# Patient Record
Sex: MALE | Race: WHITE | NOT HISPANIC OR LATINO | Employment: OTHER | URBAN - METROPOLITAN AREA
[De-identification: names, ages, dates, MRNs, and addresses within clinical notes are randomized per-mention and may not be internally consistent; named-entity substitution may affect disease eponyms.]

---

## 2017-01-03 ENCOUNTER — OFFICE VISIT (OUTPATIENT)
Dept: CARDIOLOGY | Facility: CLINIC | Age: 72
End: 2017-01-03
Attending: INTERNAL MEDICINE
Payer: COMMERCIAL

## 2017-01-03 VITALS
SYSTOLIC BLOOD PRESSURE: 136 MMHG | BODY MASS INDEX: 24.22 KG/M2 | DIASTOLIC BLOOD PRESSURE: 76 MMHG | WEIGHT: 173 LBS | HEIGHT: 71 IN | HEART RATE: 52 BPM

## 2017-01-03 DIAGNOSIS — I48.0 PAROXYSMAL A-FIB (H): ICD-10-CM

## 2017-01-03 PROCEDURE — 99213 OFFICE O/P EST LOW 20 MIN: CPT | Mod: 25 | Performed by: NURSE PRACTITIONER

## 2017-01-03 PROCEDURE — 93000 ELECTROCARDIOGRAM COMPLETE: CPT | Performed by: NURSE PRACTITIONER

## 2017-01-03 NOTE — Clinical Note
1/3/2017    Sabas Staley MD  [facility]   [address]  [facility] [address]    RE: Rey Benjaminalex       Dear Colleague,    I had the pleasure of seeing Rey Brown in the [facility].  HPI: I saw Mr. Brown for followup of paroxysmal atrial fibrillation.  He is a 70-year-old white male with symptomatic paroxysmal atrial fibrillation.  He has been on flecainide 100 mg p.o. b.i.d. without side effects or recurrence of atrial fibrillation.  His heart rates have been in the 50's on the flecainide but he is asymptomatic.  He remains  He is very physically active without any limitations.      EKG today showed sinus bradycardia at 49 beats per minute.  The QRS duration was normal.      ASSESSMENT AND PLAN:  Mr. Brown is doing well symptomatically.  He is not having any problems with the flecainide therapy.  He will continue the current medication and return for followup in 1 year.          Encounter Diagnosis   Name Primary?     Paroxysmal a-fib (H)        CURRENT MEDICATIONS:  Current Outpatient Prescriptions   Medication Sig Dispense Refill     flecainide (TAMBOCOR) 100 MG tablet Take 1 tablet (100 mg) by mouth 2 times daily 180 tablet 3     fluticasone (FLONASE) 50 MCG/ACT nasal spray        simvastatin (ZOCOR) 20 MG tablet TAKE ONE TABLET BY MOUTH AT BEDTIME 90 tablet 3     sildenafil (VIAGRA) 100 MG tablet Take 0.5-1 tablets ( mg) by mouth daily as needed for erectile dysfunction 6 tablet 0     aspirin 81 MG tablet Take  by mouth daily.         ALLERGIES   No Known Allergies    PAST MEDICAL HISTORY:  Past Medical History   Diagnosis Date     Paroxysmal a-fib (H)      Hypercholesterolemia      Lumbar disc disease        PAST SURGICAL HISTORY:  Past Surgical History   Procedure Laterality Date     Tonsillectomy       Open reduction internal fixation hand       Rhinoplasty         FAMILY HISTORY:  Family History   Problem Relation  "Age of Onset     Dementia Father      Osteoarthritis Father      CEREBROVASCULAR DISEASE Father      Myocardial Infarction Mother      C.A.D. Mother      Coronary Artery Disease Mother        SOCIAL HISTORY:  Social History     Social History     Marital Status:      Spouse Name: Erin     Number of Children: N/A     Years of Education: N/A     Social History Main Topics     Smoking status: Never Smoker      Smokeless tobacco: Never Used     Alcohol Use: 0.0 oz/week     .1 Cans of beer per week      Comment: social only     Drug Use: None     Sexual Activity: Not Asked     Other Topics Concern     Special Diet No     Exercise Yes     2-3x/week     Social History Narrative       Review of Systems:  Skin:  Negative       Eyes:  Negative      ENT:  Negative      Respiratory:  Negative       Cardiovascular:  Negative      Gastroenterology: Negative      Genitourinary:  Negative      Musculoskeletal:  Negative      Neurologic:  Positive for headaches    Psychiatric:  Negative      Heme/Lymph/Imm:  Negative      Endocrine:  Negative        Physical Exam:  Vitals: /76 mmHg  Pulse 52  Ht 1.791 m (5' 10.5\")  Wt 78.472 kg (173 lb)  BMI 24.46 kg/m2    Constitutional:  cooperative, alert and oriented, well developed, well nourished, in no acute distress        Skin:  warm and dry to the touch        Head:  normocephalic        Eyes:  pupils equal and round        ENT:  no pallor or cyanosis        Neck:  carotid pulses are full and equal bilaterally, JVP normal, no carotid bruit, no thyromegaly        Chest:  normal breath sounds, clear to auscultation, normal A-P diameter, normal symmetry, normal respiratory excursion, no use of accessory muscles          Cardiac: regular rhythm, normal S1/S2, no S3 or S4, apical impulse not displaced, no murmurs, gallops or rubs                  Abdomen:           Vascular:                                          Extremities and Back:  no edema              Neurological:  " affect appropriate, oriented to time, person and place            Thank you for allowing me to participate in the care of your patient.      Sincerely,     Kaylan Oleary, NP, APRN [facility]

## 2017-01-03 NOTE — PROGRESS NOTES
HPI: I saw Mr. Brown for followup of paroxysmal atrial fibrillation.  He is a 70-year-old white male with symptomatic paroxysmal atrial fibrillation.  He has been on flecainide 100 mg p.o. b.i.d. without side effects or recurrence of atrial fibrillation.  His heart rates have been in the 50's on the flecainide but he is asymptomatic.  He remains  He is very physically active without any limitations.      EKG today showed sinus bradycardia at 49 beats per minute.  The QRS duration was normal.      ASSESSMENT AND PLAN:  Mr. Brown is doing well symptomatically.  He is not having any problems with the flecainide therapy.  He will continue the current medication and return for followup in 1 year.          Encounter Diagnosis   Name Primary?     Paroxysmal a-fib (H)        CURRENT MEDICATIONS:  Current Outpatient Prescriptions   Medication Sig Dispense Refill     flecainide (TAMBOCOR) 100 MG tablet Take 1 tablet (100 mg) by mouth 2 times daily 180 tablet 3     fluticasone (FLONASE) 50 MCG/ACT nasal spray        simvastatin (ZOCOR) 20 MG tablet TAKE ONE TABLET BY MOUTH AT BEDTIME 90 tablet 3     sildenafil (VIAGRA) 100 MG tablet Take 0.5-1 tablets ( mg) by mouth daily as needed for erectile dysfunction 6 tablet 0     aspirin 81 MG tablet Take  by mouth daily.         ALLERGIES   No Known Allergies    PAST MEDICAL HISTORY:  Past Medical History   Diagnosis Date     Paroxysmal a-fib (H)      Hypercholesterolemia      Lumbar disc disease        PAST SURGICAL HISTORY:  Past Surgical History   Procedure Laterality Date     Tonsillectomy       Open reduction internal fixation hand       Rhinoplasty         FAMILY HISTORY:  Family History   Problem Relation Age of Onset     Dementia Father      Osteoarthritis Father      CEREBROVASCULAR DISEASE Father      Myocardial Infarction Mother      C.A.D. Mother      Coronary Artery Disease Mother        SOCIAL HISTORY:  Social History     Social History     Marital  "Status:      Spouse Name: Erin     Number of Children: N/A     Years of Education: N/A     Social History Main Topics     Smoking status: Never Smoker      Smokeless tobacco: Never Used     Alcohol Use: 0.0 oz/week     .1 Cans of beer per week      Comment: social only     Drug Use: None     Sexual Activity: Not Asked     Other Topics Concern     Special Diet No     Exercise Yes     2-3x/week     Social History Narrative       Review of Systems:  Skin:  Negative       Eyes:  Negative      ENT:  Negative      Respiratory:  Negative       Cardiovascular:  Negative      Gastroenterology: Negative      Genitourinary:  Negative      Musculoskeletal:  Negative      Neurologic:  Positive for headaches    Psychiatric:  Negative      Heme/Lymph/Imm:  Negative      Endocrine:  Negative        Physical Exam:  Vitals: /76 mmHg  Pulse 52  Ht 1.791 m (5' 10.5\")  Wt 78.472 kg (173 lb)  BMI 24.46 kg/m2    Constitutional:  cooperative, alert and oriented, well developed, well nourished, in no acute distress        Skin:  warm and dry to the touch        Head:  normocephalic        Eyes:  pupils equal and round        ENT:  no pallor or cyanosis        Neck:  carotid pulses are full and equal bilaterally, JVP normal, no carotid bruit, no thyromegaly        Chest:  normal breath sounds, clear to auscultation, normal A-P diameter, normal symmetry, normal respiratory excursion, no use of accessory muscles          Cardiac: regular rhythm, normal S1/S2, no S3 or S4, apical impulse not displaced, no murmurs, gallops or rubs                  Abdomen:           Vascular:                                          Extremities and Back:  no edema              Neurological:  affect appropriate, oriented to time, person and place              CC  Sharmin Scott MD   [facility]  [address]  [address]  [place] [id number]                "

## 2017-01-03 NOTE — Clinical Note
1/3/2017    Sharmin Scott MD   [facility]  [address]  [address]  [place] [address]    RE: Rey Perkinsbarbara       Dear Colleague,    [facility] the [facility].  HPI: I saw Mr. Brown for followup of paroxysmal atrial fibrillation.  He is a 70-year-old white male with symptomatic paroxysmal atrial fibrillation.  He has been on flecainide 100 mg p.o. b.i.d. without side effects or recurrence of atrial fibrillation.  His heart rates have been in the 50's on the flecainide but he is asymptomatic.  He remains  He is very physically active without any limitations.      EKG today showed sinus bradycardia at 49 beats per minute.  The QRS duration was normal.      ASSESSMENT AND PLAN:  Mr. Brown is doing well symptomatically.  He is not having any problems with the flecainide therapy.  He will continue the current medication and return for followup in 1 year.          Encounter Diagnosis   Name Primary?     Paroxysmal a-fib (H)        CURRENT MEDICATIONS:  Current Outpatient Prescriptions   Medication Sig Dispense Refill     flecainide (TAMBOCOR) 100 MG tablet Take 1 tablet (100 mg) by mouth 2 times daily 180 tablet 3     fluticasone (FLONASE) 50 MCG/ACT nasal spray        simvastatin (ZOCOR) 20 MG tablet TAKE ONE TABLET BY MOUTH AT BEDTIME 90 tablet 3     sildenafil (VIAGRA) 100 MG tablet Take 0.5-1 tablets ( mg) by mouth daily as needed for erectile dysfunction 6 tablet 0     aspirin 81 MG tablet Take  by mouth daily.         ALLERGIES   No Known Allergies    PAST MEDICAL HISTORY:  Past Medical History   Diagnosis Date     Paroxysmal a-fib (H)      Hypercholesterolemia      Lumbar disc disease        PAST SURGICAL HISTORY:  Past Surgical History   Procedure Laterality Date     Tonsillectomy       Open reduction internal fixation hand       Rhinoplasty         FAMILY HISTORY:  Family History   Problem Relation Age of Onset      "Dementia Father      Osteoarthritis Father      CEREBROVASCULAR DISEASE Father      Myocardial Infarction Mother      C.A.D. Mother      Coronary Artery Disease Mother        SOCIAL HISTORY:  Social History     Social History     Marital Status:      Spouse Name: Erin     Number of Children: N/A     Years of Education: N/A     Social History Main Topics     Smoking status: Never Smoker      Smokeless tobacco: Never Used     Alcohol Use: 0.0 oz/week     .1 Cans of beer per week      Comment: social only     Drug Use: None     Sexual Activity: Not Asked     Other Topics Concern     Special Diet No     Exercise Yes     2-3x/week     Social History Narrative       Review of Systems:  Skin:  Negative       Eyes:  Negative      ENT:  Negative      Respiratory:  Negative       Cardiovascular:  Negative      Gastroenterology: Negative      Genitourinary:  Negative      Musculoskeletal:  Negative      Neurologic:  Positive for headaches    Psychiatric:  Negative      Heme/Lymph/Imm:  Negative      Endocrine:  Negative        Physical Exam:  Vitals: /76 mmHg  Pulse 52  Ht 1.791 m (5' 10.5\")  Wt 78.472 kg (173 lb)  BMI 24.46 kg/m2    Constitutional:  cooperative, alert and oriented, well developed, well nourished, in no acute distress        Skin:  warm and dry to the touch        Head:  normocephalic        Eyes:  pupils equal and round        ENT:  no pallor or cyanosis        Neck:  carotid pulses are full and equal bilaterally, JVP normal, no carotid bruit, no thyromegaly        Chest:  normal breath sounds, clear to auscultation, normal A-P diameter, normal symmetry, normal respiratory excursion, no use of accessory muscles          Cardiac: regular rhythm, normal S1/S2, no S3 or S4, apical impulse not displaced, no murmurs, gallops or rubs                  Abdomen:           Vascular:                                          Extremities and Back:  no edema              Neurological:  affect appropriate, " oriented to time, person and place              CC  Sharmin Scott MD   [facility]  [address]  [address]  [place] [address]                  Thank you for allowing me to participate in the care of your patient.      Sincerely,     Kaylan Oleary, NP, APRN CNP     [facility] [place]    cc:   Sharmin Scott MD   [facility]  [address]  [address]  [place] [address]    Sabas Staley MD  [facility] [address]  [facility] [address]

## 2017-01-12 ENCOUNTER — MYC MEDICAL ADVICE (OUTPATIENT)
Dept: FAMILY MEDICINE | Facility: CLINIC | Age: 72
End: 2017-01-12

## 2017-01-12 RX ORDER — SILDENAFIL 100 MG/1
50-100 TABLET, FILM COATED ORAL DAILY PRN
Qty: 90 TABLET | Refills: 1 | COMMUNITY
Start: 2017-01-12 | End: 2017-01-12

## 2017-01-12 RX ORDER — SILDENAFIL 100 MG/1
50-100 TABLET, FILM COATED ORAL DAILY PRN
Qty: 16 TABLET | Refills: 3 | COMMUNITY
Start: 2017-01-12 | End: 2018-11-27 | Stop reason: SINTOL

## 2017-01-13 NOTE — TELEPHONE ENCOUNTER
Per patients request a rx for Viagra was mailed to him for Lorena fill.  Per Dr Luís FISHER for Viagra 100 mg.  Mailed out rx to patient.

## 2017-01-23 ENCOUNTER — OFFICE VISIT (OUTPATIENT)
Dept: FAMILY MEDICINE | Facility: CLINIC | Age: 72
End: 2017-01-23

## 2017-01-23 VITALS
HEART RATE: 45 BPM | SYSTOLIC BLOOD PRESSURE: 138 MMHG | OXYGEN SATURATION: 99 % | WEIGHT: 172 LBS | DIASTOLIC BLOOD PRESSURE: 70 MMHG | BODY MASS INDEX: 24.32 KG/M2

## 2017-01-23 DIAGNOSIS — M25.611 SHOULDER STIFFNESS, RIGHT: Primary | ICD-10-CM

## 2017-01-23 PROCEDURE — 99213 OFFICE O/P EST LOW 20 MIN: CPT | Performed by: FAMILY MEDICINE
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
## 2017-03-02 ENCOUNTER — OFFICE VISIT (OUTPATIENT)
Dept: FAMILY MEDICINE | Facility: CLINIC | Age: 72
End: 2017-03-02

## 2017-03-02 VITALS
SYSTOLIC BLOOD PRESSURE: 138 MMHG | WEIGHT: 169.2 LBS | BODY MASS INDEX: 24.22 KG/M2 | DIASTOLIC BLOOD PRESSURE: 68 MMHG | HEIGHT: 70 IN | OXYGEN SATURATION: 92 % | RESPIRATION RATE: 16 BRPM | HEART RATE: 53 BPM

## 2017-03-02 DIAGNOSIS — B07.0 PLANTAR WART: Primary | ICD-10-CM

## 2017-03-02 DIAGNOSIS — E78.00 HYPERCHOLESTEROLEMIA: ICD-10-CM

## 2017-03-02 PROCEDURE — 17110 DESTRUCTION B9 LES UP TO 14: CPT | Performed by: FAMILY MEDICINE

## 2017-03-02 PROCEDURE — 99207 ZZC NO CHARGE LOS: CPT | Performed by: FAMILY MEDICINE

## 2017-03-02 RX ORDER — SIMVASTATIN 20 MG
TABLET ORAL
Qty: 90 TABLET | Refills: 3 | Status: SHIPPED | OUTPATIENT
Start: 2017-03-02 | End: 2017-07-09

## 2017-03-02 NOTE — MR AVS SNAPSHOT
After Visit Summary   3/2/2017    Rey Brown    MRN: [id number]           Patient Information     Date Of Birth          1945        Visit Information        Provider Department      3/2/2017 9:15 AM Kel Burton MD [facility]        Today's Diagnoses     Plantar wart    -  1    Hypercholesterolemia           Follow-ups after your visit        Your next 10 appointments already scheduled     Apr 14, 2017  7:45 AM CDT   PHYSICAL with Sabas Staley MD   [facility] ([facility])    [address] [place] [address] [phone]              Who to contact     If you have questions or need follow up information about today's clinic visit or your schedule please contact [facility] directly at [phone].  Normal or non-critical lab and imaging results will be communicated to you by MyChart, letter or phone within 4 business days after the clinic has received the results. If you do not hear from us within 7 days, please contact the clinic through MyChart or phone. If you have a critical or abnormal lab result, we will notify you by phone as soon as possible.  Submit refill requests through [employer] or call your pharmacy and they will forward the refill request to us. Please allow 3 business days for your refill to be completed.          Additional Information About Your Visit        MyChart Information     [employer] gives you secure access to your electronic health record. If you see a primary care provider, you can also send messages to your care team and make appointments. If you have questions, please call your primary care clinic.  If you do not have a primary care provider, please call [phone] and they will assist you.        Care EveryWhere ID     This is your Care EveryWhere ID. This could be used by other organizations to access your [place] medical records  [id number]        Your Vitals Were     Pulse  "Respirations Height Pulse Oximetry BMI (Body Mass Index)       53 16 1.765 m (5' 9.5\") 92% 24.63 kg/m2        Blood Pressure from Last 3 Encounters:   03/02/17 138/68   01/23/17 138/70   01/03/17 136/76    Weight from Last 3 Encounters:   03/02/17 76.7 kg (169 lb 3.2 oz)   01/23/17 78 kg (172 lb)   01/03/17 78.5 kg (173 lb)              We Performed the Following     DESTRUCT BENIGN LESION, UP TO 14          Where to get your medicines      These medications were sent to [facility] - [address]  [address] [facility] [address]    Hours:  Mon-Fri 8:30am-5:00pm Toll Free [phone] Phone:  [phone]     simvastatin 20 MG tablet          Primary Care Provider Office Phone # Fax #    Sabas Staley [phone]       [facility] [address]  [facility] [address]        Thank you!     Thank you for choosing [facility]  for your care. Our goal is always to provide you with excellent care. Hearing back from our patients is one way we can continue to improve our services. Please take a few minutes to complete the written survey that you may receive in the mail after your visit with us. Thank you!             Your Updated Medication List - Protect others around you: Learn how to safely use, store and throw away your medicines at www.disposemymeds.org.          This list is accurate as of: 3/2/17  9:30 AM.  Always use your most recent med list.                   Brand Name Dispense Instructions for use    aspirin 81 MG tablet      Take  by mouth daily.       BREATHE RIGHT LARGE Strp      nightly as needed       flecainide 100 MG tablet    TAMBOCOR    180 tablet    Take 1 tablet (100 mg) by mouth 2 times daily       fluticasone 50 MCG/ACT spray    FLONASE     as needed for rhinitis Reported on 3/2/2017       simvastatin 20 MG tablet    ZOCOR    90 tablet    TAKE ONE TABLET BY MOUTH AT BEDTIME       " VIAGRA 100 MG cap/tab   Generic drug:  sildenafil     16 tablet    Take 0.5-1 tablets ( mg) by mouth daily as needed for erectile dysfunction Take 30 min to 4 hours before intercourse.  Never use with nitroglycerin, terazosin or doxazosin.       VITAMIN C PO      Take 500 mg by mouth daily       VITAMIN D (CHOLECALCIFEROL) PO      Take 1,000 Units by mouth daily

## 2017-03-02 NOTE — PROGRESS NOTES
"Wart left foot  Plantar  And back of right hand  For months  Has had before    Past history positive for warts.  Review of systems  No pain at the site of these warts.  Complaints of some numbness in both feet    Vital signs.  /68  Pulse 53  Resp 16  Ht 1.765 m (5' 9.5\")  Wt 76.7 kg (169 lb 3.2 oz)  SpO2 92%  BMI 24.63 kg/m2    Healthy-appearing man  Dorsum of RIGHT hand has a 1 mm raised whitish papule.  It's rough and firm  Plantar surface of his LEFT foot mid foot has a raised rough slightly irregular thickened area of skin with some black punctation.    Cryotherapy is applied to both lesions 20 seconds to the back of hand 40 seconds to the bottom of his foot ×2     Salomon Puente MD    "

## 2017-04-14 ENCOUNTER — OFFICE VISIT (OUTPATIENT)
Dept: FAMILY MEDICINE | Facility: CLINIC | Age: 72
End: 2017-04-14

## 2017-04-14 VITALS
OXYGEN SATURATION: 99 % | WEIGHT: 169 LBS | HEIGHT: 70 IN | SYSTOLIC BLOOD PRESSURE: 158 MMHG | BODY MASS INDEX: 24.2 KG/M2 | DIASTOLIC BLOOD PRESSURE: 70 MMHG | HEART RATE: 55 BPM

## 2017-04-14 DIAGNOSIS — Z00.00 ROUTINE GENERAL MEDICAL EXAMINATION AT A HEALTH CARE FACILITY: Primary | ICD-10-CM

## 2017-04-14 DIAGNOSIS — R97.20 ELEVATED PROSTATE SPECIFIC ANTIGEN (PSA): ICD-10-CM

## 2017-04-14 DIAGNOSIS — I10 ESSENTIAL HYPERTENSION: ICD-10-CM

## 2017-04-14 DIAGNOSIS — E78.00 HYPERCHOLESTEREMIA: ICD-10-CM

## 2017-04-14 DIAGNOSIS — Z11.59 NEED FOR HEPATITIS C SCREENING TEST: ICD-10-CM

## 2017-04-14 DIAGNOSIS — G44.219 EPISODIC TENSION-TYPE HEADACHE, NOT INTRACTABLE: ICD-10-CM

## 2017-04-14 DIAGNOSIS — I48.0 PAROXYSMAL A-FIB (H): ICD-10-CM

## 2017-04-14 LAB
% GRANULOCYTES: 66.6 % (ref 42.2–75.2)
HCT VFR BLD AUTO: 49.2 % (ref 39–51)
HEMOGLOBIN: 16.5 G/DL (ref 13.4–17.5)
LYMPHOCYTES NFR BLD AUTO: 22.5 % (ref 20.5–51.1)
MCH RBC QN AUTO: 30.4 PG (ref 27–31)
MCHC RBC AUTO-ENTMCNC: 33.6 G/DL (ref 33–37)
MCV RBC AUTO: 90.3 FL (ref 80–100)
MONOCYTES NFR BLD AUTO: 10.9 % (ref 1.7–9.3)
PLATELET # BLD AUTO: 210 K/UL (ref 140–450)
RBC # BLD AUTO: 5.44 X10/CMM (ref 4.2–5.9)
WBC # BLD AUTO: 4.7 X10/CMM (ref 3.8–11)

## 2017-04-14 PROCEDURE — 85025 COMPLETE CBC W/AUTO DIFF WBC: CPT | Performed by: FAMILY MEDICINE

## 2017-04-14 PROCEDURE — G0439 PPPS, SUBSEQ VISIT: HCPCS | Performed by: FAMILY MEDICINE

## 2017-04-14 PROCEDURE — 36415 COLL VENOUS BLD VENIPUNCTURE: CPT | Performed by: FAMILY MEDICINE

## 2017-04-14 PROCEDURE — 99214 OFFICE O/P EST MOD 30 MIN: CPT | Mod: 25 | Performed by: FAMILY MEDICINE

## 2017-04-14 RX ORDER — LOSARTAN POTASSIUM 25 MG/1
25 TABLET ORAL DAILY
Qty: 30 TABLET | Refills: 11 | Status: SHIPPED | OUTPATIENT
Start: 2017-04-14 | End: 2018-03-01

## 2017-04-14 NOTE — PROGRESS NOTES
SUBJECTIVE:                                                            Rey Brown is a 72 year old male who presents for Preventive Visit.      Are you in the first 12 months of your Medicare Part B coverage?  No    Healthy Habits:    Do you get at least three servings of calcium containing foods daily (dairy, green leafy vegetables, etc.)? yes    Amount of exercise or daily activities, outside of work: 6 day(s) per week    Problems taking medications regularly No    Medication side effects: No    Have you had an eye exam in the past two years? yes    Do you see a dentist twice per year? yes    Do you have sleep apnea, excessive snoring or daytime drowsiness?no    COGNITIVE SCREEN  1) Repeat 3 items (Banana, Sunrise, Chair)    2) Clock draw: NORMAL  3) 3 item recall: Recalls 3 objects  Results: 3 items recalled: COGNITIVE IMPAIRMENT LESS LIKELY    Mini-CogTM Copyright S Gerry. Licensed by the author for use in [facility]; reprinted with permission ([email or web address]). All rights reserved.            Headaches    Reviewed and updated as needed this visit by clinical staff         Reviewed and updated as needed this visit by Provider        Social History   Substance Use Topics     Smoking status: Never Smoker     Smokeless tobacco: Never Used     Alcohol use 0.0 oz/week     0 Cans of beer per week      Comment: social only       The patient does not drink >3 drinks per day nor >7 drinks per week.    Today's PHQ-2 Score:   PHQ-2 ( 1999 Pfizer) 6/22/2015 5/7/2014   Q1: Little interest or pleasure in doing things 0 0   Q2: Feeling down, depressed or hopeless 0 0   PHQ-2 Score 0 0       Do you feel safe in your environment - Yes    Do you have a Health Care Directive?: No: Advance care planning reviewed with patient; information given to patient to review.    Current providers sharing in care for this patient include:   Patient Care Team:  Sabas Staley MD as PCP - General (Family  Practice)    HEARING FREQUENCY:   Right Ear: passed  Left Ear: passed      Hearing impairment: No    Ability to successfully perform activities of daily living: Yes, no assistance needed     Fall risk:  Fallen 2 or more times in the past year?: No  Any fall with injury in the past year?: No    Home safety:  throw rugs in the hallway and lack of grab bars in the bathroom      The following health maintenance items are reviewed in Epic and correct as of today:  Health Maintenance   Topic Date Due     HEPATITIS C SCREENING  04/10/1965     AORTIC ANEURYSM SCREENING (SYSTEM ASSIGNED)  04/10/2012     FALL RISK ASSESSMENT  06/22/2016     INFLUENZA VACCINE (SYSTEM ASSIGNED)  09/01/2017     ADVANCE DIRECTIVE PLANNING Q5 YRS (NO INBASKET)  05/07/2019     LIPID SCREEN Q5 YR MALE (SYSTEM ASSIGNED)  06/15/2020     TETANUS IMMUNIZATION (SYSTEM ASSIGNED)  04/30/2022     COLON CANCER SCREEN (SYSTEM ASSIGNED)  08/24/2026     PNEUMOCOCCAL  Completed              ROS:  Constitutional, HEENT, cardiovascular, pulmonary, GI, , musculoskeletal, neuro, skin, endocrine and psych systems are negative, except as otherwise noted.    Patient Active Problem List   Diagnosis     Paroxysmal a-fib (H)     HTN (hypertension)     Hypercholesterolemia     Lumbar disc disease     ACP (advance care planning)     Health Care Home     Elevated prostate specific antigen (PSA)     Family history of migraine     Plantar wart     Past Surgical History:   Procedure Laterality Date     OPEN REDUCTION INTERNAL FIXATION HAND       RHINOPLASTY       TONSILLECTOMY         Social History   Substance Use Topics     Smoking status: Never Smoker     Smokeless tobacco: Never Used     Alcohol use 0.0 oz/week     0 Cans of beer per week      Comment: social only     Family History   Problem Relation Age of Onset     Dementia Father      Osteoarthritis Father      CEREBROVASCULAR DISEASE Father      Myocardial Infarction Mother      C.A.D. Mother      Coronary Artery  "Disease Mother          OBJECTIVE:                                                            There were no vitals taken for this visit. Estimated body mass index is 24.63 kg/(m^2) as calculated from the following:    Height as of 3/2/17: 1.765 m (5' 9.5\").    Weight as of 3/2/17: 76.7 kg (169 lb 3.2 oz).  EXAM:       ASSESSMENT / PLAN:                                                            Rey was seen today for physical and hearing screening.    Diagnoses and all orders for this visit:    Routine general medical examination at a health care facility        End of Life Planning:  Patient currently has an advanced directive: Yes.  Practitioner is supportive of decision.    COUNSELING:  Reviewed preventive health counseling, as reflected in patient instructions       Regular exercise       Healthy diet/nutrition        Estimated body mass index is 24.63 kg/(m^2) as calculated from the following:    Height as of 3/2/17: 1.765 m (5' 9.5\").    Weight as of 3/2/17: 76.7 kg (169 lb 3.2 oz).     reports that he has never smoked. He has never used smokeless tobacco.      Appropriate preventive services were discussed with this patient, including applicable screening as appropriate for cardiovascular disease, diabetes, osteopenia/osteoporosis, and glaucoma.  As appropriate for age/gender, discussed screening for colorectal cancer, prostate cancer, breast cancer, and cervical cancer. Checklist reviewing preventive services available has been given to the patient.    Reviewed patients plan of care and provided an AVS. The Basic Care Plan (routine screening as documented in Health Maintenance) for Rey meets the Care Plan requirement. This Care Plan has been established and reviewed with the Patient.    Counseling Resources:  ATP IV Guidelines  Pooled Cohorts Equation Calculator  Breast Cancer Risk Calculator  FRAX Risk Assessment  ICSI Preventive Guidelines  Dietary Guidelines for Americans, 2010  USDA's " MyPlate  ASA Prophylaxis  Lung CA Screening    Sabas Staley MD  [facility]

## 2017-04-14 NOTE — MR AVS SNAPSHOT
After Visit Summary   4/14/2017    Rey Brown    MRN: [id number]           Patient Information     Date Of Birth          1945        Visit Information        Provider Department      4/14/2017 7:45 AM Sabas Staley MD [facility]        Today's Diagnoses     Routine general medical examination at a health care facility    -  1    Paroxysmal a-fib (H)        Essential hypertension        Elevated prostate specific antigen (PSA)        Episodic tension-type headache, not intractable        Refill clinic medication management patient        Hypercholesteremia          Care Instructions      Preventive Health Recommendations:       Male Ages 65 and over    Yearly exam:             See your health care provider every year in order to  o   Review health changes.   o   Discuss preventive care.    o   Review your medicines if your doctor has prescribed any.    Talk with your health care provider about whether you should have a test to screen for prostate cancer (PSA).    Every 3 years, have a diabetes test (fasting glucose). If you are at risk for diabetes, you should have this test more often.    Every 5 years, have a cholesterol test. Have this test more often if you are at risk for high cholesterol or heart disease.     Every 10 years, have a colonoscopy. Or, have a yearly FIT test (stool test). These exams will check for colon cancer.    Talk to with your health care provider about screening for Abdominal Aortic Aneurysm if you have a family history of AAA or have a history of smoking.  Shots:     Get a flu shot each year.     Get a tetanus shot every 10 years.     Talk to your doctor about your pneumonia vaccines. There are now two you should receive - Pneumovax (PPSV 23) and Prevnar (PCV 13).    Talk to your doctor about a shingles vaccine.     Talk to your doctor about the hepatitis B vaccine.  Nutrition:     Eat at least 5 servings of fruits and vegetables each day.      Eat whole-grain bread, whole-wheat pasta and brown rice instead of white grains and rice.     Talk to your doctor about Calcium and Vitamin D.   Lifestyle    Exercise for at least 150 minutes a week (30 minutes a day, 5 days a week). This will help you control your weight and prevent disease.     Limit alcohol to one drink per day.     No smoking.     Wear sunscreen to prevent skin cancer.     See your dentist every six months for an exam and cleaning.     See your eye doctor every 1 to 2 years to screen for conditions such as glaucoma, macular degeneration and cataracts.        Follow-ups after your visit        Additional Services     [facility]       Referral to [facility]  Phone: [phone]  Fax: [phone]  Reason for referral: CT of sinuses due to sinus headaches                  Who to contact     If you have questions or need follow up information about today's clinic visit or your schedule please contact [facility] directly at [phone].  Normal or non-critical lab and imaging results will be communicated to you by [employer], letter or phone within 4 business days after the clinic has received the results. If you do not hear from us within 7 days, please contact the clinic through [employer] or phone. If you have a critical or abnormal lab result, we will notify you by phone as soon as possible.  Submit refill requests through [employer] or call your pharmacy and they will forward the refill request to us. Please allow 3 business days for your refill to be completed.          Additional Information About Your Visit        [employer] Information     [employer] gives you secure access to your electronic health record. If you see a primary care provider, you can also send messages to your care team and make appointments. If you have questions, please call your primary care clinic.  If you do not have a primary care provider, please call [phone] and they will assist you.    "     Care EveryWhere ID     This is your Care EveryWhere ID. This could be used by other organizations to access your [place] medical records  [id number]        Your Vitals Were     Pulse Height Pulse Oximetry BMI (Body Mass Index)          55 1.772 m (5' 9.75\") 99% 24.42 kg/m2         Blood Pressure from Last 3 Encounters:   04/14/17 158/70   03/02/17 138/68   01/23/17 138/70    Weight from Last 3 Encounters:   04/14/17 76.7 kg (169 lb)   03/02/17 76.7 kg (169 lb 3.2 oz)   01/23/17 78 kg (172 lb)              We Performed the Following     CBC with Diff/Plt ([facility])     Comp. Metabolic Panel (14) ([facility])     Lipid Panel ([facility])     PSA Serum ([facility])     Referral to [facility]          Today's Medication Changes          These changes are accurate as of: 4/14/17  8:31 AM.  If you have any questions, ask your nurse or doctor.               Start taking these medicines.        Dose/Directions    losartan 25 MG tablet   Commonly known as:  COZAAR   Used for:  Essential hypertension   Started by:  Sabas Staley MD        Dose:  25 mg   Take 1 tablet (25 mg) by mouth daily   Quantity:  30 tablet   Refills:  11            Where to get your medicines      These medications were sent to [id number] - [place], [place] - [address]  [address] [address] [facility] [address]     Phone:  [phone]     losartan 25 MG tablet                Primary Care Provider Office Phone # Fax #    Sabas Staley [phone]       [facility] [address]  [facility][note 3] [phone]        Thank you!     Thank you for choosing [facility]  for your care. Our goal is always to provide you with excellent care. Hearing back from our patients is one way we can continue to improve our services. Please take a few minutes to complete the written survey that you may receive in the mail after your visit with us. Thank you!             Your Updated Medication " List - Protect others around you: Learn how to safely use, store and throw away your medicines at www.disposemymeds.org.          This list is accurate as of: 4/14/17  8:31 AM.  Always use your most recent med list.                   Brand Name Dispense Instructions for use    aspirin 81 MG tablet      Take  by mouth daily.       BREATHE RIGHT LARGE Strp      nightly as needed       flecainide 100 MG tablet    TAMBOCOR    180 tablet    Take 1 tablet (100 mg) by mouth 2 times daily       fluticasone 50 MCG/ACT spray    FLONASE     as needed for rhinitis Reported on 3/2/2017       losartan 25 MG tablet    COZAAR    30 tablet    Take 1 tablet (25 mg) by mouth daily       simvastatin 20 MG tablet    ZOCOR    90 tablet    TAKE ONE TABLET BY MOUTH AT BEDTIME       VIAGRA 100 MG cap/tab   Generic drug:  sildenafil     16 tablet    Take 0.5-1 tablets ( mg) by mouth daily as needed for erectile dysfunction Take 30 min to 4 hours before intercourse.  Never use with nitroglycerin, terazosin or doxazosin.       VITAMIN C PO      Take 500 mg by mouth daily       VITAMIN D (CHOLECALCIFEROL) PO      Take 1,000 Units by mouth daily

## 2017-04-14 NOTE — PROGRESS NOTES
Problem(s) Oriented visit    Besides the Medicare Wellness Exam he is here to discuss the status of the following problem(s)  Subjective:  1.  Episodic tension-type headache, not intractable  Headache    Onset: 2 month(s) ago    Description:                 Location: unilateral in the left frontal area   Character: sharp pain if bends over but mostly a dull                 Frequency:  Twice a week and starts off with a sneeze                 Pain scale ratin/10                 Are headaches getting more intense or more frequent: YES- more freqent    Precipitating and/or Alleviating factors:        How much caffeine do you use daily: 1 can of pop  Does movement, bending over, increase pain: YES- bending over makes worse  Does light/sound make it worse: no  Does sleep help: YES  Do you wake up with a headache or does it wake you from sleep: YES                 Are you able to do daily activities: no    Accompanying Signs & Symptoms:   Stiff neck: no  Fever: no  Sinus pressure: YES  Nausea or vomiting: no  Dizziness: no  Numbness: no  Weakness: no  Visual changes: no    History:    Any head trauma: no  Any family history of migraines: YES- daughter  Any previous tests/evaluation  for headaches: no    Medications tried and outcome:  Ibuprofen (Advil, Motrin) with moderate relief        2. Paroxysmal a-fib (H)  No recent episodes on the flecanide    3. Essential hypertension  he has Hypertension which is currently not well controlled. he has been compliant with his medications and is here today to follow up on the this issue and see if we can't work on better control of the blood pressure.    Reviewed last 6 BP readings in chart:  BP Readings from Last 6 Encounters:   17 158/70   17 138/68   17 138/70   17 136/76   10/25/16 128/60   16 128/60     he  has not experienced any significant side effects from current medications for hypertension.    NO active cardiac complaints or symptoms  with exercise.      4. Elevated prostate specific antigen (PSA)  Long discussion wants to continue    ROS:  General and CV completed and negative except as noted above     HISTORY:   reports that he drinks alcohol.   reports that he has never smoked. He has never used smokeless tobacco.    Patient Active Problem List    Plantar wart         Priority: Medium [2]         Date Noted: 03/02/2017      Family history of migraine         Priority: Medium [2]         Date Noted: 04/09/2015      Elevated prostate specific antigen (PSA)         Priority: Medium [2]         Date Noted: 05/07/2014      Health Care Home         Priority: Medium [2]         Date Noted: 07/19/2013      ACP (advance care planning)         Priority: Medium [2]         Date Noted: 05/09/2013            form given      Lumbar disc disease         Priority: Medium [2]      Paroxysmal a-fib (H)         Priority: Medium [2]      HTN (hypertension)         Priority: Medium [2]      Hypercholesterolemia         Priority: Medium [2]        EXAM:  BP: 158/70   Pulse: 55    Temp: Data Unavailable    Wt Readings from Last 2 Encounters:   04/14/17 76.7 kg (169 lb)   03/02/17 76.7 kg (169 lb 3.2 oz)       BMI= Body mass index is 24.42 kg/(m^2).    EXAM:  APPEARANCE: = Relaxed and in no distress  Conj/Eyelids = noninjected and lids and lashes are without inflammation  PERRLA/Irises = Pupils Round Reactive to Light and Irisis without inflammation  Ears/Nose = External structures and Nares have usual shape and form  Ear canals and TM's = Canals are not inflammed and have none or little wax and the drums are not injected and have a light reflex   Lips/Teeth/Gums = No lesions seen, good dentition, and gums seem healthy  Oropharynx = No leukoplakia, No injection to the tissues, Normal Uvula  Neck = No anterior or posterior adenopathy appreciated.  Thyroid = Not enlarged and no masses felt  Resp effort = Calm regular breathing  Breath Sounds = Good air movement with no  rales or rhonchi in any lung fields  Heart Rate, Rythym, & sounds (no Murm)  = Regular rate and rythym with no S3, S4, or murmer appreciated.  Carotid Art's = Pulses full and equal and no bruits appreciated  Abdomen = Soft, nontender, no masses, & bowel sounds in all quadrants  Liver/Spleen = Normal span and no splenomegaly noted  = testes and penis are without lesions  Rectal = Anus without lesions, no polyps, Prostate nrl sized and no nodules  Digits and Nails = FROM in all finger joints, no nail dystrophy  Ext (edema) = No pretibial edema noted or elsewhere  Musculsktl =  Strength and ROM of major joints are within normal limits  SKIN = absent significant rashes or lesions   Recent/Remote Memory = Alert and Oriented x 3  NEURO = CN II-XII are tested and no deficits found  Mood/Affect = Cooperative and interested      Assessment/Plan:  Paroxysmal a-fib (H)  Stable CPM    Essential hypertension  Discussed hypertension in detail including JNC VIII guidelines for blood pressure goals.  Discussed indication for treatment and treatment options.  Discussed the importance for aggressive management of HTN to prevent vascular complications later.  Recommended lower fat, lower carbohydrate, and lower sodium (<2000 mg)diet.  Discussed required intervals for follow up on HTN, lab studies, and the need to aggresive management of other cardiac disease risk factors.  Recommened pt. follow their blood pressures outside the clinic to ensure that BPs are remaining within guidelines, and to contact me if the readings are not within guidelines so we can adjust treatment as needed.    -     losartan (COZAAR) 25 MG tablet; Take 1 tablet (25 mg) by mouth daily  -     CBC with Diff/Plt ([facility])    Elevated prostate specific antigen (PSA)  -     PSA Serum ([facility])  Needs a recheck  Episodic tension-type headache, not intractable  Will need to explore  -     Referral to [facility]    Hypercholesteremia  Discussed current lipid  results, previous results (if available) current guidelines (NCEP) for treatment and goals for lipids.  Discussed lifestyle modification, dietary changes (low fat, low simple carb) and regular aerobic exercise.  Discussed the link between dysmetabolic syndrome and impaired glucose tolerance seen in certain patterns of lipids.  Briefly discussed medication used for lipid lowering, including the statins are their possible side effects of myalgias, rhabdomyolysis, and liver toxicity.    -     Lipid Panel ([facility])          Reviewed patients plan of care and provided an AVS.   The Basic Care Plan (routine screening as documented in Health Maintenance) for Rey meets the Care Plan requirement.   This Care Plan has been established and reviewed with the  Patient.    Sabas Staley  [facility]  [phone]   For any issues my office # is [phone]

## 2017-04-18 ENCOUNTER — TRANSFERRED RECORDS (OUTPATIENT)
Dept: FAMILY MEDICINE | Facility: CLINIC | Age: 72
End: 2017-04-18

## 2017-04-18 LAB
ALBUMIN SERPL-MCNC: 4.2 G/DL (ref 3.5–4.8)
ALBUMIN/GLOB SERPL: 1.8 {RATIO} (ref 1.2–2.2)
ALP SERPL-CCNC: 66 IU/L (ref 39–117)
ALT SERPL-CCNC: 14 IU/L (ref 0–44)
AST SERPL-CCNC: 18 IU/L (ref 0–40)
BILIRUB SERPL-MCNC: 1.1 MG/DL (ref 0–1.2)
BUN SERPL-MCNC: 21 MG/DL (ref 8–27)
BUN/CREATININE RATIO: 21 (ref 10–24)
CALCIUM SERPL-MCNC: 9.5 MG/DL (ref 8.6–10.2)
CHLORIDE SERPLBLD-SCNC: 101 MMOL/L (ref 96–106)
CHOLEST SERPL-MCNC: 157 MG/DL (ref 100–199)
CREAT SERPL-MCNC: 0.98 MG/DL (ref 0.76–1.27)
EGFR IF AFRICN AM: 90 ML/MIN/1.73
EGFR IF NONAFRICN AM: 78 ML/MIN/1.73
GLOBULIN, TOTAL: 2.3 G/DL (ref 1.5–4.5)
GLUCOSE SERPL-MCNC: 79 MG/DL (ref 65–99)
HCV AB SERPL QL IA: <0.1 S/CO RATIO (ref 0–0.9)
HDLC SERPL-MCNC: 59 MG/DL
LDL/HDL RATIO: 1.5 RATIO UNITS (ref 0–3.6)
LDLC SERPL CALC-MCNC: 87 MG/DL (ref 0–99)
POTASSIUM SERPL-SCNC: 4.6 MMOL/L (ref 3.5–5.2)
PROT SERPL-MCNC: 6.5 G/DL (ref 6–8.5)
PSA NG/ML: 4.5 NG/ML (ref 0–4)
SODIUM SERPL-SCNC: 143 MMOL/L (ref 134–144)
TOTAL CO2: 27 MMOL/L (ref 18–28)
TRIGL SERPL-MCNC: 55 MG/DL (ref 0–149)
VLDLC SERPL CALC-MCNC: 11 MG/DL (ref 5–40)

## 2017-04-18 NOTE — LETTER
[facility] [address] [place]  [address]  Phone: [phone]    April 19, 2017      Rey Brown  [address]  [place] [address]              Dear Rey,      I am writing to report that your included test results are within expected ranges. I do not suggest that we make any changes at this time.           Sincerely,     Sabas Staley M.D.    Results for orders placed or performed in visit on 04/14/17   Comp. Metabolic Panel (14) ([facility])   Result Value Ref Range    Glucose 79 65 - 99 mg/dL    Urea Nitrogen 21 8 - 27 mg/dL    Creatinine 0.98 0.76 - 1.27 mg/dL    eGFR If NonAfricn Am 78 >59 mL/min/1.73    eGFR If Africn Am 90 >59 mL/min/1.73    BUN/Creatinine Ratio 21 10 - 24    Sodium 143 134 - 144 mmol/L    Potassium 4.6 3.5 - 5.2 mmol/L    Chloride 101 96 - 106 mmol/L    Total CO2 27 18 - 28 mmol/L    Calcium 9.5 8.6 - 10.2 mg/dL    Protein Total 6.5 6.0 - 8.5 g/dL    Albumin 4.2 3.5 - 4.8 g/dL    Globulin, Total 2.3 1.5 - 4.5 g/dL    A/G Ratio 1.8 1.2 - 2.2    Bilirubin Total 1.1 0.0 - 1.2 mg/dL    Alkaline Phosphatase 66 39 - 117 IU/L    AST 18 0 - 40 IU/L    ALT 14 0 - 44 IU/L    Narrative    Performed at:  01 - [facility] [address], [place], [place]  [id number]  : Bolivar Garcia MD, Phone:  [phone]   Lipid Panel ([facility])   Result Value Ref Range    Cholesterol 157 100 - 199 mg/dL    Triglycerides 55 0 - 149 mg/dL    HDL Cholesterol 59 >39 mg/dL    VLDL Cholesterol Dani 11 5 - 40 mg/dL    LDL Cholesterol Calculated 87 0 - 99 mg/dL    LDL/HDL Ratio 1.5 0.0 - 3.6 ratio units    Narrative    Performed at:  01 - [facility] [address], [place], [place]  [id number]  : Bolivar Garcia MD, Phone:  [phone]   CBC with Diff/Plt ([facility])   Result Value Ref Range    WBC x10/cmm 4.7 3.8 - 11.0 x10/cmm    % Lymphocytes 22.5 20.5 - 51.1 %    % Monocytes 10.9 (A) 1.7 - 9.3 %    % Granulocytes 66.6 42.2 - 75.2 %    RBC x10/cmm 5.44 4.2 - 5.9  x10/cmm    Hemoglobin 16.5 13.4 - 17.5 g/dl    Hematocrit 49.2 39 - 51 %    MCV 90.3 80 - 100 fL    MCH 30.4 27.0 - 31.0 pg    MCHC 33.6 33.0 - 37.0 g/dL    Platelet Count 210 140 - 450 K/uL   PSA Serum ([facility])   Result Value Ref Range    PSA NG/ML 4.5 (H) 0.0 - 4.0 ng/mL    Narrative    Performed at:  01 - [facility] [address], [place], [place]  [id number]  : Bolivar Garcia MD, Phone:  [phone]   HCV Antibody ([facility])   Result Value Ref Range    Hep C Virus Ab <0.1 0.0 - 0.9 s/co ratio    Narrative    Performed at:  01 - [facility] [address], [place], [place]  [id number]  : Bolivar Garcia MD, Phone:  [phone]

## 2017-04-18 NOTE — PROGRESS NOTES
Dear Rey,   I am writing to report that your included test results are within expected ranges. I do not suggest that we make any changes at this time.    Sabas Staley M.D.

## 2017-07-09 DIAGNOSIS — E78.00 HYPERCHOLESTEROLEMIA: ICD-10-CM

## 2017-07-10 RX ORDER — SIMVASTATIN 20 MG
TABLET ORAL
Qty: 90 TABLET | Refills: 3 | Status: SHIPPED | OUTPATIENT
Start: 2017-07-10 | End: 2018-04-09

## 2017-07-17 DIAGNOSIS — I48.0 PAROXYSMAL A-FIB (H): ICD-10-CM

## 2017-07-17 DIAGNOSIS — Z76.0 ENCOUNTER FOR MEDICATION REFILL: Primary | ICD-10-CM

## 2017-07-17 RX ORDER — FLECAINIDE ACETATE 100 MG/1
TABLET ORAL
Qty: 180 TABLET | Refills: 2 | Status: SHIPPED | OUTPATIENT
Start: 2017-07-17 | End: 2018-04-16

## 2017-07-17 NOTE — TELEPHONE ENCOUNTER
Pending Prescriptions:                       Disp   Refills    flecainide (TAMBOCOR) 100 MG tablet [Pharm*180 ta*2        Sig: TAKE 1 TABLET BY MOUTH 2 TIMES DAILY    Last OV 4/14/17  Lipid, CMP, CBC 4/14/17  TSH 4/9/15

## 2018-01-10 ENCOUNTER — OFFICE VISIT (OUTPATIENT)
Dept: CARDIOLOGY | Facility: CLINIC | Age: 73
End: 2018-01-10
Attending: INTERNAL MEDICINE
Payer: COMMERCIAL

## 2018-01-10 VITALS
SYSTOLIC BLOOD PRESSURE: 126 MMHG | DIASTOLIC BLOOD PRESSURE: 68 MMHG | BODY MASS INDEX: 24.55 KG/M2 | HEART RATE: 52 BPM | HEIGHT: 70 IN | WEIGHT: 171.5 LBS

## 2018-01-10 DIAGNOSIS — I10 ESSENTIAL HYPERTENSION: Primary | ICD-10-CM

## 2018-01-10 DIAGNOSIS — I48.0 PAROXYSMAL A-FIB (H): ICD-10-CM

## 2018-01-10 PROCEDURE — 99214 OFFICE O/P EST MOD 30 MIN: CPT | Performed by: INTERNAL MEDICINE

## 2018-01-10 PROCEDURE — 93000 ELECTROCARDIOGRAM COMPLETE: CPT | Performed by: INTERNAL MEDICINE

## 2018-01-10 RX ORDER — TRIAMCINOLONE ACETONIDE 55 UG/1
2 SPRAY, METERED NASAL PRN
COMMUNITY
End: 2020-03-16

## 2018-01-10 NOTE — MR AVS SNAPSHOT
After Visit Summary   1/10/2018    Rey Brown    MRN: [id number]           Patient Information     Date Of Birth          1945        Visit Information        Provider Department      1/10/2018 3:45 PM Sharmin Scott MD [facility]        Today's Diagnoses     Essential hypertension    -  1    Paroxysmal a-fib (H)           Follow-ups after your visit        Additional Services     Follow-Up with Cardiac Advanced Practice Provider           Follow-Up with Electrophysiologist                 Future tests that were ordered for you today     Open Future Orders        Priority Expected Expires Ordered    Follow-Up with Cardiac Advanced Practice Provider Routine 1/10/2019 5/25/2019 1/10/2018    Follow-Up with Electrophysiologist Routine 1/10/2020 5/25/2020 1/10/2018    EKG 12-lead complete w/read (Future)- to be scheduled Routine 1/4/2019 1/9/2019 1/10/2018            Who to contact     If you have questions or need follow up information about today's clinic visit or your schedule please contact [facility] directly at [phone].  Normal or non-critical lab and imaging results will be communicated to you by S[employer] Material[employer]art, letter or phone within 4 business days after the clinic has received the results. If you do not hear from us within 7 days, please contact the clinic through Counsylt or phone. If you have a critical or abnormal lab result, we will notify you by phone as soon as possible.  Submit refill requests through Desura or call your pharmacy and they will forward the refill request to us. Please allow 3 business days for your refill to be completed.          Additional Information About Your Visit        S[employer] Material[employer]art Information     Desura gives you secure access to your electronic health record. If you see a primary care provider, you can also send messages to your care team and make appointments. If you have  "questions, please call your primary care clinic.  If you do not have a primary care provider, please call [phone] and they will assist you.        Care EveryWhere ID     This is your Care EveryWhere ID. This could be used by other organizations to access your [place] medical records  [id number]        Your Vitals Were     Pulse Height BMI (Body Mass Index)             52 1.772 m (5' 9.76\") 24.77 kg/m2          Blood Pressure from Last 3 Encounters:   01/10/18 126/68   04/14/17 158/70   03/02/17 138/68    Weight from Last 3 Encounters:   01/10/18 77.8 kg (171 lb 8 oz)   04/14/17 76.7 kg (169 lb)   03/02/17 76.7 kg (169 lb 3.2 oz)              We Performed the Following     EKG 12-lead complete w/read - Clinics (performed today)     Follow-Up with Electrophysiologist        Primary Care Provider Office Phone # Fax #    Sabas Staley [phone] [address]  [facility] [address]        Equal Access to Services     [facility] : Hadii aad ku hadasho Soomaali, waaxda luqadaha, qaybta kaalmada adevicyaellyn, sakina zendejas . So [facility] [phone].    ATENCIÓN: Si habla español, tiene a ramsay disposición servicios gratuitos de asistencia lingüística. Bozena al [phone].    We comply with applicable federal civil rights laws and [place] laws. We do not discriminate on the basis of race, color, national origin, age, disability, sex, sexual orientation, or gender identity.            Thank you!     Thank you for choosing [facility]  for your care. Our goal is always to provide you with excellent care. Hearing back from our patients is one way we can continue to improve our services. Please take a few minutes to complete the written survey that you may receive in the mail after your visit with us. Thank you!             Your Updated Medication List - Protect others around you: Learn how to safely use, store and throw " away your medicines at www.disposemymeds.org.          This list is accurate as of: 1/10/18  4:07 PM.  Always use your most recent med list.                   Brand Name Dispense Instructions for use Diagnosis    aspirin 81 MG tablet      Take  by mouth daily.        BREATHE RIGHT LARGE Strp      nightly as needed        flecainide 100 MG tablet    TAMBOCOR    180 tablet    TAKE 1 TABLET BY MOUTH 2 TIMES DAILY    Encounter for medication refill       fluticasone 50 MCG/ACT spray    FLONASE     as needed for rhinitis Reported on 3/2/2017    Chest wall pain       losartan 25 MG tablet    COZAAR    30 tablet    Take 1 tablet (25 mg) by mouth daily    Essential hypertension       NASACORT AQ 55 MCG/ACT Inhaler   Generic drug:  triamcinolone      Spray 2 sprays into both nostrils as needed        simvastatin 20 MG tablet    ZOCOR    90 tablet    TAKE ONE TABLET BY MOUTH AT BEDTIME    Hypercholesterolemia       VIAGRA 100 MG tablet   Generic drug:  sildenafil     16 tablet    Take 0.5-1 tablets ( mg) by mouth daily as needed for erectile dysfunction Take 30 min to 4 hours before intercourse.  Never use with nitroglycerin, terazosin or doxazosin.        VITAMIN C PO      Take 1,000 mg by mouth daily        VITAMIN D (CHOLECALCIFEROL) PO      Take 1,000 Units by mouth daily

## 2018-01-10 NOTE — LETTER
1/10/2018      Sabas Staley MD  [address]  [facility] [address]      RE: Rey Perkinsbarbara       Dear Colleague,    I had the pleasure of seeing Rey Brown in the [facility].    HISTORY OF PRESENT ILLNESS:  I saw Mr. Brown for followup of atrial fibrillation.  He is a 72-year-old white male with a history of symptomatic paroxysmal atrial fibrillation.  He has been on flecainide 100 mg p.o. b.i.d. without recurrence of atrial fibrillation.  He tolerates the current dose of flecainide well without side effects.  Over the last year, he has been doing well with no new events health-wise.  At the present time, he has no palpitation, dizziness, shortness of breath or fatigue.      PHYSICAL EXAMINATION:   VITAL SIGNS:  Blood pressure was 126/68, heart rate 52 beats per minute, body weight 171 pounds.   HEENT:  Eyes and ENT were unremarkable.   LUNGS:  Clear.   CARDIAC:  Rhythm was regular, and heart sounds were normal with no murmur.   ABDOMEN:  Examination showed no hepatomegaly.   EXTREMITIES:  There was no pedal edema.      EKG today showed normal sinus rhythm with a QRS duration of 101 milliseconds.      ASSESSMENT AND RECOMMENDATIONS:  Mr. Brown is doing well symptomatically.  He tolerates flecainide well.  There is no evidence of atrial fibrillation.  He can continue current dose of flecainide and return for followup in 1 year.        Outpatient Encounter Prescriptions as of 1/10/2018   Medication Sig Dispense Refill     triamcinolone (NASACORT AQ) 55 MCG/ACT Inhaler Spray 2 sprays into both nostrils as needed       flecainide (TAMBOCOR) 100 MG tablet TAKE 1 TABLET BY MOUTH 2 TIMES DAILY 180 tablet 2     simvastatin (ZOCOR) 20 MG tablet TAKE ONE TABLET BY MOUTH AT BEDTIME 90 tablet 3     losartan (COZAAR) 25 MG tablet Take 1 tablet (25 mg) by mouth daily 30 tablet 11     Nasal Dilators (BREATHE RIGHT LARGE) STRP nightly as needed       Ascorbic  Acid (VITAMIN C PO) Take 1,000 mg by mouth daily        sildenafil (VIAGRA) 100 MG cap/tab Take 0.5-1 tablets ( mg) by mouth daily as needed for erectile dysfunction Take 30 min to 4 hours before intercourse.  Never use with nitroglycerin, terazosin or doxazosin. 16 tablet 3     aspirin 81 MG tablet Take  by mouth daily.       VITAMIN D, CHOLECALCIFEROL, PO Take 1,000 Units by mouth daily       fluticasone (FLONASE) 50 MCG/ACT nasal spray as needed for rhinitis Reported on 3/2/2017       No facility-administered encounter medications on file as of 1/10/2018.      Again, thank you for allowing me to participate in the care of your patient.      Sincerely,    Sharmin Scott MD     [facility]

## 2018-01-10 NOTE — PROGRESS NOTES
HPI and Plan:   See dictation    Orders Placed This Encounter   Procedures     Follow-Up with Electrophysiologist     Follow-Up with Cardiac Advanced Practice Provider     EKG 12-lead complete w/read - Clinics (performed today)     EKG 12-lead complete w/read (Future)- to be scheduled       Orders Placed This Encounter   Medications     triamcinolone (NASACORT AQ) 55 MCG/ACT Inhaler     Sig: Spray 2 sprays into both nostrils as needed       There are no discontinued medications.      Encounter Diagnoses   Name Primary?     Paroxysmal a-fib (H)      Essential hypertension Yes       CURRENT MEDICATIONS:  Current Outpatient Prescriptions   Medication Sig Dispense Refill     triamcinolone (NASACORT AQ) 55 MCG/ACT Inhaler Spray 2 sprays into both nostrils as needed       flecainide (TAMBOCOR) 100 MG tablet TAKE 1 TABLET BY MOUTH 2 TIMES DAILY 180 tablet 2     simvastatin (ZOCOR) 20 MG tablet TAKE ONE TABLET BY MOUTH AT BEDTIME 90 tablet 3     losartan (COZAAR) 25 MG tablet Take 1 tablet (25 mg) by mouth daily 30 tablet 11     Nasal Dilators (BREATHE RIGHT LARGE) STRP nightly as needed       Ascorbic Acid (VITAMIN C PO) Take 1,000 mg by mouth daily        sildenafil (VIAGRA) 100 MG cap/tab Take 0.5-1 tablets ( mg) by mouth daily as needed for erectile dysfunction Take 30 min to 4 hours before intercourse.  Never use with nitroglycerin, terazosin or doxazosin. 16 tablet 3     aspirin 81 MG tablet Take  by mouth daily.       VITAMIN D, CHOLECALCIFEROL, PO Take 1,000 Units by mouth daily       fluticasone (FLONASE) 50 MCG/ACT nasal spray as needed for rhinitis Reported on 3/2/2017         ALLERGIES     Allergies   Allergen Reactions     Ace Inhibitors Cough       PAST MEDICAL HISTORY:  Past Medical History:   Diagnosis Date     Hypercholesterolemia      Lumbar disc disease      Paroxysmal a-fib (H)        PAST SURGICAL HISTORY:  Past Surgical History:   Procedure Laterality Date     OPEN REDUCTION INTERNAL FIXATION  "HAND       RHINOPLASTY       TONSILLECTOMY         FAMILY HISTORY:  Family History   Problem Relation Age of Onset     Dementia Father      Osteoarthritis Father      CEREBROVASCULAR DISEASE Father      Myocardial Infarction Mother      C.A.D. Mother      Coronary Artery Disease Mother        SOCIAL HISTORY:  Social History     Social History     Marital status:      Spouse name: Erin     Number of children: N/A     Years of education: N/A     Social History Main Topics     Smoking status: Never Smoker     Smokeless tobacco: Never Used     Alcohol use 0.0 oz/week     0 Cans of beer per week      Comment: social only     Drug use: None     Sexual activity: Not Asked     Other Topics Concern     Special Diet No     Exercise Yes     2-3x/week     Social History Narrative       Review of Systems:  Skin:  Negative       Eyes:  Negative      ENT:  Negative      Respiratory:  Negative       Cardiovascular:  Negative      Gastroenterology: Negative      Genitourinary:  Negative      Musculoskeletal:  Negative      Neurologic:  Positive for headaches    Psychiatric:  Negative      Heme/Lymph/Imm:  Negative      Endocrine:  Negative        Physical Exam:  Vitals: /68  Pulse 52  Ht 1.772 m (5' 9.76\")  Wt 77.8 kg (171 lb 8 oz)  BMI 24.77 kg/m2    Constitutional:  cooperative, alert and oriented, well developed, well nourished, in no acute distress        Skin:  warm and dry to the touch          Head:  normocephalic        Eyes:  pupils equal and round        Lymph:      ENT:  no pallor or cyanosis        Neck:           Respiratory:  normal breath sounds, clear to auscultation, normal A-P diameter, normal symmetry, normal respiratory excursion, no use of accessory muscles         Cardiac: regular rhythm, normal S1/S2, no S3 or S4, apical impulse not displaced, no murmurs, gallops or rubs       systolic ejection murmur;grade 1                                                 GI:           Extremities and " Muscular Skeletal:  no edema              Neurological:           Psych:           CC  Sharmin Scott MD  [address]  [address]  [place] [id number]

## 2018-01-11 NOTE — PROGRESS NOTES
HISTORY OF PRESENT ILLNESS:  I saw Mr. Velazquez for followup of atrial fibrillation.  He is a 72-year-old white male with a history of symptomatic paroxysmal atrial fibrillation.  He has been on flecainide 100 mg p.o. b.i.d. without recurrence of atrial fibrillation.  He tolerates the current dose of flecainide well without side effects.  Over the last year, he has been doing well with no new events health-wise.  At the present time, he has no palpitation, dizziness, shortness of breath or fatigue.      PHYSICAL EXAMINATION:   VITAL SIGNS:  Blood pressure was 126/68, heart rate 52 beats per minute, body weight 171 pounds.   HEENT:  Eyes and ENT were unremarkable.   LUNGS:  Clear.   CARDIAC:  Rhythm was regular, and heart sounds were normal with no murmur.   ABDOMEN:  Examination showed no hepatomegaly.   EXTREMITIES:  There was no pedal edema.      EKG today showed normal sinus rhythm with a QRS duration of 101 milliseconds.      ASSESSMENT AND RECOMMENDATIONS:  Mr. Velazquez is doing well symptomatically.  He tolerates flecainide well.  There is no evidence of atrial fibrillation.  He can continue current dose of flecainide and return for followup in 1 year.      cc:   Sabas Staley MD    [facility]    [address] [place] [address]         MEMO KURTZ MD             D: 01/10/2018 16:10   T: 01/10/2018 21:36   MT: MICK      Name:     RO VELAZQUEZ   MRN:      [id number]        Account:      [id number]   :      1945           Service Date: 01/10/2018      Document: [id number]

## 2018-03-01 DIAGNOSIS — I10 ESSENTIAL HYPERTENSION: ICD-10-CM

## 2018-03-01 RX ORDER — LOSARTAN POTASSIUM 25 MG/1
TABLET ORAL
Qty: 90 TABLET | Refills: 0 | Status: SHIPPED | OUTPATIENT
Start: 2018-03-01 | End: 2018-06-02

## 2018-03-01 NOTE — TELEPHONE ENCOUNTER
losartan (COZAAR) 25 MG    LAST  Med check 4/14/17 cpx   last labs(for RX) 4/14/17  Next  appt scheduled =  none  Mala Gandhi MA    BP Readings from Last 3 Encounters:   01/10/18 126/68   04/14/17 158/70   03/02/17 138/68     Last Basic Metabolic Panel:  Lab Results   Component Value Date     04/14/2017      Lab Results   Component Value Date    POTASSIUM 4.6 04/14/2017     Lab Results   Component Value Date    CHLORIDE 101 04/14/2017     Lab Results   Component Value Date    ELIEZER 9.5 04/14/2017     Lab Results   Component Value Date    CO2 28 04/09/2013     Lab Results   Component Value Date    BUN 21 04/14/2017    BUN 21 04/14/2017     Lab Results   Component Value Date    CR 0.98 04/14/2017     Lab Results   Component Value Date    GLC 79 04/14/2017

## 2018-04-09 DIAGNOSIS — E78.00 HYPERCHOLESTEROLEMIA: ICD-10-CM

## 2018-04-09 RX ORDER — SIMVASTATIN 20 MG
TABLET ORAL
Qty: 90 TABLET | Refills: 3 | Status: SHIPPED | OUTPATIENT
Start: 2018-04-09 | End: 2019-03-26

## 2018-04-16 DIAGNOSIS — Z79.899 ENCOUNTER FOR LONG-TERM (CURRENT) USE OF MEDICATIONS: Primary | ICD-10-CM

## 2018-04-16 RX ORDER — FLECAINIDE ACETATE 100 MG/1
TABLET ORAL
Qty: 180 TABLET | Refills: 1 | Status: SHIPPED | OUTPATIENT
Start: 2018-04-16 | End: 2018-10-07

## 2018-04-16 NOTE — TELEPHONE ENCOUNTER
flecainide (TAMBOCOR) 100 MG   LAST  Med check 4/14/17   last labs(for RX) 4/14/17  Next  appt scheduled =  6/21/18 cpx appt  Mala Gandhi MA

## 2018-06-02 DIAGNOSIS — I10 ESSENTIAL HYPERTENSION: ICD-10-CM

## 2018-06-04 RX ORDER — LOSARTAN POTASSIUM 25 MG/1
TABLET ORAL
Qty: 90 TABLET | Refills: 0 | Status: SHIPPED | OUTPATIENT
Start: 2018-06-04 | End: 2018-08-26

## 2018-06-04 NOTE — TELEPHONE ENCOUNTER
Sisys Meredith MD  P g Triage        Caller: Unspecified (2 days ago,  8:30 PM)                     Overdue for labs BMP or CMP.   BP Readings from Last 3 Encounters:   01/10/18 : 126/68   04/14/17 : 158/70   03/02/17 : 138/68   Refill one month. Schedule lab appointment.       Patient of Dr. Staley's. Last visit was 4/2017 for cpx. Patient has cpx scheduled with Dr. Staley 6/21/18 - patient had scheduled this appt 3/6/18.   Ok per Dr. Staley to refill medication. Rx sent.  Cristin Roberson RN

## 2018-06-21 ENCOUNTER — OFFICE VISIT (OUTPATIENT)
Dept: FAMILY MEDICINE | Facility: CLINIC | Age: 73
End: 2018-06-21

## 2018-06-21 VITALS
SYSTOLIC BLOOD PRESSURE: 130 MMHG | RESPIRATION RATE: 12 BRPM | HEART RATE: 56 BPM | OXYGEN SATURATION: 98 % | DIASTOLIC BLOOD PRESSURE: 76 MMHG | BODY MASS INDEX: 23.79 KG/M2 | WEIGHT: 166.2 LBS | HEIGHT: 70 IN

## 2018-06-21 DIAGNOSIS — L82.0 INFLAMED SEBORRHEIC KERATOSIS: ICD-10-CM

## 2018-06-21 DIAGNOSIS — Z00.00 MEDICARE ANNUAL WELLNESS VISIT, SUBSEQUENT: Primary | ICD-10-CM

## 2018-06-21 DIAGNOSIS — I48.0 PAROXYSMAL A-FIB (H): ICD-10-CM

## 2018-06-21 DIAGNOSIS — I10 BENIGN ESSENTIAL HYPERTENSION: ICD-10-CM

## 2018-06-21 DIAGNOSIS — R35.0 URINARY FREQUENCY: ICD-10-CM

## 2018-06-21 DIAGNOSIS — D48.5 NEOPLASM OF UNCERTAIN BEHAVIOR OF SKIN: ICD-10-CM

## 2018-06-21 DIAGNOSIS — R39.12 BENIGN PROSTATIC HYPERPLASIA WITH WEAK URINARY STREAM: ICD-10-CM

## 2018-06-21 DIAGNOSIS — R97.20 ELEVATED PROSTATE SPECIFIC ANTIGEN (PSA): ICD-10-CM

## 2018-06-21 DIAGNOSIS — E78.00 HYPERCHOLESTEROLEMIA: ICD-10-CM

## 2018-06-21 DIAGNOSIS — Z13.6 SCREENING FOR AAA (ABDOMINAL AORTIC ANEURYSM): ICD-10-CM

## 2018-06-21 DIAGNOSIS — N40.1 BENIGN PROSTATIC HYPERPLASIA WITH WEAK URINARY STREAM: ICD-10-CM

## 2018-06-21 LAB
% GRANULOCYTES: 71.1 % (ref 42.2–75.2)
HCT VFR BLD AUTO: 47.5 % (ref 39–51)
HEMOGLOBIN: 16 G/DL (ref 13.4–17.5)
LYMPHOCYTES NFR BLD AUTO: 22.7 % (ref 20.5–51.1)
MCH RBC QN AUTO: 30.6 PG (ref 27–31)
MCHC RBC AUTO-ENTMCNC: 33.7 G/DL (ref 33–37)
MCV RBC AUTO: 90.7 FL (ref 80–100)
MONOCYTES NFR BLD AUTO: 6.2 % (ref 1.7–9.3)
PLATELET # BLD AUTO: 227 K/UL (ref 140–450)
RBC # BLD AUTO: 5.24 X10/CMM (ref 4.2–5.9)
WBC # BLD AUTO: 6.5 X10/CMM (ref 3.8–11)

## 2018-06-21 PROCEDURE — 99214 OFFICE O/P EST MOD 30 MIN: CPT | Mod: 25 | Performed by: FAMILY MEDICINE

## 2018-06-21 PROCEDURE — 85025 COMPLETE CBC W/AUTO DIFF WBC: CPT | Performed by: FAMILY MEDICINE

## 2018-06-21 PROCEDURE — 99397 PER PM REEVAL EST PAT 65+ YR: CPT | Performed by: FAMILY MEDICINE

## 2018-06-21 PROCEDURE — 36415 COLL VENOUS BLD VENIPUNCTURE: CPT | Performed by: FAMILY MEDICINE

## 2018-06-21 RX ORDER — TAMSULOSIN HYDROCHLORIDE 0.4 MG/1
0.4 CAPSULE ORAL DAILY
Qty: 30 CAPSULE | Refills: 1 | Status: SHIPPED | OUTPATIENT
Start: 2018-06-21 | End: 2019-09-27

## 2018-06-21 NOTE — MR AVS SNAPSHOT
After Visit Summary   6/21/2018    Rey Brown    MRN: [id number]           Patient Information     Date Of Birth          1945        Visit Information        Provider Department      6/21/2018 1:15 PM Sabas Staley MD [facility]        Today's Diagnoses     Medicare annual wellness visit, subsequent    -  1    Hypercholesterolemia        Elevated prostate specific antigen (PSA)        Urinary frequency        Screening for AAA (abdominal aortic aneurysm)        Benign prostatic hyperplasia with weak urinary stream        Benign essential hypertension        Paroxysmal a-fib (H)        Neoplasm of uncertain behavior of skin        Inflamed seborrheic keratosis          Care Instructions      Preventive Health Recommendations:       Male Ages 65 and over    Yearly exam:             See your health care provider every year in order to  o   Review health changes.   o   Discuss preventive care.    o   Review your medicines if your doctor has prescribed any.    Talk with your health care provider about whether you should have a test to screen for prostate cancer (PSA).    Every 3 years, have a diabetes test (fasting glucose). If you are at risk for diabetes, you should have this test more often.    Every 5 years, have a cholesterol test. Have this test more often if you are at risk for high cholesterol or heart disease.     Every 10 years, have a colonoscopy. Or, have a yearly FIT test (stool test). These exams will check for colon cancer.    Talk to with your health care provider about screening for Abdominal Aortic Aneurysm if you have a family history of AAA or have a history of smoking.  Shots:     Get a flu shot each year.     Get a tetanus shot every 10 years.     Talk to your doctor about your pneumonia vaccines. There are now two you should receive - Pneumovax (PPSV 23) and Prevnar (PCV 13).    Talk to your doctor about a shingles vaccine.     Talk to your doctor  about the hepatitis B vaccine.  Nutrition:     Eat at least 5 servings of fruits and vegetables each day.     Eat whole-grain bread, whole-wheat pasta and brown rice instead of white grains and rice.     Talk to your doctor about Calcium and Vitamin D.   Lifestyle    Exercise for at least 150 minutes a week (30 minutes a day, 5 days a week). This will help you control your weight and prevent disease.     Limit alcohol to one drink per day.     No smoking.     Wear sunscreen to prevent skin cancer.     See your dentist every six months for an exam and cleaning.     See your eye doctor every 1 to 2 years to screen for conditions such as glaucoma, macular degeneration and cataracts.    Wound Care Instructions for Liquid Nitrogen Treatment   The treatment area will appear white at first. Over the next several hours a fluid-filled blister may form. The blister can be very dark. If blister appears, it will remain blistered for about a week. Then a scab will form over the area.   Leave the blistered area uncovered as long as it remains closed. If the area breaks open, you may cover it with a clean band aid. Do not pull off the skin covering the blister.   If the blistered area becomes uncomfortably filled with fluid, you may release some of the fluid by puncturing the blister with a needle that has been cleansed with alcohol.   If the skin covering the blister comes off, clean the area daily with soap and water. Apply a small amount of Vaseline and then cover with a clean band aid. Change the band aid twice daily until the skin is completely healed.   Call us if.....   1. You have signs of infection such as thick yellow or pus-like drainage from the wound site or a fever over 100 degrees Fahrenheit.   2. You have any questions or are not sure how to take care of the wound.              Follow-ups after your visit        Who to contact     If you have questions or need follow up information about today's clinic visit or  "your schedule please contact [facility] directly at [phone].  Normal or non-critical lab and imaging results will be communicated to you by YEVVOhart, letter or phone within 4 business days after the clinic has received the results. If you do not hear from us within 7 days, please contact the clinic through Snapeeet or phone. If you have a critical or abnormal lab result, we will notify you by phone as soon as possible.  Submit refill requests through BookingNest or call your pharmacy and they will forward the refill request to us. Please allow 3 business days for your refill to be completed.          Additional Information About Your Visit        BookingNest Information     BookingNest gives you secure access to your electronic health record. If you see a primary care provider, you can also send messages to your care team and make appointments. If you have questions, please call your primary care clinic.  If you do not have a primary care provider, please call [phone] and they will assist you.        Care EveryWhere ID     This is your Care EveryWhere ID. This could be used by other organizations to access your [place] medical records  [id number]        Your Vitals Were     Pulse Respirations Height Pulse Oximetry BMI (Body Mass Index)       56 12 1.778 m (5' 10\") 98% 23.85 kg/m2        Blood Pressure from Last 3 Encounters:   06/21/18 130/76   01/10/18 126/68   04/14/17 158/70    Weight from Last 3 Encounters:   06/21/18 75.4 kg (166 lb 3.2 oz)   01/10/18 77.8 kg (171 lb 8 oz)   04/14/17 76.7 kg (169 lb)              We Performed the Following     Abdominal Aortic Aneurysm Screening/Tracking     CBC with Diff/Plt ([facility])     Comp. Metabolic Panel (14) ([facility])     Lipid Panel ([facility])     PSA Serum ([facility])          Today's Medication Changes          These changes are accurate as of 6/21/18  2:24 PM.  If you have any questions, ask your nurse or doctor.               Start taking these medicines.     "    Dose/Directions    tamsulosin 0.4 MG capsule   Commonly known as:  FLOMAX   Used for:  Benign prostatic hyperplasia with weak urinary stream   Started by:  Sabas Staley MD        Dose:  0.4 mg   Take 1 capsule (0.4 mg) by mouth daily   Quantity:  30 capsule   Refills:  1            Where to get your medicines      These medications [facility] [place] [address] [facility]  [address] [facility] [address]    Hours:  Mon-Fri 8:30am-5:00pm Toll Free [phone] Phone:  [phone]     tamsulosin 0.4 MG capsule                Primary Care Provider Office Phone # Fax #    Sabas Staley [phone] [address]  [facility] [address]        Equal Access to Services     [facility]: Hadii nilda hernandez hadasho Soomaali, waaxda luqadaha, qaybta kaalmada adeegyada, waxay danielle hayaakermit zendejas . So [facility] [phone].    ATENCIÓN: Si habla español, tiene a ramsay disposición servicios gratuitos de asistencia lingüística. Llame al [phone].    We comply with applicable federal civil rights laws and [place] laws. We do not discriminate on the basis of race, color, national origin, age, disability, sex, sexual orientation, or gender identity.            Thank you!     Thank you for choosing [facility]  for your care. Our goal is always to provide you with excellent care. Hearing back from our patients is one way we can continue to improve our services. Please take a few minutes to complete the written survey that you may receive in the mail after your visit with us. Thank you!             Your Updated Medication List - Protect others around you: Learn how to safely use, store and throw away your medicines at www.disposemymeds.org.          This list is accurate as of 6/21/18  2:24 PM.  Always use your most recent med list.                   Brand Name Dispense Instructions for use Diagnosis     aspirin 81 MG tablet      Take  by mouth daily.        BREATHE RIGHT LARGE Strp      nightly as needed        flecainide 100 MG tablet    TAMBOCOR    180 tablet    TAKE 1 TABLET BY MOUTH 2 TIMES DAILY    Encounter for long-term (current) use of medications       fluticasone 50 MCG/ACT spray    FLONASE     as needed for rhinitis Reported on 3/2/2017    Chest wall pain       losartan 25 MG tablet    COZAAR    90 tablet    TAKE ONE TABLET BY MOUTH ONE TIME DAILY    Essential hypertension       NASACORT AQ 55 MCG/ACT Inhaler   Generic drug:  triamcinolone      Spray 2 sprays into both nostrils as needed        simvastatin 20 MG tablet    ZOCOR    90 tablet    TAKE ONE TABLET BY MOUTH AT BEDTIME    Hypercholesterolemia       tamsulosin 0.4 MG capsule    FLOMAX    30 capsule    Take 1 capsule (0.4 mg) by mouth daily    Benign prostatic hyperplasia with weak urinary stream       VIAGRA 100 MG tablet   Generic drug:  sildenafil     16 tablet    Take 0.5-1 tablets ( mg) by mouth daily as needed for erectile dysfunction Take 30 min to 4 hours before intercourse.  Never use with nitroglycerin, terazosin or doxazosin.        VITAMIN C PO      Take 1,000 mg by mouth daily

## 2018-06-21 NOTE — PROGRESS NOTES
SUBJECTIVE:   Rey Brown is a 73 year old male who presents for Preventive Visit.      Are you in the first 12 months of your Medicare Part B coverage?  No    Healthy Habits:    Do you get at least three servings of calcium containing foods daily (dairy, green leafy vegetables, etc.)? yes    Amount of exercise or daily activities, outside of work: 5 day(s) per week    Problems taking medications regularly No    Medication side effects: No    Have you had an eye exam in the past two years? no    Do you see a dentist twice per year? yes    Do you have sleep apnea, excessive snoring or daytime drowsiness?no      Ability to successfully perform activities of daily living: Yes, no assistance needed    Home safety:  lack of grab bars in the bathroom     Hearing impairment: Yes, Difficulty following a conversation in a noisy restaurant or crowded room.    Feel that people are mumbling or not speaking clearly.    Need to ask people to speak up or repeat themselves.    Difficulty understanding soft or whispered speech.  2  HEARING FREQUENCY TESTED BOTH EARS:Failed  Right Ear/Left Ear      500 Hz: passed/failed: failed left, pass right    1000 Hz: Passed   2000 Hz: Passed   4000 Hz: FAILED both  Mala Gandhi MA 6/21/2018  Check ear wax or audiology- hear loss as teenager            Fall risk:  Fallen 2 or more times in the past year?: No  Any fall with injury in the past year?: No        COGNITIVE SCREEN  1) Repeat 3 items (Banana, Sunrise, Chair)    2) Clock draw: NORMAL  3) 3 item recall: Recalls 3 objects  Results: 3 items recalled: COGNITIVE IMPAIRMENT LESS LIKELY    Mini-CogTM Copyright HEATH Garrett. Licensed by the author for use in Health system; reprinted with permission ([email or web address]@.[email or web address]). All rights reserved.            Skin check - itchy moles - scalp & right side  Dizziness- getting up too fast  Left eye headache  Urinary /prostate problem He complains of urinary hesitancy, weak stream, double and  incomplete voiding, intermittent urinary frequency and nocturia times     If sneezes or coughs does get pain behind left eye, previous MRI  Blood presure remains well controlled when checked out of clinic.    Reviewed last 6 BP readings in chart:  BP Readings from Last 6 Encounters:   06/21/18 130/76   01/10/18 126/68   04/14/17 158/70   03/02/17 138/68   01/23/17 138/70   01/03/17 136/76       he has not experienced any significant side effects from medications for hypertension.    NO active cardiac complaints or symptoms with exercise.    Afib is gone since on the Flecainide    Has history of hyperlipidemia.  On statin for this, denies any significant side effects of this medication.      Latest labs reviewed:    Recent Labs   Lab Test  04/14/17   0841  06/15/15   0916   CHOL  157  131   HDL  59  55   LDL  87  66   TRIG  55  48        Lab Results   Component Value Date    AST 18 04/14/2017        Taking Viagra as needed for erectile dysfunction.  He is satisfied with the current dose and effect.  He understands the contraindication with the use of any Nitrates.  He has not experienced any significant side effects of this medication. He would like to continue on it.      Reviewed and updated as needed this visit by clinical staff  Tobacco  Allergies  Meds         Reviewed and updated as needed this visit by Provider        Social History   Substance Use Topics     Smoking status: Never Smoker     Smokeless tobacco: Never Used     Alcohol use 1.2 - 1.8 oz/week     2 - 3 Cans of beer per week      Comment: social only       If you drink alcohol do you typically have >3 drinks per day or >7 drinks per week? No                        Today's PHQ-2 Score:   PHQ-2 ( 1999 Pfizer) 6/21/2018 4/14/2017   Q1: Little interest or pleasure in doing things 0 0   Q2: Feeling down, depressed or hopeless 0 0   PHQ-2 Score 0 0       Do you feel safe in your environment - Yes    Do you have a Health Care Directive?: No: Advance  "care planning reviewed with patient; information given to patient to review.    Current providers sharing in care for this patient include:   Patient Care Team:  Sabas Staley MD as PCP - General (Family Practice)    The following health maintenance items are reviewed in Epic and correct as of today:  Health Maintenance   Topic Date Due     AORTIC ANEURYSM SCREENING (SYSTEM ASSIGNED)  04/10/2012     FALL RISK ASSESSMENT  04/14/2018     INFLUENZA VACCINE (Season Ended) 09/01/2018     ADVANCE DIRECTIVE PLANNING Q5 YRS  05/07/2019     PHQ-2 Q1 YR  06/21/2019     LIPID SCREEN Q5 YR MALE (SYSTEM ASSIGNED)  04/14/2022     TETANUS IMMUNIZATION (SYSTEM ASSIGNED)  04/30/2022     COLON CANCER SCREEN (SYSTEM ASSIGNED)  08/24/2026     PNEUMOCOCCAL  Completed     HEPATITIS C SCREENING  Completed           ROS:  Constitutional, HEENT, cardiovascular, pulmonary, GI, , musculoskeletal, neuro, skin, endocrine and psych systems are negative, except as otherwise noted.    OBJECTIVE:   /76  Pulse 56  Resp 12  Ht 1.778 m (5' 10\")  Wt 75.4 kg (166 lb 3.2 oz)  SpO2 98%  BMI 23.85 kg/m2 Estimated body mass index is 23.85 kg/(m^2) as calculated from the following:    Height as of this encounter: 1.778 m (5' 10\").    Weight as of this encounter: 75.4 kg (166 lb 3.2 oz).  EXAM:   GENERAL: healthy, alert and no distress  EYES: Eyes grossly normal to inspection, PERRL and conjunctivae and sclerae normal  HENT: ear canals and TM's normal, nose and mouth without ulcers or lesions  NECK: no adenopathy, no asymmetry, masses, or scars and thyroid normal to palpation  RESP: lungs clear to auscultation - no rales, rhonchi or wheezes  CV: regular rate and rhythm, normal S1 S2, no S3 or S4, no murmur, click or rub, no peripheral edema and peripheral pulses strong  ABDOMEN: soft, nontender, no hepatosplenomegaly, no masses and bowel sounds normal   (male): normal male genitalia without lesions or urethral discharge, no " hernia  RECTAL: normal sphincter tone, no rectal masses, prostate normal size, smooth, nontender without nodules or masses  MS: no gross musculoskeletal defects noted, no edema  SKIN: no suspicious lesions or rashes  NEURO: Normal strength and tone, mentation intact and speech normal  PSYCH: mentation appears normal, affect normal/bright    ASSESSMENT / PLAN:   Rey was seen today for physical, hearing screening, headache, derm problem and urinary problem.    Diagnoses and all orders for this visit:    Medicare annual wellness visit, subsequent    Hypercholesterolemia  -     Lipid Panel ([facility])    Elevated prostate specific antigen (PSA)  -     PSA Serum ([facility])    Screening for AAA (abdominal aortic aneurysm)  -     Abdominal Aortic Aneurysm Screening/Tracking    Benign prostatic hyperplasia with weak urinary stream  These symptoms sound most consistent with bladder irritation from an enlarged prostate (BPH).  Will make sure the PSA is normal and up to date.    Discussed treatment options for this including lifestyle changes ( i.e. restricting water intake close to bedtime or long trips, avoiding caffeine, etc.), medications (including FLOMAX, proscar, saw palmetto, etc), and referral to Urology.    Benign essential hypertension  -     Comp. Metabolic Panel (14) ([facility])  -     CBC with Diff/Plt ([facility])  Discussed current hypertension treatment guidelines, including indications for treatment and treatment options.  Discussed the importance for aggressive management of HTN to prevent vascular complications later.  Recommended lower fat, lower carbohydrate, and lower sodium (<2000 mg)diet.  Discussed required intervals for follow up on HTN, lab studies.  Recommened pt. follow their blood pressures outside the clinic to ensure that BPs are remaining within guidelines, and to contact me if the readings are not within guidelines on a regular basis so we can adjust treatment as needed.    Paroxysmal a-fib (H)  The  "pathophysiology of atrial fibrillation, various causes, possibility of lone atrial fibrillation, risk of thrombus and embolic stroke, need for anticoagulation depending on etiology and Chads Score was discussed with patient and/or relatives. Rate Vs rhythm control approach was discussed including no difference in two strategies in patients enrolled in Affirm and Race trials. In some patients, afib ablation may be an option and was reviewed.  The indication for anticoagulation was discussed with the patient and/or their family in detail. The pros and cons of using warfarin versus newer anticoagulants was discussed including the need for INR monitoring with warfarin, dietary restrictions with warfarin and low cost of warfarin versus high co-pay for newer anticoagulants and lack of reversal agent with agents like Eliquis and Xarelto. Agents like Pradaxa has a reversal agent although the reversal agent can cause serious adverse events. The risk of bleeding including major bleeding and intracranial bleeding was discussed with all these agents and the data of efficacy and safety of these agents versus warfarin was communicated.  After discussion, the patient decided to take anticoagulation with asa.    Other orders  -     Cancel: Urinalysis w/reflex protein, bili ([facility])  -     Cancel: Referral to Audiology - [facility]        End of Life Planning:  Patient currently has an advanced directive: Yes.  Practitioner is supportive of decision.    COUNSELING:  Reviewed preventive health counseling, as reflected in patient instructions       Regular exercise       Healthy diet/nutrition        Estimated body mass index is 23.85 kg/(m^2) as calculated from the following:    Height as of this encounter: 1.778 m (5' 10\").    Weight as of this encounter: 75.4 kg (166 lb 3.2 oz).       reports that he has never smoked. He has never used smokeless tobacco.      Appropriate preventive services were discussed with this " patient, including applicable screening as appropriate for cardiovascular disease, diabetes, osteopenia/osteoporosis, and glaucoma.  As appropriate for age/gender, discussed screening for colorectal cancer, prostate cancer, breast cancer, and cervical cancer. Checklist reviewing preventive services available has been given to the patient.    Reviewed patients plan of care and provided an AVS. The Basic Care Plan (routine screening as documented in Health Maintenance) for Rey meets the Care Plan requirement. This Care Plan has been established and reviewed with the Patient.    Counseling Resources:  ATP IV Guidelines  Pooled Cohorts Equation Calculator  Breast Cancer Risk Calculator  FRAX Risk Assessment  ICSI Preventive Guidelines  Dietary Guidelines for Americans, 2010  USDA's MyPlate  ASA Prophylaxis  Lung CA Screening    Sabas Staley MD  [facility]

## 2018-06-21 NOTE — PATIENT INSTRUCTIONS
Preventive Health Recommendations:       Male Ages 65 and over    Yearly exam:             See your health care provider every year in order to  o   Review health changes.   o   Discuss preventive care.    o   Review your medicines if your doctor has prescribed any.    Talk with your health care provider about whether you should have a test to screen for prostate cancer (PSA).    Every 3 years, have a diabetes test (fasting glucose). If you are at risk for diabetes, you should have this test more often.    Every 5 years, have a cholesterol test. Have this test more often if you are at risk for high cholesterol or heart disease.     Every 10 years, have a colonoscopy. Or, have a yearly FIT test (stool test). These exams will check for colon cancer.    Talk to with your health care provider about screening for Abdominal Aortic Aneurysm if you have a family history of AAA or have a history of smoking.  Shots:     Get a flu shot each year.     Get a tetanus shot every 10 years.     Talk to your doctor about your pneumonia vaccines. There are now two you should receive - Pneumovax (PPSV 23) and Prevnar (PCV 13).    Talk to your doctor about a shingles vaccine.     Talk to your doctor about the hepatitis B vaccine.  Nutrition:     Eat at least 5 servings of fruits and vegetables each day.     Eat whole-grain bread, whole-wheat pasta and brown rice instead of white grains and rice.     Talk to your doctor about Calcium and Vitamin D.   Lifestyle    Exercise for at least 150 minutes a week (30 minutes a day, 5 days a week). This will help you control your weight and prevent disease.     Limit alcohol to one drink per day.     No smoking.     Wear sunscreen to prevent skin cancer.     See your dentist every six months for an exam and cleaning.     See your eye doctor every 1 to 2 years to screen for conditions such as glaucoma, macular degeneration and cataracts.    Wound Care Instructions for Liquid Nitrogen Treatment    The treatment area will appear white at first. Over the next several hours a fluid-filled blister may form. The blister can be very dark. If blister appears, it will remain blistered for about a week. Then a scab will form over the area.   Leave the blistered area uncovered as long as it remains closed. If the area breaks open, you may cover it with a clean band aid. Do not pull off the skin covering the blister.   If the blistered area becomes uncomfortably filled with fluid, you may release some of the fluid by puncturing the blister with a needle that has been cleansed with alcohol.   If the skin covering the blister comes off, clean the area daily with soap and water. Apply a small amount of Vaseline and then cover with a clean band aid. Change the band aid twice daily until the skin is completely healed.   Call us if.....   1. You have signs of infection such as thick yellow or pus-like drainage from the wound site or a fever over 100 degrees Fahrenheit.   2. You have any questions or are not sure how to take care of the wound.

## 2018-06-21 NOTE — PROCEDURES
Seb k on the anterior flank.  Red itchy, irritating, catch\es on clothes    ROS: No bleeding problems.    cryotherapy applied  Liquid nitrogen was applied for 5-10 seconds x3  to the skin lesions      A:P  Irritated, red and itchy  Dipesh K's    The expected blistering or scabbing reaction explained. Do not pick at the areas. Patient reminded to expect hypopigmented scars from the procedure. Return if lesions fail to fully resolve.

## 2018-06-22 LAB
ALBUMIN SERPL-MCNC: 4.4 G/DL (ref 3.5–4.8)
ALBUMIN/GLOB SERPL: 2 {RATIO} (ref 1.2–2.2)
ALP SERPL-CCNC: 70 IU/L (ref 39–117)
ALT SERPL-CCNC: 13 IU/L (ref 0–44)
AST SERPL-CCNC: 22 IU/L (ref 0–40)
BILIRUB SERPL-MCNC: 1.1 MG/DL (ref 0–1.2)
BUN SERPL-MCNC: 20 MG/DL (ref 8–27)
BUN/CREATININE RATIO: 22 (ref 10–24)
CALCIUM SERPL-MCNC: 9.3 MG/DL (ref 8.6–10.2)
CHLORIDE SERPLBLD-SCNC: 103 MMOL/L (ref 96–106)
CHOLEST SERPL-MCNC: 158 MG/DL (ref 100–199)
CREAT SERPL-MCNC: 0.93 MG/DL (ref 0.76–1.27)
EGFR IF AFRICN AM: 95 ML/MIN/1.73
EGFR IF NONAFRICN AM: 82 ML/MIN/1.73
GLOBULIN, TOTAL: 2.2 G/DL (ref 1.5–4.5)
GLUCOSE SERPL-MCNC: 88 MG/DL (ref 65–99)
HDLC SERPL-MCNC: 57 MG/DL
LDL/HDL RATIO: 1.6 RATIO (ref 0–3.6)
LDLC SERPL CALC-MCNC: 92 MG/DL (ref 0–99)
POTASSIUM SERPL-SCNC: 4.1 MMOL/L (ref 3.5–5.2)
PROT SERPL-MCNC: 6.6 G/DL (ref 6–8.5)
PSA NG/ML: 5.7 NG/ML (ref 0–4)
SODIUM SERPL-SCNC: 141 MMOL/L (ref 134–144)
TOTAL CO2: 27 MMOL/L (ref 20–29)
TRIGL SERPL-MCNC: 45 MG/DL (ref 0–149)
VLDLC SERPL CALC-MCNC: 9 MG/DL (ref 5–40)

## 2018-07-06 ENCOUNTER — TELEPHONE (OUTPATIENT)
Dept: FAMILY MEDICINE | Facility: CLINIC | Age: 73
End: 2018-07-06

## 2018-07-06 DIAGNOSIS — R97.20 ELEVATED PROSTATE SPECIFIC ANTIGEN (PSA): Primary | ICD-10-CM

## 2018-07-06 NOTE — TELEPHONE ENCOUNTER
----- Message from Melissa Duncan MD sent at 7/1/2018 12:54 PM CDT -----  Your labs all look good with the exception of Prostate Specific Antigen that is just a small amount above normal. Given your urinary symptoms and this change since last year, I think a Urology consult would be appropriate. My nurse will contact you with information regarding this.

## 2018-07-06 NOTE — TELEPHONE ENCOUNTER
Patient returned phone call for results.  Results given per Dr. Duncan, referral entered and faxed to [facility].  Contact information and phone number given to patient to call to schedule.  Dina Coughlin

## 2018-07-12 ENCOUNTER — DOCUMENTATION ONLY (OUTPATIENT)
Dept: VASCULAR SURGERY | Facility: CLINIC | Age: 73
End: 2018-07-12

## 2018-07-16 ENCOUNTER — DOCUMENTATION ONLY (OUTPATIENT)
Dept: OTHER | Facility: CLINIC | Age: 73
End: 2018-07-16

## 2018-07-16 DIAGNOSIS — Z71.89 ACP (ADVANCE CARE PLANNING): ICD-10-CM

## 2018-08-01 ENCOUNTER — TRANSFERRED RECORDS (OUTPATIENT)
Dept: FAMILY MEDICINE | Facility: CLINIC | Age: 73
End: 2018-08-01

## 2018-08-29 ENCOUNTER — TRANSFERRED RECORDS (OUTPATIENT)
Dept: FAMILY MEDICINE | Facility: CLINIC | Age: 73
End: 2018-08-29

## 2018-10-07 DIAGNOSIS — Z79.899 ENCOUNTER FOR LONG-TERM (CURRENT) USE OF MEDICATIONS: ICD-10-CM

## 2018-10-07 RX ORDER — FLECAINIDE ACETATE 100 MG/1
TABLET ORAL
Qty: 180 TABLET | Refills: 0 | Status: SHIPPED | OUTPATIENT
Start: 2018-10-07 | End: 2019-01-11

## 2018-11-27 ENCOUNTER — OFFICE VISIT (OUTPATIENT)
Dept: FAMILY MEDICINE | Facility: CLINIC | Age: 73
End: 2018-11-27

## 2018-11-27 ENCOUNTER — TRANSFERRED RECORDS (OUTPATIENT)
Dept: FAMILY MEDICINE | Facility: CLINIC | Age: 73
End: 2018-11-27

## 2018-11-27 VITALS
SYSTOLIC BLOOD PRESSURE: 144 MMHG | HEART RATE: 56 BPM | BODY MASS INDEX: 24.45 KG/M2 | DIASTOLIC BLOOD PRESSURE: 94 MMHG | WEIGHT: 170.4 LBS | RESPIRATION RATE: 20 BRPM

## 2018-11-27 DIAGNOSIS — G89.29 CHRONIC RIGHT-SIDED LOW BACK PAIN WITHOUT SCIATICA: Primary | ICD-10-CM

## 2018-11-27 DIAGNOSIS — M54.50 CHRONIC RIGHT-SIDED LOW BACK PAIN WITHOUT SCIATICA: Primary | ICD-10-CM

## 2018-11-27 PROCEDURE — 99213 OFFICE O/P EST LOW 20 MIN: CPT | Performed by: NURSE PRACTITIONER

## 2018-11-27 NOTE — LETTER
[facility]  [address] [place]  [address]  Phone: [phone]    December 4, 2018      Rey Brown  [address]  [place] [address]              Dear Rey,    The results from your recent visit showed X ray normal except stool and gas.      Sincerely,     Diana Pal CNP    Results for orders placed or performed in visit on 06/21/18   Abdominal Aortic Aneurysm Screening/Tracking    Narrative    Pt has never smoked, no family history of AAA; does not meet criteria for screening     Comp. Metabolic Panel (14) ([facility])   Result Value Ref Range    Glucose 88 65 - 99 mg/dL    Urea Nitrogen 20 8 - 27 mg/dL    Creatinine 0.93 0.76 - 1.27 mg/dL    eGFR If NonAfricn Am 82 >59 mL/min/1.73    eGFR If Africn Am 95 >59 mL/min/1.73    BUN/Creatinine Ratio 22 10 - 24    Sodium 141 134 - 144 mmol/L    Potassium 4.1 3.5 - 5.2 mmol/L    Chloride 103 96 - 106 mmol/L    Total CO2 27 20 - 29 mmol/L    Calcium 9.3 8.6 - 10.2 mg/dL    Protein Total 6.6 6.0 - 8.5 g/dL    Albumin 4.4 3.5 - 4.8 g/dL    Globulin, Total 2.2 1.5 - 4.5 g/dL    A/G Ratio 2.0 1.2 - 2.2    Bilirubin Total 1.1 0.0 - 1.2 mg/dL    Alkaline Phosphatase 70 39 - 117 IU/L    AST 22 0 - 40 IU/L    ALT 13 0 - 44 IU/L    Narrative    Performed at:  01 - [facility] [address], [place], [place]  [id number]  : Bolivar Garcia MD, Phone:  [phone]   Lipid Panel ([facility])   Result Value Ref Range    Cholesterol 158 100 - 199 mg/dL    Triglycerides 45 0 - 149 mg/dL    HDL Cholesterol 57 >39 mg/dL    VLDL Cholesterol Dani 9 5 - 40 mg/dL    LDL Cholesterol Calculated 92 0 - 99 mg/dL    LDL/HDL Ratio 1.6 0.0 - 3.6 ratio    Narrative    Performed at:  01 - [facility] [address], [place], [place]  [id number]  : Bolivar Garcia MD, Phone:  [phone]   PSA Serum ([facility])   Result Value Ref Range    PSA NG/ML 5.7 (H) 0.0 - 4.0 ng/mL    Narrative    Performed at:  01 - [facility]  [address] [facility],  [place], [place]  [id number]  : Bolivar Garcia MD, Phone:  [phone]   CBC with Diff/Plt ([facility])   Result Value Ref Range    WBC x10/cmm 6.5 3.8 - 11.0 x10/cmm    % Lymphocytes 22.7 20.5 - 51.1 %    % Monocytes 6.2 1.7 - 9.3 %    % Granulocytes 71.1 42.2 - 75.2 %    RBC x10/cmm 5.24 4.2 - 5.9 x10/cmm    Hemoglobin 16.0 13.4 - 17.5 g/dl    Hematocrit 47.5 39 - 51 %    MCV 90.7 80 - 100 fL    MCH 30.6 27.0 - 31.0 pg    MCHC 33.7 33.0 - 37.0 g/dL    Platelet Count 227 140 - 450 K/uL

## 2018-11-27 NOTE — PROGRESS NOTES
SUBJECTIVE:   Rey Brown is a 73 year old male who presents to clinic today for the following health issues:  Has had the same right side low back pain he has had previously for the past 6 weeks, began when he bent down to tie shoe. IBU and rest helps. Does not hurt today. If returns would like another cortisone injection.       Back Pain       Duration: 10-7-18        Specific cause: turning/bending    Description:   Location of pain: low back right and hip right  Character of pain: dull ache, burning and intermittent  Pain radiation:none  New numbness or weakness in legs, not attributed to pain:  no     Intensity: Currently 1/10, At its worst 6-7/10, moderate    History:   Pain interferes with job: Not applicable,   History of back problems: cortisone injections in past - last one was 2013 for bulging disc  Any previous MRI or X-rays: Yes- at [facility].  Date 5/9/13  Sees a specialist for back pain:  No  Therapies tried with relief: NSAIDs and rest, stretching    Alleviating factors:   Improved by: steroid injection      Precipitating factors:  Worsened by: Lifting and Bending      Accompanying Signs & Symptoms:  Risk of Fracture:  None  Risk of Cauda Equina:  None  Risk of Infection:  None  Risk of Cancer:  None  Risk of Ankylosing Spondylitis:  Onset at age <35, male, AND morning back stiffness. no         Problem list and histories reviewed & adjusted, as indicated.  Additional history: as documented    Labs reviewed in EPIC    Reviewed and updated as needed this visit by clinical staff       Reviewed and updated as needed this visit by Provider         ROS:  Constitutional, HEENT, cardiovascular, pulmonary, gi and gu systems are negative, except as otherwise noted.    OBJECTIVE:     BP (!) 144/94  Pulse 56  Resp 20  Wt 77.3 kg (170 lb 6.4 oz)  BMI 24.45 kg/m2  Body mass index is 24.45 kg/(m^2).  GENERAL: healthy, alert and no distress  MS: no gross musculoskeletal defects noted, no  edema, decreased ROM with bending  SKIN: no suspicious lesions or rashes  BACK: no CVA tenderness, no paralumbar tenderness  PSYCH: mentation appears normal, affect normal/bright    Diagnostic Test Results:  none     ASSESSMENT/PLAN:       1. Chronic right-sided low back pain without sciatica  Pt will hold on to referral for if pain returns  - Referral to [facility] for low back xray and possible cortisone injection    FUTURE APPOINTMENTS:       - Follow-up for annual visit or as needed    JOSE CRUZ Morales CNP  [facility]

## 2018-11-27 NOTE — MR AVS SNAPSHOT
After Visit Summary   11/27/2018    Rey Brown    MRN: [id number]           Patient Information     Date Of Birth          1945        Visit Information        Provider Department      11/27/2018 12:30 PM Diana Pal APRN CNP [facility]        Today's Diagnoses     Chronic right-sided low back pain without sciatica    -  1       Follow-ups after your visit        Additional Services     Referral to [facility]       Referral to [facility]  Phone: [phone]  Fax: [phone]  Reason for referral: x ray right low back and possible cortisone injection, last injection an dimaging 5/2013.                  Your next 10 appointments already scheduled     Jan 11, 2019 12:30 PM CST   [facility] EP RETURN with JOSE CRUZ Baker CNP   [facility] ([facility])    [address] [place] [address] [phone] OPT 2              Who to contact     If you have questions or need follow up information about today's clinic visit or your schedule please contact [facility] directly at [phone].  Normal or non-critical lab and imaging results will be communicated to you by [employer], letter or phone within 4 business days after the clinic has received the results. If you do not hear from us within 7 days, please contact the clinic through [employer] or phone. If you have a critical or abnormal lab result, we will notify you by phone as soon as possible.  Submit refill requests through [employer] or call your pharmacy and they will forward the refill request to us. Please allow 3 business days for your refill to be completed.          Additional Information About Your Visit        MyChart Information     [employer] gives you secure access to your electronic health record. If you see a primary care provider, you can also send messages to your care team and make appointments. If you have questions, please  call your primary care clinic.  If you do not have a primary care provider, please call [phone] and they will assist you.        Care EveryWhere ID     This is your Care EveryWhere ID. This could be used by other organizations to access your [facility] medical records  [id number]        Your Vitals Were     Pulse Respirations BMI (Body Mass Index)             56 20 24.45 kg/m2          Blood Pressure from Last 3 Encounters:   11/27/18 (!) 144/94   06/21/18 130/76   01/10/18 126/68    Weight from Last 3 Encounters:   11/27/18 77.3 kg (170 lb 6.4 oz)   06/21/18 75.4 kg (166 lb 3.2 oz)   01/10/18 77.8 kg (171 lb 8 oz)              We Performed the Following     Referral to [facility]          Today's Medication Changes          These changes are accurate as of 11/27/18 12:58 PM.  If you have any questions, ask your nurse or doctor.               Stop taking these medicines if you haven't already. Please contact your care team if you have questions.     VIAGRA 100 MG tablet   Generic drug:  sildenafil   Stopped by:  Diana Pal APRN CNP                    Primary Care Provider Office Phone # Fax #    Sabas Staley [phone] [address]  [facility] [address]        Equal Access to Services     [facility] ENIO : Hadii aad ku hadasho Soomaali, waaxda luqadaha, qaybta kaalmada adeegyada, sakina agarwal. So [facility] [phone].    ATENCIÓN: Si habla español, tiene a ramsay disposición servicios gratuitos de asistencia lingüística. Llame al [phone].    We comply with applicable federal civil rights laws and [place] laws. We do not discriminate on the basis of race, color, national origin, age, disability, sex, sexual orientation, or gender identity.            Thank you!     Thank you for choosing [facility]  for your care. Our goal is always to provide you with excellent care. Hearing back from our patients is one way we can  continue to improve our services. Please take a few minutes to complete the written survey that you may receive in the mail after your visit with us. Thank you!             Your Updated Medication List - Protect others around you: Learn how to safely use, store and throw away your medicines at www.disposemymeds.org.          This list is accurate as of 11/27/18 12:58 PM.  Always use your most recent med list.                   Brand Name Dispense Instructions for use Diagnosis    aspirin 81 MG tablet    ASA     Take  by mouth daily.        BREATHE RIGHT LARGE Strp      nightly as needed        flecainide 100 MG tablet    TAMBOCOR    180 tablet    TAKE ONE TABLET BY MOUTH TWICE DAILY    Encounter for long-term (current) use of medications       LEVITRA PO      Take by mouth as needed        losartan 25 MG tablet    COZAAR    90 tablet    TAKE ONE TABLET BY MOUTH ONE TIME DAILY    Essential hypertension       NASACORT AQ 55 MCG/ACT nasal aerosol   Generic drug:  triamcinolone      Spray 2 sprays into both nostrils as needed        simvastatin 20 MG tablet    ZOCOR    90 tablet    TAKE ONE TABLET BY MOUTH AT BEDTIME    Hypercholesterolemia       tamsulosin 0.4 MG capsule    FLOMAX    30 capsule    Take 1 capsule (0.4 mg) by mouth daily    Benign prostatic hyperplasia with weak urinary stream

## 2018-12-04 ENCOUNTER — TELEPHONE (OUTPATIENT)
Dept: FAMILY MEDICINE | Facility: CLINIC | Age: 73
End: 2018-12-04

## 2018-12-04 NOTE — TELEPHONE ENCOUNTER
Call from patient asking for result of lumbar X ray.  Informed patient of normal x ray except for stool and gas seen.  Patient states he still has pain but is getting a little better.

## 2018-12-27 ENCOUNTER — MEDICAL CORRESPONDENCE (OUTPATIENT)
Dept: FAMILY MEDICINE | Facility: CLINIC | Age: 73
End: 2018-12-27

## 2019-01-11 ENCOUNTER — OFFICE VISIT (OUTPATIENT)
Dept: CARDIOLOGY | Facility: CLINIC | Age: 74
End: 2019-01-11
Attending: INTERNAL MEDICINE
Payer: COMMERCIAL

## 2019-01-11 VITALS
OXYGEN SATURATION: 99 % | SYSTOLIC BLOOD PRESSURE: 144 MMHG | WEIGHT: 173 LBS | DIASTOLIC BLOOD PRESSURE: 72 MMHG | HEART RATE: 46 BPM | HEIGHT: 70 IN | BODY MASS INDEX: 24.77 KG/M2

## 2019-01-11 DIAGNOSIS — I48.0 PAROXYSMAL A-FIB (H): Primary | ICD-10-CM

## 2019-01-11 DIAGNOSIS — Z79.899 ENCOUNTER FOR LONG-TERM (CURRENT) USE OF MEDICATIONS: ICD-10-CM

## 2019-01-11 PROCEDURE — 93000 ELECTROCARDIOGRAM COMPLETE: CPT | Performed by: NURSE PRACTITIONER

## 2019-01-11 PROCEDURE — 99213 OFFICE O/P EST LOW 20 MIN: CPT | Performed by: NURSE PRACTITIONER

## 2019-01-11 RX ORDER — FLUTICASONE PROPIONATE 50 MCG
1 SPRAY, SUSPENSION (ML) NASAL PRN
COMMUNITY
End: 2020-05-12

## 2019-01-11 RX ORDER — FLECAINIDE ACETATE 100 MG/1
100 TABLET ORAL 2 TIMES DAILY
Qty: 180 TABLET | Refills: 3 | Status: SHIPPED | OUTPATIENT
Start: 2019-01-11 | End: 2019-02-26

## 2019-01-11 RX ORDER — OMEGA-3 FATTY ACIDS/FISH OIL 300-1000MG
200 CAPSULE ORAL EVERY 4 HOURS PRN
Status: ON HOLD | COMMUNITY
End: 2020-05-14

## 2019-01-11 ASSESSMENT — MIFFLIN-ST. JEOR: SCORE: 1545.97

## 2019-01-11 NOTE — LETTER
1/11/2019    Sabas Staley MD  [address]  [facility] [address]    RE: Rey Brown       Dear Colleague,    I had the pleasure of seeing Rey Brown in the [facility].    Rey Brown  MRN: [id number]  Gender Identity: Male    HPI   Mr. Brown for followup of paroxysmal atrial fibrillation.  He is a 73-year-old white male with symptomatic paroxysmal atrial fibrillation.  He has been on flecainide 100 mg p.o. b.i.d. without side effects or recurrence of atrial fibrillation.  His heart rates have been in the 50's on the flecainide, but are 46 bpm today. He is asymptomatic and is very physically active without any limitations.    12 lead EKG today shows sinus rhythm with a first-degree AV block.  QRS is stable at 104 ms.    Currently patient states he feels well.  He denies any exercise intolerance.  He states if he were to jog he becomes short of breath, but that is mostly secondary to deconditioning.  He denies chest pain, lightheadedness, dizziness, shortness of breath, or peripheral edema.       Plan:   Mr. Brown is doing well symptomatically.  There  has been no reoccurrence of atrial fibrillation.     I did review the EKG with Dr. Mccarthy.  The patient's QRS is stable.  Although his heart rate is slightly slower he is asymptomatic.  I did ask that if he develops any symptoms of fatigue or exertional shortness of breath over the next year to please contact us.  We will see Dr. Mccarthy next year with a 12-lead EKG.    Thank you for including us in his care.    Kalyn Quick NP      Orders Placed This Encounter   Procedures     EKG 12-lead complete w/read - Clinics (performed today)       Orders Placed This Encounter   Medications     fluticasone (FLONASE) 50 MCG/ACT nasal spray     Sig: Spray 1 spray into both nostrils as needed for rhinitis or allergies     ibuprofen (ADVIL/MOTRIN) 200 MG capsule     Sig: Take 200 mg by mouth every 4  hours as needed for fever     flecainide (TAMBOCOR) 100 MG tablet     Sig: Take 1 tablet (100 mg) by mouth 2 times daily     Dispense:  180 tablet     Refill:  3       Medications Discontinued During This Encounter   Medication Reason     flecainide (TAMBOCOR) 100 MG tablet Reorder         Encounter Diagnoses   Name Primary?     Paroxysmal A-fib (H)      Encounter for long-term (current) use of medications        CURRENT MEDICATIONS:  Current Outpatient Medications   Medication Sig Dispense Refill     aspirin 81 MG tablet Take  by mouth daily.       flecainide (TAMBOCOR) 100 MG tablet Take 1 tablet (100 mg) by mouth 2 times daily 180 tablet 3     fluticasone (FLONASE) 50 MCG/ACT nasal spray Spray 1 spray into both nostrils as needed for rhinitis or allergies       ibuprofen (ADVIL/MOTRIN) 200 MG capsule Take 200 mg by mouth every 4 hours as needed for fever       losartan (COZAAR) 25 MG tablet TAKE ONE TABLET BY MOUTH ONE TIME DAILY  90 tablet 3     Nasal Dilators (BREATHE RIGHT LARGE) STRP nightly as needed       simvastatin (ZOCOR) 20 MG tablet TAKE ONE TABLET BY MOUTH AT BEDTIME 90 tablet 3     Vardenafil HCl (LEVITRA PO) Take by mouth as needed       tamsulosin (FLOMAX) 0.4 MG capsule Take 1 capsule (0.4 mg) by mouth daily (Patient not taking: Reported on 1/11/2019) 30 capsule 1     triamcinolone (NASACORT AQ) 55 MCG/ACT Inhaler Spray 2 sprays into both nostrils as needed         ALLERGIES     Allergies   Allergen Reactions     Ace Inhibitors Cough       PAST MEDICAL HISTORY:  Past Medical History:   Diagnosis Date     Hypercholesterolemia      Lumbar disc disease      Paroxysmal A-fib (H)        PAST SURGICAL HISTORY:  Past Surgical History:   Procedure Laterality Date     OPEN REDUCTION INTERNAL FIXATION HAND       RHINOPLASTY       TONSILLECTOMY         FAMILY HISTORY:  Family History   Problem Relation Age of Onset     Dementia Father      Osteoarthritis Father      Cerebrovascular Disease Father       Myocardial Infarction Mother      C.A.D. Mother      Coronary Artery Disease Mother        SOCIAL HISTORY:  Social History     Socioeconomic History     Marital status:      Spouse name: Erin     Number of children: None     Years of education: None     Highest education level: None   Social Needs     Financial resource strain: None     Food insecurity - worry: None     Food insecurity - inability: None     Transportation needs - medical: None     Transportation needs - non-medical: None   Occupational History     None   Tobacco Use     Smoking status: Never Smoker     Smokeless tobacco: Never Used   Substance and Sexual Activity     Alcohol use: Yes     Alcohol/week: 1.2 - 1.8 oz     Types: 2 - 3 Cans of beer per week     Comment: social only     Drug use: No     Sexual activity: Yes   Other Topics Concern      Service Not Asked     Blood Transfusions Not Asked     Caffeine Concern Not Asked     Occupational Exposure Not Asked     Hobby Hazards Not Asked     Sleep Concern Not Asked     Stress Concern Not Asked     Weight Concern Not Asked     Special Diet No     Back Care Not Asked     Exercise Yes     Comment: 2-3x/week     Bike Helmet Not Asked     Seat Belt Not Asked     Self-Exams Not Asked     Parent/sibling w/ CABG, MI or angioplasty before 65F 55M? Not Asked   Social History Narrative     None       Review of Systems:  Skin:  Negative       Eyes:  Negative      ENT:  Negative      Respiratory:  Negative       Cardiovascular:  Negative;palpitations;chest pain;edema;syncope or near-syncope;cyanosis;exercise intolerance;fatigue;lightheadedness;dizziness      Gastroenterology: Negative      Genitourinary:  Negative      Musculoskeletal:  Negative      Neurologic:  Positive for headaches    Psychiatric:  Negative      Heme/Lymph/Imm:  Negative      Endocrine:  Negative        Physical Exam:  Vitals: /72 (BP Location: Right arm, Patient Position: Chair, Cuff Size: Adult Regular)   Pulse (!)  "46   Ht 1.778 m (5' 10\")   Wt 78.5 kg (173 lb)   SpO2 99%   BMI 24.82 kg/m       Constitutional:  cooperative, alert and oriented, well developed, well nourished, in no acute distress        Skin:  warm and dry to the touch          Head:  normocephalic        Eyes:  pupils equal and round        Lymph:      ENT:  no pallor or cyanosis        Neck:           Respiratory:  normal breath sounds, clear to auscultation, normal A-P diameter, normal symmetry, normal respiratory excursion, no use of accessory muscles         Cardiac: regular rhythm, normal S1/S2, no S3 or S4, apical impulse not displaced, no murmurs, gallops or rubs       systolic ejection murmur;grade 1                                                 GI:  abdomen soft        Extremities and Muscular Skeletal:  no edema;no deformities, clubbing, cyanosis, erythema observed              Neurological:  no gross motor deficits        Psych:  Alert and Oriented x 3;affect appropriate, oriented to time, person and place;oriented to time, person and place        CC  Sharmin Scott MD  [address]  [address]  [place] [id number]                Thank you for allowing me to participate in the care of your patient.      Sincerely,     Kalyn Quick, NP, APRN [facility]    cc:   Sharmin Scott MD  [address]  [address]  [place] [id number]        "

## 2019-01-11 NOTE — PROGRESS NOTES
Rey Brown  MRN: [id number]  Gender Identity: Male    HPI   Mr. Brown for followup of paroxysmal atrial fibrillation.  He is a 73-year-old white male with symptomatic paroxysmal atrial fibrillation.  He has been on flecainide 100 mg p.o. b.i.d. without side effects or recurrence of atrial fibrillation.  His heart rates have been in the 50's on the flecainide, but are 46 bpm today. He is asymptomatic and is very physically active without any limitations.    12 lead EKG today shows sinus rhythm with a first-degree AV block.  QRS is stable at 104 ms.    Currently patient states he feels well.  He denies any exercise intolerance.  He states if he were to jog he becomes short of breath, but that is mostly secondary to deconditioning.  He denies chest pain, lightheadedness, dizziness, shortness of breath, or peripheral edema.       Plan:   Mr. Brown is doing well symptomatically.  There has been no reoccurrence of atrial fibrillation.    I did review the EKG with Dr. Mccarthy.  The patient's QRS is stable.  Although his heart rate is slightly slower he is asymptomatic.  I did ask that if he develops any symptoms of fatigue or exertional shortness of breath over the next year to please contact us.  We will see Dr. Mccarthy next year with a 12-lead EKG.    Thank you for including us in his care.    Kalyn Quick NP      Orders Placed This Encounter   Procedures     EKG 12-lead complete w/read - Clinics (performed today)       Orders Placed This Encounter   Medications     fluticasone (FLONASE) 50 MCG/ACT nasal spray     Sig: Spray 1 spray into both nostrils as needed for rhinitis or allergies     ibuprofen (ADVIL/MOTRIN) 200 MG capsule     Sig: Take 200 mg by mouth every 4 hours as needed for fever     flecainide (TAMBOCOR) 100 MG tablet     Sig: Take 1 tablet (100 mg) by mouth 2 times daily     Dispense:  180 tablet     Refill:  3       Medications Discontinued During This Encounter   Medication Reason      flecainide (TAMBOCOR) 100 MG tablet Reorder         Encounter Diagnoses   Name Primary?     Paroxysmal A-fib (H)      Encounter for long-term (current) use of medications        CURRENT MEDICATIONS:  Current Outpatient Medications   Medication Sig Dispense Refill     aspirin 81 MG tablet Take  by mouth daily.       flecainide (TAMBOCOR) 100 MG tablet Take 1 tablet (100 mg) by mouth 2 times daily 180 tablet 3     fluticasone (FLONASE) 50 MCG/ACT nasal spray Spray 1 spray into both nostrils as needed for rhinitis or allergies       ibuprofen (ADVIL/MOTRIN) 200 MG capsule Take 200 mg by mouth every 4 hours as needed for fever       losartan (COZAAR) 25 MG tablet TAKE ONE TABLET BY MOUTH ONE TIME DAILY  90 tablet 3     Nasal Dilators (BREATHE RIGHT LARGE) STRP nightly as needed       simvastatin (ZOCOR) 20 MG tablet TAKE ONE TABLET BY MOUTH AT BEDTIME 90 tablet 3     Vardenafil HCl (LEVITRA PO) Take by mouth as needed       tamsulosin (FLOMAX) 0.4 MG capsule Take 1 capsule (0.4 mg) by mouth daily (Patient not taking: Reported on 1/11/2019) 30 capsule 1     triamcinolone (NASACORT AQ) 55 MCG/ACT Inhaler Spray 2 sprays into both nostrils as needed         ALLERGIES     Allergies   Allergen Reactions     Ace Inhibitors Cough       PAST MEDICAL HISTORY:  Past Medical History:   Diagnosis Date     Hypercholesterolemia      Lumbar disc disease      Paroxysmal A-fib (H)        PAST SURGICAL HISTORY:  Past Surgical History:   Procedure Laterality Date     OPEN REDUCTION INTERNAL FIXATION HAND       RHINOPLASTY       TONSILLECTOMY         FAMILY HISTORY:  Family History   Problem Relation Age of Onset     Dementia Father      Osteoarthritis Father      Cerebrovascular Disease Father      Myocardial Infarction Mother      C.A.D. Mother      Coronary Artery Disease Mother        SOCIAL HISTORY:  Social History     Socioeconomic History     Marital status:      Spouse name: Erin     Number of children: None     Years of  "education: None     Highest education level: None   Social Needs     Financial resource strain: None     Food insecurity - worry: None     Food insecurity - inability: None     Transportation needs - medical: None     Transportation needs - non-medical: None   Occupational History     None   Tobacco Use     Smoking status: Never Smoker     Smokeless tobacco: Never Used   Substance and Sexual Activity     Alcohol use: Yes     Alcohol/week: 1.2 - 1.8 oz     Types: 2 - 3 Cans of beer per week     Comment: social only     Drug use: No     Sexual activity: Yes   Other Topics Concern      Service Not Asked     Blood Transfusions Not Asked     Caffeine Concern Not Asked     Occupational Exposure Not Asked     Hobby Hazards Not Asked     Sleep Concern Not Asked     Stress Concern Not Asked     Weight Concern Not Asked     Special Diet No     Back Care Not Asked     Exercise Yes     Comment: 2-3x/week     Bike Helmet Not Asked     Seat Belt Not Asked     Self-Exams Not Asked     Parent/sibling w/ CABG, MI or angioplasty before 65F 55M? Not Asked   Social History Narrative     None       Review of Systems:  Skin:  Negative       Eyes:  Negative      ENT:  Negative      Respiratory:  Negative       Cardiovascular:  Negative;palpitations;chest pain;edema;syncope or near-syncope;cyanosis;exercise intolerance;fatigue;lightheadedness;dizziness      Gastroenterology: Negative      Genitourinary:  Negative      Musculoskeletal:  Negative      Neurologic:  Positive for headaches    Psychiatric:  Negative      Heme/Lymph/Imm:  Negative      Endocrine:  Negative        Physical Exam:  Vitals: /72 (BP Location: Right arm, Patient Position: Chair, Cuff Size: Adult Regular)   Pulse (!) 46   Ht 1.778 m (5' 10\")   Wt 78.5 kg (173 lb)   SpO2 99%   BMI 24.82 kg/m      Constitutional:  cooperative, alert and oriented, well developed, well nourished, in no acute distress        Skin:  warm and dry to the touch          Head:  " normocephalic        Eyes:  pupils equal and round        Lymph:      ENT:  no pallor or cyanosis        Neck:           Respiratory:  normal breath sounds, clear to auscultation, normal A-P diameter, normal symmetry, normal respiratory excursion, no use of accessory muscles         Cardiac: regular rhythm, normal S1/S2, no S3 or S4, apical impulse not displaced, no murmurs, gallops or rubs       systolic ejection murmur;grade 1                                                 GI:  abdomen soft        Extremities and Muscular Skeletal:  no edema;no deformities, clubbing, cyanosis, erythema observed              Neurological:  no gross motor deficits        Psych:  Alert and Oriented x 3;affect appropriate, oriented to time, person and place;oriented to time, person and place        CC  Sharmin Scott MD  [address]  [id number]  [place] [address]

## 2019-01-11 NOTE — LETTER
1/11/2019    Sabas Staley MD  [address]  [facility] [address]    RE: Rey Brown       Dear Colleague,    I had the pleasure of seeing Rey Brown in the [facility].    Rey Brown  MRN: [id number]  Gender Identity: Male    HPI   Mr. Brown for followup of paroxysmal atrial fibrillation.  He is a 73-year-old white male with symptomatic paroxysmal atrial fibrillation.  He has been on flecainide 100 mg p.o. b.i.d. without side effects or recurrence of atrial fibrillation.  His heart rates have been in the 50's on the flecainide, but are 46 bpm today. He is asymptomatic and is very physically active without any limitations.    12 lead EKG today shows sinus rhythm with a first-degree AV block.  QRS is stable at 104 ms.    Currently patient states he feels well.  He denies any exercise intolerance.  He states if he were to jog he becomes short of breath, but that is mostly secondary to deconditioning.  He denies chest pain, lightheadedness, dizziness, shortness of breath, or peripheral edema.       Plan:   Mr. Brown is doing well symptomatically.  There  has been no reoccurrence of atrial fibrillation.     I did review the EKG with Dr. Mccarthy.  The patient's QRS is stable.  Although his heart rate is slightly slower he is asymptomatic.  I did ask that if he develops any symptoms of fatigue or exertional shortness of breath over the next year to please contact us.  We will see Dr. Mccarthy next year with a 12-lead EKG.    Thank you for including us in his care.    Kalyn Quick NP      Orders Placed This Encounter   Procedures     EKG 12-lead complete w/read - Clinics (performed today)       Orders Placed This Encounter   Medications     fluticasone (FLONASE) 50 MCG/ACT nasal spray     Sig: Spray 1 spray into both nostrils as needed for rhinitis or allergies     ibuprofen (ADVIL/MOTRIN) 200 MG capsule     Sig: Take 200 mg by mouth every 4  hours as needed for fever     flecainide (TAMBOCOR) 100 MG tablet     Sig: Take 1 tablet (100 mg) by mouth 2 times daily     Dispense:  180 tablet     Refill:  3       Medications Discontinued During This Encounter   Medication Reason     flecainide (TAMBOCOR) 100 MG tablet Reorder         Encounter Diagnoses   Name Primary?     Paroxysmal A-fib (H)      Encounter for long-term (current) use of medications        CURRENT MEDICATIONS:  Current Outpatient Medications   Medication Sig Dispense Refill     aspirin 81 MG tablet Take  by mouth daily.       flecainide (TAMBOCOR) 100 MG tablet Take 1 tablet (100 mg) by mouth 2 times daily 180 tablet 3     fluticasone (FLONASE) 50 MCG/ACT nasal spray Spray 1 spray into both nostrils as needed for rhinitis or allergies       ibuprofen (ADVIL/MOTRIN) 200 MG capsule Take 200 mg by mouth every 4 hours as needed for fever       losartan (COZAAR) 25 MG tablet TAKE ONE TABLET BY MOUTH ONE TIME DAILY  90 tablet 3     Nasal Dilators (BREATHE RIGHT LARGE) STRP nightly as needed       simvastatin (ZOCOR) 20 MG tablet TAKE ONE TABLET BY MOUTH AT BEDTIME 90 tablet 3     Vardenafil HCl (LEVITRA PO) Take by mouth as needed       tamsulosin (FLOMAX) 0.4 MG capsule Take 1 capsule (0.4 mg) by mouth daily (Patient not taking: Reported on 1/11/2019) 30 capsule 1     triamcinolone (NASACORT AQ) 55 MCG/ACT Inhaler Spray 2 sprays into both nostrils as needed         ALLERGIES     Allergies   Allergen Reactions     Ace Inhibitors Cough       PAST MEDICAL HISTORY:  Past Medical History:   Diagnosis Date     Hypercholesterolemia      Lumbar disc disease      Paroxysmal A-fib (H)        PAST SURGICAL HISTORY:  Past Surgical History:   Procedure Laterality Date     OPEN REDUCTION INTERNAL FIXATION HAND       RHINOPLASTY       TONSILLECTOMY         FAMILY HISTORY:  Family History   Problem Relation Age of Onset     Dementia Father      Osteoarthritis Father      Cerebrovascular Disease Father       Myocardial Infarction Mother      C.A.D. Mother      Coronary Artery Disease Mother        SOCIAL HISTORY:  Social History     Socioeconomic History     Marital status:      Spouse name: Erin     Number of children: None     Years of education: None     Highest education level: None   Social Needs     Financial resource strain: None     Food insecurity - worry: None     Food insecurity - inability: None     Transportation needs - medical: None     Transportation needs - non-medical: None   Occupational History     None   Tobacco Use     Smoking status: Never Smoker     Smokeless tobacco: Never Used   Substance and Sexual Activity     Alcohol use: Yes     Alcohol/week: 1.2 - 1.8 oz     Types: 2 - 3 Cans of beer per week     Comment: social only     Drug use: No     Sexual activity: Yes   Other Topics Concern      Service Not Asked     Blood Transfusions Not Asked     Caffeine Concern Not Asked     Occupational Exposure Not Asked     Hobby Hazards Not Asked     Sleep Concern Not Asked     Stress Concern Not Asked     Weight Concern Not Asked     Special Diet No     Back Care Not Asked     Exercise Yes     Comment: 2-3x/week     Bike Helmet Not Asked     Seat Belt Not Asked     Self-Exams Not Asked     Parent/sibling w/ CABG, MI or angioplasty before 65F 55M? Not Asked   Social History Narrative     None       Review of Systems:  Skin:  Negative       Eyes:  Negative      ENT:  Negative      Respiratory:  Negative       Cardiovascular:  Negative;palpitations;chest pain;edema;syncope or near-syncope;cyanosis;exercise intolerance;fatigue;lightheadedness;dizziness      Gastroenterology: Negative      Genitourinary:  Negative      Musculoskeletal:  Negative      Neurologic:  Positive for headaches    Psychiatric:  Negative      Heme/Lymph/Imm:  Negative      Endocrine:  Negative        Physical Exam:  Vitals: /72 (BP Location: Right arm, Patient Position: Chair, Cuff Size: Adult Regular)   Pulse (!)  "46   Ht 1.778 m (5' 10\")   Wt 78.5 kg (173 lb)   SpO2 99%   BMI 24.82 kg/m       Constitutional:  cooperative, alert and oriented, well developed, well nourished, in no acute distress        Skin:  warm and dry to the touch          Head:  normocephalic        Eyes:  pupils equal and round        Lymph:      ENT:  no pallor or cyanosis        Neck:           Respiratory:  normal breath sounds, clear to auscultation, normal A-P diameter, normal symmetry, normal respiratory excursion, no use of accessory muscles         Cardiac: regular rhythm, normal S1/S2, no S3 or S4, apical impulse not displaced, no murmurs, gallops or rubs       systolic ejection murmur;grade 1                                                 GI:  abdomen soft        Extremities and Muscular Skeletal:  no edema;no deformities, clubbing, cyanosis, erythema observed              Neurological:  no gross motor deficits        Psych:  Alert and Oriented x 3;affect appropriate, oriented to time, person and place;oriented to time, person and place        Thank you for allowing me to participate in the care of your patient.    Sincerely,     Kalyn Quick NP, APRN [facility]    "

## 2019-02-25 ENCOUNTER — TELEPHONE (OUTPATIENT)
Dept: CARDIOLOGY | Facility: CLINIC | Age: 74
End: 2019-02-25

## 2019-02-25 DIAGNOSIS — Z79.899 ENCOUNTER FOR LONG-TERM (CURRENT) USE OF MEDICATIONS: ICD-10-CM

## 2019-02-25 DIAGNOSIS — I48.0 PAROXYSMAL A-FIB (H): Primary | ICD-10-CM

## 2019-02-25 NOTE — TELEPHONE ENCOUNTER
Received call and my chart message from patient regarding AF episodes that have occurred in the last three weeks.  Patient has not had symptoms of  AF for >4 years.  First one occurred on 2/8 lasting 14 hours (12:05am-2:00pm) and today (5:00am-3:00pm).  Reports feeling fluttering in chest.  HR <100.   Wondering if medications need adjusting.  Patient currently in [place] returning no 3/2.  Will update Dr Scott to see if wants changes to medication.  HOLLEY Johns

## 2019-02-25 NOTE — TELEPHONE ENCOUNTER
Patient left voicemail reporting he has has two episodes of afib in February and hasn't had any for 5 years.  Called patient to get specifics.  Awaiting return call.  HOLLEY Johns

## 2019-02-26 RX ORDER — FLECAINIDE ACETATE 150 MG/1
150 TABLET ORAL 2 TIMES DAILY
Start: 2019-02-26 | End: 2019-03-25

## 2019-02-26 NOTE — TELEPHONE ENCOUNTER
Spoke to patient regarding recommendations per Dr Scott to increase flecainide dosing from 100mg po bid to 150mg po bid d/t recent breakthrough of AF episodes.  Also verbal order for patient to have follow up EKG in 3-5 days.  Patient will start with increased dosing tomorrow 2/27.  Patient returning from [place] on 3/2.  Scheduled patient for follow up EKG on 3/4.  Orders placed in epic and medication list updated in epic.  Patient provided verbal understanding of above.  HOLLEY Johns

## 2019-02-26 NOTE — TELEPHONE ENCOUNTER
May consider increasing flecainide from 100 to 150 mg bid for now. Ok to keep the original follow up date.

## 2019-03-04 ENCOUNTER — TELEPHONE (OUTPATIENT)
Dept: CARDIOLOGY | Facility: CLINIC | Age: 74
End: 2019-03-04

## 2019-03-04 DIAGNOSIS — I48.0 PAROXYSMAL A-FIB (H): ICD-10-CM

## 2019-03-04 PROCEDURE — 93000 ELECTROCARDIOGRAM COMPLETE: CPT | Performed by: INTERNAL MEDICINE

## 2019-03-04 NOTE — TELEPHONE ENCOUNTER
Dr Scott reviewed EKG.  Ok for patient to continue with flecainide 150mg po bid.  HR 47 patient denies symptoms.  Patient reports improvement in symptoms since increased dose.  Instructed patient to call clinic if he has any issues.  Patient provided verbal understanding.  HOLLEY Johns

## 2019-03-25 ENCOUNTER — TELEPHONE (OUTPATIENT)
Dept: CARDIOLOGY | Facility: CLINIC | Age: 74
End: 2019-03-25

## 2019-03-25 DIAGNOSIS — I48.0 PAROXYSMAL A-FIB (H): ICD-10-CM

## 2019-03-25 DIAGNOSIS — Z79.899 ENCOUNTER FOR LONG-TERM (CURRENT) USE OF MEDICATIONS: ICD-10-CM

## 2019-03-25 DIAGNOSIS — E78.00 HYPERCHOLESTEROLEMIA: ICD-10-CM

## 2019-03-25 RX ORDER — FLECAINIDE ACETATE 150 MG/1
150 TABLET ORAL 2 TIMES DAILY
Qty: 180 TABLET | Refills: 2 | Status: SHIPPED | OUTPATIENT
Start: 2019-03-25 | End: 2019-09-27

## 2019-03-25 NOTE — TELEPHONE ENCOUNTER
Pt calling to have a refill of his Flecainide sent since the in crease in Flecainide.  Pt not taking 150 mg bid.  Asked if pt having any trouble since increase and pt stated that his flow was not good.  Asked if pt had prostate issues, of which pt stated that he did.  Pt has Flomax in his list, but had stopped a couple of months ago d/t headaches. Asked when pt took his dose and pt stated in the morning, did tell pt that some pts have issues when taking in the AM and may want to try at night, but should also talk to PCP. Pt also states that BP have been up a little with HR's in the 40's. Pt HR was 45 on the lower dose Flecainide and continue to be similar to increased dose. Pt does not feel lightheaded.  Pt states that his Losartan was 100 mg in the past and then lowered.  Explained that this also should be looked at with PCP.  Pt states understanding. Licha

## 2019-03-26 RX ORDER — SIMVASTATIN 20 MG
TABLET ORAL
Qty: 90 TABLET | Refills: 3 | Status: SHIPPED | OUTPATIENT
Start: 2019-03-26 | End: 2019-09-27

## 2019-08-19 DIAGNOSIS — I10 ESSENTIAL HYPERTENSION: ICD-10-CM

## 2019-08-19 RX ORDER — LOSARTAN POTASSIUM 25 MG/1
TABLET ORAL
Qty: 90 TABLET | Refills: 2 | Status: SHIPPED | OUTPATIENT
Start: 2019-08-19 | End: 2019-09-27

## 2019-09-17 ENCOUNTER — TELEPHONE (OUTPATIENT)
Dept: CARDIOLOGY | Facility: CLINIC | Age: 74
End: 2019-09-17

## 2019-09-17 NOTE — TELEPHONE ENCOUNTER
Patient called to report he had an afib episode since increasing his flecainde to 150mg po bid.  Reports episode occurred right after having sex and lasted for 18 hours.  Patient reports he he tolerated his symptoms and HR from what he could count was not very fast.  Has not had any further issues.   Instructed patient to call if any further episodes so we can try and get him in to the clinic if during clinc hours to capture an EKG.  Patient provided verbal understanding.  HOLLEY Johns

## 2019-09-17 NOTE — TELEPHONE ENCOUNTER
Patient called and left voicemail reporting he had an afib episode while out on the [place] cruise lasting 18 hours.  Called patient to get more details.  Awaiting return call.  HOLLEY Johns

## 2019-09-27 ENCOUNTER — OFFICE VISIT (OUTPATIENT)
Dept: FAMILY MEDICINE | Facility: CLINIC | Age: 74
End: 2019-09-27

## 2019-09-27 VITALS
OXYGEN SATURATION: 99 % | HEART RATE: 47 BPM | BODY MASS INDEX: 24.42 KG/M2 | WEIGHT: 170.6 LBS | SYSTOLIC BLOOD PRESSURE: 120 MMHG | RESPIRATION RATE: 16 BRPM | DIASTOLIC BLOOD PRESSURE: 84 MMHG | HEIGHT: 70 IN

## 2019-09-27 DIAGNOSIS — E78.00 HYPERCHOLESTEROLEMIA: ICD-10-CM

## 2019-09-27 DIAGNOSIS — N40.1 BENIGN PROSTATIC HYPERPLASIA WITH WEAK URINARY STREAM: ICD-10-CM

## 2019-09-27 DIAGNOSIS — I48.0 PAROXYSMAL A-FIB (H): ICD-10-CM

## 2019-09-27 DIAGNOSIS — Z00.00 ENCOUNTER FOR MEDICARE ANNUAL WELLNESS EXAM: Primary | ICD-10-CM

## 2019-09-27 DIAGNOSIS — B07.0 PLANTAR WART: ICD-10-CM

## 2019-09-27 DIAGNOSIS — I10 ESSENTIAL HYPERTENSION: ICD-10-CM

## 2019-09-27 DIAGNOSIS — Z79.899 ENCOUNTER FOR LONG-TERM (CURRENT) USE OF MEDICATIONS: ICD-10-CM

## 2019-09-27 DIAGNOSIS — R39.12 BENIGN PROSTATIC HYPERPLASIA WITH WEAK URINARY STREAM: ICD-10-CM

## 2019-09-27 LAB
% GRANULOCYTES: 74.1 % (ref 42.2–75.2)
HCT VFR BLD AUTO: 45.9 % (ref 39–51)
HEMOGLOBIN: 15.8 G/DL (ref 13.4–17.5)
LYMPHOCYTES NFR BLD AUTO: 18.7 % (ref 20.5–51.1)
MCH RBC QN AUTO: 31 PG (ref 27–31)
MCHC RBC AUTO-ENTMCNC: 34.5 G/DL (ref 33–37)
MCV RBC AUTO: 89.8 FL (ref 80–100)
MONOCYTES NFR BLD AUTO: 7.2 % (ref 1.7–9.3)
PLATELET # BLD AUTO: 226 K/UL (ref 140–450)
RBC # BLD AUTO: 5.11 X10/CMM (ref 4.2–5.9)
WBC # BLD AUTO: 5.2 X10/CMM (ref 3.8–11)

## 2019-09-27 PROCEDURE — 36415 COLL VENOUS BLD VENIPUNCTURE: CPT | Performed by: FAMILY MEDICINE

## 2019-09-27 PROCEDURE — 85025 COMPLETE CBC W/AUTO DIFF WBC: CPT | Performed by: FAMILY MEDICINE

## 2019-09-27 PROCEDURE — 99214 OFFICE O/P EST MOD 30 MIN: CPT | Mod: 25 | Performed by: FAMILY MEDICINE

## 2019-09-27 PROCEDURE — 99397 PER PM REEVAL EST PAT 65+ YR: CPT | Performed by: FAMILY MEDICINE

## 2019-09-27 PROCEDURE — 92551 PURE TONE HEARING TEST AIR: CPT | Performed by: FAMILY MEDICINE

## 2019-09-27 PROCEDURE — 17110 DESTRUCTION B9 LES UP TO 14: CPT | Performed by: FAMILY MEDICINE

## 2019-09-27 RX ORDER — LOSARTAN POTASSIUM 25 MG/1
25 TABLET ORAL DAILY
Qty: 90 TABLET | Refills: 3 | Status: SHIPPED | OUTPATIENT
Start: 2019-09-27 | End: 2020-11-16

## 2019-09-27 RX ORDER — FINASTERIDE 5 MG/1
5 TABLET, FILM COATED ORAL DAILY
Qty: 90 TABLET | Refills: 3 | Status: SHIPPED | OUTPATIENT
Start: 2019-09-27 | End: 2021-03-23

## 2019-09-27 RX ORDER — SIMVASTATIN 20 MG
TABLET ORAL
Qty: 90 TABLET | Refills: 3 | Status: SHIPPED | OUTPATIENT
Start: 2019-09-27 | End: 2020-09-28

## 2019-09-27 RX ORDER — FLECAINIDE ACETATE 150 MG/1
150 TABLET ORAL 2 TIMES DAILY
Qty: 180 TABLET | Refills: 3 | Status: SHIPPED | OUTPATIENT
Start: 2019-09-27 | End: 2020-12-10

## 2019-09-27 ASSESSMENT — MIFFLIN-ST. JEOR: SCORE: 1526.12

## 2019-09-27 NOTE — PROCEDURES
Seb k on the back.  Red itchy, irritating, catch\es on clothes  And two plantars warts.  ROS: No bleeding problems.    cryotherapy applied  Liquid nitrogen was applied for 5-10 seconds x3  to the skin lesions      A:P  Irritated, red and itchy  Dipesh K's    The expected blistering or scabbing reaction explained. Do not pick at the areas. Patient reminded to expect hypopigmented scars from the procedure. Return if lesions fail to fully resolve.

## 2019-09-27 NOTE — PROGRESS NOTES
"  SUBJECTIVE:   Rey Brown is a 74 year old male who presents for Preventive Visit.  CONCERNS:  1. Prostate  2. Itchy Mole - under right breast  3. Plantar Warts  Are you in the first 12 months of your Medicare Part B coverage?  No    Physical Health:    In general, how would you rate your overall physical health? excellent    Outside of work, how many days during the week do you exercise? 2-3 days/week    Outside of work, approximately how many minutes a day do you exercise?30-45 minutes    If you drink alcohol do you typically have >3 drinks per day or >7 drinks per week? No    Do you usually eat at least 4 servings of fruit and vegetables a day, include whole grains & fiber and avoid regularly eating high fat or \"junk\" foods? Yes    Do you have any problems taking medications regularly?  No    Do you have any side effects from medications? none    Needs assistance for the following daily activities: no assistance needed    Which of the following safety concerns are present in your home?  throw rugs in the hallway and lack of grab bars in the bathroom     Hearing impairment: Yes    In the past 6 months, have you been bothered by leaking of urine? yes    Mental Health:    In general, how would you rate your overall mental or emotional health? excellent  PHQ-2 Score:      Do you feel safe in your environment? Yes    Do you have a Health Care Directive? Yes: Advance Directive has been received and scanned.    Fall risk:  Fallen 2 or more times in the past year?: No  Any fall with injury in the past year?: No    Right Ear:    500 Hz- fail    1000 Hz- pass    2000 Hz- pass    4000 Hz- fail  Left Ear:     500 Hz- fail    1000 Hz- pass    2000 Hz-pass    4000 Hz- fail    Audiology referral or check for cerumen impaction  Isabel Gastelum CMA  2019    Cognitive Screenin) Repeat 3 items (Leader, Season, Table)    2) Clock draw: NORMAL  3) 3 item recall: Recalls 3 objects  Results: 3 items " recalled: COGNITIVE IMPAIRMENT LESS LIKELY    Mini-CogTM Copyright S Gerry. Licensed by the author for use in [facility]; reprinted with permission (pascual@[facility]). All rights reserved.      Do you have sleep apnea, excessive snoring or daytime drowsiness?: no      Reviewed and updated as needed this visit by clinical staff       Has history of hyperlipidemia.  On statin for this, denies any significant side effects of this medication.      Latest labs reviewed:    Recent Labs   Lab Test 06/21/18  1445 04/14/17  0841   CHOL 158 157   HDL 57 59   LDL 92 87   TRIG 45 55        Lab Results   Component Value Date    AST 22 06/21/2018      Blood presure remains well controlled when checked out of clinic.    Reviewed last 6 BP readings in chart:  BP Readings from Last 6 Encounters:   09/27/19 120/84   01/11/19 144/72   11/27/18 (!) 144/94   06/21/18 130/76   01/10/18 126/68   04/14/17 158/70       he has not experienced any significant side effects from medications for hypertension.    NO active cardiac complaints or symptoms with exercise.    Has history of atrial fibrillation.  Very rare episodes. n  No palpitations, no dizziness, no dyspnea on exertion, no shortness of breath.  No racing heart, no fast heart rates.    Taking medications as ordered, no side effects reported.   Patient is taking anticoagulation Asa 325 mg daily according to direction, with no reported side effects.    Reviewed and updated as needed this visit by Provider        Social History     Tobacco Use     Smoking status: Never Smoker     Smokeless tobacco: Never Used   Substance Use Topics     Alcohol use: Yes     Alcohol/week: 2.0 - 3.0 standard drinks     Types: 2 - 3 Cans of beer per week     Comment: social only                           Current providers sharing in care for this patient include:   Patient Care Team:  Sabas Staley MD as PCP - General (Family Practice)  Diana Pal APRN CNP as Assigned PCP    The  following health maintenance items are reviewed in Epic and correct as of today:  Health Maintenance   Topic Date Due     ZOSTER IMMUNIZATION (3 of 3) 07/02/2018     PHQ-2  01/01/2019     MEDICARE ANNUAL WELLNESS VISIT  06/21/2019     FALL RISK ASSESSMENT  06/21/2019     INFLUENZA VACCINE (1) 09/01/2019     DTAP/TDAP/TD IMMUNIZATION (3 - Td) 04/30/2022     LIPID  06/21/2023     ADVANCE CARE PLANNING  07/16/2023     COLONOSCOPY  08/24/2026     HEPATITIS C SCREENING  Completed     PNEUMOCOCCAL IMMUNIZATION 65+ LOW/MEDIUM RISK  Completed     AORTIC ANEURYSM SCREENING (SYSTEM ASSIGNED)  Completed     IPV IMMUNIZATION  Aged Out     MENINGITIS IMMUNIZATION  Aged Out     BP Readings from Last 3 Encounters:   09/27/19 120/84   01/11/19 144/72   11/27/18 (!) 144/94    Wt Readings from Last 3 Encounters:   09/27/19 77.4 kg (170 lb 9.6 oz)   01/11/19 78.5 kg (173 lb)   11/27/18 77.3 kg (170 lb 6.4 oz)                  Patient Active Problem List   Diagnosis     Paroxysmal A-fib (H)     HTN (hypertension)     Hypercholesterolemia     Lumbar disc disease     ACP (advance care planning)     Health Care Home     Elevated prostate specific antigen (PSA)     Family history of migraine     Plantar wart     Past Surgical History:   Procedure Laterality Date     OPEN REDUCTION INTERNAL FIXATION HAND       RHINOPLASTY       TONSILLECTOMY         Social History     Tobacco Use     Smoking status: Never Smoker     Smokeless tobacco: Never Used   Substance Use Topics     Alcohol use: Yes     Alcohol/week: 2.0 - 3.0 standard drinks     Types: 2 - 3 Cans of beer per week     Comment: social only     Family History   Problem Relation Age of Onset     Dementia Father      Osteoarthritis Father      Cerebrovascular Disease Father      Myocardial Infarction Mother      C.A.D. Mother      Coronary Artery Disease Mother              ROS:  Constitutional, HEENT, cardiovascular, pulmonary, GI, , musculoskeletal, neuro, skin, endocrine and psych  "systems are negative, except as otherwise noted.    OBJECTIVE:   There were no vitals taken for this visit. Estimated body mass index is 24.82 kg/m  as calculated from the following:    Height as of 1/11/19: 1.778 m (5' 10\").    Weight as of 1/11/19: 78.5 kg (173 lb).  EXAM:   GENERAL: healthy, alert and no distress  EYES: Eyes grossly normal to inspection, PERRL and conjunctivae and sclerae normal  HENT: ear canals and TM's normal, nose and mouth without ulcers or lesions  NECK: no adenopathy, no asymmetry, masses, or scars and thyroid normal to palpation  RESP: lungs clear to auscultation - no rales, rhonchi or wheezes  CV: regular rate and rhythm, normal S1 S2, no S3 or S4, no murmur, click or rub, no peripheral edema and peripheral pulses strong  ABDOMEN: soft, nontender, no hepatosplenomegaly, no masses and bowel sounds normal   (male): normal male genitalia without lesions or urethral discharge, no hernia  RECTAL: normal sphincter tone, no rectal masses, prostate normal size, smooth, nontender without nodules or masses  MS: no gross musculoskeletal defects noted, no edema  SKIN: no suspicious lesions or rashes  NEURO: Normal strength and tone, mentation intact and speech normal  PSYCH: mentation appears normal, affect normal/bright    Diagnostic Test Results:  Labs reviewed in Epic    ASSESSMENT / PLAN:   Rey was seen today for physical and hearing screening.    Diagnoses and all orders for this visit:    Encounter for Medicare annual wellness exam  -     SCREENING TEST, PURE TONE, AIR ONLY    Encounter for long-term (current) use of medications    Paroxysmal A-fib (H)  -     flecainide (TAMBOCOR) 150 MG tablet; Take 1 tablet (150 mg) by mouth 2 times daily    Hypercholesterolemia  -     simvastatin (ZOCOR) 20 MG tablet; TAKE ONE TABLET BY MOUTH AT BEDTIME    Essential hypertension  -     losartan (COZAAR) 25 MG tablet; Take 1 tablet (25 mg) by mouth daily        End of Life Planning:  Patient currently " "has an advanced directive: Yes.  Practitioner is supportive of decision.    COUNSELING:  Reviewed preventive health counseling, as reflected in patient instructions       Regular exercise       Vision screening       Hearing screening    Estimated body mass index is 24.82 kg/m  as calculated from the following:    Height as of 1/11/19: 1.778 m (5' 10\").    Weight as of 1/11/19: 78.5 kg (173 lb).         reports that he has never smoked. He has never used smokeless tobacco.      Appropriate preventive services were discussed with this patient, including applicable screening as appropriate for cardiovascular disease, diabetes, osteopenia/osteoporosis, and glaucoma.  As appropriate for age/gender, discussed screening for colorectal cancer, prostate cancer, breast cancer, and cervical cancer. Checklist reviewing preventive services available has been given to the patient.    Reviewed patients plan of care and provided an AVS. The Basic Care Plan (routine screening as documented in Health Maintenance) for Rey meets the Care Plan requirement. This Care Plan has been established and reviewed with the Patient.    Counseling Resources:  ATP IV Guidelines  Pooled Cohorts Equation Calculator  Breast Cancer Risk Calculator  FRAX Risk Assessment  ICSI Preventive Guidelines  Dietary Guidelines for Americans, 2010  USDA's MyPlate  ASA Prophylaxis  Lung CA Screening    Sabas Staley MD  [facility]  "

## 2019-09-27 NOTE — PATIENT INSTRUCTIONS
I recommend that you go to the pharmacy to get your second Shingrix shot.  Wound Care Instructions for Liquid Nitrogen Treatment   The treatment area will appear white at first. Over the next several hours a fluid-filled blister may form. The blister can be very dark. If blister appears, it will remain blistered for about a week. Then a scab will form over the area.   Leave the blistered area uncovered as long as it remains closed. If the area breaks open, you may cover it with a clean band aid. Do not pull off the skin covering the blister.   If the blistered area becomes uncomfortably filled with fluid, you may release some of the fluid by puncturing the blister with a needle that has been cleansed with alcohol.   If the skin covering the blister comes off, clean the area daily with soap and water. Apply a small amount of Vaseline and then cover with a clean band aid. Change the band aid twice daily until the skin is completely healed.   Call us if.....   1. You have signs of infection such as thick yellow or pus-like drainage from the wound site or a fever over 100 degrees Fahrenheit.   2. You have any questions or are not sure how to take care of the wound.        Patient Education   Personalized Prevention Plan  You are due for the preventive services outlined below.  Your care team is available to assist you in scheduling these services.  If you have already completed any of these items, please share that information with your care team to update in your medical record.  Health Maintenance Due   Topic Date Due     Zoster (Shingles) Vaccine (3 of 3) 07/02/2018     PHQ-2  01/01/2019     Annual Wellness Visit  06/21/2019     FALL RISK ASSESSMENT  06/21/2019     Flu Vaccine (1) 09/01/2019        Patient Education   Personalized Prevention Plan  You are due for the preventive services outlined below.  Your care team is available to assist you in scheduling these services.  If you have already completed any of  these items, please share that information with your care team to update in your medical record.  Health Maintenance Due   Topic Date Due     Zoster (Shingles) Vaccine (3 of 3) 07/02/2018     PHQ-2  01/01/2019     Annual Wellness Visit  06/21/2019     FALL RISK ASSESSMENT  06/21/2019     Flu Vaccine (1) 09/01/2019

## 2019-09-28 LAB
ALBUMIN SERPL-MCNC: 4.2 G/DL (ref 3.5–4.8)
ALBUMIN/GLOB SERPL: 2.2 {RATIO} (ref 1.2–2.2)
ALP SERPL-CCNC: 71 IU/L (ref 39–117)
ALT SERPL-CCNC: 12 IU/L (ref 0–44)
AST SERPL-CCNC: 19 IU/L (ref 0–40)
BILIRUB SERPL-MCNC: 0.8 MG/DL (ref 0–1.2)
BUN SERPL-MCNC: 20 MG/DL (ref 8–27)
BUN/CREATININE RATIO: 19 (ref 10–24)
CALCIUM SERPL-MCNC: 8.9 MG/DL (ref 8.6–10.2)
CHLORIDE SERPLBLD-SCNC: 103 MMOL/L (ref 96–106)
CHOLEST SERPL-MCNC: 158 MG/DL (ref 100–199)
CREAT SERPL-MCNC: 1.07 MG/DL (ref 0.76–1.27)
EGFR IF AFRICN AM: 80 ML/MIN/1.73
EGFR IF NONAFRICN AM: 69 ML/MIN/1.73
GLOBULIN, TOTAL: 1.9 G/DL (ref 1.5–4.5)
GLUCOSE SERPL-MCNC: 87 MG/DL (ref 65–99)
HDLC SERPL-MCNC: 53 MG/DL
LDL/HDL RATIO: 1.8 RATIO (ref 0–3.6)
LDLC SERPL CALC-MCNC: 94 MG/DL (ref 0–99)
POTASSIUM SERPL-SCNC: 4.4 MMOL/L (ref 3.5–5.2)
PROT SERPL-MCNC: 6.1 G/DL (ref 6–8.5)
PSA NG/ML: 4.8 NG/ML (ref 0–4)
SODIUM SERPL-SCNC: 142 MMOL/L (ref 134–144)
TOTAL CO2: 25 MMOL/L (ref 20–29)
TRIGL SERPL-MCNC: 56 MG/DL (ref 0–149)
VLDLC SERPL CALC-MCNC: 11 MG/DL (ref 5–40)

## 2019-10-15 ENCOUNTER — HOSPITAL ENCOUNTER (EMERGENCY)
Facility: CLINIC | Age: 74
Discharge: HOME | End: 2019-10-15
Attending: EMERGENCY MEDICINE | Admitting: EMERGENCY MEDICINE
Payer: COMMERCIAL

## 2019-10-15 VITALS
DIASTOLIC BLOOD PRESSURE: 74 MMHG | HEIGHT: 70 IN | OXYGEN SATURATION: 100 % | TEMPERATURE: 97.9 F | HEART RATE: 75 BPM | WEIGHT: 168 LBS | RESPIRATION RATE: 20 BRPM | BODY MASS INDEX: 24.05 KG/M2 | SYSTOLIC BLOOD PRESSURE: 137 MMHG

## 2019-10-15 DIAGNOSIS — Z87.438 HISTORY OF BPH: ICD-10-CM

## 2019-10-15 DIAGNOSIS — R33.9 URINARY RETENTION: ICD-10-CM

## 2019-10-15 PROCEDURE — 99283 EMERGENCY DEPT VISIT LOW MDM: CPT

## 2019-10-15 PROCEDURE — 51702 INSERT TEMP BLADDER CATH: CPT

## 2019-10-15 ASSESSMENT — ENCOUNTER SYMPTOMS
FEVER: 0
DYSURIA: 0
DIFFICULTY URINATING: 1
HEMATURIA: 0

## 2019-10-15 ASSESSMENT — MIFFLIN-ST. JEOR: SCORE: 1518.29

## 2019-10-15 NOTE — ED AVS SNAPSHOT
Emergency Department  [address] [facility] [address]  Phone:  [phone]  Fax:  [phone]                                    Rey Brown   MRN: [id number]    Department:   Emergency Department   Date of Visit:  10/15/2019           After Visit Summary Signature Page    I have received my discharge instructions, and my questions have been answered. I have discussed any challenges I see with this plan with the nurse or doctor.    ..........................................................................................................................................  Patient/Patient Representative Signature      ..........................................................................................................................................  Patient Representative Print Name and Relationship to Patient    ..................................................               ................................................  Date                                   Time    ..........................................................................................................................................  Reviewed by Signature/Title    ...................................................              ..............................................  Date                                               Time          22EPIC Rev 08/18

## 2019-10-16 NOTE — ED PROVIDER NOTES
"  History     Chief Complaint:  Urinary Retention    HPI   Rey Brown is a 74 year old male with a history of enlarged prostate who is on Proscar who presents with urinary retention. The patient reports that he has had decreased urine stream for a few years but tonight has not been able to urinate since 2000 today. He states he is very uncomfortable. He denies dysuria, hematuria, and fevers, and taking cold medications recently.     Allergies:  Ace inhibitors    Medications:    Aspirin 81 mg  Proscar  Tambocor  Cozaar  Zocor  Flonase  Nasacort inhaler  Levitra    Past Medical History:    Hypercholesterolemia  Lumbar disc disease  Paroxysmal a-fib  Hypertension  Elevated prostate specific antigen    Past Surgical History:    Open reduction internal fixation hand  Rhinoplasty  Tonsillectomy    Family History:    Dementia  Osteoarthritis  Cerebrovascular disease  Myocardial infarction  Coronary artery disease    Social History:  Smoking status: Never  Alcohol use: Yes, 2-3 drinks per week  Drug use: No  PCP: Sabas Staley  Presents to the ED with wife  Marital Status:   [2]    Review of Systems   Constitutional: Negative for fever.   Genitourinary: Positive for decreased urine volume and difficulty urinating. Negative for dysuria and hematuria.       Physical Exam     Patient Vitals for the past 24 hrs:   Temp Temp src Pulse Heart Rate Resp SpO2 Height Weight   10/15/19 2256 97.9  F (36.6  C) Oral 75 75 16 98 % 1.778 m (5' 10\") 76.2 kg (168 lb)     Physical Exam  General:          Alert and cooperative.   Resp:               Non-labored  Skin:                No rash  Neuro:             Speech is normal and fluent.   MSkel:             Moving all extremities  GI:                   Suprapubic distension and tenderness    Emergency Department Course   Emergency Department Course:  Past medical records, nursing notes, and vitals reviewed.  2321: I performed an exam of the patient and obtained history, " as documented above.    A catheter was placed.  Findings and plan explained to the Patient and spouse. Patient discharged home with instructions regarding supportive care, medications, and reasons to return. The importance of close follow-up was reviewed.     Impression & Plan    Medical Decision Making:  Rey Brown is a 74 year old male who presents to the emergency department today for evaluation of inability to urinate. He has suprapubic distension consistent with urinary retention and a history of BPH.  He has no symptoms to suggest associated stone or infection and UA, and imaging were not indicated. Faria catheter was placed and he will be following up with his urologist.     Diagnosis:    ICD-10-CM   1. Urinary retention R33.9   2. History of BPH Z87.438     Disposition:  discharged to home    Chadd Hidalgo  10/15/2019    EMERGENCY DEPARTMENT  Chadd RICH, am serving as a scribe at 11:21 PM on 10/15/2019 to document services personally performed by Renetta Juarez MD based on my observations and the provider's statements to me.      Renetta Juarez MD  10/16/19 0354

## 2019-10-17 ENCOUNTER — TRANSFERRED RECORDS (OUTPATIENT)
Dept: FAMILY MEDICINE | Facility: CLINIC | Age: 74
End: 2019-10-17

## 2019-10-21 ENCOUNTER — TRANSFERRED RECORDS (OUTPATIENT)
Dept: FAMILY MEDICINE | Facility: CLINIC | Age: 74
End: 2019-10-21

## 2019-10-27 ENCOUNTER — HEALTH MAINTENANCE LETTER (OUTPATIENT)
Age: 74
End: 2019-10-27

## 2019-11-04 ENCOUNTER — TRANSFERRED RECORDS (OUTPATIENT)
Dept: FAMILY MEDICINE | Facility: CLINIC | Age: 74
End: 2019-11-04

## 2019-12-16 ENCOUNTER — TRANSFERRED RECORDS (OUTPATIENT)
Dept: FAMILY MEDICINE | Facility: CLINIC | Age: 74
End: 2019-12-16

## 2019-12-26 ENCOUNTER — TELEPHONE (OUTPATIENT)
Dept: CARDIOLOGY | Facility: CLINIC | Age: 74
End: 2019-12-26

## 2019-12-26 NOTE — TELEPHONE ENCOUNTER
Patient left message inquiring if he has his annual follow up appt scheduled yet.  Called patient and left message.  Patient due to see Dr Scott 1/2020.  Awaiting return call.  HOLLEY Johns

## 2020-01-28 ENCOUNTER — OFFICE VISIT (OUTPATIENT)
Dept: CARDIOLOGY | Facility: CLINIC | Age: 75
End: 2020-01-28
Payer: COMMERCIAL

## 2020-01-28 VITALS
SYSTOLIC BLOOD PRESSURE: 148 MMHG | WEIGHT: 178.6 LBS | DIASTOLIC BLOOD PRESSURE: 80 MMHG | BODY MASS INDEX: 25.57 KG/M2 | HEART RATE: 50 BPM | HEIGHT: 70 IN

## 2020-01-28 DIAGNOSIS — I48.0 PAROXYSMAL A-FIB (H): ICD-10-CM

## 2020-01-28 PROCEDURE — 99214 OFFICE O/P EST MOD 30 MIN: CPT | Performed by: INTERNAL MEDICINE

## 2020-01-28 PROCEDURE — 93000 ELECTROCARDIOGRAM COMPLETE: CPT | Performed by: INTERNAL MEDICINE

## 2020-01-28 RX ORDER — TAMSULOSIN HYDROCHLORIDE 0.4 MG/1
0.4 CAPSULE ORAL DAILY
COMMUNITY
End: 2020-12-29

## 2020-01-28 ASSESSMENT — MIFFLIN-ST. JEOR: SCORE: 1562.4

## 2020-01-28 NOTE — LETTER
1/28/2020      Sabas Staley MD  [address]  [facility] [address]      RE: Rey Perkinsbarbara       Dear Colleague,    I had the pleasure of seeing Rey Brown in the [facility].    Service Date: 01/28/2020      HISTORY OF PRESENT ILLNESS:  I saw Mr. Brown for followup of atrial fibrillation.  He is a 74-year-old white male with a history of symptomatic paroxysmal atrial fibrillation.  He previously did well on flecainide 100 mg p.o. b.i.d.  Starting around 02/2019 he started to have breakthrough atrial fibrillation.  Afterwards he had been asked to increase the dose of flecainide to 150 mg p.o. b.i.d.  Unfortunately, he continued to have recurrent atrial fibrillation.  During today's clinic visit, he brought in a log that recorded 4 episodes of atrial fibrillation in 2019.  Those episodes occurred on 02/07, 02/25, 09/13 and 11/21.  The duration of the symptom lasted from 13 up to 21.5 hours.  He felt palpitation but did not go to the ER.  He did not check his actual pulse rate.  He stated that he woke up with the palpitation symptoms.      PHYSICAL EXAMINATION:   VITAL SIGNS:  Blood pressure was 148/80, heart rate 50 beats per minute, body weight 178 pounds.   HEENT:  Eyes and ENT were unremarkable.   LUNGS:  Clear.   CARDIAC:  Rhythm was regular, and the heart sounds were normal with no murmur.   ABDOMEN:  Examination showed no hepatomegaly.   EXTREMITIES:  There was no pedal edema.      EKG today showed sinus bradycardia.      ASSESSMENT AND RECOMMENDATIONS:  Mr. Brown is having breakthrough atrial fibrillation on flecainide 150 mg p.o. b.i.d.  The concerns are risk of stroke and symptoms of atrial fibrillation.  There is probably a low likelihood of developing any tachycardia-induced cardiomyopathy.  I recommended catheter ablation of atrial fibrillation, but the patient remains undecided at the present time.  He would like to wait for a few  months before a final decision.  If he continues to have a long duration of recurrent atrial fibrillation without going through ablation, I would recommend regular anticoagulation.      The patient is scheduled for a urologist procedure in the next few weeks.  He is asked to continue flecainide without interruption.  He does not require further cardiac clearance.      He is tentatively scheduled for followup in 1 year.  However, if he decides for ablation, we may schedule the procedure earlier and he may need a history and physical examination update.      cc:   Sabas Staley MD    [facility] [address] [place]  [address]         MEMO KURTZ MD             D: 2020   T: 2020   MT: MICK      Name:     RO VELAZQUEZ   MRN:      [id number]        Account:      [id number]   :      1945           Service Date: 2020      Document: [id number]           Outpatient Encounter Medications as of 2020   Medication Sig Dispense Refill     finasteride (PROSCAR) 5 MG tablet Take 1 tablet (5 mg) by mouth daily 90 tablet 3     flecainide (TAMBOCOR) 150 MG tablet Take 1 tablet (150 mg) by mouth 2 times daily 180 tablet 3     ibuprofen (ADVIL/MOTRIN) 200 MG capsule Take 200 mg by mouth every 4 hours as needed for fever       losartan (COZAAR) 25 MG tablet Take 1 tablet (25 mg) by mouth daily 90 tablet 3     Nasal Dilators (BREATHE RIGHT LARGE) STRP nightly as needed       simvastatin (ZOCOR) 20 MG tablet TAKE ONE TABLET BY MOUTH AT BEDTIME 90 tablet 3     tamsulosin (FLOMAX) 0.4 MG capsule Take 0.4 mg by mouth daily       Vardenafil HCl (LEVITRA PO) Take by mouth as needed       aspirin 81 MG tablet Take  by mouth daily.       fluticasone (FLONASE) 50 MCG/ACT nasal spray Spray 1 spray into both nostrils as needed for rhinitis or allergies       triamcinolone (NASACORT AQ) 55 MCG/ACT Inhaler Spray 2 sprays into both nostrils as needed       No  facility-administered encounter medications on file as of 1/28/2020.        Again, thank you for allowing me to participate in the care of your patient.      Sincerely,    Sharmin Scott MD     [facility]

## 2020-01-28 NOTE — PROGRESS NOTES
Service Date: 01/28/2020      HISTORY OF PRESENT ILLNESS:  I saw Mr. Brown for followup of atrial fibrillation.  He is a 74-year-old white male with a history of symptomatic paroxysmal atrial fibrillation.  He previously did well on flecainide 100 mg p.o. b.i.d.  Starting around 02/2019 he started to have breakthrough atrial fibrillation.  Afterwards he had been asked to increase the dose of flecainide to 150 mg p.o. b.i.d.  Unfortunately, he continued to have recurrent atrial fibrillation.  During today's clinic visit, he brought in a log that recorded 4 episodes of atrial fibrillation in 2019.  Those episodes occurred on 02/07, 02/25, 09/13 and 11/21.  The duration of the symptom lasted from 13 up to 21.5 hours.  He felt palpitation but did not go to the ER.  He did not check his actual pulse rate.  He stated that he woke up with the palpitation symptoms.      PHYSICAL EXAMINATION:   VITAL SIGNS:  Blood pressure was 148/80, heart rate 50 beats per minute, body weight 178 pounds.   HEENT:  Eyes and ENT were unremarkable.   LUNGS:  Clear.   CARDIAC:  Rhythm was regular, and the heart sounds were normal with no murmur.   ABDOMEN:  Examination showed no hepatomegaly.   EXTREMITIES:  There was no pedal edema.      EKG today showed sinus bradycardia.      ASSESSMENT AND RECOMMENDATIONS:  Mr. Brown is having breakthrough atrial fibrillation on flecainide 150 mg p.o. b.i.d.  The concerns are risk of stroke and symptoms of atrial fibrillation.  There is probably a low likelihood of developing any tachycardia-induced cardiomyopathy.  I recommended catheter ablation of atrial fibrillation, but the patient remains undecided at the present time.  He would like to wait for a few months before a final decision.  If he continues to have a long duration of recurrent atrial fibrillation without going through ablation, I would recommend regular anticoagulation.      The patient is scheduled for a urologist procedure in the  next few weeks.  He is asked to continue flecainide without interruption.  He does not require further cardiac clearance.      He is tentatively scheduled for followup in 1 year.  However, if he decides for ablation, we may schedule the procedure earlier and he may need a history and physical examination update.      cc:   Sabas Staley MD    [facility] [address] [place]  [address]         MEMO KURTZ MD             D: 2020   T: 2020   MT: MICK      Name:     RO VELAZQUEZ   MRN:      [id number]        Account:      [id number]   :      1945           Service Date: 2020      Document: [id number]

## 2020-01-28 NOTE — PROGRESS NOTES
HPI and Plan:   See dictation    Orders Placed This Encounter   Procedures     Follow-Up with Electrophysiologist     EKG 12-lead complete w/read - Clinics     EKG 12-lead complete w/read (Future)- to be scheduled       Orders Placed This Encounter   Medications     tamsulosin (FLOMAX) 0.4 MG capsule     Sig: Take 0.4 mg by mouth daily       There are no discontinued medications.      Encounter Diagnosis   Name Primary?     Paroxysmal A-fib (H)        CURRENT MEDICATIONS:  Current Outpatient Medications   Medication Sig Dispense Refill     finasteride (PROSCAR) 5 MG tablet Take 1 tablet (5 mg) by mouth daily 90 tablet 3     flecainide (TAMBOCOR) 150 MG tablet Take 1 tablet (150 mg) by mouth 2 times daily 180 tablet 3     ibuprofen (ADVIL/MOTRIN) 200 MG capsule Take 200 mg by mouth every 4 hours as needed for fever       losartan (COZAAR) 25 MG tablet Take 1 tablet (25 mg) by mouth daily 90 tablet 3     Nasal Dilators (BREATHE RIGHT LARGE) STRP nightly as needed       simvastatin (ZOCOR) 20 MG tablet TAKE ONE TABLET BY MOUTH AT BEDTIME 90 tablet 3     tamsulosin (FLOMAX) 0.4 MG capsule Take 0.4 mg by mouth daily       Vardenafil HCl (LEVITRA PO) Take by mouth as needed       aspirin 81 MG tablet Take  by mouth daily.       fluticasone (FLONASE) 50 MCG/ACT nasal spray Spray 1 spray into both nostrils as needed for rhinitis or allergies       triamcinolone (NASACORT AQ) 55 MCG/ACT Inhaler Spray 2 sprays into both nostrils as needed         ALLERGIES     Allergies   Allergen Reactions     Ace Inhibitors Cough       PAST MEDICAL HISTORY:  Past Medical History:   Diagnosis Date     Hypercholesterolemia      Lumbar disc disease      Paroxysmal A-fib (H)        PAST SURGICAL HISTORY:  Past Surgical History:   Procedure Laterality Date     OPEN REDUCTION INTERNAL FIXATION HAND       RHINOPLASTY       TONSILLECTOMY         FAMILY HISTORY:  Family History   Problem Relation Age of Onset     Dementia Father      Osteoarthritis  Father      Cerebrovascular Disease Father      Myocardial Infarction Mother      C.A.D. Mother      Coronary Artery Disease Mother        SOCIAL HISTORY:  Social History     Socioeconomic History     Marital status:      Spouse name: Erin     Number of children: None     Years of education: None     Highest education level: None   Occupational History     None   Social Needs     Financial resource strain: None     Food insecurity:     Worry: None     Inability: None     Transportation needs:     Medical: None     Non-medical: None   Tobacco Use     Smoking status: Never Smoker     Smokeless tobacco: Never Used   Substance and Sexual Activity     Alcohol use: Yes     Alcohol/week: 2.0 - 3.0 standard drinks     Types: 2 - 3 Cans of beer per week     Comment: social only     Drug use: No     Sexual activity: Yes   Lifestyle     Physical activity:     Days per week: None     Minutes per session: None     Stress: None   Relationships     Social connections:     Talks on phone: None     Gets together: None     Attends Buddhist service: None     Active member of club or organization: None     Attends meetings of clubs or organizations: None     Relationship status: None     Intimate partner violence:     Fear of current or ex partner: None     Emotionally abused: None     Physically abused: None     Forced sexual activity: None   Other Topics Concern      Service Not Asked     Blood Transfusions Not Asked     Caffeine Concern Not Asked     Occupational Exposure Not Asked     Hobby Hazards Not Asked     Sleep Concern Not Asked     Stress Concern Not Asked     Weight Concern Not Asked     Special Diet No     Back Care Not Asked     Exercise Yes     Comment: 2-3x/week     Bike Helmet Not Asked     Seat Belt Not Asked     Self-Exams Not Asked     Parent/sibling w/ CABG, MI or angioplasty before 65F 55M? Not Asked   Social History Narrative     None       Review of Systems:  Skin:  Negative       Eyes:   "Negative      ENT:  Negative      Respiratory:  Negative       Cardiovascular:  Negative      Gastroenterology: Negative      Genitourinary:  Negative      Musculoskeletal:  Negative      Neurologic:  Negative      Psychiatric:  Negative      Heme/Lymph/Imm:  Negative      Endocrine:  Negative        Physical Exam:  Vitals: BP (!) 148/80   Pulse 50   Ht 1.772 m (5' 9.75\")   Wt 81 kg (178 lb 9.6 oz)   BMI 25.81 kg/m      Constitutional:  cooperative, alert and oriented, well developed, well nourished, in no acute distress        Skin:  warm and dry to the touch          Head:  normocephalic        Eyes:  pupils equal and round        Lymph:      ENT:  no pallor or cyanosis        Neck:           Respiratory:  normal breath sounds, clear to auscultation, normal A-P diameter, normal symmetry, normal respiratory excursion, no use of accessory muscles         Cardiac: regular rhythm, normal S1/S2, no S3 or S4, apical impulse not displaced, no murmurs, gallops or rubs       systolic ejection murmur;grade 1                                                 GI:  abdomen soft        Extremities and Muscular Skeletal:  no edema;no deformities, clubbing, cyanosis, erythema observed              Neurological:  no gross motor deficits        Psych:  Alert and Oriented x 3;affect appropriate, oriented to time, person and place;oriented to time, person and place        CC  Sharmin Scott MD  [address]  [address]  [place] [id number]              "

## 2020-01-28 NOTE — LETTER
1/28/2020    Sabas Staley MD  [address] [facility] [address]    RE: Rey Brown       Dear Colleague,    I had the pleasure of seeing Rey Brown in the [facility].    HPI and Plan:   See dictation    Orders Placed This Encounter   Procedures     Follow-Up with Electrophysiologist     EKG 12-lead complete w/read - Clinics     EKG 12-lead complete w/read (Future)- to be scheduled       Orders Placed This Encounter   Medications     tamsulosin (FLOMAX) 0.4 MG capsule     Sig: Take 0.4 mg by mouth daily       There are no discontinued medications.      Encounter Diagnosis   Name Primary?     Paroxysmal A-fib (H)        CURRENT MEDICATIONS:  Current Outpatient Medications   Medication Sig Dispense Refill     finasteride (PROSCAR) 5 MG tablet Take 1 tablet (5 mg) by mouth daily 90 tablet 3     flecainide (TAMBOCOR) 150 MG tablet Take 1 tablet (150 mg) by mouth 2 times daily 180 tablet 3     ibuprofen (ADVIL/MOTRIN) 200 MG capsule Take 200 mg by mouth every 4 hours as needed for fever       losartan (COZAAR) 25 MG tablet Take 1 tablet (25 mg) by mouth daily 90 tablet 3     Nasal Dilators (BREATHE RIGHT LARGE) STRP nightly as needed       simvastatin (ZOCOR) 20 MG tablet TAKE ONE TABLET BY MOUTH AT BEDTIME 90 tablet 3     tamsulosin (FLOMAX) 0.4 MG capsule Take 0.4 mg by mouth daily       Vardenafil HCl (LEVITRA PO) Take by mouth as needed       aspirin 81 MG tablet Take  by mouth daily.       fluticasone (FLONASE) 50 MCG/ACT nasal spray Spray 1 spray into both nostrils as needed for rhinitis or allergies       triamcinolone (NASACORT AQ) 55 MCG/ACT Inhaler Spray 2 sprays into both nostrils as needed         ALLERGIES     Allergies   Allergen Reactions     Ace Inhibitors Cough       PAST MEDICAL HISTORY:  Past Medical History:   Diagnosis Date     Hypercholesterolemia      Lumbar disc disease      Paroxysmal A-fib (H)        PAST SURGICAL HISTORY:  Past  Surgical History:   Procedure Laterality Date     OPEN REDUCTION INTERNAL FIXATION HAND       RHINOPLASTY       TONSILLECTOMY         FAMILY HISTORY:  Family History   Problem Relation Age of Onset     Dementia Father      Osteoarthritis Father      Cerebrovascular Disease Father      Myocardial Infarction Mother      C.A.D. Mother      Coronary Artery Disease Mother        SOCIAL HISTORY:  Social History     Socioeconomic History     Marital status:      Spouse name: Erin     Number of children: None     Years of education: None     Highest education level: None   Occupational History     None   Social Needs     Financial resource strain: None     Food insecurity:     Worry: None     Inability: None     Transportation needs:     Medical: None     Non-medical: None   Tobacco Use     Smoking status: Never Smoker     Smokeless tobacco: Never Used   Substance and Sexual Activity     Alcohol use: Yes     Alcohol/week: 2.0 - 3.0 standard drinks     Types: 2 - 3 Cans of beer per week     Comment: social only     Drug use: No     Sexual activity: Yes   Lifestyle     Physical activity:     Days per week: None     Minutes per session: None     Stress: None   Relationships     Social connections:     Talks on phone: None     Gets together: None     Attends Jewish service: None     Active member of club or organization: None     Attends meetings of clubs or organizations: None     Relationship status: None     Intimate partner violence:     Fear of current or ex partner: None     Emotionally abused: None     Physically abused: None     Forced sexual activity: None   Other Topics Concern      Service Not Asked     Blood Transfusions Not Asked     Caffeine Concern Not Asked     Occupational Exposure Not Asked     Hobby Hazards Not Asked     Sleep Concern Not Asked     Stress Concern Not Asked     Weight Concern Not Asked     Special Diet No     Back Care Not Asked     Exercise Yes     Comment: 2-3x/week      "Bike Helmet Not Asked     Seat Belt Not Asked     Self-Exams Not Asked     Parent/sibling w/ CABG, MI or angioplasty before 65F 55M? Not Asked   Social History Narrative     None       Review of Systems:  Skin:  Negative       Eyes:  Negative      ENT:  Negative      Respiratory:  Negative       Cardiovascular:  Negative      Gastroenterology: Negative      Genitourinary:  Negative      Musculoskeletal:  Negative      Neurologic:  Negative      Psychiatric:  Negative      Heme/Lymph/Imm:  Negative      Endocrine:  Negative        Physical Exam:  Vitals: BP (!) 148/80   Pulse 50   Ht 1.772 m (5' 9.75\")   Wt 81 kg (178 lb 9.6 oz)   BMI 25.81 kg/m       Constitutional:  cooperative, alert and oriented, well developed, well nourished, in no acute distress        Skin:  warm and dry to the touch          Head:  normocephalic        Eyes:  pupils equal and round        Lymph:      ENT:  no pallor or cyanosis        Neck:           Respiratory:  normal breath sounds, clear to auscultation, normal A-P diameter, normal symmetry, normal respiratory excursion, no use of accessory muscles         Cardiac: regular rhythm, normal S1/S2, no S3 or S4, apical impulse not displaced, no murmurs, gallops or rubs       systolic ejection murmur;grade 1                                                 GI:  abdomen soft        Extremities and Muscular Skeletal:  no edema;no deformities, clubbing, cyanosis, erythema observed              Neurological:  no gross motor deficits        Psych:  Alert and Oriented x 3;affect appropriate, oriented to time, person and place;oriented to time, person and place        CC  Sharmin Scott MD  [address]  [address]  [place] [address]                Thank you for allowing me to participate in the care of your patient.      Sincerely,     Sharmin Scott MD     [facility]    cc:   Sharmin Scott MD  [address]  [address]  [place] [id number]        "

## 2020-03-04 RX ORDER — CEFAZOLIN SODIUM 2 G/100ML
2 INJECTION, SOLUTION INTRAVENOUS
Status: CANCELLED | OUTPATIENT
Start: 2020-03-18

## 2020-03-06 ENCOUNTER — OFFICE VISIT (OUTPATIENT)
Dept: URGENT CARE | Facility: URGENT CARE | Age: 75
End: 2020-03-06
Payer: COMMERCIAL

## 2020-03-06 VITALS
DIASTOLIC BLOOD PRESSURE: 67 MMHG | OXYGEN SATURATION: 96 % | SYSTOLIC BLOOD PRESSURE: 119 MMHG | TEMPERATURE: 97.4 F | WEIGHT: 179 LBS | HEART RATE: 68 BPM | BODY MASS INDEX: 25.87 KG/M2

## 2020-03-06 DIAGNOSIS — K52.9 GASTROENTERITIS: Primary | ICD-10-CM

## 2020-03-06 PROCEDURE — 99213 OFFICE O/P EST LOW 20 MIN: CPT | Performed by: FAMILY MEDICINE

## 2020-03-06 RX ORDER — ACETAMINOPHEN 325 MG/1
325-650 TABLET ORAL EVERY 6 HOURS PRN
COMMUNITY

## 2020-03-06 RX ORDER — ONDANSETRON 4 MG/1
TABLET, FILM COATED ORAL
Qty: 20 TABLET | Refills: 0 | Status: SHIPPED | OUTPATIENT
Start: 2020-03-06 | End: 2020-03-16

## 2020-03-07 NOTE — PROGRESS NOTES
Subjective         HPI   Here with wife.  Just drove home from [place] this weekend.  Vomited last on Monday.  Intermittent fevers this week.  Has had decreased appetite due to persistent nausea.  No diarrhea.  Tolerating po well.    No chills, no ST.  No abdominal pain.      Patient Active Problem List   Diagnosis     Paroxysmal A-fib (H)     HTN (hypertension)     Hypercholesterolemia     Lumbar disc disease     ACP (advance care planning)     Health Care Home     Elevated prostate specific antigen (PSA)     Family history of migraine     Plantar wart     Past Surgical History:   Procedure Laterality Date     OPEN REDUCTION INTERNAL FIXATION HAND       RHINOPLASTY       TONSILLECTOMY         Social History     Tobacco Use     Smoking status: Never Smoker     Smokeless tobacco: Never Used   Substance Use Topics     Alcohol use: Yes     Alcohol/week: 2.0 - 3.0 standard drinks     Types: 2 - 3 Cans of beer per week     Comment: social only     Family History   Problem Relation Age of Onset     Dementia Father      Osteoarthritis Father      Cerebrovascular Disease Father      Myocardial Infarction Mother      C.A.D. Mother      Coronary Artery Disease Mother          Current Outpatient Medications   Medication Sig Dispense Refill     acetaminophen (TYLENOL) 325 MG tablet Take 325-650 mg by mouth every 6 hours as needed for mild pain       finasteride (PROSCAR) 5 MG tablet Take 1 tablet (5 mg) by mouth daily 90 tablet 3     flecainide (TAMBOCOR) 150 MG tablet Take 1 tablet (150 mg) by mouth 2 times daily 180 tablet 3     losartan (COZAAR) 25 MG tablet Take 1 tablet (25 mg) by mouth daily 90 tablet 3     ondansetron (ZOFRAN) 4 MG tablet 1-2 TABS Q 8 HOURS PRN NAUSEA 20 tablet 0     simvastatin (ZOCOR) 20 MG tablet TAKE ONE TABLET BY MOUTH AT BEDTIME 90 tablet 3     tamsulosin (FLOMAX) 0.4 MG capsule Take 0.4 mg by mouth daily       aspirin 81 MG tablet Take  by mouth daily.       fluticasone (FLONASE) 50 MCG/ACT nasal spray  Spray 1 spray into both nostrils as needed for rhinitis or allergies       ibuprofen (ADVIL/MOTRIN) 200 MG capsule Take 200 mg by mouth every 4 hours as needed for fever       Nasal Dilators (BREATHE RIGHT LARGE) STRP nightly as needed       triamcinolone (NASACORT AQ) 55 MCG/ACT Inhaler Spray 2 sprays into both nostrils as needed       Vardenafil HCl (LEVITRA PO) Take by mouth as needed       Allergies   Allergen Reactions     Ace Inhibitors Cough     Recent Labs   Lab Test 09/27/19  1151 06/21/18  1445 04/14/17  0841   LDL 94 92 87   HDL 53 57 59   TRIG 56 45 55   ALT 12 13 14   CR 1.07 0.93 0.98   POTASSIUM 4.4 4.1 4.6      BP Readings from Last 3 Encounters:   03/06/20 119/67   01/28/20 (!) 148/80   10/15/19 137/74    Wt Readings from Last 3 Encounters:   03/06/20 81.2 kg (179 lb)   01/28/20 81 kg (178 lb 9.6 oz)   10/15/19 76.2 kg (168 lb)                    Reviewed and updated as needed this visit by Provider         Review of Systems   ROS COMP: Constitutional, HEENT, cardiovascular, pulmonary, GI, , musculoskeletal, neuro, skin, endocrine and psych systems are negative, except as otherwise noted.      Objective    /67   Pulse 68   Temp 97.4  F (36.3  C) (Oral)   Wt 81.2 kg (179 lb)   SpO2 96%   BMI 25.87 kg/m      Physical Exam   GENERAL: healthy, alert and no distress  EYES: Eyes grossly normal to inspection, PERRL and conjunctivae and sclerae normal  HENT: ear canals and TM's normal, nose and mouth without ulcers or lesions  NECK: no adenopathy, no asymmetry, masses, or scars and thyroid normal to palpation  RESP: lungs clear to auscultation - no rales, rhonchi or wheezes  CV: regular rate and rhythm, normal S1 S2, no S3 or S4, no murmur, click or rub, no peripheral edema and peripheral pulses strong  ABDOMEN: soft, nontender, no hepatosplenomegaly, no masses and bowel sounds normal  MS: no gross musculoskeletal defects noted, no edema  SKIN: no suspicious lesions or rashes  NEURO: Normal  strength and tone, mentation intact and speech normal  PSYCH: mentation appears normal, affect normal/bright        Assessment & Plan     1. Gastroenteritis    - ondansetron (ZOFRAN) 4 MG tablet; 1-2 TABS Q 8 HOURS PRN NAUSEA  Dispense: 20 tablet; Refill: 0     Recommend rest, increased electrolyte fluids and BRAT diet.  Zofran for nausea prn to help increase po intake.  Close Follow-up if no change or worsening symptoms prn.    Elayne Martinez MD  [facility]

## 2020-03-10 ENCOUNTER — OFFICE VISIT (OUTPATIENT)
Dept: FAMILY MEDICINE | Facility: CLINIC | Age: 75
End: 2020-03-10

## 2020-03-10 VITALS
SYSTOLIC BLOOD PRESSURE: 146 MMHG | RESPIRATION RATE: 16 BRPM | DIASTOLIC BLOOD PRESSURE: 69 MMHG | WEIGHT: 177.2 LBS | HEART RATE: 58 BPM | OXYGEN SATURATION: 97 % | BODY MASS INDEX: 25.61 KG/M2

## 2020-03-10 DIAGNOSIS — I10 ESSENTIAL HYPERTENSION: Primary | ICD-10-CM

## 2020-03-10 DIAGNOSIS — R10.12 LEFT UPPER QUADRANT PAIN: ICD-10-CM

## 2020-03-10 LAB
% GRANULOCYTES: 92.8 % (ref 42.2–75.2)
HCT VFR BLD AUTO: 39.3 % (ref 39–51)
HEMOGLOBIN: 13.7 G/DL (ref 13.4–17.5)
LYMPHOCYTES NFR BLD AUTO: 5.3 % (ref 20.5–51.1)
MCH RBC QN AUTO: 30.5 PG (ref 27–31)
MCHC RBC AUTO-ENTMCNC: 34.9 G/DL (ref 33–37)
MCV RBC AUTO: 87.4 FL (ref 80–100)
MONOCYTES NFR BLD AUTO: 1.9 % (ref 1.7–9.3)
PLATELET # BLD AUTO: 284 K/UL (ref 140–450)
RBC # BLD AUTO: 4.49 X10/CMM (ref 4.2–5.9)
WBC # BLD AUTO: 20.1 X10/CMM (ref 3.8–11)

## 2020-03-10 PROCEDURE — 99214 OFFICE O/P EST MOD 30 MIN: CPT | Mod: 25 | Performed by: FAMILY MEDICINE

## 2020-03-10 PROCEDURE — 93050 ART PRESSURE WAVEFORM ANALYS: CPT | Performed by: FAMILY MEDICINE

## 2020-03-10 PROCEDURE — 36415 COLL VENOUS BLD VENIPUNCTURE: CPT | Performed by: FAMILY MEDICINE

## 2020-03-10 PROCEDURE — 85025 COMPLETE CBC W/AUTO DIFF WBC: CPT | Performed by: FAMILY MEDICINE

## 2020-03-10 NOTE — PROGRESS NOTES
More he has had a 10-day course the begin with body aches malaise and nausea that has continued for him will up until today.  Last night he had a period of no symptoms and thought he might be over the situation but they returned this morning.  He has had the constant symptom of nausea throughout this time.  He also reports left upper abdominal pain at times sensitivity of the skin a certain sense of queasiness driving in the car has been difficult and the jostling jostling makes him feel worse.  He goes from so far to say clothes are actually painful when they brush against the skin  Having  A TURP next week,  No anxiety about that.  He also mentions that he has had thicker smelly her urine than normal  Voice is off since he vomitted 10 days ago.    Problem(s) Oriented visit      ROS:  General and Resp. completed and negative except as noted above     HISTORY:   reports current alcohol use of about 2.0 - 3.0 standard drinks of alcohol per week.   reports that he has never smoked. He has never used smokeless tobacco.    Past Medical History:   Diagnosis Date     Hypercholesterolemia      Lumbar disc disease      Paroxysmal A-fib (H)      Past Surgical History:   Procedure Laterality Date     OPEN REDUCTION INTERNAL FIXATION HAND       RHINOPLASTY       TONSILLECTOMY         EXAM:  BP: 146/69[[facility] BP Machine[   Pulse: 58    Temp: Data Unavailable    Wt Readings from Last 2 Encounters:   03/10/20 80.4 kg (177 lb 3.2 oz)   03/06/20 81.2 kg (179 lb)       BMI= Body mass index is 25.61 kg/m .    EXAM:  APPEARANCE: = Relaxed and in no distress  Conj/Eyelids = noninjected and lids and lashes are without inflammation  PERRLA/Irises = Pupils Round Reactive to Light and Irisis without inflammation  Neck = No anterior or posterior adenopathy appreciated.  Thyroid = Not enlarged and no masses felt  Resp effort = Calm regular breathing  Breath Sounds = Good air movement with no rales or rhonchi in any lung fields  Heart Rate,  "Rythym, & sounds (no Murm)  = Regular rate and rythym with no S3, S4, or murmer appreciated.  Carotid Art's = Pulses full and equal and no bruits appreciated  Abdomen: soft, nontender, no masses, & bowel sounds = tenderness moderate LUQ  Liver/Spleen = Normal span and no splenomegaly noted  Digits and Nails = FROM in all finger joints, no nail dystrophy  Ext (edema) = No pretibial edema noted or elsewhere  Musculsktl =  Strength and ROM of major joints are within normal limits  SKIN = absent significant rashes or lesions   Recent/Remote Memory = Alert and Oriented x 3  Mood/Affect = Cooperative and interested      Assessment/Plan:  Rey was seen today for gastrointestinal problem.    Diagnoses and all orders for this visit:    HTN (hypertension)  -     C ART PRESS WAVEFORM ANALYS CENTRAL ART PRESSURE    Left upper quadrant pain  -     Comp. Metabolic Panel (14) ([facility])  -     CBC with Diff/Plt ([facility])  -     Amylase  Serum ([facility])  -     Lipase  Serum ([facility])  -     H. pylori IgG  Abs ([facility])  -     RADIOLOGY REFERRAL    long discussion   Pancreatitis no1 on ddx    COUNSELING:   reports that he has never smoked. He has never used smokeless tobacco.    Estimated body mass index is 25.61 kg/m  as calculated from the following:    Height as of 1/28/20: 1.772 m (5' 9.75\").    Weight as of this encounter: 80.4 kg (177 lb 3.2 oz).       Appropriate preventive services were discussed with this patient, including applicable screening as appropriate for cardiovascular disease, diabetes, osteopenia/osteoporosis, and glaucoma.  As appropriate for age/gender, discussed screening for colorectal cancer, prostate cancer, breast cancer, and cervical cancer. Checklist reviewing preventive services available has been given to the patient.    Reviewed patients plan of care and provided an AVS. The Basic Care Plan (routine screening as documented in Health Maintenance) for Rey meets the Care Plan requirement. This Care " Plan has been established and reviewed with the  Patient.      The following health maintenance items are reviewed in Epic and correct as of today:  Health Maintenance   Topic Date Due     ZOSTER IMMUNIZATION (3 of 3) 07/02/2018     PHQ-2  01/01/2020     MEDICARE ANNUAL WELLNESS VISIT  09/27/2020     FALL RISK ASSESSMENT  09/27/2020     DTAP/TDAP/TD IMMUNIZATION (3 - Td) 04/30/2022     ADVANCE CARE PLANNING  07/16/2023     LIPID  09/27/2024     COLORECTAL CANCER SCREENING  08/24/2026     HEPATITIS C SCREENING  Completed     INFLUENZA VACCINE  Completed     PNEUMOCOCCAL IMMUNIZATION 65+ LOW/MEDIUM RISK  Completed     AORTIC ANEURYSM SCREENING (SYSTEM ASSIGNED)  Completed     IPV IMMUNIZATION  Aged Out     MENINGITIS IMMUNIZATION  Aged Out       Sabas Staley  [facility]  For any issues my office # is [phone]

## 2020-03-11 ENCOUNTER — TRANSFERRED RECORDS (OUTPATIENT)
Dept: FAMILY MEDICINE | Facility: CLINIC | Age: 75
End: 2020-03-11

## 2020-03-11 LAB
ALBUMIN SERPL-MCNC: 2.9 G/DL (ref 3.7–4.7)
ALBUMIN/GLOB SERPL: 1.1 {RATIO} (ref 1.2–2.2)
ALP SERPL-CCNC: 439 IU/L (ref 39–117)
ALT SERPL-CCNC: 113 IU/L (ref 0–44)
AMYLASE SERPL-CCNC: 59 U/L (ref 31–110)
AST SERPL-CCNC: 82 IU/L (ref 0–40)
BILIRUB SERPL-MCNC: 0.9 MG/DL (ref 0–1.2)
BUN SERPL-MCNC: 40 MG/DL (ref 8–27)
BUN/CREATININE RATIO: 18 (ref 10–24)
CALCIUM SERPL-MCNC: 7.9 MG/DL (ref 8.6–10.2)
CHLORIDE SERPLBLD-SCNC: 99 MMOL/L (ref 96–106)
CREAT SERPL-MCNC: 2.21 MG/DL (ref 0.76–1.27)
EGFR IF AFRICN AM: 33 ML/MIN/1.73
EGFR IF NONAFRICN AM: 29 ML/MIN/1.73
GLOBULIN, TOTAL: 2.6 G/DL (ref 1.5–4.5)
GLUCOSE SERPL-MCNC: 104 MG/DL (ref 65–99)
H. PYLORI ABY IGG INDEX VALUE: 0.71 INDEX VALUE (ref 0–0.79)
LIPASE SERPL-CCNC: 58 U/L (ref 13–78)
POTASSIUM SERPL-SCNC: 4.8 MMOL/L (ref 3.5–5.2)
PROT SERPL-MCNC: 5.5 G/DL (ref 6–8.5)
SODIUM SERPL-SCNC: 134 MMOL/L (ref 134–144)
TOTAL CO2: 22 MMOL/L (ref 20–29)

## 2020-03-13 ENCOUNTER — OFFICE VISIT (OUTPATIENT)
Dept: URGENT CARE | Facility: URGENT CARE | Age: 75
End: 2020-03-13
Payer: COMMERCIAL

## 2020-03-13 ENCOUNTER — TELEPHONE (OUTPATIENT)
Dept: FAMILY MEDICINE | Facility: CLINIC | Age: 75
End: 2020-03-13

## 2020-03-13 VITALS
DIASTOLIC BLOOD PRESSURE: 62 MMHG | TEMPERATURE: 96.9 F | WEIGHT: 174 LBS | SYSTOLIC BLOOD PRESSURE: 142 MMHG | BODY MASS INDEX: 25.15 KG/M2 | OXYGEN SATURATION: 98 % | HEART RATE: 57 BPM

## 2020-03-13 DIAGNOSIS — R79.89 ELEVATED SERUM CREATININE: ICD-10-CM

## 2020-03-13 DIAGNOSIS — N39.0 URINARY TRACT INFECTION WITHOUT HEMATURIA, SITE UNSPECIFIED: Primary | ICD-10-CM

## 2020-03-13 DIAGNOSIS — D72.829 LEUKOCYTOSIS, UNSPECIFIED TYPE: ICD-10-CM

## 2020-03-13 LAB
ALBUMIN UR-MCNC: 30 MG/DL
APPEARANCE UR: CLEAR
BACTERIA #/AREA URNS HPF: ABNORMAL /HPF
BILIRUB UR QL STRIP: NEGATIVE
COLOR UR AUTO: YELLOW
DIFFERENTIAL METHOD BLD: ABNORMAL
ERYTHROCYTE [DISTWIDTH] IN BLOOD BY AUTOMATED COUNT: 14.1 % (ref 10–15)
GLUCOSE UR STRIP-MCNC: NEGATIVE MG/DL
HCT VFR BLD AUTO: 39.4 % (ref 40–53)
HGB BLD-MCNC: 13.3 G/DL (ref 13.3–17.7)
HGB UR QL STRIP: ABNORMAL
KETONES UR STRIP-MCNC: NEGATIVE MG/DL
LEUKOCYTE ESTERASE UR QL STRIP: ABNORMAL
LYMPHOCYTES # BLD AUTO: 0.9 10E9/L (ref 0.8–5.3)
LYMPHOCYTES NFR BLD AUTO: 5 %
MCH RBC QN AUTO: 31.2 PG (ref 26.5–33)
MCHC RBC AUTO-ENTMCNC: 33.8 G/DL (ref 31.5–36.5)
MCV RBC AUTO: 93 FL (ref 78–100)
MONOCYTES # BLD AUTO: 1.7 10E9/L (ref 0–1.3)
MONOCYTES NFR BLD AUTO: 9 %
NEUTROPHILS # BLD AUTO: 16.2 10E9/L (ref 1.6–8.3)
NEUTROPHILS NFR BLD AUTO: 86 %
NITRATE UR QL: POSITIVE
NON-SQ EPI CELLS #/AREA URNS LPF: ABNORMAL /LPF
PH UR STRIP: 5.5 PH (ref 5–7)
PLATELET # BLD AUTO: 436 10E9/L (ref 150–450)
RBC # BLD AUTO: 4.26 10E12/L (ref 4.4–5.9)
RBC #/AREA URNS AUTO: ABNORMAL /HPF
SOURCE: ABNORMAL
SP GR UR STRIP: 1.02 (ref 1–1.03)
UROBILINOGEN UR STRIP-ACNC: 1 EU/DL (ref 0.2–1)
WBC # BLD AUTO: 18.8 10E9/L (ref 4–11)
WBC #/AREA URNS AUTO: >100 /HPF

## 2020-03-13 PROCEDURE — 87088 URINE BACTERIA CULTURE: CPT | Performed by: FAMILY MEDICINE

## 2020-03-13 PROCEDURE — 85025 COMPLETE CBC W/AUTO DIFF WBC: CPT | Performed by: FAMILY MEDICINE

## 2020-03-13 PROCEDURE — 99214 OFFICE O/P EST MOD 30 MIN: CPT | Performed by: FAMILY MEDICINE

## 2020-03-13 PROCEDURE — 80048 BASIC METABOLIC PNL TOTAL CA: CPT | Performed by: FAMILY MEDICINE

## 2020-03-13 PROCEDURE — 87186 SC STD MICRODIL/AGAR DIL: CPT | Performed by: FAMILY MEDICINE

## 2020-03-13 PROCEDURE — 36415 COLL VENOUS BLD VENIPUNCTURE: CPT | Performed by: FAMILY MEDICINE

## 2020-03-13 PROCEDURE — 81001 URINALYSIS AUTO W/SCOPE: CPT | Performed by: FAMILY MEDICINE

## 2020-03-13 PROCEDURE — 87086 URINE CULTURE/COLONY COUNT: CPT | Performed by: FAMILY MEDICINE

## 2020-03-13 RX ORDER — CIPROFLOXACIN 250 MG/1
250 TABLET, FILM COATED ORAL 2 TIMES DAILY
Qty: 14 TABLET | Refills: 0 | Status: SHIPPED | OUTPATIENT
Start: 2020-03-13 | End: 2020-04-13

## 2020-03-13 NOTE — TELEPHONE ENCOUNTER
Discussed blood work and CT with Dr. Angulo and then Dr. Guy.  CT shows mild hydronephrosis.  WBC's were elevated at 20K  Cr elevated into the 2's  Unfortunately our lab is now closed.  [facility]   Would anticipate he will get UA/[facility] and if positive start antibiotics, repeat cbc and bmp.  Tonight he feels that he has not deteriorated since Tuesday.  He also reports that he did self cath prior to the beginning of this episode.....  Dr. Guy is on call this weekend and now is aware of patient.    KN

## 2020-03-13 NOTE — PROGRESS NOTES
Subjective         HPI   Presents today at direction of his PCP to get a UA/UC, repeat CBC and BMP.  I saw him one week ago after returning from [place] with some decreased energy and nausea.  At that time he had no chills, no ST, no abdominal or flank pain and no dysuria.  Exam was otherwise unremarkable as well as vitals and supportive cares were recommended.  On Tuesday he presented to his PCP with continued malaise and nausea.  He mentioned his urine smelled different and had some LUQ pain.  Labs and imaging were ordered.  Patient followed up yesterday for his lab and imaging results- PCP got back to him tonite at 6PM and told him to come in to UC for eval.  Has TURP scheduled for 3-.  He states he self-cathed once this past week and feels this is what triggered his problems.    He denies any fevers.  He has had some chills.  He is tolerating po well.  Here with wife again.  Noted to have elevated creatinine which is new for him.  Continues with increased urinary urgency and frequency.      Patient Active Problem List   Diagnosis     Paroxysmal A-fib (H)     HTN (hypertension)     Hypercholesterolemia     Lumbar disc disease     ACP (advance care planning)     Health Care Home     Elevated prostate specific antigen (PSA)     Family history of migraine     Plantar wart     Past Surgical History:   Procedure Laterality Date     OPEN REDUCTION INTERNAL FIXATION HAND       RHINOPLASTY       TONSILLECTOMY         Social History     Tobacco Use     Smoking status: Never Smoker     Smokeless tobacco: Never Used   Substance Use Topics     Alcohol use: Yes     Alcohol/week: 2.0 - 3.0 standard drinks     Types: 2 - 3 Cans of beer per week     Comment: social only     Family History   Problem Relation Age of Onset     Dementia Father      Osteoarthritis Father      Cerebrovascular Disease Father      Myocardial Infarction Mother      C.A.D. Mother      Coronary Artery Disease Mother          Current Outpatient  Medications   Medication Sig Dispense Refill     acetaminophen (TYLENOL) 325 MG tablet Take 325-650 mg by mouth every 6 hours as needed for mild pain       ciprofloxacin (CIPRO) 250 MG tablet Take 1 tablet (250 mg) by mouth 2 times daily for 7 days 14 tablet 0     finasteride (PROSCAR) 5 MG tablet Take 1 tablet (5 mg) by mouth daily 90 tablet 3     flecainide (TAMBOCOR) 150 MG tablet Take 1 tablet (150 mg) by mouth 2 times daily 180 tablet 3     fluticasone (FLONASE) 50 MCG/ACT nasal spray Spray 1 spray into both nostrils as needed for rhinitis or allergies       ibuprofen (ADVIL/MOTRIN) 200 MG capsule Take 200 mg by mouth every 4 hours as needed for fever       losartan (COZAAR) 25 MG tablet Take 1 tablet (25 mg) by mouth daily 90 tablet 3     Nasal Dilators (BREATHE RIGHT LARGE) STRP nightly as needed       simvastatin (ZOCOR) 20 MG tablet TAKE ONE TABLET BY MOUTH AT BEDTIME 90 tablet 3     tamsulosin (FLOMAX) 0.4 MG capsule Take 0.4 mg by mouth daily       Vardenafil HCl (LEVITRA PO) Take by mouth as needed       aspirin 81 MG tablet Take  by mouth daily.       ondansetron (ZOFRAN) 4 MG tablet 1-2 TABS Q 8 HOURS PRN NAUSEA 20 tablet 0     triamcinolone (NASACORT AQ) 55 MCG/ACT Inhaler Spray 2 sprays into both nostrils as needed       Allergies   Allergen Reactions     Ace Inhibitors Cough     Recent Labs   Lab Test 03/10/20  1441 09/27/19  1151 06/21/18  1445 04/14/17  0841   LDL  --  94 92 87   HDL  --  53 57 59   TRIG  --  56 45 55   * 12 13 14   CR 2.21* 1.07 0.93 0.98   POTASSIUM 4.8 4.4 4.1 4.6      BP Readings from Last 3 Encounters:   03/13/20 (!) 142/62   03/10/20 (!) 146/69   03/06/20 119/67    Wt Readings from Last 3 Encounters:   03/13/20 78.9 kg (174 lb)   03/10/20 80.4 kg (177 lb 3.2 oz)   03/06/20 81.2 kg (179 lb)                    Reviewed and updated as needed this visit by Provider         Review of Systems   ROS COMP: Constitutional, HEENT, cardiovascular, pulmonary, GI, ,  musculoskeletal, neuro, skin, endocrine and psych systems are negative, except as otherwise noted.      Objective    BP (!) 142/62 (BP Location: Left arm, Cuff Size: Adult Regular)   Pulse 57   Temp 96.9  F (36.1  C) (Oral)   Wt 78.9 kg (174 lb)   SpO2 98%   BMI 25.15 kg/m      Physical Exam   GENERAL: healthy, alert and no distress  EYES: Eyes grossly normal to inspection, PERRL and conjunctivae and sclerae normal  HENT: nose and mouth without ulcers or lesions  ABDOMEN: soft, nontender, no hepatosplenomegaly, no masses and bowel sounds normal  MS: no gross musculoskeletal defects noted, no edema  SKIN: no suspicious lesions or rashes  NEURO: Normal strength and tone, mentation intact and speech normal  PSYCH: mentation appears normal, affect normal/bright    Diagnostic Test Results:  Labs reviewed in Epic  Results for orders placed or performed in visit on 03/13/20 (from the past 24 hour(s))   *UA reflex to Microscopic and Culture ([place] and [facility] (except [place] and [place])    Specimen: Midstream Urine   Result Value Ref Range    Color Urine Yellow     Appearance Urine Clear     Glucose Urine Negative NEG^Negative mg/dL    Bilirubin Urine Negative NEG^Negative    Ketones Urine Negative NEG^Negative mg/dL    Specific Gravity Urine 1.020 1.003 - 1.035    Blood Urine Small (A) NEG^Negative    pH Urine 5.5 5.0 - 7.0 pH    Protein Albumin Urine 30 (A) NEG^Negative mg/dL    Urobilinogen Urine 1.0 0.2 - 1.0 EU/dL    Nitrite Urine Positive (A) NEG^Negative    Leukocyte Esterase Urine Small (A) NEG^Negative    Source Midstream Urine    Urine Microscopic   Result Value Ref Range    WBC Urine >100 (A) OTO5^0 - 5 /HPF    RBC Urine O - 2 OTO2^O - 2 /HPF    Squamous Epithelial /LPF Urine Few FEW^Few /LPF    Bacteria Urine Moderate (A) NEG^Negative /HPF   CBC with platelets differential   Result Value Ref Range    WBC 18.8 (H) 4.0 - 11.0 10e9/L    RBC Count 4.26 (L) 4.4 - 5.9 10e12/L    Hemoglobin 13.3 13.3  - 17.7 g/dL    Hematocrit 39.4 (L) 40.0 - 53.0 %    MCV 93 78 - 100 fl    MCH 31.2 26.5 - 33.0 pg    MCHC 33.8 31.5 - 36.5 g/dL    RDW 14.1 10.0 - 15.0 %    Platelet Count 436 150 - 450 10e9/L    % Neutrophils 86.0 %    % Lymphocytes 5.0 %    % Monocytes 9.0 %    Absolute Neutrophil 16.2 (H) 1.6 - 8.3 10e9/L    Absolute Lymphocytes 0.9 0.8 - 5.3 10e9/L    Absolute Monocytes 1.7 (H) 0.0 - 1.3 10e9/L    Diff Method Manual Differential            Assessment & Plan     1. Urinary tract infection without hematuria, site unspecified    - *UA reflex to Microscopic and Culture ([place] and [facility] (except [place] and [place])  - Urine Culture Aerobic Bacterial  - CBC with platelets differential  - Basic metabolic panel  (Ca, Cl, CO2, Creat, Gluc, K, Na, BUN)  - Urine Microscopic  - ciprofloxacin (CIPRO) 250 MG tablet; Take 1 tablet (250 mg) by mouth 2 times daily for 7 days  Dispense: 14 tablet; Refill: 0    +UA.  UC pending.  Will treat with cipro.  Close Follow-up as noted below.    2. Leukocytosis, unspecified type    White count remains elevated- has Follow-up Monday with PCP- recommend recheck after Abx start tonite.  Understands if any change in condition- increased fever, flank pain, N/V this weekend- to present to ED for eval and IV Abx prn.  Understands he should quickly improve with start of Abx- and if worsens- needs to be seen urgently with concern for urosepsis.  Today he appears fatigued but otherwise does not look toxic with normal vitals.  Wife and patient voice understanding of these instructions.    3. Elevated serum creatinine    Unable to get same-day BMP tonite.  It is pending.  Recommend increased fluids and close Follow-up with PCP.       Elayne Martinez MD  [facility]

## 2020-03-14 LAB
ANION GAP SERPL CALCULATED.3IONS-SCNC: 4 MMOL/L (ref 3–14)
BUN SERPL-MCNC: 33 MG/DL (ref 7–30)
CALCIUM SERPL-MCNC: 8.4 MG/DL (ref 8.5–10.1)
CHLORIDE SERPL-SCNC: 104 MMOL/L (ref 94–109)
CO2 SERPL-SCNC: 27 MMOL/L (ref 20–32)
CREAT SERPL-MCNC: 1.8 MG/DL (ref 0.66–1.25)
GFR SERPL CREATININE-BSD FRML MDRD: 37 ML/MIN/{1.73_M2}
GLUCOSE SERPL-MCNC: 80 MG/DL (ref 70–99)
POTASSIUM SERPL-SCNC: 5.1 MMOL/L (ref 3.4–5.3)
SODIUM SERPL-SCNC: 135 MMOL/L (ref 133–144)

## 2020-03-15 LAB
BACTERIA SPEC CULT: ABNORMAL
SPECIMEN SOURCE: ABNORMAL

## 2020-03-16 ENCOUNTER — OFFICE VISIT (OUTPATIENT)
Dept: FAMILY MEDICINE | Facility: CLINIC | Age: 75
End: 2020-03-16

## 2020-03-16 VITALS
RESPIRATION RATE: 16 BRPM | SYSTOLIC BLOOD PRESSURE: 140 MMHG | OXYGEN SATURATION: 99 % | HEIGHT: 70 IN | DIASTOLIC BLOOD PRESSURE: 74 MMHG | HEART RATE: 48 BPM | WEIGHT: 170.4 LBS | BODY MASS INDEX: 24.39 KG/M2

## 2020-03-16 DIAGNOSIS — R79.89 ELEVATED SERUM CREATININE: ICD-10-CM

## 2020-03-16 DIAGNOSIS — Z01.818 PREOP GENERAL PHYSICAL EXAM: Primary | ICD-10-CM

## 2020-03-16 DIAGNOSIS — N12 PYELONEPHRITIS: ICD-10-CM

## 2020-03-16 PROCEDURE — 99214 OFFICE O/P EST MOD 30 MIN: CPT | Performed by: FAMILY MEDICINE

## 2020-03-16 RX ORDER — SULFAMETHOXAZOLE/TRIMETHOPRIM 800-160 MG
1 TABLET ORAL 2 TIMES DAILY
Qty: 20 TABLET | Refills: 1 | Status: SHIPPED | OUTPATIENT
Start: 2020-03-16 | End: 2020-03-16

## 2020-03-16 RX ORDER — SULFAMETHOXAZOLE/TRIMETHOPRIM 800-160 MG
1 TABLET ORAL 2 TIMES DAILY
Qty: 20 TABLET | Refills: 1 | Status: SHIPPED | OUTPATIENT
Start: 2020-03-16 | End: 2020-04-13

## 2020-03-16 ASSESSMENT — MIFFLIN-ST. JEOR: SCORE: 1525.21

## 2020-03-16 NOTE — PROGRESS NOTES
"Problem(s) Oriented visit        SUBJECTIVE:                                                    Rey Brown is a 74 year old male who presents to clinic today for the following health issues :        1. Preop general physical exam  Will need to delay due to...    2. Pyelonephritis  See previous notes, started on cipro, resistant  - sulfamethoxazole-trimethoprim (BACTRIM DS) 800-160 MG tablet; Take 1 tablet by mouth 2 times daily  Dispense: 20 tablet; Refill: 1    3. Elevated serum creatinine  2.2 down to 1.8 on Frday  - Basic Metabolic Panel (8) ([facility])       Problem list, Medication list, Allergies, and Medical/Social/Surgical histories reviewed in [facility] and updated as appropriate.   Additional history: as documented    ROS:  General and CV completed and negative except as noted above    Histories:   Patient Active Problem List   Diagnosis     Paroxysmal A-fib (H)     HTN (hypertension)     Hypercholesterolemia     Lumbar disc disease     ACP (advance care planning)     Health Care Home     Elevated prostate specific antigen (PSA)     Family history of migraine     Plantar wart     Past Surgical History:   Procedure Laterality Date     OPEN REDUCTION INTERNAL FIXATION HAND       RHINOPLASTY       TONSILLECTOMY         Social History     Tobacco Use     Smoking status: Never Smoker     Smokeless tobacco: Never Used   Substance Use Topics     Alcohol use: Yes     Alcohol/week: 2.0 - 3.0 standard drinks     Types: 2 - 3 Cans of beer per week     Comment: social only     Family History   Problem Relation Age of Onset     Dementia Father      Osteoarthritis Father      Cerebrovascular Disease Father      Myocardial Infarction Mother      C.A.D. Mother      Coronary Artery Disease Mother            OBJECTIVE:                                                    BP (!) 140/74   Pulse (!) 48   Resp 16   Ht 1.772 m (5' 9.75\")   Wt 77.3 kg (170 lb 6.4 oz)   SpO2 99%   BMI 24.63 kg/m    Body mass index is 24.63 " kg/m .   APPEARANCE: = Relaxed and in no distress     ASSESSMENT/PLAN:                                                        Rey was seen today for pre-op exam.    Diagnoses and all orders for this visit:    Preop general physical exam    Pyelonephritis  -     sulfamethoxazole-trimethoprim (BACTRIM DS) 800-160 MG tablet; Take 1 tablet by mouth 2 times daily    Elevated serum creatinine  -     Basic Metabolic Panel (8) ([facility])      >25 min spent with patient, greater than 50% spent on discussion/education/planning, etc. About The primary encounter diagnosis was Preop general physical exam. Diagnoses of Pyelonephritis and Elevated serum creatinine were also pertinent to this visit.        Patient Instructions   Dr. Angulo will reach out about next surgery steps.      The following health maintenance items are reviewed in Epic and correct as of today:  Health Maintenance   Topic Date Due     ZOSTER IMMUNIZATION (3 of 3) 07/02/2018     PHQ-2  01/01/2020     MEDICARE ANNUAL WELLNESS VISIT  09/27/2020     FALL RISK ASSESSMENT  09/27/2020     DTAP/TDAP/TD IMMUNIZATION (3 - Td) 04/30/2022     ADVANCE CARE PLANNING  07/16/2023     LIPID  09/27/2024     COLORECTAL CANCER SCREENING  08/24/2026     HEPATITIS C SCREENING  Completed     INFLUENZA VACCINE  Completed     PNEUMOCOCCAL IMMUNIZATION 65+ LOW/MEDIUM RISK  Completed     AORTIC ANEURYSM SCREENING (SYSTEM ASSIGNED)  Completed     IPV IMMUNIZATION  Aged Out     MENINGITIS IMMUNIZATION  Aged Out       Sabas Staley MD  [facility]  Family Practice  [facility]  [phone]    For any issues my office # is [phone]

## 2020-03-17 LAB
BUN SERPL-MCNC: 24 MG/DL (ref 8–27)
BUN/CREATININE RATIO: 14 (ref 10–24)
CALCIUM SERPL-MCNC: 8.8 MG/DL (ref 8.6–10.2)
CHLORIDE SERPLBLD-SCNC: 101 MMOL/L (ref 96–106)
CREAT SERPL-MCNC: 1.7 MG/DL (ref 0.76–1.27)
EGFR IF AFRICN AM: 46 ML/MIN/1.73
EGFR IF NONAFRICN AM: 39 ML/MIN/1.73
GLUCOSE SERPL-MCNC: 98 MG/DL (ref 65–99)
POTASSIUM SERPL-SCNC: 5.2 MMOL/L (ref 3.5–5.2)
SODIUM SERPL-SCNC: 137 MMOL/L (ref 134–144)
TOTAL CO2: 24 MMOL/L (ref 20–29)

## 2020-03-18 NOTE — RESULT ENCOUNTER NOTE
Dear Rey,   I am writing to report that your included test results are improving. I do not suggest that we make any changes at this time.  Sabas Staley M.D.

## 2020-04-13 ENCOUNTER — VIRTUAL VISIT (OUTPATIENT)
Dept: FAMILY MEDICINE | Facility: CLINIC | Age: 75
End: 2020-04-13

## 2020-04-13 DIAGNOSIS — R10.13 ABDOMINAL PAIN, EPIGASTRIC: Primary | ICD-10-CM

## 2020-04-13 PROCEDURE — 99213 OFFICE O/P EST LOW 20 MIN: CPT | Mod: GT | Performed by: FAMILY MEDICINE

## 2020-04-13 NOTE — PROGRESS NOTES
"  Problem(s) Oriented visit      Rey Brown is being evaluated via a billable video visit.      The patient has been notified of following:     \"This video visit will be conducted via a call between you and your physician/provider. We have found that certain health care needs can be provided without the need for an in-person physical exam.  This service lets us provide the care you need with a video conversation.  If a prescription is necessary we can send it directly to your pharmacy.  If lab work is needed we can place an order for that and you can then stop by our lab to have the test done at a later time.    If during the course of the call the physician/provider feels a video visit is not appropriate, you will not be charged for this service.\"     Physician has received verbal consent for a Video Visit from the patient? Yes    SUBJECTIVE:                                                    Subjective     Rey Brown is a 75 year old male who presents to clinic today for the following health issues:    HPI   Follow up on his UTI and GI symptoms  Mostly feeling better abdomen for sure is improved.    No dark black stools, no fatty food intolerance.        Histories:   Patient Active Problem List   Diagnosis     Paroxysmal A-fib (H)     HTN (hypertension)     Hypercholesterolemia     Lumbar disc disease     ACP (advance care planning)     Health Care Home     Elevated prostate specific antigen (PSA)     Family history of migraine     Plantar wart     Past Surgical History:   Procedure Laterality Date     OPEN REDUCTION INTERNAL FIXATION HAND       RHINOPLASTY       TONSILLECTOMY         Social History     Tobacco Use     Smoking status: Never Smoker     Smokeless tobacco: Never Used   Substance Use Topics     Alcohol use: Yes     Alcohol/week: 2.0 - 3.0 standard drinks     Types: 2 - 3 Cans of beer per week     Comment: social only     Family History   Problem Relation Age of Onset     Dementia " "Father      Osteoarthritis Father      Cerebrovascular Disease Father      Myocardial Infarction Mother      C.A.D. Mother      Coronary Artery Disease Mother            Reviewed and updated as needed this visit by Provider         ROS:  General and CV completed and negative except as noted above          OBJECTIVE:                                                      Objective    There were no vitals taken for this visit.  Estimated body mass index is 24.63 kg/m  as calculated from the following:    Height as of 3/16/20: 1.772 m (5' 9.75\").    Weight as of 3/16/20: 77.3 kg (170 lb 6.4 oz).    Physical Exam   APPEARANCE: = Relaxed and in no distress  Resp effort = Calm regular breathing  Recent/Remote Memory = Alert and Oriented x 3  Mood/Affect = Cooperative and interested            ASSESSMENT/PLAN:                                                        Patient Instructions   Lets restart the prilosec twice daily.  Recheck at preop on May 8, 2020    Rey was seen today for consult.    Diagnoses and all orders for this visit:    Abdominal pain, epigastric        See Patient Instructions    Video-Visit Details    Type of service:  Video Visit  Originating Location (pt. Location): Home  Distant Location (provider location):  [facility]   Mode of Communication:  Video Conference via [employer]      The following health maintenance items are reviewed in Epic and correct as of today:  Health Maintenance   Topic Date Due     ZOSTER IMMUNIZATION (3 of 3) 07/02/2018     PHQ-2  01/01/2020     MEDICARE ANNUAL WELLNESS VISIT  09/27/2020     FALL RISK ASSESSMENT  09/27/2020     DTAP/TDAP/TD IMMUNIZATION (3 - Td) 04/30/2022     ADVANCE CARE PLANNING  07/16/2023     LIPID  09/27/2024     COLORECTAL CANCER SCREENING  08/24/2026     HEPATITIS C SCREENING  Completed     INFLUENZA VACCINE  Completed     PNEUMOCOCCAL IMMUNIZATION 65+ LOW/MEDIUM RISK  Completed     AORTIC ANEURYSM SCREENING (SYSTEM ASSIGNED)  " Completed     IPV IMMUNIZATION  Aged Out     MENINGITIS IMMUNIZATION  Aged Out       Sabas Staley MD  [facility]  [phone]    For any issues my office # is [phone]

## 2020-04-30 DIAGNOSIS — Z11.59 ENCOUNTER FOR SCREENING FOR OTHER VIRAL DISEASES: Primary | ICD-10-CM

## 2020-05-08 ENCOUNTER — OFFICE VISIT (OUTPATIENT)
Dept: FAMILY MEDICINE | Facility: CLINIC | Age: 75
End: 2020-05-08

## 2020-05-08 VITALS
BODY MASS INDEX: 22.69 KG/M2 | TEMPERATURE: 97.7 F | RESPIRATION RATE: 16 BRPM | OXYGEN SATURATION: 99 % | DIASTOLIC BLOOD PRESSURE: 72 MMHG | SYSTOLIC BLOOD PRESSURE: 122 MMHG | WEIGHT: 158.5 LBS | HEART RATE: 53 BPM | HEIGHT: 70 IN

## 2020-05-08 DIAGNOSIS — Z01.818 PREOP GENERAL PHYSICAL EXAM: ICD-10-CM

## 2020-05-08 DIAGNOSIS — R97.20 ELEVATED PROSTATE SPECIFIC ANTIGEN (PSA): ICD-10-CM

## 2020-05-08 DIAGNOSIS — I10 ESSENTIAL HYPERTENSION: ICD-10-CM

## 2020-05-08 DIAGNOSIS — Z00.00 ENCOUNTER FOR MEDICARE ANNUAL WELLNESS EXAM: Primary | ICD-10-CM

## 2020-05-08 DIAGNOSIS — I44.0 FIRST DEGREE AV BLOCK: ICD-10-CM

## 2020-05-08 DIAGNOSIS — N18.30 CHRONIC KIDNEY DISEASE, STAGE III (MODERATE) (H): ICD-10-CM

## 2020-05-08 PROCEDURE — 99215 OFFICE O/P EST HI 40 MIN: CPT | Performed by: FAMILY MEDICINE

## 2020-05-08 ASSESSMENT — MIFFLIN-ST. JEOR: SCORE: 1466.23

## 2020-05-08 NOTE — NURSING NOTE
Patient left without labs drawn  I called patient & left message to return to clinic for labs  BMP & CBC  Mala Gandhi MA May 8, 2020 3:24 PM

## 2020-05-08 NOTE — PATIENT INSTRUCTIONS
Patient Education   Personalized Prevention Plan  You are due for the preventive services outlined below.  Your care team is available to assist you in scheduling these services.  If you have already completed any of these items, please share that information with your care team to update in your medical record.  Health Maintenance Due   Topic Date Due     Zoster (Shingles) Vaccine (3 of 3) 07/02/2018     PHQ-2  01/01/2020        Before Your Surgery      Call your surgeon if there is any change in your health. This includes signs of a cold or flu (such as a sore throat, runny nose, cough, rash or fever).    Do not smoke, drink alcohol or take over the counter medicine (unless your surgeon or primary care doctor tells you to) for the 24 hours before and after surgery.    If you take prescribed drugs: Follow your doctor s orders about which medicines to take and which to stop until after surgery.    Eating and drinking prior to surgery: follow the instructions from your surgeon    Take a shower or bath the night before surgery. Use the soap your surgeon gave you to gently clean your skin. If you do not have soap from your surgeon, use your regular soap. Do not shave or scrub the surgery site.  Wear clean pajamas and have clean sheets on your bed.

## 2020-05-08 NOTE — PROGRESS NOTES
[facility]  [address] [place] [address] [phone]  Dept: [phone]    PRE-OP EVALUATION:  Today's date: 2020    Rey Brown (: 1945) presents for pre-operative evaluation assessment as requested by Dr. Angulo.  He requires evaluation and anesthesia risk assessment prior to undergoing surgery/procedure for treatment of cystoscopy .    Proposed Surgery/ Procedure: Cystoscopy  Date of Surgery/ Procedure: 20  Time of Surgery/ Procedure: 7:30am  Hospital/Surgical Facility: [place] [id number]  Primary Physician: Sabas Staley  Type of Anesthesia Anticipated: to be determined    Patient has a Health Care Directive or Living Will:  YES     1. NO - Do you have a history of heart attack, stroke, stent, bypass or surgery on an artery in the head, neck, heart or legs?  2. NO - Do you ever have any pain or discomfort in your chest?  3. NO - Do you have a history of  Heart Failure?  4. NO - Are you troubled by shortness of breath when: walking on the level, up a slight hill or at night?  5. NO - Do you currently have a cold, bronchitis or other respiratory infection?  6. NO - Do you have a cough, shortness of breath or wheezing?  7. NO - Do you sometimes get pains in the calves of your legs when you walk?  8. NO - Do you or anyone in your family have previous history of blood clots?  9. NO - Do you or does anyone in your family have a serious bleeding problem such as prolonged bleeding following surgeries or cuts?  10. NO - Have you ever had problems with anemia or been told to take iron pills?  11. NO - Have you had any abnormal blood loss such as black, tarry or bloody stools, or abnormal vaginal bleeding?  12. NO - Have you ever had a blood transfusion?  13. NO - Have you or any of your relatives ever had problems with anesthesia?  14. NO - Do you have sleep apnea, excessive snoring or daytime drowsiness?  15. NO - Do you have any prosthetic heart  valves?  16. NO - Do you have prosthetic joints?  17. NO - Is there any chance that you may be pregnant?      HPI:     HPI related to upcoming procedure: ready for TURP, has had quite the past 6 months with obstruction leading to self cath leading to significant Pyelonehpritis leading to acute kidney injury, last Cr. Last on 3/16 was 1.7, peak was 2.21 on March 10.  Now feeling quite well and anxious to decrease his medication dependency as he had acute urinary retention when missed medication in fall of '19.      See problem list for active medical problems.  Problems all longstanding and stable, except as noted/documented.  See ROS for pertinent symptoms related to these conditions.          MEDICAL HISTORY:     Patient Active Problem List    Diagnosis Date Noted     First degree AV block 05/08/2020     Priority: Medium     Chronic kidney disease, stage III (moderate) (H) 05/08/2020     Priority: Medium     Plantar wart 03/02/2017     Priority: Medium     Family history of migraine 04/09/2015     Priority: Medium     Elevated prostate specific antigen (PSA) 05/07/2014     Priority: Medium     Health Care Home 07/19/2013     Priority: Medium     ACP (advance care planning) 05/09/2013     Priority: Medium     form given       Lumbar disc disease      Priority: Medium     HTN (hypertension)      Priority: Medium     Hypercholesterolemia      Priority: Medium      Past Medical History:   Diagnosis Date     Hypercholesterolemia      Lumbar disc disease      Paroxysmal A-fib (H)      Past Surgical History:   Procedure Laterality Date     OPEN REDUCTION INTERNAL FIXATION HAND       RHINOPLASTY       TONSILLECTOMY       Current Outpatient Medications   Medication Sig Dispense Refill     acetaminophen (TYLENOL) 325 MG tablet Take 325-650 mg by mouth every 6 hours as needed for mild pain       finasteride (PROSCAR) 5 MG tablet Take 1 tablet (5 mg) by mouth daily 90 tablet 3     flecainide (TAMBOCOR) 150 MG tablet Take 1  "tablet (150 mg) by mouth 2 times daily 180 tablet 3     losartan (COZAAR) 25 MG tablet Take 1 tablet (25 mg) by mouth daily 90 tablet 3     Nasal Dilators (BREATHE RIGHT LARGE) STRP nightly as needed       simvastatin (ZOCOR) 20 MG tablet TAKE ONE TABLET BY MOUTH AT BEDTIME 90 tablet 3     tamsulosin (FLOMAX) 0.4 MG capsule Take 0.4 mg by mouth daily       fluticasone (FLONASE) 50 MCG/ACT nasal spray Spray 1 spray into both nostrils as needed for rhinitis or allergies       ibuprofen (ADVIL/MOTRIN) 200 MG capsule Take 200 mg by mouth every 4 hours as needed for fever       OTC products: None, except as noted above    Allergies   Allergen Reactions     Ace Inhibitors Cough      Latex Allergy: NO    Social History     Tobacco Use     Smoking status: Never Smoker     Smokeless tobacco: Never Used   Substance Use Topics     Alcohol use: Yes     Alcohol/week: 2.0 - 3.0 standard drinks     Types: 2 - 3 Cans of beer per week     Comment: social only     History   Drug Use No       REVIEW OF SYSTEMS:   Constitutional, neuro, ENT, endocrine, pulmonary, cardiac, gastrointestinal, genitourinary, musculoskeletal, integument and psychiatric systems are negative, except as otherwise noted.    EXAM:   /72   Pulse 53   Temp 97.7  F (36.5  C)   Resp 16   Ht 1.772 m (5' 9.75\")   Wt 71.9 kg (158 lb 8 oz)   SpO2 99%   BMI 22.91 kg/m      GENERAL APPEARANCE: healthy, alert and no distress     EYES: EOMI,  PERRL     HENT: ear canals and TM's normal and nose and mouth without ulcers or lesions     NECK: no adenopathy, no asymmetry, masses, or scars and thyroid normal to palpation     RESP: lungs clear to auscultation - no rales, rhonchi or wheezes     CV: regular rates and rhythm, normal S1 S2, no S3 or S4 and no murmur, click or rub     ABDOMEN:  soft, nontender, no HSM or masses and bowel sounds normal     MS: extremities normal- no gross deformities noted, no evidence of inflammation in joints, FROM in all extremities.     " SKIN: no suspicious lesions or rashes     NEURO: Normal strength and tone, sensory exam grossly normal, mentation intact and speech normal     PSYCH: mentation appears normal. and affect normal/bright     LYMPHATICS: No cervical adenopathy    DIAGNOSTICS:   EKG: Not indicated due to non-vascular surgery and last ekg on 1/20/20 (within 30 days for CAD history or last year for cardiac risk factors)    Recent Labs   Lab Test 03/16/20  0916 03/13/20  1825 03/10/20  1442   HGB  --  13.3 13.7   PLT  --  436 284    135  --    POTASSIUM 5.2 5.1  --    CR 1.70* 1.80*  --         IMPRESSION:   Reason for surgery/procedure: BPH with complications      The proposed surgical procedure is considered INTERMEDIATE risk.    REVISED CARDIAC RISK INDEX  The patient has the following serious cardiovascular risks for perioperative complications such as (MI, PE, VFib and 3  AV Block):  No serious cardiac risks  INTERPRETATION: 0 risks: Class I (very low risk - 0.4% complication rate)    The patient has the following additional risks for perioperative complications:  No identified additional risks      ICD-10-CM    1. Encounter for Medicare annual wellness exam  Z00.00    2. Preop general physical exam  Z01.818    3. Essential hypertension  I10 CBC with Diff/Plt ([facility])   4. Elevated prostate specific antigen (PSA)  R97.20    5. First degree AV block  I44.0    6. Chronic kidney disease, stage III (moderate) (H)  N18.3 Basic Metabolic Panel (8) ([facility])       RECOMMENDATIONS:         --Patient is to take all scheduled medications on the day of surgery EXCEPT for modifications listed below.    APPROVAL GIVEN to proceed with proposed procedure, without further diagnostic evaluation       Signed Electronically by: Sabas Staley MD    Copy of this evaluation report is provided to requesting physician.    [place] Preop Guidelines    Revised Cardiac Risk Index

## 2020-05-10 ENCOUNTER — OFFICE VISIT (OUTPATIENT)
Dept: URGENT CARE | Facility: URGENT CARE | Age: 75
End: 2020-05-10
Attending: UROLOGY
Payer: COMMERCIAL

## 2020-05-10 DIAGNOSIS — Z20.822 SUSPECTED 2019 NOVEL CORONAVIRUS INFECTION: Primary | ICD-10-CM

## 2020-05-10 PROCEDURE — 87635 SARS-COV-2 COVID-19 AMP PRB: CPT | Performed by: FAMILY MEDICINE

## 2020-05-10 PROCEDURE — 99207 ZZC NO BILLABLE SERVICE THIS VISIT: CPT

## 2020-05-10 PROCEDURE — 99000 SPECIMEN HANDLING OFFICE-LAB: CPT | Performed by: FAMILY MEDICINE

## 2020-05-11 DIAGNOSIS — I10 ESSENTIAL HYPERTENSION: ICD-10-CM

## 2020-05-11 DIAGNOSIS — Z01.818 PREOP GENERAL PHYSICAL EXAM: ICD-10-CM

## 2020-05-11 DIAGNOSIS — N18.30 CHRONIC KIDNEY DISEASE, STAGE III (MODERATE) (H): ICD-10-CM

## 2020-05-11 LAB
% GRANULOCYTES: 75.6 % (ref 42.2–75.2)
HCT VFR BLD AUTO: 39.9 % (ref 39–51)
HEMOGLOBIN: 13.9 G/DL (ref 13.4–17.5)
LYMPHOCYTES NFR BLD AUTO: 17.5 % (ref 20.5–51.1)
MCH RBC QN AUTO: 30.6 PG (ref 27–31)
MCHC RBC AUTO-ENTMCNC: 34.9 G/DL (ref 33–37)
MCV RBC AUTO: 87.8 FL (ref 80–100)
MONOCYTES NFR BLD AUTO: 6.9 % (ref 1.7–9.3)
PLATELET # BLD AUTO: 251 K/UL (ref 140–450)
RBC # BLD AUTO: 4.55 X10/CMM (ref 4.2–5.9)
SARS-COV-2 RNA SPEC QL NAA+PROBE: NOT DETECTED
SPECIMEN SOURCE: NORMAL
WBC # BLD AUTO: 7.4 X10/CMM (ref 3.8–11)

## 2020-05-11 PROCEDURE — 85025 COMPLETE CBC W/AUTO DIFF WBC: CPT

## 2020-05-11 PROCEDURE — 36415 COLL VENOUS BLD VENIPUNCTURE: CPT

## 2020-05-11 NOTE — PROGRESS NOTES
open orders for BMP, CBC to be faxed for surgery 5/13- Kev  At [facility] [phone]  Mala Gandhi MA May 11, 2020 10:36 AM

## 2020-05-12 ENCOUNTER — ANESTHESIA EVENT (OUTPATIENT)
Dept: SURGERY | Facility: CLINIC | Age: 75
End: 2020-05-12
Payer: COMMERCIAL

## 2020-05-12 LAB
BUN SERPL-MCNC: 25 MG/DL (ref 8–27)
BUN/CREATININE RATIO: 20 (ref 10–24)
CALCIUM SERPL-MCNC: 9.3 MG/DL (ref 8.6–10.2)
CHLORIDE SERPLBLD-SCNC: 107 MMOL/L (ref 96–106)
CREAT SERPL-MCNC: 1.28 MG/DL (ref 0.76–1.27)
EGFR IF AFRICN AM: 64 ML/MIN/1.73
EGFR IF NONAFRICN AM: 55 ML/MIN/1.73
GLUCOSE SERPL-MCNC: 97 MG/DL (ref 65–99)
POTASSIUM SERPL-SCNC: 5.1 MMOL/L (ref 3.5–5.2)
SODIUM SERPL-SCNC: 142 MMOL/L (ref 134–144)
TOTAL CO2: 24 MMOL/L (ref 20–29)

## 2020-05-12 NOTE — PROGRESS NOTES
PTA medications updated by Medication Scribe day before surgery via phone call with patient      -LAST DOSES ENTERED BY NURSE-    Medication history sources: Patient, Surescripts and H&P  Medication history source reliability: Good  Adherence assessment: N/A Not Observed    Significant changes made to the medication list:  None      Additional medication history information:   None        Prior to Admission medications    Medication Sig Last Dose Taking? Auth Provider   acetaminophen (TYLENOL) 325 MG tablet Take 325-650 mg by mouth every 6 hours as needed for mild pain  at PRN Yes Reported, Patient   finasteride (PROSCAR) 5 MG tablet Take 1 tablet (5 mg) by mouth daily  at PM Yes Sabas Staley MD   flecainide (TAMBOCOR) 150 MG tablet Take 1 tablet (150 mg) by mouth 2 times daily  Yes Sabas Staley MD   ibuprofen (ADVIL/MOTRIN) 200 MG capsule Take 200 mg by mouth every 4 hours as needed for fever  Yes Reported, Patient   losartan (COZAAR) 25 MG tablet Take 1 tablet (25 mg) by mouth daily  at PM Yes Sabas Staley MD   Nasal Dilators (BREATHE RIGHT LARGE) STRP nightly as needed  Yes Reported, Patient   simvastatin (ZOCOR) 20 MG tablet TAKE ONE TABLET BY MOUTH AT BEDTIME  at PM Yes Sabas Staley MD   tamsulosin (FLOMAX) 0.4 MG capsule Take 0.4 mg by mouth daily  at PM Yes Reported, Patient

## 2020-05-13 ENCOUNTER — HOSPITAL ENCOUNTER (OUTPATIENT)
Facility: CLINIC | Age: 75
Discharge: HOME | End: 2020-05-14
Attending: UROLOGY | Admitting: UROLOGY
Payer: COMMERCIAL

## 2020-05-13 ENCOUNTER — APPOINTMENT (OUTPATIENT)
Dept: GENERAL RADIOLOGY | Facility: CLINIC | Age: 75
End: 2020-05-13
Attending: UROLOGY
Payer: COMMERCIAL

## 2020-05-13 ENCOUNTER — ANESTHESIA (OUTPATIENT)
Dept: SURGERY | Facility: CLINIC | Age: 75
End: 2020-05-13
Payer: COMMERCIAL

## 2020-05-13 PROBLEM — R31.0 GROSS HEMATURIA: Status: ACTIVE | Noted: 2020-05-13

## 2020-05-13 LAB
ABO + RH BLD: NORMAL
ABO + RH BLD: NORMAL
BLD GP AB SCN SERPL QL: NORMAL
BLOOD BANK CMNT PATIENT-IMP: NORMAL
CREAT SERPL-MCNC: 1.44 MG/DL (ref 0.66–1.25)
GFR SERPL CREATININE-BSD FRML MDRD: 48 ML/MIN/{1.73_M2}
SPECIMEN EXP DATE BLD: NORMAL

## 2020-05-13 PROCEDURE — 86900 BLOOD TYPING SEROLOGIC ABO: CPT | Performed by: PHYSICIAN ASSISTANT

## 2020-05-13 PROCEDURE — 25000128 H RX IP 250 OP 636: Performed by: NURSE ANESTHETIST, CERTIFIED REGISTERED

## 2020-05-13 PROCEDURE — 25000125 ZZHC RX 250: Performed by: UROLOGY

## 2020-05-13 PROCEDURE — 88305 TISSUE EXAM BY PATHOLOGIST: CPT | Performed by: UROLOGY

## 2020-05-13 PROCEDURE — 71000012 ZZH RECOVERY PHASE 1 LEVEL 1 FIRST HR: Performed by: UROLOGY

## 2020-05-13 PROCEDURE — 25800030 ZZH RX IP 258 OP 636: Performed by: UROLOGY

## 2020-05-13 PROCEDURE — 25800030 ZZH RX IP 258 OP 636: Performed by: SURGERY

## 2020-05-13 PROCEDURE — 36415 COLL VENOUS BLD VENIPUNCTURE: CPT | Performed by: UROLOGY

## 2020-05-13 PROCEDURE — 36000056 ZZH SURGERY LEVEL 3 1ST 30 MIN: Performed by: UROLOGY

## 2020-05-13 PROCEDURE — 36415 COLL VENOUS BLD VENIPUNCTURE: CPT | Performed by: PHYSICIAN ASSISTANT

## 2020-05-13 PROCEDURE — 86901 BLOOD TYPING SEROLOGIC RH(D): CPT | Performed by: PHYSICIAN ASSISTANT

## 2020-05-13 PROCEDURE — 37000009 ZZH ANESTHESIA TECHNICAL FEE, EACH ADDTL 15 MIN: Performed by: UROLOGY

## 2020-05-13 PROCEDURE — 25800030 ZZH RX IP 258 OP 636: Performed by: NURSE ANESTHETIST, CERTIFIED REGISTERED

## 2020-05-13 PROCEDURE — 25500064 ZZH RX 255 OP 636: Performed by: UROLOGY

## 2020-05-13 PROCEDURE — 40000277 XR SURGERY CARM FLUORO LESS THAN 5 MIN W STILLS

## 2020-05-13 PROCEDURE — 88305 TISSUE EXAM BY PATHOLOGIST: CPT | Mod: 26 | Performed by: UROLOGY

## 2020-05-13 PROCEDURE — 25000128 H RX IP 250 OP 636: Performed by: PHYSICIAN ASSISTANT

## 2020-05-13 PROCEDURE — 86850 RBC ANTIBODY SCREEN: CPT | Performed by: PHYSICIAN ASSISTANT

## 2020-05-13 PROCEDURE — 82565 ASSAY OF CREATININE: CPT | Performed by: UROLOGY

## 2020-05-13 PROCEDURE — 25000132 ZZH RX MED GY IP 250 OP 250 PS 637: Performed by: UROLOGY

## 2020-05-13 PROCEDURE — C1769 GUIDE WIRE: HCPCS | Performed by: UROLOGY

## 2020-05-13 PROCEDURE — 25800025 ZZH RX 258: Performed by: UROLOGY

## 2020-05-13 PROCEDURE — 40000170 ZZH STATISTIC PRE-PROCEDURE ASSESSMENT II: Performed by: UROLOGY

## 2020-05-13 PROCEDURE — 37000008 ZZH ANESTHESIA TECHNICAL FEE, 1ST 30 MIN: Performed by: UROLOGY

## 2020-05-13 PROCEDURE — 25000128 H RX IP 250 OP 636: Performed by: SURGERY

## 2020-05-13 PROCEDURE — 36000058 ZZH SURGERY LEVEL 3 EA 15 ADDTL MIN: Performed by: UROLOGY

## 2020-05-13 PROCEDURE — 27210794 ZZH OR GENERAL SUPPLY STERILE: Performed by: UROLOGY

## 2020-05-13 PROCEDURE — 25000125 ZZHC RX 250: Performed by: NURSE ANESTHETIST, CERTIFIED REGISTERED

## 2020-05-13 RX ORDER — PROPOFOL 10 MG/ML
INJECTION, EMULSION INTRAVENOUS CONTINUOUS PRN
Status: DISCONTINUED | OUTPATIENT
Start: 2020-05-13 | End: 2020-05-13

## 2020-05-13 RX ORDER — ACETAMINOPHEN 325 MG/1
650 TABLET ORAL EVERY 6 HOURS PRN
Status: DISCONTINUED | OUTPATIENT
Start: 2020-05-13 | End: 2020-05-14 | Stop reason: HOSPADM

## 2020-05-13 RX ORDER — ONDANSETRON 4 MG/1
4 TABLET, ORALLY DISINTEGRATING ORAL EVERY 30 MIN PRN
Status: DISCONTINUED | OUTPATIENT
Start: 2020-05-13 | End: 2020-05-13 | Stop reason: HOSPADM

## 2020-05-13 RX ORDER — LIDOCAINE 40 MG/G
CREAM TOPICAL
Status: DISCONTINUED | OUTPATIENT
Start: 2020-05-13 | End: 2020-05-14 | Stop reason: HOSPADM

## 2020-05-13 RX ORDER — ONDANSETRON 2 MG/ML
4 INJECTION INTRAMUSCULAR; INTRAVENOUS EVERY 6 HOURS PRN
Status: DISCONTINUED | OUTPATIENT
Start: 2020-05-13 | End: 2020-05-14 | Stop reason: HOSPADM

## 2020-05-13 RX ORDER — ONDANSETRON 4 MG/1
4 TABLET, ORALLY DISINTEGRATING ORAL EVERY 6 HOURS PRN
Status: DISCONTINUED | OUTPATIENT
Start: 2020-05-13 | End: 2020-05-14 | Stop reason: HOSPADM

## 2020-05-13 RX ORDER — FENTANYL CITRATE 50 UG/ML
25-50 INJECTION, SOLUTION INTRAMUSCULAR; INTRAVENOUS
Status: DISCONTINUED | OUTPATIENT
Start: 2020-05-13 | End: 2020-05-13 | Stop reason: HOSPADM

## 2020-05-13 RX ORDER — ATROPA BELLADONNA AND OPIUM 16.2; 3 MG/1; MG/1
SUPPOSITORY RECTAL PRN
Status: DISCONTINUED | OUTPATIENT
Start: 2020-05-13 | End: 2020-05-13 | Stop reason: HOSPADM

## 2020-05-13 RX ORDER — SODIUM CHLORIDE, SODIUM LACTATE, POTASSIUM CHLORIDE, CALCIUM CHLORIDE 600; 310; 30; 20 MG/100ML; MG/100ML; MG/100ML; MG/100ML
INJECTION, SOLUTION INTRAVENOUS CONTINUOUS
Status: DISCONTINUED | OUTPATIENT
Start: 2020-05-13 | End: 2020-05-13 | Stop reason: HOSPADM

## 2020-05-13 RX ORDER — SODIUM CHLORIDE, SODIUM LACTATE, POTASSIUM CHLORIDE, CALCIUM CHLORIDE 600; 310; 30; 20 MG/100ML; MG/100ML; MG/100ML; MG/100ML
INJECTION, SOLUTION INTRAVENOUS CONTINUOUS
Status: DISCONTINUED | OUTPATIENT
Start: 2020-05-13 | End: 2020-05-13

## 2020-05-13 RX ORDER — ERTAPENEM 1 G/1
1 INJECTION, POWDER, LYOPHILIZED, FOR SOLUTION INTRAMUSCULAR; INTRAVENOUS ONCE
Status: COMPLETED | OUTPATIENT
Start: 2020-05-13 | End: 2020-05-13

## 2020-05-13 RX ORDER — ONDANSETRON 2 MG/ML
4 INJECTION INTRAMUSCULAR; INTRAVENOUS EVERY 30 MIN PRN
Status: DISCONTINUED | OUTPATIENT
Start: 2020-05-13 | End: 2020-05-13 | Stop reason: HOSPADM

## 2020-05-13 RX ORDER — ATROPA BELLADONNA AND OPIUM 16.2; 3 MG/1; MG/1
15 SUPPOSITORY RECTAL EVERY 6 HOURS PRN
Status: DISCONTINUED | OUTPATIENT
Start: 2020-05-13 | End: 2020-05-14 | Stop reason: HOSPADM

## 2020-05-13 RX ORDER — FLECAINIDE ACETATE 50 MG/1
150 TABLET ORAL 2 TIMES DAILY
Status: DISCONTINUED | OUTPATIENT
Start: 2020-05-13 | End: 2020-05-14 | Stop reason: HOSPADM

## 2020-05-13 RX ORDER — PROPOFOL 10 MG/ML
INJECTION, EMULSION INTRAVENOUS PRN
Status: DISCONTINUED | OUTPATIENT
Start: 2020-05-13 | End: 2020-05-13

## 2020-05-13 RX ORDER — FENTANYL CITRATE 50 UG/ML
25-50 INJECTION, SOLUTION INTRAMUSCULAR; INTRAVENOUS
Status: DISCONTINUED | OUTPATIENT
Start: 2020-05-13 | End: 2020-05-13

## 2020-05-13 RX ORDER — FUROSEMIDE 10 MG/ML
INJECTION INTRAMUSCULAR; INTRAVENOUS PRN
Status: DISCONTINUED | OUTPATIENT
Start: 2020-05-13 | End: 2020-05-13

## 2020-05-13 RX ORDER — EPHEDRINE SULFATE 50 MG/ML
INJECTION, SOLUTION INTRAMUSCULAR; INTRAVENOUS; SUBCUTANEOUS PRN
Status: DISCONTINUED | OUTPATIENT
Start: 2020-05-13 | End: 2020-05-13

## 2020-05-13 RX ORDER — BUPIVACAINE HYDROCHLORIDE 7.5 MG/ML
INJECTION, SOLUTION INTRASPINAL PRN
Status: DISCONTINUED | OUTPATIENT
Start: 2020-05-13 | End: 2020-05-13

## 2020-05-13 RX ORDER — FENTANYL CITRATE 50 UG/ML
INJECTION, SOLUTION INTRAMUSCULAR; INTRAVENOUS PRN
Status: DISCONTINUED | OUTPATIENT
Start: 2020-05-13 | End: 2020-05-13

## 2020-05-13 RX ORDER — NALOXONE HYDROCHLORIDE 0.4 MG/ML
.1-.4 INJECTION, SOLUTION INTRAMUSCULAR; INTRAVENOUS; SUBCUTANEOUS
Status: DISCONTINUED | OUTPATIENT
Start: 2020-05-13 | End: 2020-05-14 | Stop reason: HOSPADM

## 2020-05-13 RX ORDER — METOPROLOL TARTRATE 1 MG/ML
1-2 INJECTION, SOLUTION INTRAVENOUS EVERY 5 MIN PRN
Status: DISCONTINUED | OUTPATIENT
Start: 2020-05-13 | End: 2020-05-13 | Stop reason: HOSPADM

## 2020-05-13 RX ORDER — HYDROCODONE BITARTRATE AND ACETAMINOPHEN 5; 325 MG/1; MG/1
1-2 TABLET ORAL EVERY 4 HOURS PRN
Status: DISCONTINUED | OUTPATIENT
Start: 2020-05-13 | End: 2020-05-14 | Stop reason: HOSPADM

## 2020-05-13 RX ORDER — MEPERIDINE HYDROCHLORIDE 25 MG/ML
12.5 INJECTION INTRAMUSCULAR; INTRAVENOUS; SUBCUTANEOUS
Status: DISCONTINUED | OUTPATIENT
Start: 2020-05-13 | End: 2020-05-13 | Stop reason: HOSPADM

## 2020-05-13 RX ORDER — NALOXONE HYDROCHLORIDE 0.4 MG/ML
.1-.4 INJECTION, SOLUTION INTRAMUSCULAR; INTRAVENOUS; SUBCUTANEOUS
Status: DISCONTINUED | OUTPATIENT
Start: 2020-05-13 | End: 2020-05-13 | Stop reason: HOSPADM

## 2020-05-13 RX ADMIN — DEXMEDETOMIDINE HYDROCHLORIDE 20 MCG: 100 INJECTION, SOLUTION INTRAVENOUS at 07:57

## 2020-05-13 RX ADMIN — Medication 10 MG: at 09:05

## 2020-05-13 RX ADMIN — FUROSEMIDE 10 MG: 10 INJECTION, SOLUTION INTRAVENOUS at 08:47

## 2020-05-13 RX ADMIN — SODIUM CHLORIDE, POTASSIUM CHLORIDE, SODIUM LACTATE AND CALCIUM CHLORIDE: 600; 310; 30; 20 INJECTION, SOLUTION INTRAVENOUS at 08:54

## 2020-05-13 RX ADMIN — BUPIVACAINE HYDROCHLORIDE IN DEXTROSE 12 MG: 7.5 INJECTION, SOLUTION SUBARACHNOID at 08:01

## 2020-05-13 RX ADMIN — SODIUM CHLORIDE, POTASSIUM CHLORIDE, SODIUM LACTATE AND CALCIUM CHLORIDE: 600; 310; 30; 20 INJECTION, SOLUTION INTRAVENOUS at 14:19

## 2020-05-13 RX ADMIN — FENTANYL CITRATE 25 MCG: 50 INJECTION, SOLUTION INTRAMUSCULAR; INTRAVENOUS at 08:01

## 2020-05-13 RX ADMIN — FLECAINIDE ACETATE 150 MG: 50 TABLET ORAL at 20:13

## 2020-05-13 RX ADMIN — PROPOFOL 100 MCG/KG/MIN: 10 INJECTION, EMULSION INTRAVENOUS at 08:01

## 2020-05-13 RX ADMIN — Medication 10 MG: at 08:18

## 2020-05-13 RX ADMIN — PROPOFOL 20 MG: 10 INJECTION, EMULSION INTRAVENOUS at 08:35

## 2020-05-13 RX ADMIN — ERTAPENEM SODIUM 1 G: 1 INJECTION, POWDER, LYOPHILIZED, FOR SOLUTION INTRAMUSCULAR; INTRAVENOUS at 08:12

## 2020-05-13 RX ADMIN — PROPOFOL 20 MG: 10 INJECTION, EMULSION INTRAVENOUS at 08:01

## 2020-05-13 RX ADMIN — FUROSEMIDE 10 MG: 10 INJECTION, SOLUTION INTRAVENOUS at 09:03

## 2020-05-13 RX ADMIN — SODIUM CHLORIDE, POTASSIUM CHLORIDE, SODIUM LACTATE AND CALCIUM CHLORIDE: 600; 310; 30; 20 INJECTION, SOLUTION INTRAVENOUS at 07:58

## 2020-05-13 RX ADMIN — INDIGO CARMINE 40 MG: 8 INJECTION, SOLUTION INTRAMUSCULAR; INTRAVENOUS at 08:47

## 2020-05-13 RX ADMIN — PROPOFOL 20 MG: 10 INJECTION, EMULSION INTRAVENOUS at 08:44

## 2020-05-13 ASSESSMENT — MIFFLIN-ST. JEOR: SCORE: 1502.63

## 2020-05-13 ASSESSMENT — ENCOUNTER SYMPTOMS: DYSRHYTHMIAS: 1

## 2020-05-13 NOTE — ANESTHESIA POSTPROCEDURE EVALUATION
Patient: Rey Brown    Procedure(s):  CYSTOSCOPY WITH TRANSURETHRAL RESECTION PROSTATE  LEFT RETROGRADE URETERAL PYELOGRAM    Diagnosis:Hypertrophy of prostate with urinary obstruction [N40.1, N13.8]  Diagnosis Additional Information: No value filed.    Anesthesia Type:  Spinal    Note:  Anesthesia Post Evaluation    Patient location during evaluation: PACU  Patient participation: Able to fully participate in evaluation  Level of consciousness: awake and alert  Pain management: adequate  Airway patency: patent  Cardiovascular status: acceptable and hemodynamically stable  Respiratory status: acceptable and unassisted  Hydration status: acceptable  PONV: none     Anesthetic complications: None          Last vitals:  Vitals:    05/13/20 1030 05/13/20 1118 05/13/20 1150   BP: (!) 154/78 (!) 150/67 (!) 147/59   Pulse: (!) 47     Resp: 16 16 16   Temp:  35.1  C (95.1  F)    SpO2: 100% 100% 100%         Electronically Signed By: Timo Moore MD  May 13, 2020  1:50 PM

## 2020-05-13 NOTE — ANESTHESIA PREPROCEDURE EVALUATION
Anesthesia Pre-Procedure Evaluation    Patient: Rey Brown   MRN: [id number] : 1945          Preoperative Diagnosis: Hypertrophy of prostate with urinary obstruction [N40.1, N13.8]    Procedure(s):  CYSTOSCOPY WITH TRANSURETHRAL RESECTION PROSTATE  LEFT RETROGRADE URETERAL PYELOGRAM    Past Medical History:   Diagnosis Date     Hypercholesterolemia      Lumbar disc disease      Migraines      Paroxysmal A-fib (H)      Past Surgical History:   Procedure Laterality Date     COLONOSCOPY       OPEN REDUCTION INTERNAL FIXATION HAND       RHINOPLASTY       TONSILLECTOMY       wisdom teeth       Allergies   Allergen Reactions     Ace Inhibitors Cough     Social History     Tobacco Use     Smoking status: Never Smoker     Smokeless tobacco: Never Used   Substance Use Topics     Alcohol use: Yes     Alcohol/week: 2.0 - 3.0 standard drinks     Types: 2 - 3 Cans of beer per week     Comment: social only     Prior to Admission medications    Medication Sig Start Date End Date Taking? Authorizing Provider   finasteride (PROSCAR) 5 MG tablet Take 1 tablet (5 mg) by mouth daily 19  Yes Sabas Staley MD   flecainide (TAMBOCOR) 150 MG tablet Take 1 tablet (150 mg) by mouth 2 times daily 19  Yes Sabas Staley MD   losartan (COZAAR) 25 MG tablet Take 1 tablet (25 mg) by mouth daily 19  Yes Sabas Staley MD   simvastatin (ZOCOR) 20 MG tablet TAKE ONE TABLET BY MOUTH AT BEDTIME 19  Yes Sabas Staley MD   tamsulosin (FLOMAX) 0.4 MG capsule Take 0.4 mg by mouth daily   Yes Reported, Patient   acetaminophen (TYLENOL) 325 MG tablet Take 325-650 mg by mouth every 6 hours as needed for mild pain    Reported, Patient   ibuprofen (ADVIL/MOTRIN) 200 MG capsule Take 200 mg by mouth every 4 hours as needed for fever    Reported, Patient   Nasal Dilators (BREATHE RIGHT LARGE) STRP nightly as needed    Reported, Patient     Current Facility-Administered Medications Ordered in Epic    Medication Dose Route Frequency Last Rate Last Dose     ertapenem (INVanz) 1 g vial to attach to  mL bag  1 g Intravenous Once         No current [facility]-ordered outpatient medications on file.       Recent Labs   Lab Test 05/11/20  1040 03/16/20  0916 03/13/20  1825  04/09/13  0000    137 135   < > 142   POTASSIUM 5.1 5.2 5.1   < > 4.4   CHLORIDE 107* 101 104   < > 105   CO2  --   --  27  --  28   ANIONGAP  --   --  4  --   --    GLC 97 98 80   < > 83   BUN 25  20 24  14 33*   < > 15   CR 1.28* 1.70* 1.80*   < > 0.96   ELIEZER 9.3 8.8 8.4*   < > 8.9    < > = values in this interval not displayed.     Recent Labs   Lab Test 05/11/20  1037 03/13/20  1825   WBC 7.4 18.8*   HGB 13.9 13.3    436     No results for input(s): ABO, RH in the last 17318 hours.  No results for input(s): TROPI in the last 67055 hours.  No results for input(s): PH, PCO2, PO2, HCO3 in the last 93160 hours.  No results for input(s): HCG in the last 62797 hours.  No results found for this or any previous visit (from the past 744 hour(s)).  No results found for this or any previous visit (from the past 4320 hour(s)).      RECENT LABS:       Anesthesia Evaluation     .             ROS/MED HX    ENT/Pulmonary:  - neg pulmonary ROS     Neurologic:     (+)migraines, other neuro lumbar DDD    Cardiovascular: Comment: Flecainide   1st Degree AVB with hx of PAF    (+) Dyslipidemia, hypertension----. : . . . :. dysrhythmias a-fib, .      (-) NAVARRO   METS/Exercise Tolerance:  >4 METS   Hematologic:  - neg hematologic  ROS       Musculoskeletal:  - neg musculoskeletal ROS       GI/Hepatic:  - neg GI/hepatic ROS       Renal/Genitourinary:     (+) chronic renal disease, type: CRI, BPH,       Endo:  - neg endo ROS       Psychiatric:         Infectious Disease:  - neg infectious disease ROS       Malignancy:         Other:                          Physical Exam  Normal systems: dental    Airway   Mallampati: I  TM distance: >3 FB  Neck ROM:  "full    Dental     Cardiovascular   Rhythm and rate: regular and normal      Pulmonary    breath sounds clear to auscultation            Lab Results   Component Value Date    WBC 7.4 05/11/2020    HGB 13.9 05/11/2020    HCT 39.9 05/11/2020     05/11/2020    CRP <0.3 04/09/2015    SED 1 04/09/2015     05/11/2020    POTASSIUM 5.1 05/11/2020    CHLORIDE 107 (H) 05/11/2020    CO2 27 03/13/2020    BUN 25 05/11/2020    BUN 20 05/11/2020    CR 1.28 (H) 05/11/2020    GLC 97 05/11/2020    ELIEZER 9.3 05/11/2020    ALBUMIN 2.9 (L) 03/10/2020    PROTTOTAL 5.5 (L) 03/10/2020     (H) 03/10/2020    AST 82 (H) 03/10/2020    ALKPHOS 439 (H) 03/10/2020    BILITOTAL 0.9 03/10/2020    LIPASE 58 03/10/2020    AMYLASE 59 03/10/2020    INR 0.99 07/27/2007       Preop Vitals  BP Readings from Last 3 Encounters:   05/13/20 138/65   05/08/20 122/72   03/16/20 (!) 140/74    Pulse Readings from Last 3 Encounters:   05/08/20 53   03/16/20 (!) 48   03/13/20 57      Resp Readings from Last 3 Encounters:   05/13/20 16   05/08/20 16   03/16/20 16    SpO2 Readings from Last 3 Encounters:   05/13/20 99%   05/08/20 99%   03/16/20 99%      Temp Readings from Last 1 Encounters:   05/13/20 36  C (96.8  F) (Oral)    Ht Readings from Last 1 Encounters:   05/13/20 1.765 m (5' 9.5\")      Wt Readings from Last 1 Encounters:   05/13/20 75.9 kg (167 lb 6.4 oz)    Estimated body mass index is 24.37 kg/m  as calculated from the following:    Height as of this encounter: 1.765 m (5' 9.5\").    Weight as of this encounter: 75.9 kg (167 lb 6.4 oz).       Anesthesia Plan      History & Physical Review  History and physical reviewed and following examination; no interval change.    ASA Status:  2 .    NPO Status:  > 8 hours    Plan for Spinal   PONV prophylaxis:  Ondansetron (or other 5HT-3) and Dexamethasone or Solumedrol         Postoperative Care  Postoperative pain management:  Multi-modal analgesia.      Consents  Anesthetic plan, risks, benefits " and alternatives discussed with:  Patient..                 Timo Moore MD

## 2020-05-13 NOTE — ANESTHESIA PROCEDURE NOTES
Procedure note : intrathecal  Staff -   Anesthesiologist:  Timo Moore MD      Performed By: anesthesiologist        Pre-Procedure  Performed by Timo Moore MD  Location: OR      Pre-Anesthestic Checklist: patient identified, IV checked, risks and benefits discussed, informed consent, monitors and equipment checked, pre-op evaluation and at physician/surgeon's request    Timeout  Correct Patient: Yes   Correct Procedure: Yes   Correct Site: Yes   Correct Laterality: N/A   Correct Position: Yes   Site Marked: N/A   .   Procedure Documentation    .    Procedure: intrathecal, .   Patient Position:sitting Insertion Site:L3-4  (midline approach)     Patient Prep/Sterile Barriers; mask, sterile gloves, povidone-iodine 7.5% surgical scrub, patient draped.  .  Needle:  Spinal Needle (gauge): 24  Spinal/LP Needle Length (inches): 3.5 # of attempts: 1 and # of redirects: : 0. Introducer used Introducer: 20 G .        Assessment/Narrative  Paresthesias: No.  .  .  clear CSF fluid removed . Comments:  Injected 12mg Bupivacaine 0.75% + dextrose  Patient tolerated the procedure well and without complications.  Patient laid flat immediately.

## 2020-05-13 NOTE — PLAN OF CARE
Pt arrived from PACU late am.  Bradycardia continues from PACU, other VS stable. Pt A&O.  CBI running with watermelon output.  IVFs continue.  Pt tolerated clear liquids, advanced to fulls.  Plan to ambulate this evening.  Hopeful discharge tomorrow back home.

## 2020-05-13 NOTE — OP NOTE
Procedure Date: 05/13/2020      PREOPERATIVE DIAGNOSES:   1.  Acute urinary retention.   2.  Bladder outlet obstruction secondary to trilobar benign prostatic hypertrophy.   3.  Mild left hydronephrosis noted on recent CT noncontrast imaging.      POSTOPERATIVE DIAGNOSES:   1.  Acute urinary retention.   2.  Bladder outlet obstruction secondary to trilobar benign prostatic hypertrophy.   3.  Mild left hydronephrosis noted on recent CT noncontrast imaging. Normal anatomy noted on today's retrograde study  4.  Obscured right ureteral orifice.      PROCEDURES:   1.  Cystoscopy.   2.  Left retrograde ureteropyelogram with intraoperative interpretation.   3.  Transurethral resection of the prostate.      ANESTHESIA:  Spinal.      BLOOD LOSS:  15 mL.      DESCRIPTION OF PROCEDURE:  The patient was prepped and draped the dorsal lithotomy position in room 18, which is the Urology endoscopy room.  A multidisciplinary timeout was observed and the patient received a spinal anesthetic.      I advanced a 22-Cook Islander Stortz cystoscope per meatus and into the bladder.  Trilobar BPH was observed  markedly obstructing the bladder outlet.  There was deviation of the trigone with BPH tissue growing intravesically which was more pronounced on the right side.  I was able to locate the left ureteral orifice which was displaced laterally, I was unable to visualize the right ureteral orifice, even with the institution of indigo carmine and 20 mg of Lasix.     Using a cone tipped 10-Cook Islander ureteral catheter, I cannulated the ureteral orifice and shot a left retrograde ureteropyelogram.  Contrast easily progressed in a retrograde manner into the left collecting system and completely delineated the entire left collecting system.  There was no evidence of hydronephrosis (mild or otherwise), and this was an absolutely normal-appearing upper tract.  All minor and major calices were filled with the contrast, which was a 50/50 mixture of normal  saline and contrast and no filling defects were observed, nor was there any suggestion of a UPJ obstruction, which was suggested on recent noncontrast CT imaging.  The entire ureter including the proximal, mid and distal ureter were of normal caliber and shape without filling defects.  I then removed the catheter from the ureteral orifice and over 30 seconds, the majority of the contrast drained out of the system as Lasix had been given.  Again, there was no evidence of hydronephrosis nor abnormal retaining of contrast.     I passed a Super Stiff Amplatz wire into the bladder, backloaded the scope off the wire and then dilated to 30 [place].  I then passed the bipolar Stortz resectoscope and began the resection by taking down the intravesical tissue starting at 6 o'clock and moved up to 3 o'clock.  I kept looking for the right ureteral orifice, but its position was obscured and never presented itself.  I did not see any discoloration of urine from the right ureteral orifice or from the right trigone.  Vigorous colored urine was seen from the left ureteral orifice.      I continued the resection using the bipolar hot loop, resecting predominantly the left lateral lobe.  I then came over and started the resection on the right side, but was somewhat timid to aggressively resect around the bladder neck, as the ureteral orifice was obscured.  I modified my technique and spared a portion of the bladder neck intravesically and did not resect this, as I was concerned the orifice was in this obstructive tissue.      I continued the resection on the patient's right side including from 9 o'clock to 12 o'clock where there was a substantial amount of intravesical tissue anteriorly.  This was taken down to the white capsular fibers of the bladder neck and surgical capsule.  All tissue was resected that was obstructive within the prostatic urethra and removed with the Urovac.      I used an button-appearing roller ball and  fulgurated the entire lumen; light pink to clear returns were observed.  I again attempted to locate the right ureteral orifice, but was unsuccessful.  We will follow serial creatinines over the next 24-48 hours.      A 22-Urdu 3-way Faria catheter was placed with 10 mL in the balloon and then snugged down to the bladder neck with an external cotton gauze.  I connected the 3-way to 0.9 normal saline and again the returns were light pink to clear.  A B and O suppository was given at the end of procedure.      The patient tolerated the procedure well and left the operating room in stable condition.         MELLY PRAJAPATI MD             D: 2020   T: 2020   MT: MEMO      Name:     RO VELAZQUEZ   MRN:      [id number]        Account:        [id number]   :      1945           Procedure Date: 2020      Document: [id number]       cc: Sabas Prajapati MD

## 2020-05-13 NOTE — PLAN OF CARE
[id number]: A/O x 4.  Bradycardic, asymptomatic, other vitals stable on RA.  Denies pain.  Tolerating full liquids, advanced to low fiber.  BS active, passing flatus.  Up SBA, ambulated in halls, up to chair for dinner.  CBI running moderate/slow rate, weaning as able.  Urine color did darken with ambulation.  Will likely discharge tomorrow.

## 2020-05-13 NOTE — PROCEDURES
[facility] Brief Operative Note    Pre-operative diagnosis: BPH, urinary retention  Hx of mild left hydronephrosis on recent CT   Post-operative diagnosis: Same   Procedure: Procedure(s):  CYSTOSCOPY WITH TRANSURETHRAL RESECTION PROSTATE  LEFT RETROGRADE URETERAL PYELOGRAM   Surgeon: Denilson Angulo MD   Assistant(s): None   Anesthesia: Spinal Anesthesia   Estimated blood loss: 15 ml    Total IV fluids: (See anesthesia record)   Blood transfusion: No transfusion was given during surgery   Total urine output: (See anesthesia record)   Drains: Faria catheter   Specimens: None   Implants: None   Findings: Left upper tract is normal  Obscure right UO, appears to have been surrounded by intravesical BPH   Complications: None   Condition: Stable   Comments: See dictated operative report for full details

## 2020-05-13 NOTE — ANESTHESIA CARE TRANSFER NOTE
Patient: Rey Brown    Procedure(s):  CYSTOSCOPY WITH TRANSURETHRAL RESECTION PROSTATE  LEFT RETROGRADE URETERAL PYELOGRAM    Diagnosis: Hypertrophy of prostate with urinary obstruction [N40.1, N13.8]  Diagnosis Additional Information: No value filed.    Anesthesia Type:   Spinal     Note:  Airway :Face Mask  Patient transferred to:PACU  Comments: Leia Report: Identifed the Patient, Identified the Reponsible Provider, Reviewed the pertinent medical history, Discussed the surgical course, Reviewed Intra-OP anesthesia mangement and issues during anesthesia, Set expectations for post-procedure period and Allowed opportunity for questions and acknowledgement of understanding      Vitals: (Last set prior to Anesthesia Care Transfer)    CRNA VITALS  5/13/2020 0909 - 5/13/2020 0950      5/13/2020             Resp Rate (set):  10                Electronically Signed By: JOSE CRUZ Hu CRNA  May 13, 2020  9:50 AM

## 2020-05-14 ENCOUNTER — APPOINTMENT (OUTPATIENT)
Dept: ULTRASOUND IMAGING | Facility: CLINIC | Age: 75
End: 2020-05-14
Attending: PHYSICIAN ASSISTANT
Payer: COMMERCIAL

## 2020-05-14 VITALS
RESPIRATION RATE: 16 BRPM | HEIGHT: 70 IN | OXYGEN SATURATION: 97 % | TEMPERATURE: 97 F | SYSTOLIC BLOOD PRESSURE: 139 MMHG | DIASTOLIC BLOOD PRESSURE: 59 MMHG | BODY MASS INDEX: 23.96 KG/M2 | HEART RATE: 49 BPM | WEIGHT: 167.4 LBS

## 2020-05-14 LAB
ANION GAP SERPL CALCULATED.3IONS-SCNC: 4 MMOL/L (ref 3–14)
BUN SERPL-MCNC: 21 MG/DL (ref 7–30)
CALCIUM SERPL-MCNC: 8.7 MG/DL (ref 8.5–10.1)
CHLORIDE SERPL-SCNC: 106 MMOL/L (ref 94–109)
CO2 SERPL-SCNC: 30 MMOL/L (ref 20–32)
COPATH REPORT: NORMAL
CREAT SERPL-MCNC: 1.48 MG/DL (ref 0.66–1.25)
GFR SERPL CREATININE-BSD FRML MDRD: 46 ML/MIN/{1.73_M2}
GLUCOSE SERPL-MCNC: 89 MG/DL (ref 70–99)
POTASSIUM SERPL-SCNC: 4.1 MMOL/L (ref 3.4–5.3)
SODIUM SERPL-SCNC: 140 MMOL/L (ref 133–144)

## 2020-05-14 PROCEDURE — 76770 US EXAM ABDO BACK WALL COMP: CPT

## 2020-05-14 PROCEDURE — 80048 BASIC METABOLIC PNL TOTAL CA: CPT | Performed by: UROLOGY

## 2020-05-14 PROCEDURE — 25000132 ZZH RX MED GY IP 250 OP 250 PS 637: Performed by: UROLOGY

## 2020-05-14 PROCEDURE — 36415 COLL VENOUS BLD VENIPUNCTURE: CPT | Performed by: UROLOGY

## 2020-05-14 RX ADMIN — FLECAINIDE ACETATE 150 MG: 50 TABLET ORAL at 08:07

## 2020-05-14 RX ADMIN — ACETAMINOPHEN 650 MG: 325 TABLET, FILM COATED ORAL at 06:50

## 2020-05-14 RX ADMIN — ACETAMINOPHEN 650 MG: 325 TABLET, FILM COATED ORAL at 00:36

## 2020-05-14 NOTE — PLAN OF CARE
PT is A&Ox4. VSS ex bradycardia. C/o of mild back pain, PRN tylenol given x1. Up ind. Low fiber diet. BM x1. CBI running very slow- almost stopped, tolerated well w/ watermelon/cherry OP- no clots. Plan to discharge today, valderrama to be removed later today. Ambulated in halls and room. Slept between cares.     Addendum- this AM when pt got up to BR and had gas, valderrama noted to be more cherry OP. Attempted to increase CBI, but no OP. Hand irrigated x1 w/ 40 ml. Effective and OP cleared following.

## 2020-05-14 NOTE — PROGRESS NOTES
[facility]    Urology Progress Note     Assessment & Plan  Rey Brown is a 75 year old male POD#1 TURP for urinary retention. Unable to visualize the right UO. Creat 1.48 today. 1.44 yd. Historically has been as high as 2. Denies flank pain. UOP clear pink after long walk and CBI clamped.     Plan:    Remove valderrama    Measure PVR x2 and chart in flowchart. If <350 ok to go without valderrama    Renal US to check for hydronephrosis    Recheck creatinine Monday    Follow up in 6 weeks    Moody Tyson PA-C 5/14/2020 9:41 AM  [facility]  Mon-Fri, 7am - 4pm  Office: [phone]      Interval History   Ate, ambulated well this AM and passing gas. Denies any pain. No flank pain. No nausea. Minimal catheter discomfort. Urine has remained pink with minimal obstruction.    Physical Exam   Temp: 97  F (36.1  C) Temp src: Oral BP: 139/59 Pulse: (!) 49 Heart Rate: 53 Resp: 16 SpO2: 97 % O2 Device: None (Room air)    Vitals:    05/13/20 0549   Weight: 75.9 kg (167 lb 6.4 oz)     Vital Signs with Ranges  Temp:  [95.1  F (35.1  C)-97.6  F (36.4  C)] 97  F (36.1  C)  Pulse:  [44-49] 49  Heart Rate:  [42-53] 53  Resp:  [13-16] 16  BP: (120-154)/(43-88) 139/59  SpO2:  [97 %-100 %] 97 %  I/O last 3 completed shifts:  In: 2120 [P.O.:420; I.V.:1700]  Out: 4560 [Urine:4560]    General: Patient awake, alert, NAD, sitting up in bed  Head: Normocephalic, atraumatic  Eyes: No icterus  Neck: Symmetric  Respiratory: Breathing unlabored  Cardiac: Skin well-perfused  GI: Soft, NT, non-distended, no SP tenderness, no CVA tenderness  Genitourinary: Valderrama in place draining pink urine with CBI clamped, No penoscrotal edema  Skin: No visible rashes   Extremities: No LE edema, no calf pain  Neurologic: No focal deficits  Neuropsychiatric: A&O x 3, responding appropriately      Medications       flecainide  150 mg Oral BID     sodium chloride (PF)  3 mL Intracatheter Q8H       Data   Results for orders placed or  performed during the hospital encounter of 05/13/20 (from the past 24 hour(s))   Creatinine   Result Value Ref Range    Creatinine 1.44 (H) 0.66 - 1.25 mg/dL    GFR Estimate 48 (L) >60 mL/min/[1.73_m2]    GFR Estimate If Black 55 (L) >60 mL/min/[1.73_m2]   Basic metabolic panel   Result Value Ref Range    Sodium 140 133 - 144 mmol/L    Potassium 4.1 3.4 - 5.3 mmol/L    Chloride 106 94 - 109 mmol/L    Carbon Dioxide 30 20 - 32 mmol/L    Anion Gap 4 3 - 14 mmol/L    Glucose 89 70 - 99 mg/dL    Urea Nitrogen 21 7 - 30 mg/dL    Creatinine 1.48 (H) 0.66 - 1.25 mg/dL    GFR Estimate 46 (L) >60 mL/min/[1.73_m2]    GFR Estimate If Black 54 (L) >60 mL/min/[1.73_m2]    Calcium 8.7 8.5 - 10.1 mg/dL

## 2020-05-14 NOTE — PROGRESS NOTES
Patient discharged at 12:58 PM to home.  IV was discontinued. Pain at time of discharge was 0/10. Belongings returned to patient.  Discharge instructions and medications reviewed with patient.  Patient verbalized understanding and all questions were answered.  At time of discharge, patient condition was stable, escorted by RN to door 2, to be transported home by wife.

## 2020-05-18 ENCOUNTER — OFFICE VISIT (OUTPATIENT)
Dept: FAMILY MEDICINE | Facility: CLINIC | Age: 75
End: 2020-05-18

## 2020-05-18 VITALS
DIASTOLIC BLOOD PRESSURE: 68 MMHG | OXYGEN SATURATION: 99 % | HEART RATE: 52 BPM | BODY MASS INDEX: 24.6 KG/M2 | SYSTOLIC BLOOD PRESSURE: 126 MMHG | WEIGHT: 169 LBS | RESPIRATION RATE: 16 BRPM | TEMPERATURE: 97.9 F

## 2020-05-18 DIAGNOSIS — N18.30 CHRONIC KIDNEY DISEASE, STAGE III (MODERATE) (H): ICD-10-CM

## 2020-05-18 DIAGNOSIS — I48.11 LONGSTANDING PERSISTENT ATRIAL FIBRILLATION (H): Primary | ICD-10-CM

## 2020-05-18 PROCEDURE — 99496 TRANSJ CARE MGMT HIGH F2F 7D: CPT | Performed by: FAMILY MEDICINE

## 2020-05-18 PROCEDURE — 36415 COLL VENOUS BLD VENIPUNCTURE: CPT | Performed by: FAMILY MEDICINE

## 2020-05-18 NOTE — PROGRESS NOTES
Problem(s) Oriented visit        SUBJECTIVE:                                                    Rey Brown is a 75 year old male who presents to clinic today for the following health issues :    Hospital Follow-up Visit:    Hospital/Nursing Home/IP Rehab Facility: [facility]  Date of Admission:5/12/20  Date of Discharge: 5/14/20  Reason(s) for Admission: surgery      Was your hospitalization related to COVID-19? No   Problems taking medications regularly:  None  Medication changes since discharge: None  Problems adhering to non-medication therapy:  None    Summary of hospitalization:  [facility] discharge summary reviewed  Diagnostic Tests/Treatments reviewed.  Follow up needed: none  Other Healthcare Providers Involved in Patient s Care:         None  Update since discharge: improved. Post Discharge Medication Reconciliation: discharge medications reconciled and changed, per note/orders (see AVS).  Plan of care communicated with patient            1. Longstanding persistent atrial fibrillation  who is well known to me from previous visits      2. Chronic kidney disease, stage III (moderate) (H)  Needs a follow up on what is progressing       Problem list, Medication list, Allergies, and Medical/Social/Surgical histories reviewed in EPIC and updated as appropriate.   Additional history: as documented    ROS:  General and Resp. completed and negative except as noted above    Histories:   Patient Active Problem List   Diagnosis     HTN (hypertension)     Hypercholesterolemia     Lumbar disc disease     ACP (advance care planning)     Health Care Home     Elevated prostate specific antigen (PSA)     Family history of migraine     Plantar wart     First degree AV block     Chronic kidney disease, stage III (moderate) (H)     Gross hematuria     Atrial fibrillation (H)     Past Surgical History:   Procedure Laterality Date     COLONOSCOPY       CYSTOSCOPY, RETROGRADES, COMBINED Left  5/13/2020    Procedure: LEFT RETROGRADE URETERAL PYELOGRAM;  Surgeon: Denilson Angulo MD;  Location: [facility] OR     CYSTOSCOPY, TRANSURETHRAL RESECTION (TUR) PROSTATE, COMBINED N/A 5/13/2020    Procedure: CYSTOSCOPY WITH TRANSURETHRAL RESECTION PROSTATE;  Surgeon: Denilson Angulo MD;  Location: [facility] OR     OPEN REDUCTION INTERNAL FIXATION HAND       RHINOPLASTY       TONSILLECTOMY       wisdom teeth         Social History     Tobacco Use     Smoking status: Never Smoker     Smokeless tobacco: Never Used   Substance Use Topics     Alcohol use: Yes     Alcohol/week: 2.0 - 3.0 standard drinks     Types: 2 - 3 Cans of beer per week     Comment: social only     Family History   Problem Relation Age of Onset     Dementia Father      Osteoarthritis Father      Cerebrovascular Disease Father      Myocardial Infarction Mother      C.A.D. Mother      Coronary Artery Disease Mother            OBJECTIVE:                                                    /68   Pulse 52   Temp 97.9  F (36.6  C) (Oral)   Resp 16   Wt 76.7 kg (169 lb)   SpO2 99%   BMI 24.60 kg/m    Body mass index is 24.6 kg/m .   APPEARANCE: = Relaxed and in no distress  Conj/Eyelids = noninjected and lids and lashes are without inflammation  PERRLA/Irises = Pupils Round Reactive to Light and Irisis without inflammation  Neck = No anterior or posterior adenopathy appreciated.  Thyroid = Not enlarged and no masses felt  Resp effort = Calm regular breathing  Breath Sounds = Good air movement with no rales or rhonchi in any lung fields  Heart Rate, Rhythm, & sounds (no Murm)  = Regular rate and rhythm with no S3, S4, or murmur appreciated.  Carotid Art's = Pulses full and equal and no bruits appreciated  Abdomen = Soft, nontender, no masses, & bowel sounds in all quadrants  Liver/Spleen = Normal span and no splenomegaly noted  Digits and Nails = FROM in all finger joints, no nail dystrophy  Ext (edema) = No pretibial edema noted or elsewhere  Musculsktl =   Strength and ROM of major joints are within normal limits  SKIN = absent significant rashes or lesions   Recent/Remote Memory = Alert and Oriented x 3  Mood/Affect = Cooperative and interested     ASSESSMENT/PLAN:                                                        Rey was seen today for recheck.    Diagnoses and all orders for this visit:    Longstanding persistent atrial fibrillation    Chronic kidney disease, stage III (moderate) (H)        Work on weight loss  Regular exercise  There are no Patient Instructions on file for this visit.    The following health maintenance items are reviewed in Epic and correct as of today:  Health Maintenance   Topic Date Due     ZOSTER IMMUNIZATION (3 of 3) 07/02/2018     MEDICARE ANNUAL WELLNESS VISIT  09/27/2020     FALL RISK ASSESSMENT  09/27/2020     DTAP/TDAP/TD IMMUNIZATION (3 - Td) 04/30/2022     ADVANCE CARE PLANNING  07/16/2023     LIPID  09/27/2024     COLORECTAL CANCER SCREENING  08/24/2026     HEPATITIS C SCREENING  Completed     PHQ-2  Completed     INFLUENZA VACCINE  Completed     PNEUMOCOCCAL IMMUNIZATION 65+ LOW/MEDIUM RISK  Completed     AORTIC ANEURYSM SCREENING (SYSTEM ASSIGNED)  Completed     IPV IMMUNIZATION  Aged Out     MENINGITIS IMMUNIZATION  Aged Out       Sabas Staley MD  [facility]  Family Practice  [facility]  [phone]    For any issues my office # is [phone]

## 2020-05-19 LAB
BUN SERPL-MCNC: 29 MG/DL (ref 8–27)
BUN/CREATININE RATIO: 21 (ref 10–24)
CALCIUM SERPL-MCNC: 9.7 MG/DL (ref 8.6–10.2)
CHLORIDE SERPLBLD-SCNC: 104 MMOL/L (ref 96–106)
CREAT SERPL-MCNC: 1.37 MG/DL (ref 0.76–1.27)
EGFR IF AFRICN AM: 59 ML/MIN/1.73
EGFR IF NONAFRICN AM: 51 ML/MIN/1.73
GLUCOSE SERPL-MCNC: 88 MG/DL (ref 65–99)
POTASSIUM SERPL-SCNC: 4.6 MMOL/L (ref 3.5–5.2)
SODIUM SERPL-SCNC: 143 MMOL/L (ref 134–144)
TOTAL CO2: 28 MMOL/L (ref 20–29)

## 2020-06-29 DIAGNOSIS — R39.12 BENIGN PROSTATIC HYPERPLASIA WITH WEAK URINARY STREAM: Primary | ICD-10-CM

## 2020-06-29 DIAGNOSIS — N40.1 BENIGN PROSTATIC HYPERPLASIA WITH WEAK URINARY STREAM: Primary | ICD-10-CM

## 2020-06-29 NOTE — PROGRESS NOTES
from [place] [facility] - Dr Angulo fax [phone]- [phone] DX BPH N40.1  cc'd to Dr Angulo  No appt today at signing  Mala Gandhi MA June 29, 2020 3:38 PM

## 2020-06-30 VITALS — TEMPERATURE: 97.9 F

## 2020-06-30 DIAGNOSIS — N40.1 BENIGN PROSTATIC HYPERPLASIA WITH WEAK URINARY STREAM: ICD-10-CM

## 2020-06-30 DIAGNOSIS — R39.12 BENIGN PROSTATIC HYPERPLASIA WITH WEAK URINARY STREAM: ICD-10-CM

## 2020-06-30 PROCEDURE — 36415 COLL VENOUS BLD VENIPUNCTURE: CPT | Performed by: FAMILY MEDICINE

## 2020-07-01 LAB
CREAT SERPL-MCNC: 1.52 MG/DL (ref 0.76–1.27)
EGFR IF AFRICN AM: 52 ML/MIN/1.73
EGFR IF NONAFRICN AM: 45 ML/MIN/1.73

## 2020-07-06 NOTE — RESULT ENCOUNTER NOTE
Dear Rey,  Here is a copy of your labs, we will discuss them at your upcoming visit.  Sabas Staley MD

## 2020-07-24 NOTE — NURSING NOTE
Faxed labs from 6/30/20 today  To Dr Angulo @ [phone]  Mala Gandhi MA July 24, 2020 4:51 PM

## 2020-09-17 ENCOUNTER — TRANSFERRED RECORDS (OUTPATIENT)
Dept: FAMILY MEDICINE | Facility: CLINIC | Age: 75
End: 2020-09-17

## 2020-09-28 DIAGNOSIS — E78.00 HYPERCHOLESTEROLEMIA: ICD-10-CM

## 2020-09-29 RX ORDER — SIMVASTATIN 20 MG
TABLET ORAL
Qty: 90 TABLET | Refills: 1 | Status: SHIPPED | OUTPATIENT
Start: 2020-09-29 | End: 2021-01-14

## 2020-11-16 DIAGNOSIS — I10 ESSENTIAL HYPERTENSION: ICD-10-CM

## 2020-11-16 RX ORDER — LOSARTAN POTASSIUM 25 MG/1
TABLET ORAL
Qty: 90 TABLET | Refills: 0 | Status: SHIPPED | OUTPATIENT
Start: 2020-11-16 | End: 2021-01-14

## 2020-12-10 DIAGNOSIS — I48.0 PAROXYSMAL A-FIB (H): ICD-10-CM

## 2020-12-10 RX ORDER — FLECAINIDE ACETATE 150 MG/1
TABLET ORAL
Qty: 180 TABLET | Refills: 0 | Status: SHIPPED | OUTPATIENT
Start: 2020-12-10 | End: 2020-12-29

## 2020-12-29 ENCOUNTER — OFFICE VISIT (OUTPATIENT)
Dept: CARDIOLOGY | Facility: CLINIC | Age: 75
End: 2020-12-29
Attending: INTERNAL MEDICINE
Payer: COMMERCIAL

## 2020-12-29 VITALS
BODY MASS INDEX: 24.34 KG/M2 | DIASTOLIC BLOOD PRESSURE: 76 MMHG | WEIGHT: 170 LBS | SYSTOLIC BLOOD PRESSURE: 138 MMHG | HEART RATE: 56 BPM | HEIGHT: 70 IN

## 2020-12-29 DIAGNOSIS — I48.0 PAROXYSMAL A-FIB (H): ICD-10-CM

## 2020-12-29 PROCEDURE — 93000 ELECTROCARDIOGRAM COMPLETE: CPT | Performed by: INTERNAL MEDICINE

## 2020-12-29 PROCEDURE — 99213 OFFICE O/P EST LOW 20 MIN: CPT | Mod: 25 | Performed by: INTERNAL MEDICINE

## 2020-12-29 RX ORDER — FLECAINIDE ACETATE 150 MG/1
150 TABLET ORAL 2 TIMES DAILY
Qty: 180 TABLET | Refills: 3 | Status: SHIPPED | OUTPATIENT
Start: 2020-12-29 | End: 2021-01-14

## 2020-12-29 ASSESSMENT — MIFFLIN-ST. JEOR: SCORE: 1522.36

## 2020-12-29 NOTE — PROGRESS NOTES
HPI and Plan:   See dictation    Orders Placed This Encounter   Procedures     Follow-Up with Cardiac Advanced Practice Provider     Follow-Up with Electrophysiologist     EKG 12-lead complete w/read (Future)- to be scheduled       Orders Placed This Encounter   Medications     flecainide (TAMBOCOR) 150 MG tablet     Sig: Take 1 tablet (150 mg) by mouth 2 times daily     Dispense:  180 tablet     Refill:  3       Medications Discontinued During This Encounter   Medication Reason     tamsulosin (FLOMAX) 0.4 MG capsule      flecainide (TAMBOCOR) 150 MG tablet Reorder         Encounter Diagnosis   Name Primary?     Paroxysmal A-fib (H)        CURRENT MEDICATIONS:  Current Outpatient Medications   Medication Sig Dispense Refill     acetaminophen (TYLENOL) 325 MG tablet Take 325-650 mg by mouth every 6 hours as needed for mild pain       finasteride (PROSCAR) 5 MG tablet Take 1 tablet (5 mg) by mouth daily 90 tablet 3     flecainide (TAMBOCOR) 150 MG tablet Take 1 tablet (150 mg) by mouth 2 times daily 180 tablet 3     losartan (COZAAR) 25 MG tablet TAKE ONE TABLET BY MOUTH ONE TIME DAILY  90 tablet 0     Nasal Dilators (BREATHE RIGHT LARGE) STRP nightly as needed       simvastatin (ZOCOR) 20 MG tablet TAKE ONE TABLET BY MOUTH AT BEDTIME 90 tablet 1       ALLERGIES     Allergies   Allergen Reactions     Ace Inhibitors Cough       PAST MEDICAL HISTORY:  Past Medical History:   Diagnosis Date     Hypercholesterolemia      Lumbar disc disease      Migraines      Paroxysmal A-fib (H)        PAST SURGICAL HISTORY:  Past Surgical History:   Procedure Laterality Date     COLONOSCOPY       CYSTOSCOPY, RETROGRADES, COMBINED Left 5/13/2020    Procedure: LEFT RETROGRADE URETERAL PYELOGRAM;  Surgeon: Denilson Angulo MD;  Location:  OR     CYSTOSCOPY, TRANSURETHRAL RESECTION (TUR) PROSTATE, COMBINED N/A 5/13/2020    Procedure: CYSTOSCOPY WITH TRANSURETHRAL RESECTION PROSTATE;  Surgeon: Denilson Angulo MD;  Location:  OR     OPEN  REDUCTION INTERNAL FIXATION HAND       RHINOPLASTY       TONSILLECTOMY       wisdom teeth         FAMILY HISTORY:  Family History   Problem Relation Age of Onset     Dementia Father      Osteoarthritis Father      Cerebrovascular Disease Father      Myocardial Infarction Mother      C.A.D. Mother      Coronary Artery Disease Mother        SOCIAL HISTORY:  Social History     Socioeconomic History     Marital status:      Spouse name: Erin     Number of children: None     Years of education: None     Highest education level: None   Occupational History     None   Social Needs     Financial resource strain: None     Food insecurity     Worry: None     Inability: None     Transportation needs     Medical: None     Non-medical: None   Tobacco Use     Smoking status: Never Smoker     Smokeless tobacco: Never Used   Substance and Sexual Activity     Alcohol use: Yes     Alcohol/week: 2.0 - 3.0 standard drinks     Types: 2 - 3 Cans of beer per week     Comment: social only     Drug use: No     Sexual activity: Yes   Lifestyle     Physical activity     Days per week: None     Minutes per session: None     Stress: None   Relationships     Social connections     Talks on phone: None     Gets together: None     Attends Episcopalian service: None     Active member of club or organization: None     Attends meetings of clubs or organizations: None     Relationship status: None     Intimate partner violence     Fear of current or ex partner: None     Emotionally abused: None     Physically abused: None     Forced sexual activity: None   Other Topics Concern      Service Not Asked     Blood Transfusions Not Asked     Caffeine Concern Not Asked     Occupational Exposure Not Asked     Hobby Hazards Not Asked     Sleep Concern Not Asked     Stress Concern Not Asked     Weight Concern Not Asked     Special Diet No     Back Care Not Asked     Exercise Yes     Comment: 2-3x/week     Bike Helmet Not Asked     Seat Belt Not  "Asked     Self-Exams Not Asked     Parent/sibling w/ CABG, MI or angioplasty before 65F 55M? Not Asked   Social History Narrative     None       Review of Systems:  Skin:  Negative       Eyes:  Negative      ENT:  Negative      Respiratory:  Negative       Cardiovascular:  Negative      Gastroenterology: Negative      Genitourinary:  Negative      Musculoskeletal:  Negative      Neurologic:  Negative      Psychiatric:  Negative      Heme/Lymph/Imm:  Negative      Endocrine:  Negative        Physical Exam:  Vitals: /76   Pulse 56   Ht 1.778 m (5' 10\")   Wt 77.1 kg (170 lb)   BMI 24.39 kg/m      Constitutional:  cooperative, alert and oriented, well developed, well nourished, in no acute distress        Skin:  warm and dry to the touch          Head:  normocephalic        Eyes:  pupils equal and round        Lymph:      ENT:  no pallor or cyanosis        Neck:           Respiratory:  normal breath sounds, clear to auscultation, normal A-P diameter, normal symmetry, normal respiratory excursion, no use of accessory muscles         Cardiac: regular rhythm, normal S1/S2, no S3 or S4, apical impulse not displaced, no murmurs, gallops or rubs       systolic ejection murmur;grade 1                                                 GI:  abdomen soft        Extremities and Muscular Skeletal:  no edema;no deformities, clubbing, cyanosis, erythema observed              Neurological:  no gross motor deficits        Psych:  Alert and Oriented x 3;affect appropriate, oriented to time, person and place;oriented to time, person and place        CC  Sharmin Scott MD  [address]  [address]  [place] [id number]              "

## 2020-12-29 NOTE — LETTER
12/29/2020    Sabas Staley MD  [address]  [facility] [address]    RE: Rey Brown       Dear Colleague,    I had the pleasure of seeing Rey Brown in the [facility].    HPI and Plan:   See dictation    Orders Placed This Encounter   Procedures     Follow-Up with Cardiac Advanced Practice Provider     Follow-Up with Electrophysiologist     EKG 12-lead complete w/read (Future)- to be scheduled       Orders Placed This Encounter   Medications     flecainide (TAMBOCOR) 150 MG tablet     Sig: Take 1 tablet (150 mg) by mouth 2 times daily     Dispense:  180 tablet     Refill:  3       Medications Discontinued During This Encounter   Medication Reason     tamsulosin (FLOMAX) 0.4 MG capsule      flecainide (TAMBOCOR) 150 MG tablet Reorder         Encounter Diagnosis   Name Primary?     Paroxysmal A-fib (H)        CURRENT MEDICATIONS:  Current Outpatient Medications   Medication Sig Dispense Refill     acetaminophen (TYLENOL) 325 MG tablet Take 325-650 mg by mouth every 6 hours as needed for mild pain       finasteride (PROSCAR) 5 MG tablet Take 1 tablet (5 mg) by mouth daily 90 tablet 3     flecainide (TAMBOCOR) 150 MG tablet Take 1 tablet (150 mg) by mouth 2 times daily 180 tablet 3     losartan (COZAAR) 25 MG tablet TAKE ONE TABLET BY MOUTH ONE TIME DAILY  90 tablet 0     Nasal Dilators (BREATHE RIGHT LARGE) STRP nightly as needed       simvastatin (ZOCOR) 20 MG tablet TAKE ONE TABLET BY MOUTH AT BEDTIME 90 tablet 1       ALLERGIES     Allergies   Allergen Reactions     Ace Inhibitors Cough       PAST MEDICAL HISTORY:  Past Medical History:   Diagnosis Date     Hypercholesterolemia      Lumbar disc disease      Migraines      Paroxysmal A-fib (H)        PAST SURGICAL HISTORY:  Past Surgical History:   Procedure Laterality Date     COLONOSCOPY       CYSTOSCOPY, RETROGRADES, COMBINED Left 5/13/2020    Procedure: LEFT RETROGRADE URETERAL PYELOGRAM;  Surgeon:  Denilson Angulo MD;  Location:  OR     CYSTOSCOPY, TRANSURETHRAL RESECTION (TUR) PROSTATE, COMBINED N/A 5/13/2020    Procedure: CYSTOSCOPY WITH TRANSURETHRAL RESECTION PROSTATE;  Surgeon: Denilson Angulo MD;  Location:  OR     OPEN REDUCTION INTERNAL FIXATION HAND       RHINOPLASTY       TONSILLECTOMY       wisdom teeth         FAMILY HISTORY:  Family History   Problem Relation Age of Onset     Dementia Father      Osteoarthritis Father      Cerebrovascular Disease Father      Myocardial Infarction Mother      C.A.D. Mother      Coronary Artery Disease Mother        SOCIAL HISTORY:  Social History     Socioeconomic History     Marital status:      Spouse name: Erin     Number of children: None     Years of education: None     Highest education level: None   Occupational History     None   Social Needs     Financial resource strain: None     Food insecurity     Worry: None     Inability: None     Transportation needs     Medical: None     Non-medical: None   Tobacco Use     Smoking status: Never Smoker     Smokeless tobacco: Never Used   Substance and Sexual Activity     Alcohol use: Yes     Alcohol/week: 2.0 - 3.0 standard drinks     Types: 2 - 3 Cans of beer per week     Comment: social only     Drug use: No     Sexual activity: Yes   Lifestyle     Physical activity     Days per week: None     Minutes per session: None     Stress: None   Relationships     Social connections     Talks on phone: None     Gets together: None     Attends Hinduism service: None     Active member of club or organization: None     Attends meetings of clubs or organizations: None     Relationship status: None     Intimate partner violence     Fear of current or ex partner: None     Emotionally abused: None     Physically abused: None     Forced sexual activity: None   Other Topics Concern      Service Not Asked     Blood Transfusions Not Asked     Caffeine Concern Not Asked     Occupational Exposure Not Asked     Hobby  "Hazards Not Asked     Sleep Concern Not Asked     Stress Concern Not Asked     Weight Concern Not Asked     Special Diet No     Back Care Not Asked     Exercise Yes     Comment: 2-3x/week     Bike Helmet Not Asked     Seat Belt Not Asked     Self-Exams Not Asked     Parent/sibling w/ CABG, MI or angioplasty before 65F 55M? Not Asked   Social History Narrative     None       Review of Systems:  Skin:  Negative       Eyes:  Negative      ENT:  Negative      Respiratory:  Negative       Cardiovascular:  Negative      Gastroenterology: Negative      Genitourinary:  Negative      Musculoskeletal:  Negative      Neurologic:  Negative      Psychiatric:  Negative      Heme/Lymph/Imm:  Negative      Endocrine:  Negative        Physical Exam:  Vitals: /76   Pulse 56   Ht 1.778 m (5' 10\")   Wt 77.1 kg (170 lb)   BMI 24.39 kg/m      Constitutional:  cooperative, alert and oriented, well developed, well nourished, in no acute distress        Skin:  warm and dry to the touch          Head:  normocephalic        Eyes:  pupils equal and round        Lymph:      ENT:  no pallor or cyanosis        Neck:           Respiratory:  normal breath sounds, clear to auscultation, normal A-P diameter, normal symmetry, normal respiratory excursion, no use of accessory muscles         Cardiac: regular rhythm, normal S1/S2, no S3 or S4, apical impulse not displaced, no murmurs, gallops or rubs       systolic ejection murmur;grade 1                                                 GI:  abdomen soft        Extremities and Muscular Skeletal:  no edema;no deformities, clubbing, cyanosis, erythema observed              Neurological:  no gross motor deficits        Psych:  Alert and Oriented x 3;affect appropriate, oriented to time, person and place;oriented to time, person and place        CC  Sharmin Scott MD  [address]  [address]  [place] [id number]                  Thank you for allowing me to participate in the care of your " patient.      Sincerely,     Sharmin Scott MD     [facility]    cc:   Sharmin Scott MD  [address]  [address]  [place] [phone]

## 2020-12-29 NOTE — PROGRESS NOTES
Service Date: 2020      PRIMARY CARE PHYSICIAN:  Sabas Staley MD      HISTORY OF PRESENT ILLNESS:  I saw Mr. Velazquez for follow-up of atrial fibrillation.  He is a 75-year-old white male with a history of symptomatic paroxysmal atrial fibrillation.  While on flecainide 150 mg p.o. b.i.d., he has not had recurrent atrial fibrillation.  So far, he has not had apparent side effects from flecainide.  Over the last year, he did not feel any symptoms suggesting recurrence of atrial fibrillation.  He denies dizziness, shortness of breath or fatigue.      He is known to have mild sinus bradycardia at rest.      The EKG today showed no remarkable changes from last year.      PHYSICAL EXAMINATION:   VITAL SIGNS:  Blood pressure was 138/76, heart rate 56 beats per minute, body weight 170 pounds.   HEENT:  Eyes and ENT were unremarkable.   CARDIOVASCULAR:  Examination showed no remarkable abnormalities.      EKG showed sinus rhythm with first-degree AV block.      ASSESSMENT AND RECOMMENDATIONS:  Mr. Velazquez is doing well symptomatically.  He is on flecainide 150 mg p.o. b.i.d. with no other bradycardia medications.  As he is asymptomatic, I will keep him on the same dose of flecainide.  If he has further deterioration of the bradycardia, we may have to reduce the dose of flecainide to 100 mg p.o. b.i.d.  He is scheduled to return for followup in 1 year.      cc:   Sabas Staley MD   [facility]   [address] [place] [address]         MEMO KURTZ MD             D: 2020   T: 2020   MT: al      Name:     RO VELAZQUEZ   MRN:      [id number]        Account:      [id number]   :      1945           Service Date: 2020      Document: [id number]

## 2020-12-29 NOTE — LETTER
2020      Sabas Staley MD  [address]  [facility] [address]      RE: Ro Velazquez       Dear Colleague,    I had the pleasure of seeing Ro Velazquez in the [facility].    Service Date: 2020      PRIMARY CARE PHYSICIAN:  Sabas Staley MD      HISTORY OF PRESENT ILLNESS:  I saw Mr. Velazquez for follow-up of atrial fibrillation.  He is a 75-year-old white male with a history of symptomatic paroxysmal atrial fibrillation.  While on flecainide 150 mg p.o. b.i.d., he has not had recurrent atrial fibrillation.  So far, he has not had apparent side effects from flecainide.  Over the last year, he did not feel any symptoms suggesting recurrence of atrial fibrillation.  He denies dizziness, shortness of breath or fatigue.      He is known to have mild sinus bradycardia at rest.      The EKG today showed no remarkable changes from last year.      PHYSICAL EXAMINATION:   VITAL SIGNS:  Blood pressure was 138/76, heart rate 56 beats per minute, body weight 170 pounds.   HEENT:  Eyes and ENT were unremarkable.   CARDIOVASCULAR:  Examination showed no remarkable abnormalities.      EKG showed sinus rhythm with first-degree AV block.      ASSESSMENT AND RECOMMENDATIONS:  Mr. Velazquez is doing well symptomatically.  He is on flecainide 150 mg p.o. b.i.d. with no other bradycardia medications.  As he is asymptomatic, I will keep him on the same dose of flecainide.  If he has further deterioration of the bradycardia, we may have to reduce the dose of flecainide to 100 mg p.o. b.i.d.  He is scheduled to return for followup in 1 year.      cc:   Sabas Staley MD   [facility]   [address] [place] [address]         MEMO KURTZ MD             D: 2020   T: 2020   MT: al      Name:     RO VELAZQUEZ   MRN:      [id number]        Account:      [id number]   :      1945           Service Date: 2020       Document: [id number]            Outpatient Encounter Medications as of 12/29/2020   Medication Sig Dispense Refill     acetaminophen (TYLENOL) 325 MG tablet Take 325-650 mg by mouth every 6 hours as needed for mild pain       finasteride (PROSCAR) 5 MG tablet Take 1 tablet (5 mg) by mouth daily 90 tablet 3     flecainide (TAMBOCOR) 150 MG tablet Take 1 tablet (150 mg) by mouth 2 times daily 180 tablet 3     losartan (COZAAR) 25 MG tablet TAKE ONE TABLET BY MOUTH ONE TIME DAILY  90 tablet 0     Nasal Dilators (BREATHE RIGHT LARGE) STRP nightly as needed       simvastatin (ZOCOR) 20 MG tablet TAKE ONE TABLET BY MOUTH AT BEDTIME 90 tablet 1     [DISCONTINUED] flecainide (TAMBOCOR) 150 MG tablet TAKE 1 TABLET BY MOUTH TWICE DAILY  180 tablet 0     [DISCONTINUED] tamsulosin (FLOMAX) 0.4 MG capsule Take 0.4 mg by mouth daily       No facility-administered encounter medications on file as of 12/29/2020.        Again, thank you for allowing me to participate in the care of your patient.      Sincerely,    Sharmin Scott MD     [facility]

## 2021-01-10 ENCOUNTER — HEALTH MAINTENANCE LETTER (OUTPATIENT)
Age: 76
End: 2021-01-10

## 2021-01-14 DIAGNOSIS — I48.0 PAROXYSMAL A-FIB (H): ICD-10-CM

## 2021-01-14 DIAGNOSIS — I10 ESSENTIAL HYPERTENSION: ICD-10-CM

## 2021-01-14 DIAGNOSIS — E78.00 HYPERCHOLESTEROLEMIA: ICD-10-CM

## 2021-01-14 RX ORDER — FLECAINIDE ACETATE 150 MG/1
150 TABLET ORAL 2 TIMES DAILY
Qty: 180 TABLET | Refills: 1 | Status: SHIPPED | OUTPATIENT
Start: 2021-01-14 | End: 2021-06-04

## 2021-01-14 RX ORDER — SIMVASTATIN 20 MG
TABLET ORAL
Qty: 90 TABLET | Refills: 1 | Status: SHIPPED | OUTPATIENT
Start: 2021-01-14 | End: 2021-06-04

## 2021-01-14 RX ORDER — LOSARTAN POTASSIUM 25 MG/1
25 TABLET ORAL DAILY
Qty: 90 TABLET | Refills: 1 | Status: SHIPPED | OUTPATIENT
Start: 2021-01-14 | End: 2021-06-04

## 2021-01-14 NOTE — TELEPHONE ENCOUNTER
**Patient changing to mail in pharmacy- [facility]    Flecainide tab- refilled at [facility] 12/29/20  Losartan  Simvastatin   Last OV and labs 5/18/2020  Last lipid 9/27/19- due      BP Readings from Last 3 Encounters:   12/29/20 138/76   05/18/20 126/68   05/14/20 139/59     Recent Labs   Lab Test 09/27/19  1151 06/21/18  1445   CHOL 158 158   HDL 53 57   LDL 94 92   TRIG 56 45     Last Comprehensive Metabolic Panel:  Sodium   Date Value Ref Range Status   05/18/2020 143 134 - 144 mmol/L Final     Potassium   Date Value Ref Range Status   05/18/2020 4.6 3.5 - 5.2 mmol/L Final     Chloride   Date Value Ref Range Status   05/18/2020 104 96 - 106 mmol/L Final     Carbon Dioxide   Date Value Ref Range Status   05/14/2020 30 20 - 32 mmol/L Final     Anion Gap   Date Value Ref Range Status   05/14/2020 4 3 - 14 mmol/L Final     Glucose   Date Value Ref Range Status   05/18/2020 88 65 - 99 mg/dL Final     Urea Nitrogen   Date Value Ref Range Status   05/18/2020 29 (H) 8 - 27 mg/dL Final     BUN/Creatinine Ratio   Date Value Ref Range Status   05/18/2020 21 10 - 24 Final     Creatinine   Date Value Ref Range Status   06/30/2020 1.52 (H) 0.76 - 1.27 mg/dL Final     GFR Estimate   Date Value Ref Range Status   05/14/2020 46 (L) >60 mL/min/[1.73_m2] Final     Comment:     Non  GFR Calc  Starting 12/18/2018, serum creatinine based estimated GFR (eGFR) will be   calculated using the Chronic Kidney Disease Epidemiology Collaboration   (CKD-EPI) equation.       Calcium   Date Value Ref Range Status   05/18/2020 9.7 8.6 - 10.2 mg/dL Final

## 2021-01-19 NOTE — TELEPHONE ENCOUNTER
In [place]--will call to schedule.  Believes is coming back to [place] in May--needs fasting labs    Dimas Maki,   [facility]  [phone]

## 2021-03-04 ENCOUNTER — IMMUNIZATION (OUTPATIENT)
Dept: NURSING | Facility: CLINIC | Age: 76
End: 2021-03-04
Payer: COMMERCIAL

## 2021-03-04 PROCEDURE — 91300 PR COVID VAC PFIZER DIL RECON 30 MCG/0.3 ML IM: CPT

## 2021-03-04 PROCEDURE — 0001A PR COVID VAC PFIZER DIL RECON 30 MCG/0.3 ML IM: CPT

## 2021-03-23 ENCOUNTER — OFFICE VISIT (OUTPATIENT)
Dept: FAMILY MEDICINE | Facility: CLINIC | Age: 76
End: 2021-03-23

## 2021-03-23 VITALS
DIASTOLIC BLOOD PRESSURE: 80 MMHG | HEIGHT: 70 IN | RESPIRATION RATE: 16 BRPM | OXYGEN SATURATION: 97 % | WEIGHT: 173 LBS | BODY MASS INDEX: 24.77 KG/M2 | TEMPERATURE: 97.6 F | HEART RATE: 47 BPM | SYSTOLIC BLOOD PRESSURE: 130 MMHG

## 2021-03-23 DIAGNOSIS — I48.0 PAROXYSMAL A-FIB (H): ICD-10-CM

## 2021-03-23 DIAGNOSIS — N18.31 STAGE 3A CHRONIC KIDNEY DISEASE (H): ICD-10-CM

## 2021-03-23 DIAGNOSIS — R97.20 ELEVATED PROSTATE SPECIFIC ANTIGEN (PSA): ICD-10-CM

## 2021-03-23 DIAGNOSIS — I10 ESSENTIAL HYPERTENSION: ICD-10-CM

## 2021-03-23 DIAGNOSIS — N52.9 VASCULOGENIC ERECTILE DYSFUNCTION, UNSPECIFIED VASCULOGENIC ERECTILE DYSFUNCTION TYPE: ICD-10-CM

## 2021-03-23 DIAGNOSIS — E78.00 HYPERCHOLESTEROLEMIA: ICD-10-CM

## 2021-03-23 DIAGNOSIS — Z00.00 ENCOUNTER FOR MEDICARE ANNUAL WELLNESS EXAM: Primary | ICD-10-CM

## 2021-03-23 DIAGNOSIS — K64.4 EXTERNAL HEMORRHOIDS: ICD-10-CM

## 2021-03-23 LAB
% GRANULOCYTES: 66.3 % (ref 42.2–75.2)
HCT VFR BLD AUTO: 45.5 % (ref 39–51)
HEMOGLOBIN: 15.4 G/DL (ref 13.4–17.5)
LYMPHOCYTES NFR BLD AUTO: 24.9 % (ref 20.5–51.1)
MCH RBC QN AUTO: 30.3 PG (ref 27–31)
MCHC RBC AUTO-ENTMCNC: 33.8 G/DL (ref 33–37)
MCV RBC AUTO: 89.7 FL (ref 80–100)
MONOCYTES NFR BLD AUTO: 8.8 % (ref 1.7–9.3)
PLATELET # BLD AUTO: 252 K/UL (ref 140–450)
RBC # BLD AUTO: 5.07 X10/CMM (ref 4.2–5.9)
WBC # BLD AUTO: 5.8 X10/CMM (ref 3.8–11)

## 2021-03-23 PROCEDURE — 99214 OFFICE O/P EST MOD 30 MIN: CPT | Mod: 25 | Performed by: FAMILY MEDICINE

## 2021-03-23 PROCEDURE — 85025 COMPLETE CBC W/AUTO DIFF WBC: CPT | Performed by: FAMILY MEDICINE

## 2021-03-23 PROCEDURE — G0439 PPPS, SUBSEQ VISIT: HCPCS | Performed by: FAMILY MEDICINE

## 2021-03-23 PROCEDURE — 36415 COLL VENOUS BLD VENIPUNCTURE: CPT | Performed by: FAMILY MEDICINE

## 2021-03-23 RX ORDER — SILDENAFIL 100 MG/1
100 TABLET, FILM COATED ORAL DAILY PRN
Qty: 30 TABLET | Refills: 3 | Status: ON HOLD | OUTPATIENT
Start: 2021-03-23 | End: 2022-05-24

## 2021-03-23 ASSESSMENT — MIFFLIN-ST. JEOR: SCORE: 1535.97

## 2021-03-23 NOTE — PATIENT INSTRUCTIONS
"Thank you for coming in today!   If you receive a survey via My Chart, mail or phone, please let us know if there was anything you especially appreciated today or if there is any way we can improve our clinic. We value your input.     Likewise, we are working hard to attend to our digital reputation.    Please consider reviewing our clinic on Google and/or Facebook via the following links:    [email or web address]    We truly appreciate you taking the time to do this!    General Information:    Today you had your appointment with Sabas Staley MD    I am in the clinic:  Monday and Friday mornings  Tuesday and Thursday afternoons    I am not in the office Wednesdays, non urgent calls received on Wednesday will be addressed when I am back in the office on Thursday.    If lab work was done today as part of your evaluation you will generally be contacted via My Chart, mail, or phone with the results within 1-5 days. If there is an alarming result we will contact you by phone. Lab results come back at varying times, I generally wait until all labs are resulted before making comments on results. Please note labs are automatically released to My Chart once available.    If you need refills please contact your pharmacist. They will send a refill request to me to review. Please allow 3 business days for us to process all refill requests.    Please call or send a medical message with any questions or concerns.    Thank you for choosing [facility].  We truly appreciate you trusting us with your medical care.    Your next visit with us should be scheduled for Follow up in 1 year.     Listed next are the medical health Maintenance items we are tracking for your care and when they are next due (or overdue!)  Our goal is 100% \"up to date.\" Keep this list handy to call and schedule what you are due for in the future!    Health " Maintenance Due   Topic Date Due     FALL RISK ASSESSMENT  09/27/2020     COVID-19 Vaccine (2 - Pfizer 2-dose series) 03/25/2021       Sincerely,    Sabas Staley MD    Patient Education   Personalized Prevention Plan  You are due for the preventive services outlined below.  Your care team is available to assist you in scheduling these services.  If you have already completed any of these items, please share that information with your care team to update in your medical record.  Health Maintenance Due   Topic Date Due     FALL RISK ASSESSMENT  09/27/2020     PHQ-2  01/01/2021     COVID-19 Vaccine (2 - Pfizer 2-dose series) 03/25/2021

## 2021-03-23 NOTE — PROGRESS NOTES
"  SUBJECTIVE:   Rey Brown is a 75 year old male who presents for Preventive Visit.  icult  Pt. reports diffy achieving and maintaining erections.  Denies other specific  complaints.  Has  tried VIAGRA or similar medication.  Is interested in trying medication to assist with erections.    The patient has noticed some bright red blood on toilet paper and on the stool as well over the past couple of days.    Pt states that his BMs have been \"normal\" and regular formed stool on average once per day.   No abd pain, no cramping no melena, no hematochezia, no upper GI sx (other than rare reflux), no ETOH, no regular use of NSAIDs.    No family or personal history of intestinal disease.      Essential Hypertension   Remains well controlled when checked out of clinic.   he has not experienced any significant side effects from medications for hypertension.    NO active cardiac complaints or symptoms with exercise.  Current medications for treatment:  ARB (Irbesartan, Losartan, Valsartan,    Reviewed last 6 BP readings in chart:  BP Readings from Last 6 Encounters:   03/23/21 130/80   12/29/20 138/76   05/18/20 126/68   05/14/20 139/59   05/08/20 122/72   03/16/20 (!) 140/74       Hyperlipidemia:  Has history of hyperlipidemia.    The patient is  taking a medication for this.  Denies any significant side effects from his medication.      Latest labs reviewed:    Recent Labs   Lab Test 09/27/19  1151 06/21/18  1445   CHOL 158 158   HDL 53 57   LDL 94 92   TRIG 56 45        Lab Results   Component Value Date    AST 82 03/10/2020      Has history of atrial fibrillation not often and controlled by flecanide.    No palpitations, no dizziness, no dyspnea on exertion, no shortness of breath.  No racing heart, no fast heart rates.    Taking medications as ordered, no side effects reported.   Patient is not taking anticoagulation      Patient has been advised of split billing requirements and indicates understanding:   Are " "you in the first 12 months of your Medicare Part B coverage?  No    Physical Health:    In general, how would you rate your overall physical health? excellent    Outside of work, how many days during the week do you exercise? 4-5 days/week    Outside of work, approximately how many minutes a day do you exercise?30-45 minutes    If you drink alcohol do you typically have >3 drinks per day or >7 drinks per week? No    Do you usually eat at least 4 servings of fruit and vegetables a day, include whole grains & fiber and avoid regularly eating high fat or \"junk\" foods? Yes    Do you have any problems taking medications regularly?  No    Do you have any side effects from medications? not applicable    Needs assistance for the following daily activities: no assistance needed    Which of the following safety concerns are present in your home?  throw rugs in the hallway and lack of grab bars in the bathroom     Hearing impairment: No    In the past 6 months, have you been bothered by leaking of urine? no    Mental Health:    In general, how would you rate your overall mental or emotional health? excellent  PHQ-2 Score:      Do you feel safe in your environment? Yes    Have you ever done Advance Care Planning? (For example, a Health Directive, POLST, or a discussion with a medical provider or your loved ones about your wishes): Yes, patient states has an Advance Care Planning document and will bring a copy to the clinic.    Additional concerns to address?  No    Fall risk:  Fallen 2 or more times in the past year?: No  Any fall with injury in the past year?: No    Cognitive Screenin) Repeat 3 items (Leader, Season, Table)    2) Clock draw: NORMAL  3) 3 item recall: Recalls 3 objects  Results: 3 items recalled: COGNITIVE IMPAIRMENT LESS LIKELY    Mini-CogTM Copyright S Gerry. Licensed by the author for use in [facility]; reprinted with permission ([email or web address]@.[facility]). All rights reserved.      Do you have sleep " apnea, excessive snoring or daytime drowsiness?: no    Reviewed and updated as needed this visit by clinical staff  Tobacco  Allergies  Meds              Reviewed and updated as needed this visit by Provider                Social History     Tobacco Use     Smoking status: Never Smoker     Smokeless tobacco: Never Used   Substance Use Topics     Alcohol use: Yes     Alcohol/week: 2.0 - 3.0 standard drinks     Types: 2 - 3 Cans of beer per week     Comment: social only                           Current providers sharing in care for this patient include:   Patient Care Team:  Sabas Staley MD as PCP - General (Family Practice)  Sabas Staley MD as Assigned PCP  Sharmin Scott MD as Assigned Heart and Vascular Provider    The following health maintenance items are reviewed in Epic and correct as of today:  Health Maintenance   Topic Date Due     FALL RISK ASSESSMENT  09/27/2020     COVID-19 Vaccine (2 - Pfizer 2-dose series) 03/25/2021     MEDICARE ANNUAL WELLNESS VISIT  03/23/2022     DTAP/TDAP/TD IMMUNIZATION (3 - Td) 04/30/2022     LIPID  09/27/2024     ADVANCE CARE PLANNING  03/23/2026     COLORECTAL CANCER SCREENING  08/24/2026     HEPATITIS C SCREENING  Completed     PHQ-2  Completed     INFLUENZA VACCINE  Completed     Pneumococcal Vaccine: 65+ Years  Completed     ZOSTER IMMUNIZATION  Completed     AORTIC ANEURYSM SCREENING (SYSTEM ASSIGNED)  Completed     Pneumococcal Vaccine: Pediatrics (0 to 5 Years) and At-Risk Patients (6 to 64 Years)  Aged Out     IPV IMMUNIZATION  Aged Out     MENINGITIS IMMUNIZATION  Aged Out     HEPATITIS B IMMUNIZATION  Aged Out     Patient Active Problem List   Diagnosis     HTN (hypertension)     Hypercholesterolemia     Lumbar disc disease     ACP (advance care planning)     Health Care Home     Elevated prostate specific antigen (PSA)     Family history of migraine     Plantar wart     First degree AV block     Chronic kidney disease, stage III (moderate)     Gross  "hematuria     Atrial fibrillation (H)     Past Surgical History:   Procedure Laterality Date     COLONOSCOPY       CYSTOSCOPY, RETROGRADES, COMBINED Left 5/13/2020    Procedure: LEFT RETROGRADE URETERAL PYELOGRAM;  Surgeon: Denilson Angulo MD;  Location:  OR     CYSTOSCOPY, TRANSURETHRAL RESECTION (TUR) PROSTATE, COMBINED N/A 5/13/2020    Procedure: CYSTOSCOPY WITH TRANSURETHRAL RESECTION PROSTATE;  Surgeon: Denilson Angulo MD;  Location:  OR     OPEN REDUCTION INTERNAL FIXATION HAND       RHINOPLASTY       TONSILLECTOMY       wisdom teeth         Social History     Tobacco Use     Smoking status: Never Smoker     Smokeless tobacco: Never Used   Substance Use Topics     Alcohol use: Yes     Alcohol/week: 2.0 - 3.0 standard drinks     Types: 2 - 3 Cans of beer per week     Comment: social only     Family History   Problem Relation Age of Onset     Dementia Father      Osteoarthritis Father      Cerebrovascular Disease Father      Myocardial Infarction Mother      C.A.D. Mother      Coronary Artery Disease Mother          Pneumonia Vaccine:    ROS:  Constitutional, HEENT, cardiovascular, pulmonary, GI, , musculoskeletal, neuro, skin, endocrine and psych systems are negative, except as otherwise noted.    OBJECTIVE:   /80   Pulse (!) 47   Temp 97.6  F (36.4  C)   Resp 16   Ht 1.778 m (5' 10\")   Wt 78.5 kg (173 lb)   SpO2 97%   BMI 24.82 kg/m   Estimated body mass index is 24.82 kg/m  as calculated from the following:    Height as of this encounter: 1.778 m (5' 10\").    Weight as of this encounter: 78.5 kg (173 lb).  EXAM:   GENERAL: healthy, alert and no distress  EYES: Eyes grossly normal to inspection, PERRL and conjunctivae and sclerae normal  HENT: ear canals and TM's normal, nose and mouth without ulcers or lesions  NECK: no adenopathy, no asymmetry, masses, or scars and thyroid normal to palpation  RESP: lungs clear to auscultation - no rales, rhonchi or wheezes  CV: regular rate and rhythm, " normal S1 S2, no S3 or S4, no murmur, click or rub, no peripheral edema and peripheral pulses strong  ABDOMEN: soft, nontender, no hepatosplenomegaly, no masses and bowel sounds normal  MS: no gross musculoskeletal defects noted, no edema  SKIN: no suspicious lesions or rashes  NEURO: Normal strength and tone, mentation intact and speech normal  PSYCH: mentation appears normal, affect normal/bright    Diagnostic Test Results:  Labs reviewed in Epic    ASSESSMENT / PLAN:   Rey was seen today for physical, musculoskeletal problem and hemorrhoids.    Diagnoses and all orders for this visit:    Encounter for Medicare annual wellness exam    Paroxysmal A-fib (H)  Continue the tambicor    Stage 3a chronic kidney disease  Chronic kidney disease due to HTN and DM.  Check urine for protein.   Patient is on ACE/ARB.  Patient is on a statin.  Told the patient to avoid NSAIDs if at all possible.   Will monitor closely and send to Nephrologist if renal function continues to decline.      -     Comp. Metabolic Panel (14) ([facility])    Elevated prostate specific antigen (PSA)  Had a TURP a year ago  -     PSA Serum ([facility])    Hypercholesterolemia  Hyperlipidemia  Discussed current lipid results, previous results (if available) current guidelines (NCEP) for treatment and goals for lipids.    Discussed ongoing lifestyle modification, dietary changes (low fat, low simple carb) and regular aerobic exercise.    Discussed the link between dysmetabolic syndrome and impaired glucose tolerance seen in certain patterns of lipids.     Reviewed medication use for lipid lowering, including the statins are their possible side effects of myalgias, rhabdomyolysis, and liver toxicity.  We today managed his prescriptions with refills ensured to ensure availabilty of current medications.  Discussed the importance for aggressive management of dyslipidemia to prevent vascular complications later.     Instructed to contact me if he develop any  intolerance to the treatment.    -     Lipid Panel ([facility])    Vasculogenic erectile dysfunction, unspecified vasculogenic erectile dysfunction type  Discussed erectile dysfunction in detail including a review of the physiology that produces erections.  Reviewed typical causes of impotence such as medication side effect, diabetes, neuropathies, drugs/alcohol, underlying mood disorder, relationship issues, neurogenic causes, hypogonadism.  Discussed typical medical evaluation including physical exam findings, labs (thyroid, testosterone, BMP, etc.)  Treatment will include modifying any causative features if able, stopping nay substance use, treating low testosterone levels, and consideration of medications (Viagra/Levitra/Cialis).     Discussed the risks and benefits of these meds.  Discussed the potential side effects of Viagra/Levitra/Cialis including dizziness, headache, vision changes, syncope, GI upset/dyspepsia, hypotension.  Recommended avoiding taking with large meal to improve absorption.  Avoid taking with alcohol.  Never take nitroglycerin containing compounds when on these medications.    WIll start with a lower dose and titrate upward as needed, aiming for the lowest effective dose.  If effective, they will call for a prescription provided the medication if effective and there are no side effects.     -     sildenafil (VIAGRA) 100 MG tablet; Take 1 tablet (100 mg) by mouth daily as needed    Essential hypertension  Reviewed his current HTN management. Discussed our goal for him is a systolic pressure at or below 128 and diastolic pressure at or below 83.  We today managed his prescriptions with refills ensured to ensure availabilty of current medications.  Discussed the importance for aggressive management of HTN to prevent vascular complications later.  Recommended lower fat, lower carbohydrate, and lower sodium (<2000 mg)diet. Required intervals for follow up on HTN, lab studies reviewed.    Strongly  "recommened he follow his blood pressures outside the clinic to ensure that BPs are remaining within guidelines,.  Instructed to contact me if the readings are not within guidelines on a regular basis so we can adjust treatment as needed.    -     CBC with Diff/Plt ([facility])    External hemorrhoids  Discussed hemorrhoids in detail, including pathophysiology, their cause by difficult elimination. conservative treatments starting with increased water and dietary fiber (friuts/vegetables/bran/psyllium), conservative treatments such as Preparation H, Anusol, and even topical benzocaine oint.  Discussed point of intervention and referral to colorectal surgeons as indicated by symptoms.    -     COLORECTAL SURGERY REFERRAL        Patient has been advised of split billing requirements and indicates understanding: Yes    COUNSELING:  Reviewed preventive health counseling, as reflected in patient instructions       Regular exercise       Healthy diet/nutrition    Estimated body mass index is 24.82 kg/m  as calculated from the following:    Height as of this encounter: 1.778 m (5' 10\").    Weight as of this encounter: 78.5 kg (173 lb).        He reports that he has never smoked. He has never used smokeless tobacco.    Appropriate preventive services were discussed with this patient, including applicable screening as appropriate for cardiovascular disease, diabetes, osteopenia/osteoporosis, and glaucoma.  As appropriate for age/gender, discussed screening for colorectal cancer, prostate cancer, breast cancer, and cervical cancer. Checklist reviewing preventive services available has been given to the patient.    Reviewed patients plan of care and provided an AVS. The Basic Care Plan (routine screening as documented in Health Maintenance) for Rey meets the Care Plan requirement. This Care Plan has been established and reviewed with the Patient.    Counseling Resources:  ATP IV Guidelines  Pooled Cohorts Equation " Calculator  Breast Cancer Risk Calculator  BRCA-Related Cancer Risk Assessment: FHS-7 Tool  FRAX Risk Assessment  ICSI Preventive Guidelines  Dietary Guidelines for Americans, 2010  USDA's MyPlate  ASA Prophylaxis  Lung CA Screening    Sabas Staley MD  [facility]

## 2021-03-24 LAB
ALBUMIN SERPL-MCNC: 4.3 G/DL (ref 3.7–4.7)
ALBUMIN/GLOB SERPL: 1.9 {RATIO} (ref 1.2–2.2)
ALP SERPL-CCNC: 71 IU/L (ref 39–117)
ALT SERPL-CCNC: 10 IU/L (ref 0–44)
AST SERPL-CCNC: 18 IU/L (ref 0–40)
BILIRUB SERPL-MCNC: 0.7 MG/DL (ref 0–1.2)
BUN SERPL-MCNC: 24 MG/DL (ref 8–27)
BUN/CREATININE RATIO: 18 (ref 10–24)
CALCIUM SERPL-MCNC: 9.3 MG/DL (ref 8.6–10.2)
CHLORIDE SERPLBLD-SCNC: 103 MMOL/L (ref 96–106)
CHOLEST SERPL-MCNC: 158 MG/DL (ref 100–199)
CREAT SERPL-MCNC: 1.31 MG/DL (ref 0.76–1.27)
EGFR IF AFRICN AM: 62 ML/MIN/1.73
EGFR IF NONAFRICN AM: 54 ML/MIN/1.73
GLOBULIN, TOTAL: 2.3 G/DL (ref 1.5–4.5)
GLUCOSE SERPL-MCNC: 79 MG/DL (ref 65–99)
HDLC SERPL-MCNC: 54 MG/DL
LDL/HDL RATIO: 1.7 RATIO (ref 0–3.6)
LDLC SERPL CALC-MCNC: 92 MG/DL (ref 0–99)
POTASSIUM SERPL-SCNC: 4.5 MMOL/L (ref 3.5–5.2)
PROT SERPL-MCNC: 6.6 G/DL (ref 6–8.5)
PSA NG/ML: 3.9 NG/ML (ref 0–4)
SODIUM SERPL-SCNC: 143 MMOL/L (ref 134–144)
TOTAL CO2: 28 MMOL/L (ref 20–29)
TRIGL SERPL-MCNC: 58 MG/DL (ref 0–149)
VLDLC SERPL CALC-MCNC: 12 MG/DL (ref 5–40)

## 2021-03-25 ENCOUNTER — IMMUNIZATION (OUTPATIENT)
Dept: NURSING | Facility: CLINIC | Age: 76
End: 2021-03-25
Attending: INTERNAL MEDICINE
Payer: COMMERCIAL

## 2021-03-25 PROCEDURE — 0002A PR COVID VAC PFIZER DIL RECON 30 MCG/0.3 ML IM: CPT

## 2021-03-25 PROCEDURE — 91300 PR COVID VAC PFIZER DIL RECON 30 MCG/0.3 ML IM: CPT

## 2021-04-21 ENCOUNTER — TRANSFERRED RECORDS (OUTPATIENT)
Dept: FAMILY MEDICINE | Facility: CLINIC | Age: 76
End: 2021-04-21

## 2021-05-28 ENCOUNTER — TRANSFERRED RECORDS (OUTPATIENT)
Dept: FAMILY MEDICINE | Facility: CLINIC | Age: 76
End: 2021-05-28

## 2021-06-03 DIAGNOSIS — E78.00 HYPERCHOLESTEROLEMIA: ICD-10-CM

## 2021-06-03 DIAGNOSIS — I48.0 PAROXYSMAL A-FIB (H): ICD-10-CM

## 2021-06-03 DIAGNOSIS — I10 ESSENTIAL HYPERTENSION: ICD-10-CM

## 2021-06-04 RX ORDER — FLECAINIDE ACETATE 150 MG/1
TABLET ORAL
Qty: 180 TABLET | Refills: 3 | Status: SHIPPED | OUTPATIENT
Start: 2021-06-04 | End: 2021-12-06

## 2021-06-04 RX ORDER — SIMVASTATIN 20 MG
TABLET ORAL
Qty: 90 TABLET | Refills: 3 | Status: SHIPPED | OUTPATIENT
Start: 2021-06-04 | End: 2022-01-04

## 2021-06-04 RX ORDER — LOSARTAN POTASSIUM 25 MG/1
TABLET ORAL
Qty: 90 TABLET | Refills: 3 | Status: SHIPPED | OUTPATIENT
Start: 2021-06-04 | End: 2022-05-06

## 2021-06-23 ENCOUNTER — TRANSFERRED RECORDS (OUTPATIENT)
Dept: FAMILY MEDICINE | Facility: CLINIC | Age: 76
End: 2021-06-23

## 2021-09-09 ENCOUNTER — TELEPHONE (OUTPATIENT)
Dept: CARDIOLOGY | Facility: CLINIC | Age: 76
End: 2021-09-09

## 2021-09-09 DIAGNOSIS — I48.0 PAROXYSMAL ATRIAL FIBRILLATION (H): Primary | ICD-10-CM

## 2021-09-09 NOTE — TELEPHONE ENCOUNTER
He is in atach or atypical flutter.    Please start Eliquis 5 mg bid ([id number] is 2 and soon to be 3).    Apt with Dr. Scott or NOLAN, first available.    Will not add CCB or BB d/t risk of severe dc should he convert spontaneously.    DI

## 2021-09-09 NOTE — TELEPHONE ENCOUNTER
9/9/21 Pt was unable to come for EKG because when he reached the Welcome desk he reported he had a COVID test yesterday d/t runny nose and body aches. He is awaiting results. He did bring EMT EKG tracings which were done earlier today which were reviewed by Dr Caldwell in Dr Jiang absence.     Msg recd from Dr Caldwell in Dr Jiang absence    He is in atach or atypical flutter.     Please start Eliquis 5 mg bid ([id number] is 2 and soon to be 3).     Apt with Dr. Scott or NOLAN, first available.     Will not add CCB or BB d/t risk of severe dc should he convert spontaneously.     Spoke w pt     Spoke w pt and explained recommendations. He requested rx be sent to [facility] in [place]. Pt scheduled for OV 9/13 at 10:30 with Karma Sunshine NP. He will call if COVID test positive as in person visit may need to be changed to virtual.  Den 3 pm

## 2021-09-09 NOTE — TELEPHONE ENCOUNTER
"9/9/21 Pt called to report he feels like he has been in Afib since Saturday 9/4. [place] wiping his car down and all of a sudden he felt a change in his heart rhythm. He has been feeling \" ok \" since then but today was playing senior softball and \" ran too hard\".  He got dizzy and weak and had to sit down. EMS was called and EKG was done showing he was still in Afib  He was instructed to follow up w Dr Scott.  Pt verifies he is taking Flecainide 150 mg BID but currently not on anticoagulation.  Will have pt come in for ambulatory EKG to determine plan.  Den 105 pm    "

## 2021-09-10 NOTE — TELEPHONE ENCOUNTER
9/10/21 Pt called to report he has tested positive for COVID.   In clinic visit omari Parada on 9/13 changed to [facility]ximity.  Pt states he feels well except for a minor cough.  Den 1015 am

## 2021-09-13 ENCOUNTER — VIRTUAL VISIT (OUTPATIENT)
Dept: CARDIOLOGY | Facility: CLINIC | Age: 76
End: 2021-09-13
Payer: COMMERCIAL

## 2021-09-13 DIAGNOSIS — Z51.81 ENCOUNTER FOR MONITORING FLECAINIDE THERAPY: ICD-10-CM

## 2021-09-13 DIAGNOSIS — I48.0 PAROXYSMAL ATRIAL FIBRILLATION (H): Primary | ICD-10-CM

## 2021-09-13 DIAGNOSIS — Z79.899 ENCOUNTER FOR MONITORING FLECAINIDE THERAPY: ICD-10-CM

## 2021-09-13 DIAGNOSIS — U07.1 CLINICAL DIAGNOSIS OF COVID-19: ICD-10-CM

## 2021-09-13 PROCEDURE — 99214 OFFICE O/P EST MOD 30 MIN: CPT | Performed by: NURSE PRACTITIONER

## 2021-09-13 NOTE — LETTER
9/13/2021    Sabas Staley MD  [address]  [facility] [address]    RE: Rey Brown       Dear Colleague,    I had the pleasure of seeing Rey Brown in the [facility].    Anthony is a 76 year old who is being evaluated via a billable video visit.       How would you like to obtain your AVS? MyChart  If the video visit is dropped, the invitation should be resent by: Text to cell phone: [phone]  Will anyone else be joining your video visit? No       Vitals - Patient Reported  Systolic (Patient Reported): 135  Diastolic (Patient Reported): 72  Weight (Patient Reported): 71.8 kg (158 lb 6.4 oz)  Pulse (Patient Reported): 54     Review Of Systems  Skin: negative  Eyes: negative  Ears/Nose/Throat: nasal congestion- COVID +  Respiratory: Cough-   Cardiovascular: negative- was in afib for 8 days but came out this morning     Gastrointestinal: negative  Genitourinary: negative  Musculoskeletal: negative  Neurologic: negative and headaches  Psychiatric: negative  Hematologic/Lymphatic/Immunologic: negative  Endocrine: negative     Alia Quinn MA      Video Start Time: 10:35 AM  Video-Visit Details     Type of service:  Video Visit was converted to telephone visit      Video End Time:10:45 AM    Originating Location (pt. Location): Home     Distant Location (provider location):  [facility] [place]      Platform used for Video Visit: [employer]    Rey Brown is a 76 year old male who is following up for atrial fibrillation.   This visit is being conducted as a virtual visit due to the emphasis on mitigation of the COVID-19 virus pandemic. The clinician has decided that the risk of an in-office visit outweighs the benefit for this patient.   He is a patient of .  His medical history includes     1. Paroxysmal atrial fibrillation.  2. Hypertension  3. Hemorrhoids  4. CKD    In December 2020, patient  met with Dr. Scott denied any recurrence of atrial fibrillation while taking flecainide 150 mg twice daily.  He had a history of bradycardia therefore was on no AV node blockers.    In September 2021, patient called thinking he was in atrial fibrillation despite flecainide 150 mg twice daily.  He was playing softball, ran too hard got dizzy weak and had to sit down.  EMS was called his EKG showed atrial fibrillation.  An ECG in the office on 9/9/2021 revealed atrial tachycardia/8 typical atrial flutter.  He was started on Eliquis 5 mg twice daily.   The next day he called the clinic he tested positive for Covid.    Today he reports converting to normal rhythm this morning.    He has URI symptoms and is treating this with fluids, ibuprofen and Tylenol.  Denies chest pain or pressure, dizziness, syncope, angina, dyspnea at rest or with exertion, palpitations, orthopnea, PND, or edema.  Reports taking medications as prescribed including Eliquis and denies bleeding, hematuria, melena, epistaxis and signs/symptoms of stroke.      Physical Exam was not completed as a telephone visit was done.     The remainder of the physical exam was deferred due to public health crisis.   Acknowledge and reviewed ROS completed by Alia Quinn MA     ASSESSMENT AND PLAN    Paroxysmal symptomatic atrial fibrillation    For rhythm control he is taking flecainide 150 mg twice daily.  He is not on AV micky blockers due to history of bradycardia.  He has had a recent recurrence of atrial fibrillation corresponding with being Covid positive.   Today he states he spontaneously converted.   Will follow up in 12/2021 with annual visit.     For anticoagulation for CHADS VASC 1 (age, HTN) he is currently taking Eliquis 5 mg twice daily.  Last hgb was 15.4 (3/2021).      Hypertension    Controlled at home while taking losartan     COVID 19 positive    Pt did have Pfizer vaccine in March 2021    Having URI symptoms with no fevers and no shortness of  breath.     Encouraged fluids, Tylenol and ibuprofen.  Call primary with concerns and further questions.     Plan:  Continue with current mediations  Follow up with December with Dr. Scott       Thank you for allowing me to participate in the care of your patient.      Sincerely,     JOSE CRUZ Chamorro [facility]  cc:   No referring provider defined for this encounter.

## 2021-09-13 NOTE — PROGRESS NOTES
Anthony is a 76 year old who is being evaluated via a billable video visit.       How would you like to obtain your AVS? MyChart  If the video visit is dropped, the invitation should be resent by: Text to cell phone: [phone]  Will anyone else be joining your video visit? No       Vitals - Patient Reported  Systolic (Patient Reported): 135  Diastolic (Patient Reported): 72  Weight (Patient Reported): 71.8 kg (158 lb 6.4 oz)  Pulse (Patient Reported): 54     Review Of Systems  Skin: negative  Eyes: negative  Ears/Nose/Throat: nasal congestion- COVID +  Respiratory: Cough-   Cardiovascular: negative- was in afib for 8 days but came out this morning     Gastrointestinal: negative  Genitourinary: negative  Musculoskeletal: negative  Neurologic: negative and headaches  Psychiatric: negative  Hematologic/Lymphatic/Immunologic: negative  Endocrine: negative     Alia Quinn MA      Video Start Time: 10:35 AM  Video-Visit Details     Type of service:  Video Visit was converted to telephone visit      Video End Time:10:45 AM    Originating Location (pt. Location): Home     Distant Location (provider location):  [facility]      Platform used for Video Visit: [employer]    Rey Brown is a 76 year old male who is following up for atrial fibrillation.   This visit is being conducted as a virtual visit due to the emphasis on mitigation of the COVID-19 virus pandemic. The clinician has decided that the risk of an in-office visit outweighs the benefit for this patient.   He is a patient of .  His medical history includes     1. Paroxysmal atrial fibrillation.  2. Hypertension  3. Hemorrhoids  4. CKD    In December 2020, patient met with Dr. Scott denied any recurrence of atrial fibrillation while taking flecainide 150 mg twice daily.  He had a history of bradycardia therefore was on no AV node blockers.    In September 2021, patient called thinking he was in atrial fibrillation despite  flecainide 150 mg twice daily.  He was playing softball, ran too hard got dizzy weak and had to sit down.  EMS was called his EKG showed atrial fibrillation.  An ECG in the office on 9/9/2021 revealed atrial tachycardia/8 typical atrial flutter.  He was started on Eliquis 5 mg twice daily.   The next day he called the clinic he tested positive for Covid.    Today he reports converting to normal rhythm this morning.    He has URI symptoms and is treating this with fluids, ibuprofen and Tylenol.  Denies chest pain or pressure, dizziness, syncope, angina, dyspnea at rest or with exertion, palpitations, orthopnea, PND, or edema.  Reports taking medications as prescribed including Eliquis and denies bleeding, hematuria, melena, epistaxis and signs/symptoms of stroke.      Physical Exam was not completed as a telephone visit was done.     The remainder of the physical exam was deferred due to public health crisis.   Acknowledge and reviewed ROS completed by Alia Quinn MA     ASSESSMENT AND PLAN    Paroxysmal symptomatic atrial fibrillation    For rhythm control he is taking flecainide 150 mg twice daily.  He is not on AV micky blockers due to history of bradycardia.  He has had a recent recurrence of atrial fibrillation corresponding with being Covid positive.   Today he states he spontaneously converted.   Will follow up in 12/2021 with annual visit.     For anticoagulation for CHADS VASC 1 (age, HTN) he is currently taking Eliquis 5 mg twice daily.  Last hgb was 15.4 (3/2021).      Hypertension    Controlled at home while taking losartan     COVID 19 positive    Pt did have Pfizer vaccine in March 2021    Having URI symptoms with no fevers and no shortness of breath.     Encouraged fluids, Tylenol and ibuprofen.  Call primary with concerns and further questions.     Plan:  Continue with current mediations  Follow up with December with Dr. Scott

## 2021-10-04 ENCOUNTER — TRANSFERRED RECORDS (OUTPATIENT)
Dept: FAMILY MEDICINE | Facility: CLINIC | Age: 76
End: 2021-10-04

## 2021-10-19 ENCOUNTER — OFFICE VISIT (OUTPATIENT)
Dept: FAMILY MEDICINE | Facility: CLINIC | Age: 76
End: 2021-10-19

## 2021-10-19 VITALS
HEIGHT: 70 IN | HEART RATE: 50 BPM | SYSTOLIC BLOOD PRESSURE: 140 MMHG | RESPIRATION RATE: 18 BRPM | OXYGEN SATURATION: 98 % | WEIGHT: 169.2 LBS | BODY MASS INDEX: 24.22 KG/M2 | DIASTOLIC BLOOD PRESSURE: 58 MMHG

## 2021-10-19 DIAGNOSIS — I10 PRIMARY HYPERTENSION: Primary | ICD-10-CM

## 2021-10-19 DIAGNOSIS — Z23 NEED FOR VACCINATION: ICD-10-CM

## 2021-10-19 DIAGNOSIS — S69.92XA INJURY OF FINGER OF LEFT HAND, INITIAL ENCOUNTER: ICD-10-CM

## 2021-10-19 PROCEDURE — G0008 ADMIN INFLUENZA VIRUS VAC: HCPCS | Mod: 59 | Performed by: FAMILY MEDICINE

## 2021-10-19 PROCEDURE — 73140 X-RAY EXAM OF FINGER(S): CPT | Mod: FY | Performed by: FAMILY MEDICINE

## 2021-10-19 PROCEDURE — 93050 ART PRESSURE WAVEFORM ANALYS: CPT | Performed by: FAMILY MEDICINE

## 2021-10-19 PROCEDURE — A4570 SPLINT: HCPCS | Performed by: FAMILY MEDICINE

## 2021-10-19 PROCEDURE — 99213 OFFICE O/P EST LOW 20 MIN: CPT | Mod: 25 | Performed by: FAMILY MEDICINE

## 2021-10-19 PROCEDURE — 90662 IIV NO PRSV INCREASED AG IM: CPT | Performed by: FAMILY MEDICINE

## 2021-10-19 ASSESSMENT — MIFFLIN-ST. JEOR: SCORE: 1513.74

## 2021-10-19 NOTE — PROGRESS NOTES
Problem(s) Oriented visit        SUBJECTIVE:                                                    Rey Brown is a 76 year old male who presents to clinic today for the following health issues :      1. Primary hypertension  Essential Hypertension   Remains well controlled when checked out of clinic.   he has not experienced any significant side effects from medications for hypertension.    NO active cardiac complaints or symptoms with exercise.  Current medications for treatment:  See list    Reviewed last 6 BP readings in chart:  BP Readings from Last 6 Encounters:   10/19/21 (!) 140/58   03/23/21 130/80   12/29/20 138/76   05/18/20 126/68   05/14/20 139/59   05/08/20 122/72         - [place] ART PRESS WAVEFORM ANALYS CENTRAL ART PRESSURE    2. Need for vaccination  due  - [place] FLU VACCINE, INCREASED ANTIGEN, PRESV FREE  - ADMIN INFLUENZA VIRUS VAC    3. Injury of finger of left hand, initial encounter  Injured playing volleyball  - X-ray lt finger 2-3 view  - SPLINT       Problem list, Medication list, Allergies, and Medical/Social/Surgical histories reviewed in EPIC and updated as appropriate.   Additional history: as documented    ROS:  General and Resp. completed and negative except as noted above    Histories:   Patient Active Problem List   Diagnosis     HTN (hypertension)     Hypercholesterolemia     Lumbar disc disease     ACP (advance care planning)     Health Care Home     Elevated prostate specific antigen (PSA)     Family history of migraine     Plantar wart     First degree AV block     Chronic kidney disease, stage III (moderate) (H)     Gross hematuria     Atrial fibrillation (H)     Past Surgical History:   Procedure Laterality Date     COLONOSCOPY       CYSTOSCOPY, RETROGRADES, COMBINED Left 5/13/2020    Procedure: LEFT RETROGRADE URETERAL PYELOGRAM;  Surgeon: Denilson Angulo MD;  Location:  OR     CYSTOSCOPY, TRANSURETHRAL RESECTION (TUR) PROSTATE, COMBINED N/A 5/13/2020    Procedure:  "CYSTOSCOPY WITH TRANSURETHRAL RESECTION PROSTATE;  Surgeon: Denilson Angulo MD;  Location: [facility] OR     OPEN REDUCTION INTERNAL FIXATION HAND       RHINOPLASTY       TONSILLECTOMY       wisdom teeth         Social History     Tobacco Use     Smoking status: Never Smoker     Smokeless tobacco: Never Used   Substance Use Topics     Alcohol use: Yes     Alcohol/week: 2.0 - 3.0 standard drinks     Types: 2 - 3 Cans of beer per week     Comment: social only     Family History   Problem Relation Age of Onset     Dementia Father      Osteoarthritis Father      Cerebrovascular Disease Father      Myocardial Infarction Mother      C.A.D. Mother      Coronary Artery Disease Mother            OBJECTIVE:                                                    BP (!) 140/58   Pulse 50   Resp 18   Ht 1.778 m (5' 10\")   Wt 76.7 kg (169 lb 3.2 oz)   SpO2 98%   BMI 24.28 kg/m    Body mass index is 24.28 kg/m .   APPEARANCE: = Relaxed and in no distress  Digits and Nails =  normal exam, no swelling, tenderness, instability; ligaments intact, FROM all hand, wrist, finger joints, soft tissue tenderness and swelling at the dip on the ring finger     ASSESSMENT/PLAN:                                                        Rey was seen today for finger.    Diagnoses and all orders for this visit:    Primary hypertension  -     [place] ART PRESS WAVEFORM ANALYS CENTRAL ART PRESSURE    Need for vaccination  -     [place] FLU VACCINE, INCREASED ANTIGEN, PRESV FREE  -     ADMIN INFLUENZA VIRUS VAC    Injury of finger of left hand, initial encounter  -     Cancel: XR Finger Right G/E 2 Views; Future  -     X-ray lt finger 2-3 view  -     SPLINT        Regular exercise  There are no Patient Instructions on file for this visit.    The following health maintenance items are reviewed in Epic and correct as of today:  Health Maintenance   Topic Date Due     MEDICARE ANNUAL WELLNESS VISIT  03/23/2022     FALL RISK ASSESSMENT  03/23/2022     DTAP/TDAP/TD " IMMUNIZATION (3 - Td or Tdap) 04/30/2022     LIPID  03/23/2026     ADVANCE CARE PLANNING  03/23/2026     COLORECTAL CANCER SCREENING  08/24/2026     HEPATITIS C SCREENING  Completed     PHQ-2  Completed     INFLUENZA VACCINE  Completed     Pneumococcal Vaccine: 65+ Years  Completed     ZOSTER IMMUNIZATION  Completed     AORTIC ANEURYSM SCREENING (SYSTEM ASSIGNED)  Completed     COVID-19 Vaccine  Completed     IPV IMMUNIZATION  Aged Out     MENINGITIS IMMUNIZATION  Aged Out     HEPATITIS B IMMUNIZATION  Aged Out       Sabas Staley MD  [facility]  Family Practice  [facility]  [phone]    For any issues my office # is [phone]

## 2021-10-22 ENCOUNTER — TELEPHONE (OUTPATIENT)
Dept: FAMILY MEDICINE | Facility: CLINIC | Age: 76
End: 2021-10-22

## 2021-10-22 DIAGNOSIS — S62.639A CLOSED MALLET FRACTURE OF DISTAL PHALANX OF FINGER OF LEFT HAND: Primary | ICD-10-CM

## 2021-10-22 DIAGNOSIS — M20.012 CLOSED MALLET FRACTURE OF DISTAL PHALANX OF FINGER OF LEFT HAND: Primary | ICD-10-CM

## 2021-10-22 NOTE — TELEPHONE ENCOUNTER
Called patient with xray results and recommendation. Patient agrees and ortho appt scheduled for 10/26/21 at [facility] with Dr. Kalyn Garcia at 845am. Patient will  disk with xray images at our  on Monday 10/25.   Referral faxed to [facility].    Cristin Roberson RN

## 2021-10-22 NOTE — TELEPHONE ENCOUNTER
----- Message from Sabas Staley MD sent at 10/21/2021  5:42 PM CDT -----  He has a mallet fracture and should probably see a hand specialist as it has been 8 days since he had the injury, have him keep the splint on finger all the time until then.  KN

## 2021-10-26 ENCOUNTER — TRANSFERRED RECORDS (OUTPATIENT)
Dept: FAMILY MEDICINE | Facility: CLINIC | Age: 76
End: 2021-10-26

## 2021-10-26 NOTE — PROGRESS NOTES
10/28/21  Faxed this office note to [facility] orthopedics @ [phone]    Dimas Maki,   [facility]  [phone]

## 2021-11-02 ENCOUNTER — TELEPHONE (OUTPATIENT)
Dept: CARDIOLOGY | Facility: CLINIC | Age: 76
End: 2021-11-02

## 2021-11-02 NOTE — TELEPHONE ENCOUNTER
Patient called reports he stopped taking eliquis on 10/29.  Reports he had dry cough, headache and tooth ache that has been bothering him for a couple of weeks. Since stopping medications symptoms have diminished.  Patient was placed on eliquis on 9/9 in due to afib episode,   Patient currently in NSR.  Has follow up appt with Dr Scott on 12/6.  Will update Dr Scott.  HOLLEY Johns

## 2021-11-03 NOTE — TELEPHONE ENCOUNTER
Pt made aware that he is ok to stay off his Eliquis per Dr Scott. Asked that if pt goes back into A Fib to please call A fib nurses. Pt states understanding. JNelsonHOLLEY

## 2021-11-03 NOTE — TELEPHONE ENCOUNTER
Message left for patient regarding recommendations per Dr Scott.  Awaiting return call.  HOLLEY Johns

## 2021-12-06 ENCOUNTER — OFFICE VISIT (OUTPATIENT)
Dept: CARDIOLOGY | Facility: CLINIC | Age: 76
End: 2021-12-06
Payer: COMMERCIAL

## 2021-12-06 VITALS
SYSTOLIC BLOOD PRESSURE: 149 MMHG | DIASTOLIC BLOOD PRESSURE: 75 MMHG | BODY MASS INDEX: 24.48 KG/M2 | HEIGHT: 70 IN | HEART RATE: 62 BPM | OXYGEN SATURATION: 99 % | WEIGHT: 171 LBS

## 2021-12-06 DIAGNOSIS — Z51.81 ENCOUNTER FOR MONITORING FLECAINIDE THERAPY: ICD-10-CM

## 2021-12-06 DIAGNOSIS — I48.0 PAROXYSMAL A-FIB (H): ICD-10-CM

## 2021-12-06 DIAGNOSIS — I48.0 PAROXYSMAL ATRIAL FIBRILLATION (H): ICD-10-CM

## 2021-12-06 DIAGNOSIS — Z79.899 ENCOUNTER FOR MONITORING FLECAINIDE THERAPY: ICD-10-CM

## 2021-12-06 PROCEDURE — 99214 OFFICE O/P EST MOD 30 MIN: CPT | Performed by: INTERNAL MEDICINE

## 2021-12-06 PROCEDURE — 93000 ELECTROCARDIOGRAM COMPLETE: CPT | Performed by: INTERNAL MEDICINE

## 2021-12-06 RX ORDER — FLECAINIDE ACETATE 150 MG/1
150 TABLET ORAL 2 TIMES DAILY
Qty: 180 TABLET | Refills: 3 | Status: SHIPPED | OUTPATIENT
Start: 2021-12-06 | End: 2022-01-04

## 2021-12-06 ASSESSMENT — MIFFLIN-ST. JEOR: SCORE: 1521.9

## 2021-12-06 NOTE — LETTER
12/6/2021    Sabas Staley MD  [address] [facility] [address]    RE: Rey Brown       Dear Colleague,     I had the pleasure of seeing Rey Brown in the [facility].  HPI and Plan:   See dictation        Orders Placed This Encounter   Procedures     Follow-Up with Electrophysiologist     EKG 12-lead complete w/read (Future)- to be scheduled       Orders Placed This Encounter   Medications     flecainide (TAMBOCOR) 150 MG tablet     Sig: Take 1 tablet (150 mg) by mouth 2 times daily     Dispense:  180 tablet     Refill:  3       Medications Discontinued During This Encounter   Medication Reason     flecainide (TAMBOCOR) 150 MG tablet Reorder         Encounter Diagnoses   Name Primary?     Encounter for monitoring flecainide therapy      Paroxysmal atrial fibrillation (H)      Paroxysmal A-fib (H)        CURRENT MEDICATIONS:  Current Outpatient Medications   Medication Sig Dispense Refill     acetaminophen (TYLENOL) 325 MG tablet Take 325-650 mg by mouth every 6 hours as needed for mild pain       flecainide (TAMBOCOR) 150 MG tablet Take 1 tablet (150 mg) by mouth 2 times daily 180 tablet 3     losartan (COZAAR) 25 MG tablet TAKE 1 TABLET BY MOUTH  DAILY 90 tablet 3     Nasal Dilators (BREATHE RIGHT LARGE) STRP nightly as needed       sildenafil (VIAGRA) 100 MG tablet Take 1 tablet (100 mg) by mouth daily as needed 30 tablet 3     simvastatin (ZOCOR) 20 MG tablet TAKE 1 TABLET BY MOUTH AT  BEDTIME 90 tablet 3       ALLERGIES     Allergies   Allergen Reactions     Ace Inhibitors Cough       PAST MEDICAL HISTORY:  Past Medical History:   Diagnosis Date     Hypercholesterolemia      Lumbar disc disease      Migraines      Paroxysmal A-fib (H)        PAST SURGICAL HISTORY:  Past Surgical History:   Procedure Laterality Date     COLONOSCOPY       CYSTOSCOPY, RETROGRADES, COMBINED Left 5/13/2020    Procedure: LEFT RETROGRADE URETERAL PYELOGRAM;  Surgeon: Denilson Angulo  MD JATINDER;  Location:  OR     CYSTOSCOPY, TRANSURETHRAL RESECTION (TUR) PROSTATE, COMBINED N/A 5/13/2020    Procedure: CYSTOSCOPY WITH TRANSURETHRAL RESECTION PROSTATE;  Surgeon: Denilson Angulo MD;  Location:  OR     OPEN REDUCTION INTERNAL FIXATION HAND       RHINOPLASTY       TONSILLECTOMY       wisdom teeth         FAMILY HISTORY:  Family History   Problem Relation Age of Onset     Dementia Father      Osteoarthritis Father      Cerebrovascular Disease Father      Myocardial Infarction Mother      C.A.D. Mother      Coronary Artery Disease Mother        SOCIAL HISTORY:  Social History     Socioeconomic History     Marital status:      Spouse name: Erin     Number of children: None     Years of education: None     Highest education level: None   Occupational History     None   Tobacco Use     Smoking status: Never Smoker     Smokeless tobacco: Never Used   Substance and Sexual Activity     Alcohol use: Yes     Alcohol/week: 2.0 - 3.0 standard drinks     Types: 2 - 3 Cans of beer per week     Comment: social only     Drug use: No     Sexual activity: Yes     Partners: Female   Other Topics Concern      Service Not Asked     Blood Transfusions Not Asked     Caffeine Concern Not Asked     Occupational Exposure Not Asked     Hobby Hazards Not Asked     Sleep Concern Not Asked     Stress Concern Not Asked     Weight Concern Not Asked     Special Diet No     Back Care Not Asked     Exercise Yes     Comment: 2-3x/week     Bike Helmet Not Asked     Seat Belt Not Asked     Self-Exams Not Asked     Parent/sibling w/ CABG, MI or angioplasty before 65F 55M? Not Asked   Social History Narrative     None     Social Determinants of Health     Financial Resource Strain: Not on file   Food Insecurity: Not on file   Transportation Needs: Not on file   Physical Activity: Not on file   Stress: Not on file   Social Connections: Not on file   Intimate Partner Violence: Not on file   Housing Stability: Not on file  "      Review of Systems:  Skin:  Negative       Eyes:  Negative      ENT:  Positive for postnasal drainage at night when laying down  Respiratory:  Negative       Cardiovascular:  Negative      Gastroenterology: Negative      Genitourinary:  Negative      Musculoskeletal:  Negative      Neurologic:  Positive for headaches    Psychiatric:  Negative      Heme/Lymph/Imm:  Negative      Endocrine:  Negative        Physical Exam:  Vitals: BP (!) 149/75   Pulse 62   Ht 1.778 m (5' 10\")   Wt 77.6 kg (171 lb)   SpO2 99%   BMI 24.54 kg/m      Constitutional:  cooperative, alert and oriented, well developed, well nourished, in no acute distress        Skin:  warm and dry to the touch          Head:  normocephalic        Eyes:  pupils equal and round        Lymph:      ENT:  no pallor or cyanosis        Neck:           Respiratory:  normal breath sounds, clear to auscultation, normal A-P diameter, normal symmetry, normal respiratory excursion, no use of accessory muscles         Cardiac: regular rhythm, normal S1/S2, no S3 or S4, apical impulse not displaced, no murmurs, gallops or rubs       systolic ejection murmur;grade 1                                                 GI:  abdomen soft        Extremities and Muscular Skeletal:  no edema;no deformities, clubbing, cyanosis, erythema observed              Neurological:  no gross motor deficits        Psych:  Alert and Oriented x 3;affect appropriate, oriented to time, person and place;oriented to time, person and place        CC  JOSE CRUZ Mittal CNP  [address]  [place] [address]                  Thank you for allowing me to participate in the care of your patient.      Sincerely,     Sharmin Scott MD     [facility]  cc:   JOSE CRUZ Mittal CNP  [address]  [place] [address]        "

## 2021-12-06 NOTE — PROGRESS NOTES
HPI and Plan:   See dictation        Orders Placed This Encounter   Procedures     Follow-Up with Electrophysiologist     EKG 12-lead complete w/read (Future)- to be scheduled       Orders Placed This Encounter   Medications     flecainide (TAMBOCOR) 150 MG tablet     Sig: Take 1 tablet (150 mg) by mouth 2 times daily     Dispense:  180 tablet     Refill:  3       Medications Discontinued During This Encounter   Medication Reason     flecainide (TAMBOCOR) 150 MG tablet Reorder         Encounter Diagnoses   Name Primary?     Encounter for monitoring flecainide therapy      Paroxysmal atrial fibrillation (H)      Paroxysmal A-fib (H)        CURRENT MEDICATIONS:  Current Outpatient Medications   Medication Sig Dispense Refill     acetaminophen (TYLENOL) 325 MG tablet Take 325-650 mg by mouth every 6 hours as needed for mild pain       flecainide (TAMBOCOR) 150 MG tablet Take 1 tablet (150 mg) by mouth 2 times daily 180 tablet 3     losartan (COZAAR) 25 MG tablet TAKE 1 TABLET BY MOUTH  DAILY 90 tablet 3     Nasal Dilators (BREATHE RIGHT LARGE) STRP nightly as needed       sildenafil (VIAGRA) 100 MG tablet Take 1 tablet (100 mg) by mouth daily as needed 30 tablet 3     simvastatin (ZOCOR) 20 MG tablet TAKE 1 TABLET BY MOUTH AT  BEDTIME 90 tablet 3       ALLERGIES     Allergies   Allergen Reactions     Ace Inhibitors Cough       PAST MEDICAL HISTORY:  Past Medical History:   Diagnosis Date     Hypercholesterolemia      Lumbar disc disease      Migraines      Paroxysmal A-fib (H)        PAST SURGICAL HISTORY:  Past Surgical History:   Procedure Laterality Date     COLONOSCOPY       CYSTOSCOPY, RETROGRADES, COMBINED Left 5/13/2020    Procedure: LEFT RETROGRADE URETERAL PYELOGRAM;  Surgeon: Denilson Angulo MD;  Location:  OR     CYSTOSCOPY, TRANSURETHRAL RESECTION (TUR) PROSTATE, COMBINED N/A 5/13/2020    Procedure: CYSTOSCOPY WITH TRANSURETHRAL RESECTION PROSTATE;  Surgeon: Denilson Angulo MD;  Location:  OR     OPEN  REDUCTION INTERNAL FIXATION HAND       RHINOPLASTY       TONSILLECTOMY       wisdom teeth         FAMILY HISTORY:  Family History   Problem Relation Age of Onset     Dementia Father      Osteoarthritis Father      Cerebrovascular Disease Father      Myocardial Infarction Mother      C.A.D. Mother      Coronary Artery Disease Mother        SOCIAL HISTORY:  Social History     Socioeconomic History     Marital status:      Spouse name: Erin     Number of children: None     Years of education: None     Highest education level: None   Occupational History     None   Tobacco Use     Smoking status: Never Smoker     Smokeless tobacco: Never Used   Substance and Sexual Activity     Alcohol use: Yes     Alcohol/week: 2.0 - 3.0 standard drinks     Types: 2 - 3 Cans of beer per week     Comment: social only     Drug use: No     Sexual activity: Yes     Partners: Female   Other Topics Concern      Service Not Asked     Blood Transfusions Not Asked     Caffeine Concern Not Asked     Occupational Exposure Not Asked     Hobby Hazards Not Asked     Sleep Concern Not Asked     Stress Concern Not Asked     Weight Concern Not Asked     Special Diet No     Back Care Not Asked     Exercise Yes     Comment: 2-3x/week     Bike Helmet Not Asked     Seat Belt Not Asked     Self-Exams Not Asked     Parent/sibling w/ CABG, MI or angioplasty before 65F 55M? Not Asked   Social History Narrative     None     Social Determinants of Health     Financial Resource Strain: Not on file   Food Insecurity: Not on file   Transportation Needs: Not on file   Physical Activity: Not on file   Stress: Not on file   Social Connections: Not on file   Intimate Partner Violence: Not on file   Housing Stability: Not on file       Review of Systems:  Skin:  Negative       Eyes:  Negative      ENT:  Positive for postnasal drainage at night when laying down  Respiratory:  Negative       Cardiovascular:  Negative      Gastroenterology: Negative     "  Genitourinary:  Negative      Musculoskeletal:  Negative      Neurologic:  Positive for headaches    Psychiatric:  Negative      Heme/Lymph/Imm:  Negative      Endocrine:  Negative        Physical Exam:  Vitals: BP (!) 149/75   Pulse 62   Ht 1.778 m (5' 10\")   Wt 77.6 kg (171 lb)   SpO2 99%   BMI 24.54 kg/m      Constitutional:  cooperative, alert and oriented, well developed, well nourished, in no acute distress        Skin:  warm and dry to the touch          Head:  normocephalic        Eyes:  pupils equal and round        Lymph:      ENT:  no pallor or cyanosis        Neck:           Respiratory:  normal breath sounds, clear to auscultation, normal A-P diameter, normal symmetry, normal respiratory excursion, no use of accessory muscles         Cardiac: regular rhythm, normal S1/S2, no S3 or S4, apical impulse not displaced, no murmurs, gallops or rubs       systolic ejection murmur;grade 1                                                 GI:  abdomen soft        Extremities and Muscular Skeletal:  no edema;no deformities, clubbing, cyanosis, erythema observed              Neurological:  no gross motor deficits        Psych:  Alert and Oriented x 3;affect appropriate, oriented to time, person and place;oriented to time, person and place        CC  Karma Sunshine, APRN CNP  [address]  [place] [address]              "

## 2021-12-06 NOTE — PROGRESS NOTES
Service Date: 2021    CLINIC NOTE    HISTORY OF PRESENT ILLNESS:  I saw Mr. Velazquez for followup of atrial fibrillation.  He is a 76-year-old white male who has a history of symptomatic paroxysmal atrial fibrillation.  The patient has been on flecainide 150 mg p.o. b.i.d.  In 2021, he got a COVID infection.  He did get the vaccination about 6 months prior to the infection.  He had a relatively minor symptoms but did receive monoclonal antibody therapy.  He recovered fully with no residual side effects.  At the present time, he has no problem.    During the COVID infection, he got 1 episode of atrial fibrillation that lasted for 8 days.  During that time, he had some fatigue but no other severe symptoms.  He converted to sinus rhythm spontaneously.  He has stopped Eliquis, believing that the drug caused his headache.    At the present time, he has no complaints.    PHYSICAL EXAMINATION:    VITAL SIGNS:  Blood pressure was 149/74, heart rate 62 beats per minute, body weight 171 pounds.  HEENT:  The eyes and ENT were unremarkable.  LUNGS:  Clear.  CARDIAC:  Rhythm was regular and heart sounds were normal without murmur.  ABDOMINAL EXAMINATION:  Showed no hepatomegaly.  EXTREMITIES:  There was no pedal edema.    RADIOLOGIC STUDIES:  EKG today showed normal sinus rhythm.    ASSESSMENT AND RECOMMENDATIONS:  Mr. Velazquez had breakthrough atrial fibrillation during the COVID-19 infection.  Hopefully, he will have no recurrence of atrial fibrillation.  He is to continue flecainide and return for followup in 1 year.  I agreed for him to withhold Eliquis but advised him to monitor the blood pressure and heart rate on a regular basis.    Sharmin Scott MD     cc:  Sabas Staley MD   [facility]  [address] [place] [address]    Sharmin Scott MD        D: 2021   T: 2021   MT: kourtney    Name:     RO VELAZQUEZ  MRN:      [id number]        Account:      [id number]   :       1945           Service Date: 12/06/2021       Document: [id number]

## 2021-12-06 NOTE — LETTER
12/6/2021       RE: Rey Brown  [address]  [facility] [address]     Dear Colleague,    Thank you for referring your patient, Rey Brown, to the [facility] [place] at [facility]. Please see a copy of my visit note below.    HPI and Plan:   See dictation        Orders Placed This Encounter   Procedures     Follow-Up with Electrophysiologist     EKG 12-lead complete w/read (Future)- to be scheduled       Orders Placed This Encounter   Medications     flecainide (TAMBOCOR) 150 MG tablet     Sig: Take 1 tablet (150 mg) by mouth 2 times daily     Dispense:  180 tablet     Refill:  3       Medications Discontinued During This Encounter   Medication Reason     flecainide (TAMBOCOR) 150 MG tablet Reorder         Encounter Diagnoses   Name Primary?     Encounter for monitoring flecainide therapy      Paroxysmal atrial fibrillation (H)      Paroxysmal A-fib (H)        CURRENT MEDICATIONS:  Current Outpatient Medications   Medication Sig Dispense Refill     acetaminophen (TYLENOL) 325 MG tablet Take 325-650 mg by mouth every 6 hours as needed for mild pain       flecainide (TAMBOCOR) 150 MG tablet Take 1 tablet (150 mg) by mouth 2 times daily 180 tablet 3     losartan (COZAAR) 25 MG tablet TAKE 1 TABLET BY MOUTH  DAILY 90 tablet 3     Nasal Dilators (BREATHE RIGHT LARGE) STRP nightly as needed       sildenafil (VIAGRA) 100 MG tablet Take 1 tablet (100 mg) by mouth daily as needed 30 tablet 3     simvastatin (ZOCOR) 20 MG tablet TAKE 1 TABLET BY MOUTH AT  BEDTIME 90 tablet 3       ALLERGIES     Allergies   Allergen Reactions     Ace Inhibitors Cough       PAST MEDICAL HISTORY:  Past Medical History:   Diagnosis Date     Hypercholesterolemia      Lumbar disc disease      Migraines      Paroxysmal A-fib (H)        PAST SURGICAL HISTORY:  Past Surgical History:   Procedure Laterality Date     COLONOSCOPY       CYSTOSCOPY, RETROGRADES,  Checked patient's blood glucose following administration of calcium gluconate, 50gram of D10, and insulin. Patient tolerating dialysis well. Blood sugar was 77.   COMBINED Left 5/13/2020    Procedure: LEFT RETROGRADE URETERAL PYELOGRAM;  Surgeon: Denilson Angulo MD;  Location: [facility] OR     CYSTOSCOPY, TRANSURETHRAL RESECTION (TUR) PROSTATE, COMBINED N/A 5/13/2020    Procedure: CYSTOSCOPY WITH TRANSURETHRAL RESECTION PROSTATE;  Surgeon: Denilson Angulo MD;  Location: [facility] OR     OPEN REDUCTION INTERNAL FIXATION HAND       RHINOPLASTY       TONSILLECTOMY       wisdom teeth         FAMILY HISTORY:  Family History   Problem Relation Age of Onset     Dementia Father      Osteoarthritis Father      Cerebrovascular Disease Father      Myocardial Infarction Mother      C.A.D. Mother      Coronary Artery Disease Mother        SOCIAL HISTORY:  Social History     Socioeconomic History     Marital status:      Spouse name: Erin     Number of children: None     Years of education: None     Highest education level: None   Occupational History     None   Tobacco Use     Smoking status: Never Smoker     Smokeless tobacco: Never Used   Substance and Sexual Activity     Alcohol use: Yes     Alcohol/week: 2.0 - 3.0 standard drinks     Types: 2 - 3 Cans of beer per week     Comment: social only     Drug use: No     Sexual activity: Yes     Partners: Female   Other Topics Concern      Service Not Asked     Blood Transfusions Not Asked     Caffeine Concern Not Asked     Occupational Exposure Not Asked     Hobby Hazards Not Asked     Sleep Concern Not Asked     Stress Concern Not Asked     Weight Concern Not Asked     Special Diet No     Back Care Not Asked     Exercise Yes     Comment: 2-3x/week     Bike Helmet Not Asked     Seat Belt Not Asked     Self-Exams Not Asked     Parent/sibling w/ CABG, MI or angioplasty before 65F 55M? Not Asked   Social History Narrative     None     Social Determinants of Health     Financial Resource Strain: Not on file   Food Insecurity: Not on file   Transportation Needs: Not on file   Physical Activity: Not on file   Stress: Not on file   Social  "Connections: Not on file   Intimate Partner Violence: Not on file   Housing Stability: Not on file       Review of Systems:  Skin:  Negative       Eyes:  Negative      ENT:  Positive for postnasal drainage at night when laying down  Respiratory:  Negative       Cardiovascular:  Negative      Gastroenterology: Negative      Genitourinary:  Negative      Musculoskeletal:  Negative      Neurologic:  Positive for headaches    Psychiatric:  Negative      Heme/Lymph/Imm:  Negative      Endocrine:  Negative        Physical Exam:  Vitals: BP (!) 149/75   Pulse 62   Ht 1.778 m (5' 10\")   Wt 77.6 kg (171 lb)   SpO2 99%   BMI 24.54 kg/m      Constitutional:  cooperative, alert and oriented, well developed, well nourished, in no acute distress        Skin:  warm and dry to the touch          Head:  normocephalic        Eyes:  pupils equal and round        Lymph:      ENT:  no pallor or cyanosis        Neck:           Respiratory:  normal breath sounds, clear to auscultation, normal A-P diameter, normal symmetry, normal respiratory excursion, no use of accessory muscles         Cardiac: regular rhythm, normal S1/S2, no S3 or S4, apical impulse not displaced, no murmurs, gallops or rubs       systolic ejection murmur;grade 1                                                 GI:  abdomen soft        Extremities and Muscular Skeletal:  no edema;no deformities, clubbing, cyanosis, erythema observed              Neurological:  no gross motor deficits        Psych:  Alert and Oriented x 3;affect appropriate, oriented to time, person and place;oriented to time, person and place        CC  Karma Sunshine, APRN CNP  [address]  [place] [address]                Service Date: 12/06/2021    CLINIC NOTE    HISTORY OF PRESENT ILLNESS:  I saw Mr. Brown for followup of atrial fibrillation.  He is a 76-year-old white male who has a history of symptomatic paroxysmal atrial fibrillation.  The patient has been on " flecainide 150 mg p.o. b.i.d.  In 2021, he got a COVID infection.  He did get the vaccination about 6 months prior to the infection.  He had a relatively minor symptoms but did receive monoclonal antibody therapy.  He recovered fully with no residual side effects.  At the present time, he has no problem.    During the COVID infection, he got 1 episode of atrial fibrillation that lasted for 8 days.  During that time, he had some fatigue but no other severe symptoms.  He converted to sinus rhythm spontaneously.  He has stopped Eliquis, believing that the drug caused his headache.    At the present time, he has no complaints.    PHYSICAL EXAMINATION:    VITAL SIGNS:  Blood pressure was 149/74, heart rate 62 beats per minute, body weight 171 pounds.  HEENT:  The eyes and ENT were unremarkable.  LUNGS:  Clear.  CARDIAC:  Rhythm was regular and heart sounds were normal without murmur.  ABDOMINAL EXAMINATION:  Showed no hepatomegaly.  EXTREMITIES:  There was no pedal edema.    RADIOLOGIC STUDIES:  EKG today showed normal sinus rhythm.    ASSESSMENT AND RECOMMENDATIONS:  Mr. Velazquez had breakthrough atrial fibrillation during the COVID-19 infection.  Hopefully, he will have no recurrence of atrial fibrillation.  He is to continue flecainide and return for followup in 1 year.  I agreed for him to withhold Eliquis but advised him to monitor the blood pressure and heart rate on a regular basis.    Sharmin Scott MD     cc:  Sabas Staley MD   [facility]  [address] [place] [address]      Sharmin Scott MD        D: 2021   T: 2021   MT: kourtney    Name:     RO VELAZQUEZ  MRN:      [id number]        Account:      [id number]   :      1945           Service Date: 2021       Document: [id number]

## 2021-12-07 ENCOUNTER — TRANSFERRED RECORDS (OUTPATIENT)
Dept: FAMILY MEDICINE | Facility: CLINIC | Age: 76
End: 2021-12-07

## 2022-01-03 ENCOUNTER — TELEPHONE (OUTPATIENT)
Dept: CARDIOLOGY | Facility: CLINIC | Age: 77
End: 2022-01-03
Payer: COMMERCIAL

## 2022-01-03 DIAGNOSIS — I48.0 PAROXYSMAL ATRIAL FIBRILLATION (H): Primary | ICD-10-CM

## 2022-01-03 DIAGNOSIS — E78.00 HYPERCHOLESTEROLEMIA: ICD-10-CM

## 2022-01-03 NOTE — TELEPHONE ENCOUNTER
01/03/22 Pt called to report he has been in Afib since Sunday morning at 3:15 am. Hr has been ranging from 69-93. He is asymptomatic and in fact played volleyball this am for 2 hours.  He verified he continues to take Flecaindie 150 mg BID but is not taking a blood thinner.  Will have pt come for ambulatory EKG tomorrow at 9:30. Will check with Dr Scott on resuming Eliquis as episode began 1/1 at 3:15 am  Pt voiced understanding and agreement with plan.   Den 404 pm

## 2022-01-03 NOTE — TELEPHONE ENCOUNTER
01/03/21 Msg recd from Dr Scott  Yes, restart anticoagulation.  Agree for ECG tomorrow am.  Called pt and reviewed above.  Med list updated.  Will await EKG tomorrow at 9:30  Pt voiced understanding and agreement with plan.   Den 455 pm

## 2022-01-04 DIAGNOSIS — I48.0 PAROXYSMAL ATRIAL FIBRILLATION (H): ICD-10-CM

## 2022-01-04 PROCEDURE — 93000 ELECTROCARDIOGRAM COMPLETE: CPT | Performed by: INTERNAL MEDICINE

## 2022-01-04 RX ORDER — SIMVASTATIN 20 MG
TABLET ORAL
Qty: 90 TABLET | Refills: 3
Start: 2022-01-04 | End: 2022-04-12

## 2022-01-04 RX ORDER — DILTIAZEM HYDROCHLORIDE 180 MG/1
180 CAPSULE, COATED, EXTENDED RELEASE ORAL DAILY
Qty: 30 CAPSULE | Refills: 3 | Status: SHIPPED | OUTPATIENT
Start: 2022-01-04 | End: 2022-01-20

## 2022-01-04 NOTE — TELEPHONE ENCOUNTER
Atypical atrial flutter with HR 70s.  Recommend stop flecainide, and start Cardizem  mg daily. Holter in 1 week, then see me for discussion of the long-term plan.

## 2022-01-04 NOTE — TELEPHONE ENCOUNTER
01/04/22 Pt here for ambulatory EKG. He verifies he remains asymptomatic and has re-started his Eliquis 5 mg BID as of last clinton.  Will forward to Dr Scott to review.  Den 1015 am

## 2022-01-04 NOTE — PROGRESS NOTES
Pt was in clinic this morning as an Ambulatory EKG check.  EKG was obtained.  Results were given to Germaine MURRAY RN to Review.     Kimberly, LPN  01/04/2021

## 2022-01-04 NOTE — TELEPHONE ENCOUNTER
01/04/22 EKG reviewed by Dr Scott and provided the following recommendations  Atypical atrial flutter with HR 70s.  Recommend stop flecainide, and start Cardizem  mg daily. Decrease Simvastatin to 10 mg every day . - pt voiced understanding, med list updated . Rx sent to [facility], [place] Holter in 1 week, then see me for discussion of the long-term plan.- orders placed, msg to scheduling to call pt tomorrow.  Den 417 pm

## 2022-01-06 ENCOUNTER — HOSPITAL ENCOUNTER (OUTPATIENT)
Dept: CARDIOLOGY | Facility: CLINIC | Age: 77
Discharge: HOME | End: 2022-01-06
Attending: INTERNAL MEDICINE | Admitting: INTERNAL MEDICINE
Payer: COMMERCIAL

## 2022-01-06 DIAGNOSIS — I48.0 PAROXYSMAL ATRIAL FIBRILLATION (H): ICD-10-CM

## 2022-01-06 PROCEDURE — 93226 XTRNL ECG REC<48 HR SCAN A/R: CPT

## 2022-01-06 PROCEDURE — 93227 XTRNL ECG REC<48 HR R&I: CPT | Performed by: INTERNAL MEDICINE

## 2022-01-20 ENCOUNTER — OFFICE VISIT (OUTPATIENT)
Dept: CARDIOLOGY | Facility: CLINIC | Age: 77
End: 2022-01-20
Payer: COMMERCIAL

## 2022-01-20 VITALS
WEIGHT: 176 LBS | BODY MASS INDEX: 25.2 KG/M2 | DIASTOLIC BLOOD PRESSURE: 82 MMHG | SYSTOLIC BLOOD PRESSURE: 132 MMHG | HEART RATE: 68 BPM | OXYGEN SATURATION: 99 % | HEIGHT: 70 IN

## 2022-01-20 DIAGNOSIS — I48.0 PAROXYSMAL ATRIAL FIBRILLATION (H): ICD-10-CM

## 2022-01-20 DIAGNOSIS — I48.19 PERSISTENT ATRIAL FIBRILLATION (H): Primary | ICD-10-CM

## 2022-01-20 DIAGNOSIS — I48.0 PAROXYSMAL A-FIB (H): ICD-10-CM

## 2022-01-20 PROCEDURE — 93000 ELECTROCARDIOGRAM COMPLETE: CPT | Performed by: INTERNAL MEDICINE

## 2022-01-20 PROCEDURE — 99213 OFFICE O/P EST LOW 20 MIN: CPT | Performed by: INTERNAL MEDICINE

## 2022-01-20 RX ORDER — DILTIAZEM HYDROCHLORIDE 180 MG/1
180 CAPSULE, COATED, EXTENDED RELEASE ORAL DAILY
Qty: 90 CAPSULE | Refills: 3 | Status: SHIPPED | OUTPATIENT
Start: 2022-01-20 | End: 2022-04-11

## 2022-01-20 ASSESSMENT — MIFFLIN-ST. JEOR: SCORE: 1544.58

## 2022-01-20 NOTE — LETTER
1/20/2022       RE: Rey Brown  [address]  [facility] [address]     Dear Colleague,    Thank you for referring your patient, Rey Brown, to the [facility] [place] at [facility]. Please see a copy of my visit note below.    HPI and Plan:   See dictation        Orders Placed This Encounter   Procedures     Follow-Up with Cardiology- EP     EKG 12-lead complete w/read (Future)- to be scheduled       Orders Placed This Encounter   Medications     diltiazem ER COATED BEADS (CARDIZEM CD) 180 MG 24 hr capsule     Sig: Take 1 capsule (180 mg) by mouth daily     Dispense:  90 capsule     Refill:  3     apixaban ANTICOAGULANT (ELIQUIS) 5 MG tablet     Sig: Take 1 tablet (5 mg) by mouth 2 times daily     Dispense:  180 tablet     Refill:  3       Medications Discontinued During This Encounter   Medication Reason     diltiazem ER COATED BEADS (CARDIZEM CD) 180 MG 24 hr capsule Reorder     apixaban ANTICOAGULANT (ELIQUIS) 5 MG tablet Reorder         Encounter Diagnoses   Name Primary?     Paroxysmal atrial fibrillation (H)      Paroxysmal A-fib (H)      Persistent atrial fibrillation (H) Yes       CURRENT MEDICATIONS:  Current Outpatient Medications   Medication Sig Dispense Refill     acetaminophen (TYLENOL) 325 MG tablet Take 325-650 mg by mouth every 6 hours as needed for mild pain       apixaban ANTICOAGULANT (ELIQUIS) 5 MG tablet Take 1 tablet (5 mg) by mouth 2 times daily 180 tablet 3     diltiazem ER COATED BEADS (CARDIZEM CD) 180 MG 24 hr capsule Take 1 capsule (180 mg) by mouth daily 90 capsule 3     losartan (COZAAR) 25 MG tablet TAKE 1 TABLET BY MOUTH  DAILY 90 tablet 3     Nasal Dilators (BREATHE RIGHT LARGE) STRP nightly as needed       sildenafil (VIAGRA) 100 MG tablet Take 1 tablet (100 mg) by mouth daily as needed 30 tablet 3     simvastatin (ZOCOR) 20 MG tablet Take 1/2 tablet ( 10 mg) daily 90 tablet 3        ALLERGIES     Allergies   Allergen Reactions     Ace Inhibitors Cough       PAST MEDICAL HISTORY:  Past Medical History:   Diagnosis Date     Hypercholesterolemia      Lumbar disc disease      Migraines      Paroxysmal A-fib (H)        PAST SURGICAL HISTORY:  Past Surgical History:   Procedure Laterality Date     COLONOSCOPY       CYSTOSCOPY, RETROGRADES, COMBINED Left 5/13/2020    Procedure: LEFT RETROGRADE URETERAL PYELOGRAM;  Surgeon: Denilson Angulo MD;  Location:  OR     CYSTOSCOPY, TRANSURETHRAL RESECTION (TUR) PROSTATE, COMBINED N/A 5/13/2020    Procedure: CYSTOSCOPY WITH TRANSURETHRAL RESECTION PROSTATE;  Surgeon: Denilson Angulo MD;  Location:  OR     OPEN REDUCTION INTERNAL FIXATION HAND       RHINOPLASTY       TONSILLECTOMY       wisdom teeth         FAMILY HISTORY:  Family History   Problem Relation Age of Onset     Dementia Father      Osteoarthritis Father      Cerebrovascular Disease Father      Myocardial Infarction Mother      C.A.D. Mother      Coronary Artery Disease Mother        SOCIAL HISTORY:  Social History     Socioeconomic History     Marital status:      Spouse name: Erin     Number of children: None     Years of education: None     Highest education level: None   Occupational History     None   Tobacco Use     Smoking status: Never Smoker     Smokeless tobacco: Never Used   Substance and Sexual Activity     Alcohol use: Yes     Alcohol/week: 2.0 - 3.0 standard drinks     Types: 2 - 3 Cans of beer per week     Comment: social only     Drug use: No     Sexual activity: Yes     Partners: Female   Other Topics Concern      Service Not Asked     Blood Transfusions Not Asked     Caffeine Concern Not Asked     Occupational Exposure Not Asked     Hobby Hazards Not Asked     Sleep Concern Not Asked     Stress Concern Not Asked     Weight Concern Not Asked     Special Diet No     Back Care Not Asked     Exercise Yes     Comment: 2-3x/week     Bike Helmet Not Asked     Seat  "Belt Not Asked     Self-Exams Not Asked     Parent/sibling w/ CABG, MI or angioplasty before 65F 55M? Not Asked   Social History Narrative     None     Social Determinants of Health     Financial Resource Strain: Not on file   Food Insecurity: Not on file   Transportation Needs: Not on file   Physical Activity: Not on file   Stress: Not on file   Social Connections: Not on file   Intimate Partner Violence: Not on file   Housing Stability: Not on file       Review of Systems:  Skin:  Negative       Eyes:  Negative      ENT:  Positive for postnasal drainage at night when laying down  Respiratory:  Negative       Cardiovascular:  Negative Positive for;palpitations in A-fib since Monday morning - while exercising he doesn't feel his heart beating faster, but does feel it beating harder.  Gastroenterology: Negative      Genitourinary:  Negative      Musculoskeletal:  Negative      Neurologic:  Positive for headaches    Psychiatric:  Negative      Heme/Lymph/Imm:  Negative      Endocrine:  Negative        Physical Exam:  Vitals: /82   Pulse 68   Ht 1.778 m (5' 10\")   Wt 79.8 kg (176 lb)   SpO2 99%   BMI 25.25 kg/m      Constitutional:  cooperative, alert and oriented, well developed, well nourished, in no acute distress        Skin:  warm and dry to the touch          Head:  normocephalic        Eyes:  pupils equal and round        Lymph:      ENT:  no pallor or cyanosis        Neck:           Respiratory:  normal breath sounds, clear to auscultation, normal A-P diameter, normal symmetry, normal respiratory excursion, no use of accessory muscles         Cardiac: regular rhythm, normal S1/S2, no S3 or S4, apical impulse not displaced, no murmurs, gallops or rubs       systolic ejection murmur;grade 1                                                 GI:  abdomen soft        Extremities and Muscular Skeletal:  no edema;no deformities, clubbing, cyanosis, erythema observed              Neurological:  no gross motor " deficits        Psych:  Alert and Oriented x 3;affect appropriate, oriented to time, person and place;oriented to time, person and place        CC  Sharmin Scott MD  [address]  [address]  [place] [address]                Service Date: 01/20/2022    CLINIC NOTE    HISTORY OF PRESENT ILLNESS:  I saw Mr. Brown followup of atrial fibrillation.  He is a 76-year-old white male with a history of symptomatic paroxysmal atrial fibrillation in the past.  He was treated with flecainide for years and did well until last year.  He had recurrent AFib when he was infected with COVID-19.  He converted spontaneously and was in sinus rhythm when I saw him on 12/06/2021.  A few days later, he has had recurrent atrial fibrillation.  After that, I had asked the patient to discontinue flecainide.  He had been started on Cardizem  mg p.o. daily.  The 24-hour Holter monitor showed paroxysmal atrial fibrillation/flutter with an average ventricular rate around 61 beats per minute.    Symptomatically, the patient has occasional minor palpitations.  He has no dizziness, fatigue or shortness of breath.    PHYSICAL EXAMINATION:    VITAL SIGNS:  Today, blood pressure was 132/82, heart rate 68 beats per minute, body weight 176 pounds.  CARDIAC:  Rhythm was irregularly irregular.    RADIOLOGIC STUDIES:  The EKG showed atrial flutter with controlled ventricular rate.    ASSESSMENT AND RECOMMENDATIONS:  Mr. [place] Brown is mostly in atypical atrial flutter with a controlled ventricular rate.  He has relatively minor symptoms of palpitation.  As time goes, I would expect that he will stay in persistent atypical atrial flutter.  At this point, I do not recommend further intervention.  He will continue current dose of Cardizem and Eliquis.  He will spend a few weeks in [place] during the winter.  He is scheduled to return for followup in 1 year.  The patient is asked to report back to us if he has significant changes of the symptom  status.    Sharmin Scott MD     cc:  Sabas Staley MD   [facility]  [address] [place] [address]        D: 2022   T: 2022   MT: [email or web address]    Name:     RO VELAZQUEZ  MRN:      [id number]        Account:      [id number]   :      1945           Service Date: 2022       Document: [id number]

## 2022-01-20 NOTE — PROGRESS NOTES
HPI and Plan:   See dictation        Orders Placed This Encounter   Procedures     Follow-Up with Cardiology- EP     EKG 12-lead complete w/read (Future)- to be scheduled       Orders Placed This Encounter   Medications     diltiazem ER COATED BEADS (CARDIZEM CD) 180 MG 24 hr capsule     Sig: Take 1 capsule (180 mg) by mouth daily     Dispense:  90 capsule     Refill:  3     apixaban ANTICOAGULANT (ELIQUIS) 5 MG tablet     Sig: Take 1 tablet (5 mg) by mouth 2 times daily     Dispense:  180 tablet     Refill:  3       Medications Discontinued During This Encounter   Medication Reason     diltiazem ER COATED BEADS (CARDIZEM CD) 180 MG 24 hr capsule Reorder     apixaban ANTICOAGULANT (ELIQUIS) 5 MG tablet Reorder         Encounter Diagnoses   Name Primary?     Paroxysmal atrial fibrillation (H)      Paroxysmal A-fib (H)      Persistent atrial fibrillation (H) Yes       CURRENT MEDICATIONS:  Current Outpatient Medications   Medication Sig Dispense Refill     acetaminophen (TYLENOL) 325 MG tablet Take 325-650 mg by mouth every 6 hours as needed for mild pain       apixaban ANTICOAGULANT (ELIQUIS) 5 MG tablet Take 1 tablet (5 mg) by mouth 2 times daily 180 tablet 3     diltiazem ER COATED BEADS (CARDIZEM CD) 180 MG 24 hr capsule Take 1 capsule (180 mg) by mouth daily 90 capsule 3     losartan (COZAAR) 25 MG tablet TAKE 1 TABLET BY MOUTH  DAILY 90 tablet 3     Nasal Dilators (BREATHE RIGHT LARGE) STRP nightly as needed       sildenafil (VIAGRA) 100 MG tablet Take 1 tablet (100 mg) by mouth daily as needed 30 tablet 3     simvastatin (ZOCOR) 20 MG tablet Take 1/2 tablet ( 10 mg) daily 90 tablet 3       ALLERGIES     Allergies   Allergen Reactions     Ace Inhibitors Cough       PAST MEDICAL HISTORY:  Past Medical History:   Diagnosis Date     Hypercholesterolemia      Lumbar disc disease      Migraines      Paroxysmal A-fib (H)        PAST SURGICAL HISTORY:  Past Surgical History:   Procedure Laterality Date     COLONOSCOPY        CYSTOSCOPY, RETROGRADES, COMBINED Left 5/13/2020    Procedure: LEFT RETROGRADE URETERAL PYELOGRAM;  Surgeon: Denilson Angulo MD;  Location: [facility] OR     CYSTOSCOPY, TRANSURETHRAL RESECTION (TUR) PROSTATE, COMBINED N/A 5/13/2020    Procedure: CYSTOSCOPY WITH TRANSURETHRAL RESECTION PROSTATE;  Surgeon: Denilson Angulo MD;  Location: [facility] OR     OPEN REDUCTION INTERNAL FIXATION HAND       RHINOPLASTY       TONSILLECTOMY       wisdom teeth         FAMILY HISTORY:  Family History   Problem Relation Age of Onset     Dementia Father      Osteoarthritis Father      Cerebrovascular Disease Father      Myocardial Infarction Mother      C.A.D. Mother      Coronary Artery Disease Mother        SOCIAL HISTORY:  Social History     Socioeconomic History     Marital status:      Spouse name: Erin     Number of children: None     Years of education: None     Highest education level: None   Occupational History     None   Tobacco Use     Smoking status: Never Smoker     Smokeless tobacco: Never Used   Substance and Sexual Activity     Alcohol use: Yes     Alcohol/week: 2.0 - 3.0 standard drinks     Types: 2 - 3 Cans of beer per week     Comment: social only     Drug use: No     Sexual activity: Yes     Partners: Female   Other Topics Concern      Service Not Asked     Blood Transfusions Not Asked     Caffeine Concern Not Asked     Occupational Exposure Not Asked     Hobby Hazards Not Asked     Sleep Concern Not Asked     Stress Concern Not Asked     Weight Concern Not Asked     Special Diet No     Back Care Not Asked     Exercise Yes     Comment: 2-3x/week     Bike Helmet Not Asked     Seat Belt Not Asked     Self-Exams Not Asked     Parent/sibling w/ CABG, MI or angioplasty before 65F 55M? Not Asked   Social History Narrative     None     Social Determinants of Health     Financial Resource Strain: Not on file   Food Insecurity: Not on file   Transportation Needs: Not on file   Physical Activity: Not on file  "  Stress: Not on file   Social Connections: Not on file   Intimate Partner Violence: Not on file   Housing Stability: Not on file       Review of Systems:  Skin:  Negative       Eyes:  Negative      ENT:  Positive for postnasal drainage at night when laying down  Respiratory:  Negative       Cardiovascular:  Negative Positive for;palpitations in A-fib since Monday morning - while exercising he doesn't feel his heart beating faster, but does feel it beating harder.  Gastroenterology: Negative      Genitourinary:  Negative      Musculoskeletal:  Negative      Neurologic:  Positive for headaches    Psychiatric:  Negative      Heme/Lymph/Imm:  Negative      Endocrine:  Negative        Physical Exam:  Vitals: /82   Pulse 68   Ht 1.778 m (5' 10\")   Wt 79.8 kg (176 lb)   SpO2 99%   BMI 25.25 kg/m      Constitutional:  cooperative, alert and oriented, well developed, well nourished, in no acute distress        Skin:  warm and dry to the touch          Head:  normocephalic        Eyes:  pupils equal and round        Lymph:      ENT:  no pallor or cyanosis        Neck:           Respiratory:  normal breath sounds, clear to auscultation, normal A-P diameter, normal symmetry, normal respiratory excursion, no use of accessory muscles         Cardiac: regular rhythm, normal S1/S2, no S3 or S4, apical impulse not displaced, no murmurs, gallops or rubs       systolic ejection murmur;grade 1                                                 GI:  abdomen soft        Extremities and Muscular Skeletal:  no edema;no deformities, clubbing, cyanosis, erythema observed              Neurological:  no gross motor deficits        Psych:  Alert and Oriented x 3;affect appropriate, oriented to time, person and place;oriented to time, person and place        CC  Sharmin Scott MD  [address]  [address]  [place] [id number]              "

## 2022-01-20 NOTE — LETTER
1/20/2022    Sabas Staley MD  [address] [facility] [address]    RE: Rey Brown       Dear Colleague,     I had the pleasure of seeing Rey Brown in the [facility].  HPI and Plan:   See dictation        Orders Placed This Encounter   Procedures     Follow-Up with Cardiology- EP     EKG 12-lead complete w/read (Future)- to be scheduled       Orders Placed This Encounter   Medications     diltiazem ER COATED BEADS (CARDIZEM CD) 180 MG 24 hr capsule     Sig: Take 1 capsule (180 mg) by mouth daily     Dispense:  90 capsule     Refill:  3     apixaban ANTICOAGULANT (ELIQUIS) 5 MG tablet     Sig: Take 1 tablet (5 mg) by mouth 2 times daily     Dispense:  180 tablet     Refill:  3       Medications Discontinued During This Encounter   Medication Reason     diltiazem ER COATED BEADS (CARDIZEM CD) 180 MG 24 hr capsule Reorder     apixaban ANTICOAGULANT (ELIQUIS) 5 MG tablet Reorder         Encounter Diagnoses   Name Primary?     Paroxysmal atrial fibrillation (H)      Paroxysmal A-fib (H)      Persistent atrial fibrillation (H) Yes       CURRENT MEDICATIONS:  Current Outpatient Medications   Medication Sig Dispense Refill     acetaminophen (TYLENOL) 325 MG tablet Take 325-650 mg by mouth every 6 hours as needed for mild pain       apixaban ANTICOAGULANT (ELIQUIS) 5 MG tablet Take 1 tablet (5 mg) by mouth 2 times daily 180 tablet 3     diltiazem ER COATED BEADS (CARDIZEM CD) 180 MG 24 hr capsule Take 1 capsule (180 mg) by mouth daily 90 capsule 3     losartan (COZAAR) 25 MG tablet TAKE 1 TABLET BY MOUTH  DAILY 90 tablet 3     Nasal Dilators (BREATHE RIGHT LARGE) STRP nightly as needed       sildenafil (VIAGRA) 100 MG tablet Take 1 tablet (100 mg) by mouth daily as needed 30 tablet 3     simvastatin (ZOCOR) 20 MG tablet Take 1/2 tablet ( 10 mg) daily 90 tablet 3       ALLERGIES     Allergies   Allergen Reactions     Ace Inhibitors Cough       PAST MEDICAL HISTORY:  Past  Medical History:   Diagnosis Date     Hypercholesterolemia      Lumbar disc disease      Migraines      Paroxysmal A-fib (H)        PAST SURGICAL HISTORY:  Past Surgical History:   Procedure Laterality Date     COLONOSCOPY       CYSTOSCOPY, RETROGRADES, COMBINED Left 5/13/2020    Procedure: LEFT RETROGRADE URETERAL PYELOGRAM;  Surgeon: Denilson Angulo MD;  Location:  OR     CYSTOSCOPY, TRANSURETHRAL RESECTION (TUR) PROSTATE, COMBINED N/A 5/13/2020    Procedure: CYSTOSCOPY WITH TRANSURETHRAL RESECTION PROSTATE;  Surgeon: Denilson Angulo MD;  Location:  OR     OPEN REDUCTION INTERNAL FIXATION HAND       RHINOPLASTY       TONSILLECTOMY       wisdom teeth         FAMILY HISTORY:  Family History   Problem Relation Age of Onset     Dementia Father      Osteoarthritis Father      Cerebrovascular Disease Father      Myocardial Infarction Mother      C.A.D. Mother      Coronary Artery Disease Mother        SOCIAL HISTORY:  Social History     Socioeconomic History     Marital status:      Spouse name: Erin     Number of children: None     Years of education: None     Highest education level: None   Occupational History     None   Tobacco Use     Smoking status: Never Smoker     Smokeless tobacco: Never Used   Substance and Sexual Activity     Alcohol use: Yes     Alcohol/week: 2.0 - 3.0 standard drinks     Types: 2 - 3 Cans of beer per week     Comment: social only     Drug use: No     Sexual activity: Yes     Partners: Female   Other Topics Concern      Service Not Asked     Blood Transfusions Not Asked     Caffeine Concern Not Asked     Occupational Exposure Not Asked     Hobby Hazards Not Asked     Sleep Concern Not Asked     Stress Concern Not Asked     Weight Concern Not Asked     Special Diet No     Back Care Not Asked     Exercise Yes     Comment: 2-3x/week     Bike Helmet Not Asked     Seat Belt Not Asked     Self-Exams Not Asked     Parent/sibling w/ CABG, MI or angioplasty before 65F 55M? Not  "Asked   Social History Narrative     None     Social Determinants of Health     Financial Resource Strain: Not on file   Food Insecurity: Not on file   Transportation Needs: Not on file   Physical Activity: Not on file   Stress: Not on file   Social Connections: Not on file   Intimate Partner Violence: Not on file   Housing Stability: Not on file       Review of Systems:  Skin:  Negative       Eyes:  Negative      ENT:  Positive for postnasal drainage at night when laying down  Respiratory:  Negative       Cardiovascular:  Negative Positive for;palpitations in A-fib since Monday morning - while exercising he doesn't feel his heart beating faster, but does feel it beating harder.  Gastroenterology: Negative      Genitourinary:  Negative      Musculoskeletal:  Negative      Neurologic:  Positive for headaches    Psychiatric:  Negative      Heme/Lymph/Imm:  Negative      Endocrine:  Negative        Physical Exam:  Vitals: /82   Pulse 68   Ht 1.778 m (5' 10\")   Wt 79.8 kg (176 lb)   SpO2 99%   BMI 25.25 kg/m      Constitutional:  cooperative, alert and oriented, well developed, well nourished, in no acute distress        Skin:  warm and dry to the touch          Head:  normocephalic        Eyes:  pupils equal and round        Lymph:      ENT:  no pallor or cyanosis        Neck:           Respiratory:  normal breath sounds, clear to auscultation, normal A-P diameter, normal symmetry, normal respiratory excursion, no use of accessory muscles         Cardiac: regular rhythm, normal S1/S2, no S3 or S4, apical impulse not displaced, no murmurs, gallops or rubs       systolic ejection murmur;grade 1                                                 GI:  abdomen soft        Extremities and Muscular Skeletal:  no edema;no deformities, clubbing, cyanosis, erythema observed              Neurological:  no gross motor deficits        Psych:  Alert and Oriented x 3;affect appropriate, oriented to time, person and " place;oriented to time, person and place        CC  Sharmin Scott MD  [address]  [address]  [place] [id number]                  Thank you for allowing me to participate in the care of your patient.      Sincerely,     Sharmin Scott MD     [facility]  cc:   Sharmin Scott MD  [address]  [address]  [place] [id number]

## 2022-01-25 ENCOUNTER — TELEPHONE (OUTPATIENT)
Dept: CARDIOLOGY | Facility: CLINIC | Age: 77
End: 2022-01-25
Payer: COMMERCIAL

## 2022-01-25 DIAGNOSIS — I48.0 PAROXYSMAL ATRIAL FIBRILLATION (H): Primary | ICD-10-CM

## 2022-01-25 NOTE — TELEPHONE ENCOUNTER
Pt called and states that he knows that he is in flutter most of the time and wondering why he needs to be on Diltiazem. Explained that the Diltiazem is being used for rate control and without his heart rates could be higher.  Pt wondering if he should be back on the Flecainide. Discussed that per note he was note controlled with the Flecainide thus it was stopped.  Pt states that when in flutter he is not as energetic and can not work out as hard as he used to. Pt asked about an ablation and could he have one.  Pt will be leaving for [place] on Sunday and this would not take place if okayed by Dr Scott until he was back in March.  Explained that this will need to be discussed with Dr Scott and this writer will message for his answer. Pt states understanding. Licha

## 2022-01-28 ENCOUNTER — TELEPHONE (OUTPATIENT)
Dept: CARDIOLOGY | Facility: CLINIC | Age: 77
End: 2022-01-28
Payer: COMMERCIAL

## 2022-01-28 NOTE — TELEPHONE ENCOUNTER
Patient called to see if we had any samples of diltiazem.  He has not received his mail order yet and is leaving for [place] on Sunday.  He indicated he was going to run out of his medication.  Offered to place short order at the local pharmacy to get him through.  Patient declined as he would need to pay out of pocket for medication.  HOLLEY Johns

## 2022-02-17 NOTE — TELEPHONE ENCOUNTER
Pt called and is wanting to get set up for an ablation when back from [place] on 3/14. Pt will see Dr Scott on 3/17 at 1245 to discuss and H&P. Made pt aware that first available is possibly on 3/25. Licha

## 2022-03-17 ENCOUNTER — OFFICE VISIT (OUTPATIENT)
Dept: CARDIOLOGY | Facility: CLINIC | Age: 77
End: 2022-03-17
Attending: INTERNAL MEDICINE
Payer: COMMERCIAL

## 2022-03-17 VITALS
DIASTOLIC BLOOD PRESSURE: 68 MMHG | HEART RATE: 68 BPM | SYSTOLIC BLOOD PRESSURE: 122 MMHG | WEIGHT: 176 LBS | BODY MASS INDEX: 25.2 KG/M2 | HEIGHT: 70 IN

## 2022-03-17 DIAGNOSIS — I48.0 PAROXYSMAL ATRIAL FIBRILLATION (H): ICD-10-CM

## 2022-03-17 DIAGNOSIS — Z11.59 ENCOUNTER FOR SCREENING FOR OTHER VIRAL DISEASES: Primary | ICD-10-CM

## 2022-03-17 PROCEDURE — 99214 OFFICE O/P EST MOD 30 MIN: CPT | Performed by: INTERNAL MEDICINE

## 2022-03-17 NOTE — PROGRESS NOTES
Service Date: 03/17/2022    CLINIC NOTE    HISTORY OF PRESENT ILLNESS:  I saw Mr. Brown for followup of atrial fibrillation.  He is a 76-year-old white male with a history of paroxysmal atrial fibrillation.  He was treated with flecainide for years and was doing quite well.  Last year, he had a COVID infection during which he had recurrent atrial fibrillation.  Subsequently, he had recurrent atrial fibrillation when he was fully recovered from COVID.  At that point, flecainide was discontinued.  He was treated with diltiazem and 24-hour Holter monitor showed paroxysmal atrial fibrillation with average heart rate 61 beats per minute.  During the last clinic visit, I recommended anticoagulation and continuation of diltiazem.  Apparently, the patient went on vacation and did not do well symptomatically when he had recurrent atrial fibrillation.  He is still in and out of atrial fibrillation and the patient can tell when he is in and when he is out of atrial fibrillation.  He has no dizziness, shortness of breath or chest pain.    PHYSICAL EXAMINATION:    VITAL SIGNS:  Today, blood pressure was 122/68, heart rate 68 beats per minute, body weight 176 pounds.  CARDIOVASCULAR EXAMINATION:  Showed no remarkable abnormalities.    ASSESSMENT AND RECOMMENDATIONS:  Mr. Brown is a 76-year-old white male with symptomatic paroxysmal atrial fibrillation.  He did well for years on flecainide but recently has failed to maintain sinus rhythm.  After discontinuation of flecainide, he has had continued occurrence of paroxysmal atrial fibrillation.  During atrial fibrillation, his ventricular rate is not particularly fast.  The patient is quite symptomatic and he is not happy to live with atrial fibrillation.  He was given the option of continuing current medical therapy without further intervention versus atrial fibrillation ablation.  He has decided to go for atrial fibrillation ablation.  Because of his apparent symptoms  with occurrence of atrial fibrillation, I think ablation is a reasonable decision.  The risks and benefits of ablation were explained to the patient who expressed understanding and consented for the procedure.  He will need a CT scan.  For the time being, he will continue the existing medications.    Sharmin Scott MD     cc:  Sabas Staley MD   [facility]  [address] [place] [address]    Sharmin Scott MD        D: 2022   T: 2022   MT: kourtney    Name:     RO VELAZQUEZTamra  MRN:      [id number]        Account:      [id number]   :      1945           Service Date: 2022       Document: [id number]

## 2022-03-17 NOTE — LETTER
3/17/2022    Sabas Staley MD  [address]  [facility] [address]    RE: Rey Brown       Dear Colleague,     I had the pleasure of seeing Rey Brown in the [facility].  HPI and Plan:   See dictation        Orders Placed This Encounter   Procedures     CT Angiogram heart     Case Request EP: Ablation Atrial Fibrilation       No orders of the defined types were placed in this encounter.      There are no discontinued medications.      Encounter Diagnosis   Name Primary?     Paroxysmal atrial fibrillation (H)        CURRENT MEDICATIONS:  Current Outpatient Medications   Medication Sig Dispense Refill     acetaminophen (TYLENOL) 325 MG tablet Take 325-650 mg by mouth every 6 hours as needed for mild pain       apixaban ANTICOAGULANT (ELIQUIS) 5 MG tablet Take 1 tablet (5 mg) by mouth 2 times daily 180 tablet 3     diltiazem ER COATED BEADS (CARDIZEM CD) 180 MG 24 hr capsule Take 1 capsule (180 mg) by mouth daily 90 capsule 3     losartan (COZAAR) 25 MG tablet TAKE 1 TABLET BY MOUTH  DAILY 90 tablet 3     Nasal Dilators (BREATHE RIGHT LARGE) STRP nightly as needed       sildenafil (VIAGRA) 100 MG tablet Take 1 tablet (100 mg) by mouth daily as needed 30 tablet 3     simvastatin (ZOCOR) 20 MG tablet Take 1/2 tablet ( 10 mg) daily 90 tablet 3       ALLERGIES     Allergies   Allergen Reactions     Ace Inhibitors Cough       PAST MEDICAL HISTORY:  Past Medical History:   Diagnosis Date     Hypercholesterolemia      Lumbar disc disease      Migraines      Paroxysmal A-fib (H)        PAST SURGICAL HISTORY:  Past Surgical History:   Procedure Laterality Date     COLONOSCOPY       CYSTOSCOPY, RETROGRADES, COMBINED Left 5/13/2020    Procedure: LEFT RETROGRADE URETERAL PYELOGRAM;  Surgeon: Denilson Angulo MD;  Location:  OR     CYSTOSCOPY, TRANSURETHRAL RESECTION (TUR) PROSTATE, COMBINED N/A 5/13/2020    Procedure: CYSTOSCOPY WITH TRANSURETHRAL RESECTION PROSTATE;  Surgeon:  Kev, Denilson TOBIAS MD;  Location: [facility] OR     OPEN REDUCTION INTERNAL FIXATION HAND       RHINOPLASTY       TONSILLECTOMY       wisdom teeth         FAMILY HISTORY:  Family History   Problem Relation Age of Onset     Dementia Father      Osteoarthritis Father      Cerebrovascular Disease Father      Myocardial Infarction Mother      C.A.D. Mother      Coronary Artery Disease Mother        SOCIAL HISTORY:  Social History     Socioeconomic History     Marital status:      Spouse name: Erin     Number of children: None     Years of education: None     Highest education level: None   Occupational History     None   Tobacco Use     Smoking status: Never Smoker     Smokeless tobacco: Never Used   Substance and Sexual Activity     Alcohol use: Yes     Alcohol/week: 2.0 - 3.0 standard drinks     Types: 2 - 3 Cans of beer per week     Comment: social only     Drug use: No     Sexual activity: Yes     Partners: Female   Other Topics Concern      Service Not Asked     Blood Transfusions Not Asked     Caffeine Concern Not Asked     Occupational Exposure Not Asked     Hobby Hazards Not Asked     Sleep Concern Not Asked     Stress Concern Not Asked     Weight Concern Not Asked     Special Diet No     Back Care Not Asked     Exercise Yes     Comment: 2-3x/week     Bike Helmet Not Asked     Seat Belt Not Asked     Self-Exams Not Asked     Parent/sibling w/ CABG, MI or angioplasty before 65F 55M? Not Asked   Social History Narrative     None     Social Determinants of Health     Financial Resource Strain: Not on file   Food Insecurity: Not on file   Transportation Needs: Not on file   Physical Activity: Not on file   Stress: Not on file   Social Connections: Not on file   Intimate Partner Violence: Not on file   Housing Stability: Not on file       Review of Systems:  Skin:  Negative       Eyes:  Negative      ENT:  Negative      Respiratory:  Negative       Cardiovascular:    fatigue;Positive for    Gastroenterology:  "Negative      Genitourinary:  Negative      Musculoskeletal:  Negative      Neurologic:  Negative      Psychiatric:  Negative      Heme/Lymph/Imm:  Negative      Endocrine:  Negative        Physical Exam:  Vitals: /68   Pulse 68   Ht 1.778 m (5' 10\")   Wt 79.8 kg (176 lb)   BMI 25.25 kg/m      Constitutional:           Skin:             Head:           Eyes:           Lymph:      ENT:           Neck:           Respiratory:            Cardiac:                                                           GI:           Extremities and Muscular Skeletal:                 Neurological:           Psych:           CC  Sharmin Scott MD  [address]  [place] [address]                  Thank you for allowing me to participate in the care of your patient.      Sincerely,     Sharmin Scott MD     [facility]  cc:   Sharmin Scott MD  [address]  [address]  [place] [address]        "

## 2022-03-17 NOTE — LETTER
3/17/2022       RE: Rey Borwn  [address]  [facility] [address]     Dear Colleague,    Thank you for referring your patient, Rey Brown, to the [facility] [place] at [facility]. Please see a copy of my visit note below.    HPI and Plan:   See dictation        Orders Placed This Encounter   Procedures     CT Angiogram heart     Case Request EP: Ablation Atrial Fibrilation       No orders of the defined types were placed in this encounter.      There are no discontinued medications.      Encounter Diagnosis   Name Primary?     Paroxysmal atrial fibrillation (H)        CURRENT MEDICATIONS:  Current Outpatient Medications   Medication Sig Dispense Refill     acetaminophen (TYLENOL) 325 MG tablet Take 325-650 mg by mouth every 6 hours as needed for mild pain       apixaban ANTICOAGULANT (ELIQUIS) 5 MG tablet Take 1 tablet (5 mg) by mouth 2 times daily 180 tablet 3     diltiazem ER COATED BEADS (CARDIZEM CD) 180 MG 24 hr capsule Take 1 capsule (180 mg) by mouth daily 90 capsule 3     losartan (COZAAR) 25 MG tablet TAKE 1 TABLET BY MOUTH  DAILY 90 tablet 3     Nasal Dilators (BREATHE RIGHT LARGE) STRP nightly as needed       sildenafil (VIAGRA) 100 MG tablet Take 1 tablet (100 mg) by mouth daily as needed 30 tablet 3     simvastatin (ZOCOR) 20 MG tablet Take 1/2 tablet ( 10 mg) daily 90 tablet 3       ALLERGIES     Allergies   Allergen Reactions     Ace Inhibitors Cough       PAST MEDICAL HISTORY:  Past Medical History:   Diagnosis Date     Hypercholesterolemia      Lumbar disc disease      Migraines      Paroxysmal A-fib (H)        PAST SURGICAL HISTORY:  Past Surgical History:   Procedure Laterality Date     COLONOSCOPY       CYSTOSCOPY, RETROGRADES, COMBINED Left 5/13/2020    Procedure: LEFT RETROGRADE URETERAL PYELOGRAM;  Surgeon: Denilson Angulo MD;  Location: [facility] OR     CYSTOSCOPY, TRANSURETHRAL RESECTION (TUR) PROSTATE,  COMBINED N/A 5/13/2020    Procedure: CYSTOSCOPY WITH TRANSURETHRAL RESECTION PROSTATE;  Surgeon: Denilson Angulo MD;  Location: [facility] OR     OPEN REDUCTION INTERNAL FIXATION HAND       RHINOPLASTY       TONSILLECTOMY       wisdom teeth         FAMILY HISTORY:  Family History   Problem Relation Age of Onset     Dementia Father      Osteoarthritis Father      Cerebrovascular Disease Father      Myocardial Infarction Mother      C.A.D. Mother      Coronary Artery Disease Mother        SOCIAL HISTORY:  Social History     Socioeconomic History     Marital status:      Spouse name: Erin     Number of children: None     Years of education: None     Highest education level: None   Occupational History     None   Tobacco Use     Smoking status: Never Smoker     Smokeless tobacco: Never Used   Substance and Sexual Activity     Alcohol use: Yes     Alcohol/week: 2.0 - 3.0 standard drinks     Types: 2 - 3 Cans of beer per week     Comment: social only     Drug use: No     Sexual activity: Yes     Partners: Female   Other Topics Concern      Service Not Asked     Blood Transfusions Not Asked     Caffeine Concern Not Asked     Occupational Exposure Not Asked     Hobby Hazards Not Asked     Sleep Concern Not Asked     Stress Concern Not Asked     Weight Concern Not Asked     Special Diet No     Back Care Not Asked     Exercise Yes     Comment: 2-3x/week     Bike Helmet Not Asked     Seat Belt Not Asked     Self-Exams Not Asked     Parent/sibling w/ CABG, MI or angioplasty before 65F 55M? Not Asked   Social History Narrative     None     Social Determinants of Health     Financial Resource Strain: Not on file   Food Insecurity: Not on file   Transportation Needs: Not on file   Physical Activity: Not on file   Stress: Not on file   Social Connections: Not on file   Intimate Partner Violence: Not on file   Housing Stability: Not on file     Review of Systems:  Skin:  Negative       Eyes:  Negative      ENT:  Negative     "  Respiratory:  Negative       Cardiovascular:    fatigue;Positive for    Gastroenterology: Negative      Genitourinary:  Negative      Musculoskeletal:  Negative      Neurologic:  Negative      Psychiatric:  Negative      Heme/Lymph/Imm:  Negative      Endocrine:  Negative        Physical Exam:  Vitals: /68   Pulse 68   Ht 1.778 m (5' 10\")   Wt 79.8 kg (176 lb)   BMI 25.25 kg/m      Constitutional:           Skin:             Head:           Eyes:           Lymph:      ENT:           Neck:           Respiratory:            Cardiac:                                                           GI:           Extremities and Muscular Skeletal:                 Neurological:           Psych:           Service Date: 03/17/2022    CLINIC NOTE    HISTORY OF PRESENT ILLNESS:  I saw Mr. Brown for followup of atrial fibrillation.  He is a 76-year-old white male with a history of paroxysmal atrial fibrillation.  He was treated with flecainide for years and was doing quite well.  Last year, he had a COVID infection during which he had recurrent atrial fibrillation.  Subsequently, he had recurrent atrial fibrillation when he was fully recovered from COVID.  At that point, flecainide was discontinued.  He was treated with diltiazem and 24-hour Holter monitor showed paroxysmal atrial fibrillation with average heart rate 61 beats per minute.  During the last clinic visit, I recommended anticoagulation and continuation of diltiazem.  Apparently, the patient went on vacation and did not do well symptomatically when he had recurrent atrial fibrillation.  He is still in and out of atrial fibrillation and the patient can tell when he is in and when he is out of atrial fibrillation.  He has no dizziness, shortness of breath or chest pain.    PHYSICAL EXAMINATION:    VITAL SIGNS:  Today, blood pressure was 122/68, heart rate 68 beats per minute, body weight 176 pounds.  CARDIOVASCULAR EXAMINATION:  Showed no remarkable " abnormalities.    ASSESSMENT AND RECOMMENDATIONS:  Mr. Velazquez is a 76-year-old white male with symptomatic paroxysmal atrial fibrillation.  He did well for years on flecainide but recently has failed to maintain sinus rhythm.  After discontinuation of flecainide, he has had continued occurrence of paroxysmal atrial fibrillation.  During atrial fibrillation, his ventricular rate is not particularly fast.  The patient is quite symptomatic and he is not happy to live with atrial fibrillation.  He was given the option of continuing current medical therapy without further intervention versus atrial fibrillation ablation.  He has decided to go for atrial fibrillation ablation.  Because of his apparent symptoms with occurrence of atrial fibrillation, I think ablation is a reasonable decision.  The risks and benefits of ablation were explained to the patient who expressed understanding and consented for the procedure.  He will need a CT scan.  For the time being, he will continue the existing medications.    Sharmin Scott MD     cc:  Sabas Staley MD   [facility]  [address] [place] [address]        D: 2022   T: 2022   MT: kourtney    Name:     RO VELAZQUEZ  MRN:      [id number]        Account:      [id number]   :      1945           Service Date: 2022     Document: [id number]

## 2022-03-17 NOTE — PROGRESS NOTES
HPI and Plan:   See dictation        Orders Placed This Encounter   Procedures     CT Angiogram heart     Case Request EP: Ablation Atrial Fibrilation       No orders of the defined types were placed in this encounter.      There are no discontinued medications.      Encounter Diagnosis   Name Primary?     Paroxysmal atrial fibrillation (H)        CURRENT MEDICATIONS:  Current Outpatient Medications   Medication Sig Dispense Refill     acetaminophen (TYLENOL) 325 MG tablet Take 325-650 mg by mouth every 6 hours as needed for mild pain       apixaban ANTICOAGULANT (ELIQUIS) 5 MG tablet Take 1 tablet (5 mg) by mouth 2 times daily 180 tablet 3     diltiazem ER COATED BEADS (CARDIZEM CD) 180 MG 24 hr capsule Take 1 capsule (180 mg) by mouth daily 90 capsule 3     losartan (COZAAR) 25 MG tablet TAKE 1 TABLET BY MOUTH  DAILY 90 tablet 3     Nasal Dilators (BREATHE RIGHT LARGE) STRP nightly as needed       sildenafil (VIAGRA) 100 MG tablet Take 1 tablet (100 mg) by mouth daily as needed 30 tablet 3     simvastatin (ZOCOR) 20 MG tablet Take 1/2 tablet ( 10 mg) daily 90 tablet 3       ALLERGIES     Allergies   Allergen Reactions     Ace Inhibitors Cough       PAST MEDICAL HISTORY:  Past Medical History:   Diagnosis Date     Hypercholesterolemia      Lumbar disc disease      Migraines      Paroxysmal A-fib (H)        PAST SURGICAL HISTORY:  Past Surgical History:   Procedure Laterality Date     COLONOSCOPY       CYSTOSCOPY, RETROGRADES, COMBINED Left 5/13/2020    Procedure: LEFT RETROGRADE URETERAL PYELOGRAM;  Surgeon: Denilson Angulo MD;  Location:  OR     CYSTOSCOPY, TRANSURETHRAL RESECTION (TUR) PROSTATE, COMBINED N/A 5/13/2020    Procedure: CYSTOSCOPY WITH TRANSURETHRAL RESECTION PROSTATE;  Surgeon: Denilson Angulo MD;  Location:  OR     OPEN REDUCTION INTERNAL FIXATION HAND       RHINOPLASTY       TONSILLECTOMY       wisdom teeth         FAMILY HISTORY:  Family History   Problem Relation Age of Onset     Dementia  Father      Osteoarthritis Father      Cerebrovascular Disease Father      Myocardial Infarction Mother      C.A.D. Mother      Coronary Artery Disease Mother        SOCIAL HISTORY:  Social History     Socioeconomic History     Marital status:      Spouse name: Erin     Number of children: None     Years of education: None     Highest education level: None   Occupational History     None   Tobacco Use     Smoking status: Never Smoker     Smokeless tobacco: Never Used   Substance and Sexual Activity     Alcohol use: Yes     Alcohol/week: 2.0 - 3.0 standard drinks     Types: 2 - 3 Cans of beer per week     Comment: social only     Drug use: No     Sexual activity: Yes     Partners: Female   Other Topics Concern      Service Not Asked     Blood Transfusions Not Asked     Caffeine Concern Not Asked     Occupational Exposure Not Asked     Hobby Hazards Not Asked     Sleep Concern Not Asked     Stress Concern Not Asked     Weight Concern Not Asked     Special Diet No     Back Care Not Asked     Exercise Yes     Comment: 2-3x/week     Bike Helmet Not Asked     Seat Belt Not Asked     Self-Exams Not Asked     Parent/sibling w/ CABG, MI or angioplasty before 65F 55M? Not Asked   Social History Narrative     None     Social Determinants of Health     Financial Resource Strain: Not on file   Food Insecurity: Not on file   Transportation Needs: Not on file   Physical Activity: Not on file   Stress: Not on file   Social Connections: Not on file   Intimate Partner Violence: Not on file   Housing Stability: Not on file       Review of Systems:  Skin:  Negative       Eyes:  Negative      ENT:  Negative      Respiratory:  Negative       Cardiovascular:    fatigue;Positive for    Gastroenterology: Negative      Genitourinary:  Negative      Musculoskeletal:  Negative      Neurologic:  Negative      Psychiatric:  Negative      Heme/Lymph/Imm:  Negative      Endocrine:  Negative        Physical Exam:  Vitals: /68   " Pulse 68   Ht 1.778 m (5' 10\")   Wt 79.8 kg (176 lb)   BMI 25.25 kg/m      Constitutional:           Skin:             Head:           Eyes:           Lymph:      ENT:           Neck:           Respiratory:            Cardiac:                                                           GI:           Extremities and Muscular Skeletal:                 Neurological:           Psych:           CC  Sharmin Scott MD  [address]  [address]  [place] [address]              "

## 2022-03-25 ENCOUNTER — HOSPITAL ENCOUNTER (OUTPATIENT)
Dept: CARDIOLOGY | Facility: CLINIC | Age: 77
Discharge: HOME | End: 2022-03-25
Attending: INTERNAL MEDICINE | Admitting: INTERNAL MEDICINE
Payer: COMMERCIAL

## 2022-03-25 DIAGNOSIS — I48.0 PAROXYSMAL ATRIAL FIBRILLATION (H): ICD-10-CM

## 2022-03-25 LAB
CREAT BLD-MCNC: 1.2 MG/DL (ref 0.7–1.3)
GFR SERPL CREATININE-BSD FRML MDRD: >60 ML/MIN/1.73M2

## 2022-03-25 PROCEDURE — 82565 ASSAY OF CREATININE: CPT

## 2022-03-25 PROCEDURE — 75572 CT HRT W/3D IMAGE: CPT | Mod: 26 | Performed by: INTERNAL MEDICINE

## 2022-03-25 PROCEDURE — 250N000011 HC RX IP 250 OP 636: Performed by: INTERNAL MEDICINE

## 2022-03-25 PROCEDURE — 75572 CT HRT W/3D IMAGE: CPT

## 2022-03-25 RX ORDER — DIPHENHYDRAMINE HYDROCHLORIDE 50 MG/ML
25-50 INJECTION INTRAMUSCULAR; INTRAVENOUS
Status: DISCONTINUED | OUTPATIENT
Start: 2022-03-25 | End: 2022-03-26 | Stop reason: HOSPADM

## 2022-03-25 RX ORDER — ONDANSETRON 2 MG/ML
4 INJECTION INTRAMUSCULAR; INTRAVENOUS
Status: DISCONTINUED | OUTPATIENT
Start: 2022-03-25 | End: 2022-03-26 | Stop reason: HOSPADM

## 2022-03-25 RX ORDER — IOPAMIDOL 755 MG/ML
200 INJECTION, SOLUTION INTRAVASCULAR ONCE
Status: COMPLETED | OUTPATIENT
Start: 2022-03-25 | End: 2022-03-25

## 2022-03-25 RX ORDER — METHYLPREDNISOLONE SODIUM SUCCINATE 125 MG/2ML
125 INJECTION, POWDER, LYOPHILIZED, FOR SOLUTION INTRAMUSCULAR; INTRAVENOUS
Status: DISCONTINUED | OUTPATIENT
Start: 2022-03-25 | End: 2022-03-26 | Stop reason: HOSPADM

## 2022-03-25 RX ORDER — DIPHENHYDRAMINE HCL 25 MG
25 CAPSULE ORAL
Status: DISCONTINUED | OUTPATIENT
Start: 2022-03-25 | End: 2022-03-26 | Stop reason: HOSPADM

## 2022-03-25 RX ORDER — ACYCLOVIR 200 MG/1
0-1 CAPSULE ORAL
Status: DISCONTINUED | OUTPATIENT
Start: 2022-03-25 | End: 2022-03-26 | Stop reason: HOSPADM

## 2022-03-25 RX ADMIN — IOPAMIDOL 100 ML: 755 INJECTION, SOLUTION INTRAVENOUS at 09:42

## 2022-03-30 ENCOUNTER — LAB (OUTPATIENT)
Dept: URGENT CARE | Facility: URGENT CARE | Age: 77
End: 2022-03-30
Payer: COMMERCIAL

## 2022-03-30 DIAGNOSIS — Z11.59 ENCOUNTER FOR SCREENING FOR OTHER VIRAL DISEASES: ICD-10-CM

## 2022-03-30 LAB — SARS-COV-2 RNA RESP QL NAA+PROBE: NEGATIVE

## 2022-03-30 PROCEDURE — U0005 INFEC AGEN DETEC AMPLI PROBE: HCPCS

## 2022-03-30 PROCEDURE — U0003 INFECTIOUS AGENT DETECTION BY NUCLEIC ACID (DNA OR RNA); SEVERE ACUTE RESPIRATORY SYNDROME CORONAVIRUS 2 (SARS-COV-2) (CORONAVIRUS DISEASE [COVID-19]), AMPLIFIED PROBE TECHNIQUE, MAKING USE OF HIGH THROUGHPUT TECHNOLOGIES AS DESCRIBED BY CMS-2020-01-R: HCPCS

## 2022-03-31 ENCOUNTER — ANESTHESIA EVENT (OUTPATIENT)
Dept: CARDIOLOGY | Facility: CLINIC | Age: 77
End: 2022-03-31
Payer: COMMERCIAL

## 2022-03-31 ENCOUNTER — TELEPHONE (OUTPATIENT)
Dept: CARDIOLOGY | Facility: CLINIC | Age: 77
End: 2022-03-31
Payer: COMMERCIAL

## 2022-03-31 DIAGNOSIS — I48.0 PAROXYSMAL ATRIAL FIBRILLATION (H): Primary | ICD-10-CM

## 2022-03-31 RX ORDER — SODIUM CHLORIDE, SODIUM LACTATE, POTASSIUM CHLORIDE, CALCIUM CHLORIDE 600; 310; 30; 20 MG/100ML; MG/100ML; MG/100ML; MG/100ML
INJECTION, SOLUTION INTRAVENOUS CONTINUOUS
Status: CANCELLED | OUTPATIENT
Start: 2022-03-31

## 2022-03-31 RX ORDER — LIDOCAINE 40 MG/G
CREAM TOPICAL
Status: CANCELLED | OUTPATIENT
Start: 2022-03-31

## 2022-03-31 ASSESSMENT — LIFESTYLE VARIABLES: TOBACCO_USE: 0

## 2022-03-31 ASSESSMENT — ENCOUNTER SYMPTOMS: DYSRHYTHMIAS: 1

## 2022-03-31 NOTE — TELEPHONE ENCOUNTER
Pt returned call and reminded that he is to arrive at 0630 and procedure at 0830. Pt is to be NPO after midnight tonight and may have sips of water with AM medications. Pt is not diabetic or diuretic medications. Pt wife will be pt  to and from procedure and will possibly go in with pt prior to the procedure. [t made aware he will go home unless there are complications, issues with pain, bleeing or unable to void.  Pt made aware that on discharge the he will receive a follow-up summary that will have his discharge instructions, which includes taking easy for 3 days and no lifting more then 10 pounds for those 3 days. Pt made aware that his follow-up have been made, but they can be changed when he talks to [facility] nurse on Monday.  Pt COVID test is negative and reminded to wear a mask into the hospital. Licha

## 2022-03-31 NOTE — PROGRESS NOTES
Chart reviewed for upcoming procedure:    CSE, Orders in, On Eliquis.  3/30 Covid negative.  Noted heart clinic RN called at 1315 to discuss ablation procedure and left a voice mail to return her call.

## 2022-04-01 ENCOUNTER — ANESTHESIA (OUTPATIENT)
Dept: CARDIOLOGY | Facility: CLINIC | Age: 77
End: 2022-04-01
Payer: COMMERCIAL

## 2022-04-01 ENCOUNTER — HOSPITAL ENCOUNTER (OUTPATIENT)
Facility: CLINIC | Age: 77
Discharge: HOME | End: 2022-04-01
Admitting: INTERNAL MEDICINE
Payer: COMMERCIAL

## 2022-04-01 VITALS
OXYGEN SATURATION: 96 % | DIASTOLIC BLOOD PRESSURE: 75 MMHG | WEIGHT: 173.7 LBS | RESPIRATION RATE: 18 BRPM | SYSTOLIC BLOOD PRESSURE: 131 MMHG | HEART RATE: 73 BPM | BODY MASS INDEX: 24.87 KG/M2 | HEIGHT: 70 IN | TEMPERATURE: 97.8 F

## 2022-04-01 DIAGNOSIS — I48.0 PAROXYSMAL ATRIAL FIBRILLATION (H): ICD-10-CM

## 2022-04-01 PROBLEM — I48.91 ATRIAL FIBRILLATION (H): Status: ACTIVE | Noted: 2020-05-18

## 2022-04-01 LAB
ANION GAP SERPL CALCULATED.3IONS-SCNC: 2 MMOL/L (ref 3–14)
BUN SERPL-MCNC: 22 MG/DL (ref 7–30)
CALCIUM SERPL-MCNC: 8.9 MG/DL (ref 8.5–10.1)
CHLORIDE BLD-SCNC: 109 MMOL/L (ref 94–109)
CO2 SERPL-SCNC: 28 MMOL/L (ref 20–32)
CREAT SERPL-MCNC: 1.22 MG/DL (ref 0.66–1.25)
ERYTHROCYTE [DISTWIDTH] IN BLOOD BY AUTOMATED COUNT: 12.2 % (ref 10–15)
GFR SERPL CREATININE-BSD FRML MDRD: 62 ML/MIN/1.73M2
GLUCOSE BLD-MCNC: 92 MG/DL (ref 70–99)
HCT VFR BLD AUTO: 45.6 % (ref 40–53)
HGB BLD-MCNC: 16 G/DL (ref 13.3–17.7)
MCH RBC QN AUTO: 31 PG (ref 26.5–33)
MCHC RBC AUTO-ENTMCNC: 35.1 G/DL (ref 31.5–36.5)
MCV RBC AUTO: 88 FL (ref 78–100)
PLATELET # BLD AUTO: 261 10E3/UL (ref 150–450)
POTASSIUM BLD-SCNC: 3.7 MMOL/L (ref 3.4–5.3)
RBC # BLD AUTO: 5.16 10E6/UL (ref 4.4–5.9)
SODIUM SERPL-SCNC: 139 MMOL/L (ref 133–144)
WBC # BLD AUTO: 5.8 10E3/UL (ref 4–11)

## 2022-04-01 PROCEDURE — 258N000003 HC RX IP 258 OP 636: Performed by: INTERNAL MEDICINE

## 2022-04-01 PROCEDURE — C1769 GUIDE WIRE: HCPCS | Performed by: INTERNAL MEDICINE

## 2022-04-01 PROCEDURE — C1732 CATH, EP, DIAG/ABL, 3D/VECT: HCPCS | Performed by: INTERNAL MEDICINE

## 2022-04-01 PROCEDURE — 250N000009 HC RX 250: Performed by: NURSE ANESTHETIST, CERTIFIED REGISTERED

## 2022-04-01 PROCEDURE — 258N000003 HC RX IP 258 OP 636: Performed by: NURSE ANESTHETIST, CERTIFIED REGISTERED

## 2022-04-01 PROCEDURE — 36415 COLL VENOUS BLD VENIPUNCTURE: CPT | Performed by: INTERNAL MEDICINE

## 2022-04-01 PROCEDURE — 93010 ELECTROCARDIOGRAM REPORT: CPT | Mod: 76 | Performed by: INTERNAL MEDICINE

## 2022-04-01 PROCEDURE — 250N000025 HC SEVOFLURANE, PER MIN: Performed by: INTERNAL MEDICINE

## 2022-04-01 PROCEDURE — 36591 DRAW BLOOD OFF VENOUS DEVICE: CPT

## 2022-04-01 PROCEDURE — 93656 COMPRE EP EVAL ABLTJ ATR FIB: CPT | Performed by: INTERNAL MEDICINE

## 2022-04-01 PROCEDURE — 999N000071 HC STATISTIC HEART CATH LAB OR EP LAB

## 2022-04-01 PROCEDURE — C1894 INTRO/SHEATH, NON-LASER: HCPCS | Performed by: INTERNAL MEDICINE

## 2022-04-01 PROCEDURE — 85027 COMPLETE CBC AUTOMATED: CPT | Performed by: INTERNAL MEDICINE

## 2022-04-01 PROCEDURE — 93005 ELECTROCARDIOGRAM TRACING: CPT

## 2022-04-01 PROCEDURE — 370N000017 HC ANESTHESIA TECHNICAL FEE, PER MIN: Performed by: INTERNAL MEDICINE

## 2022-04-01 PROCEDURE — 85347 COAGULATION TIME ACTIVATED: CPT | Mod: 91

## 2022-04-01 PROCEDURE — 250N000011 HC RX IP 250 OP 636: Performed by: NURSE ANESTHETIST, CERTIFIED REGISTERED

## 2022-04-01 PROCEDURE — 999N000184 HC STATISTIC TELEMETRY

## 2022-04-01 PROCEDURE — C1733 CATH, EP, OTHR THAN COOL-TIP: HCPCS | Performed by: INTERNAL MEDICINE

## 2022-04-01 PROCEDURE — C1730 CATH, EP, 19 OR FEW ELECT: HCPCS | Performed by: INTERNAL MEDICINE

## 2022-04-01 PROCEDURE — 250N000011 HC RX IP 250 OP 636: Performed by: ANESTHESIOLOGY

## 2022-04-01 PROCEDURE — 272N000001 HC OR GENERAL SUPPLY STERILE: Performed by: INTERNAL MEDICINE

## 2022-04-01 PROCEDURE — 80048 BASIC METABOLIC PNL TOTAL CA: CPT | Performed by: INTERNAL MEDICINE

## 2022-04-01 PROCEDURE — 250N000011 HC RX IP 250 OP 636: Performed by: INTERNAL MEDICINE

## 2022-04-01 RX ORDER — ONDANSETRON 4 MG/1
4 TABLET, ORALLY DISINTEGRATING ORAL EVERY 30 MIN PRN
Status: DISCONTINUED | OUTPATIENT
Start: 2022-04-01 | End: 2022-04-01 | Stop reason: HOSPADM

## 2022-04-01 RX ORDER — NALOXONE HYDROCHLORIDE 0.4 MG/ML
0.2 INJECTION, SOLUTION INTRAMUSCULAR; INTRAVENOUS; SUBCUTANEOUS
Status: DISCONTINUED | OUTPATIENT
Start: 2022-04-01 | End: 2022-04-01 | Stop reason: HOSPADM

## 2022-04-01 RX ORDER — DEXAMETHASONE SODIUM PHOSPHATE 4 MG/ML
INJECTION, SOLUTION INTRA-ARTICULAR; INTRALESIONAL; INTRAMUSCULAR; INTRAVENOUS; SOFT TISSUE PRN
Status: DISCONTINUED | OUTPATIENT
Start: 2022-04-01 | End: 2022-04-01

## 2022-04-01 RX ORDER — FLECAINIDE ACETATE 100 MG/1
100 TABLET ORAL 2 TIMES DAILY
Qty: 180 TABLET | Refills: 3 | Status: SHIPPED | OUTPATIENT
Start: 2022-04-01 | End: 2022-04-25 | Stop reason: ALTCHOICE

## 2022-04-01 RX ORDER — LIDOCAINE HYDROCHLORIDE 20 MG/ML
INJECTION, SOLUTION INFILTRATION; PERINEURAL PRN
Status: DISCONTINUED | OUTPATIENT
Start: 2022-04-01 | End: 2022-04-01

## 2022-04-01 RX ORDER — PROPOFOL 10 MG/ML
INJECTION, EMULSION INTRAVENOUS PRN
Status: DISCONTINUED | OUTPATIENT
Start: 2022-04-01 | End: 2022-04-01

## 2022-04-01 RX ORDER — FENTANYL CITRATE 50 UG/ML
25 INJECTION, SOLUTION INTRAMUSCULAR; INTRAVENOUS EVERY 5 MIN PRN
Status: DISCONTINUED | OUTPATIENT
Start: 2022-04-01 | End: 2022-04-01 | Stop reason: HOSPADM

## 2022-04-01 RX ORDER — EPHEDRINE SULFATE 50 MG/ML
INJECTION, SOLUTION INTRAMUSCULAR; INTRAVENOUS; SUBCUTANEOUS PRN
Status: DISCONTINUED | OUTPATIENT
Start: 2022-04-01 | End: 2022-04-01

## 2022-04-01 RX ORDER — SODIUM CHLORIDE, SODIUM LACTATE, POTASSIUM CHLORIDE, CALCIUM CHLORIDE 600; 310; 30; 20 MG/100ML; MG/100ML; MG/100ML; MG/100ML
INJECTION, SOLUTION INTRAVENOUS CONTINUOUS
Status: DISCONTINUED | OUTPATIENT
Start: 2022-04-01 | End: 2022-04-01 | Stop reason: HOSPADM

## 2022-04-01 RX ORDER — OXYCODONE AND ACETAMINOPHEN 5; 325 MG/1; MG/1
1 TABLET ORAL EVERY 4 HOURS PRN
Status: DISCONTINUED | OUTPATIENT
Start: 2022-04-01 | End: 2022-04-01 | Stop reason: HOSPADM

## 2022-04-01 RX ORDER — LIDOCAINE 40 MG/G
CREAM TOPICAL
Status: DISCONTINUED | OUTPATIENT
Start: 2022-04-01 | End: 2022-04-01 | Stop reason: HOSPADM

## 2022-04-01 RX ORDER — GLYCOPYRROLATE 0.2 MG/ML
INJECTION, SOLUTION INTRAMUSCULAR; INTRAVENOUS PRN
Status: DISCONTINUED | OUTPATIENT
Start: 2022-04-01 | End: 2022-04-01

## 2022-04-01 RX ORDER — OXYCODONE HYDROCHLORIDE 5 MG/1
5 TABLET ORAL EVERY 4 HOURS PRN
Status: DISCONTINUED | OUTPATIENT
Start: 2022-04-01 | End: 2022-04-01 | Stop reason: HOSPADM

## 2022-04-01 RX ORDER — FENTANYL CITRATE 50 UG/ML
INJECTION, SOLUTION INTRAMUSCULAR; INTRAVENOUS PRN
Status: DISCONTINUED | OUTPATIENT
Start: 2022-04-01 | End: 2022-04-01

## 2022-04-01 RX ORDER — NALOXONE HYDROCHLORIDE 0.4 MG/ML
0.4 INJECTION, SOLUTION INTRAMUSCULAR; INTRAVENOUS; SUBCUTANEOUS
Status: DISCONTINUED | OUTPATIENT
Start: 2022-04-01 | End: 2022-04-01 | Stop reason: HOSPADM

## 2022-04-01 RX ORDER — ONDANSETRON 2 MG/ML
4 INJECTION INTRAMUSCULAR; INTRAVENOUS EVERY 30 MIN PRN
Status: DISCONTINUED | OUTPATIENT
Start: 2022-04-01 | End: 2022-04-01 | Stop reason: HOSPADM

## 2022-04-01 RX ORDER — ONDANSETRON 2 MG/ML
INJECTION INTRAMUSCULAR; INTRAVENOUS PRN
Status: DISCONTINUED | OUTPATIENT
Start: 2022-04-01 | End: 2022-04-01

## 2022-04-01 RX ORDER — HEPARIN SODIUM 1000 [USP'U]/ML
INJECTION, SOLUTION INTRAVENOUS; SUBCUTANEOUS
Status: DISCONTINUED | OUTPATIENT
Start: 2022-04-01 | End: 2022-04-01 | Stop reason: HOSPADM

## 2022-04-01 RX ORDER — HYDROMORPHONE HYDROCHLORIDE 1 MG/ML
0.2 INJECTION, SOLUTION INTRAMUSCULAR; INTRAVENOUS; SUBCUTANEOUS EVERY 5 MIN PRN
Status: DISCONTINUED | OUTPATIENT
Start: 2022-04-01 | End: 2022-04-01 | Stop reason: HOSPADM

## 2022-04-01 RX ORDER — NEOSTIGMINE METHYLSULFATE 1 MG/ML
VIAL (ML) INJECTION PRN
Status: DISCONTINUED | OUTPATIENT
Start: 2022-04-01 | End: 2022-04-01

## 2022-04-01 RX ADMIN — GLYCOPYRROLATE 0.6 MG: 0.2 INJECTION, SOLUTION INTRAMUSCULAR; INTRAVENOUS at 10:41

## 2022-04-01 RX ADMIN — SODIUM CHLORIDE, POTASSIUM CHLORIDE, SODIUM LACTATE AND CALCIUM CHLORIDE: 600; 310; 30; 20 INJECTION, SOLUTION INTRAVENOUS at 07:03

## 2022-04-01 RX ADMIN — ROCURONIUM BROMIDE 50 MG: 50 INJECTION, SOLUTION INTRAVENOUS at 08:38

## 2022-04-01 RX ADMIN — FENTANYL CITRATE 100 MCG: 50 INJECTION, SOLUTION INTRAMUSCULAR; INTRAVENOUS at 08:38

## 2022-04-01 RX ADMIN — PROPOFOL 160 MG: 10 INJECTION, EMULSION INTRAVENOUS at 08:38

## 2022-04-01 RX ADMIN — LIDOCAINE HYDROCHLORIDE 100 MG: 20 INJECTION, SOLUTION INFILTRATION; PERINEURAL at 08:38

## 2022-04-01 RX ADMIN — Medication 4 MG: at 10:41

## 2022-04-01 RX ADMIN — Medication 5 MG: at 09:17

## 2022-04-01 RX ADMIN — PHENYLEPHRINE HYDROCHLORIDE 0.5 MCG/KG/MIN: 10 INJECTION INTRAVENOUS at 08:49

## 2022-04-01 RX ADMIN — Medication 5 MG: at 08:50

## 2022-04-01 RX ADMIN — ONDANSETRON 4 MG: 2 INJECTION INTRAMUSCULAR; INTRAVENOUS at 10:41

## 2022-04-01 RX ADMIN — DEXAMETHASONE SODIUM PHOSPHATE 4 MG: 4 INJECTION, SOLUTION INTRA-ARTICULAR; INTRALESIONAL; INTRAMUSCULAR; INTRAVENOUS; SOFT TISSUE at 08:43

## 2022-04-01 RX ADMIN — HYDROMORPHONE HYDROCHLORIDE 0.2 MG: 1 INJECTION, SOLUTION INTRAMUSCULAR; INTRAVENOUS; SUBCUTANEOUS at 11:23

## 2022-04-01 NOTE — ANESTHESIA PROCEDURE NOTES
Airway       Patient location during procedure: OR       Procedure Start/Stop Times: 4/1/2022 8:39 AM  Staff -        CRNA: Latha Zapata APRN CRNA       Performed By: CRNA  Consent for Airway        Urgency: elective  Indications and Patient Condition       Indications for airway management: brittney-procedural       Induction type:intravenous       Mask difficulty assessment: 2 - vent by mask + OA or adjuvant +/- NMBA    Final Airway Details       Final airway type: endotracheal airway       Successful airway: ETT - single  Endotracheal Airway Details        ETT size (mm): 8.0       Cuffed: yes       Successful intubation technique: direct laryngoscopy       DL Blade Type: Rivers 2       Grade View of Cords: 1       Adjucts: stylet       Position: Left       Measured from: lips       Secured at (cm): 23       Bite block used: None    Post intubation assessment        Placement verified by: capnometry, equal breath sounds and chest rise        Number of attempts at approach: 1       Secured with: pink tape       Ease of procedure: easy       Dentition: Unchanged and Intact

## 2022-04-01 NOTE — PROGRESS NOTES
Atrial fibrillation ablation under general  Anesthesia.  Very large left atrium. Scattered scar over the posterior left atrium.  Pt was in persistent AF on arrival.  Ablation was performed in AF. PV antrum isolation plus left atrial linear ablation at the level of PV os. AF was changed to AFL, paced to sinus rhythm.  No complications.  May be discharged to home around 4 pm.  Stop diltiazem.  Restart flecainide 100  Mg bid.    Femoral suture needs to be removed when bed rest is over.

## 2022-04-01 NOTE — PROGRESS NOTES
PATIENT/VISITOR WELLNESS SCREENING    Step 1 Patient Screening    1. In the last month, have you been in contact with someone who was confirmed or suspected to have Coronavirus/COVID-19? No    2. Do you have the following symptoms?  Fever/Chills? No   Cough? No   Shortness of breath? No   New loss of taste or smell? No  Sore throat? No  Muscle or body aches? No  Headaches? No  Fatigue? No  Vomiting or diarrhea? No    Step 2 Visitor Screening    1. Name of Visitor (1 visitor per patient):   Erin    2. In the last month, have you been in contact with someone who was confirmed or suspected to have Coronavirus/COVID-19? No    3. Do you have the following symptoms?  Fever/Chills? No   Cough? No   Shortness of breath? No   Skin rash? No   Loss of taste or smell? No  Sore throat? No  Runny or stuffy nose? No  Muscle or body aches? No  Headaches? No  Fatigue? No  Vomiting or diarrhea? No    If the visitor has positive symptoms, notify supervisor/manger  Per policy, the visitor will need to leave the facility     Step 3 Refer to logic grid below for actions    NO SYMPTOM(S)    ACTIONS:  1. Standard rooming process  2. Provider to assess per normal protocol  3. Implement precautions as needed and per guidelines     POSITIVE SYMPTOM(S)  If positive for ANY of the following symptoms: fever, cough, shortness of breath, rash    ACTION:  1. Continue to have the patient wear a mask   2. Room patient as soon as possible  3. Don appropriate PPE when entering room  4. Provider evaluation      Care Suites Admission Nursing Note    Patient Information  Name: Rey Brown  Age: 76 year old  Reason for admission: A fib ablation  Care Suites arrival time: 0630    Visitor Information  Name: Erin  Informed of visitor restrictions: Yes  1 visitor allowed per patient   Visitor must screen negative for COVID symptoms   Visitor must wear a mask  Waiting rooms closed to visitors    Patient Admission/Assessment   Pre-procedure assessment  complete: Yes  If abnormal assessment/labs, provider notified: N/A  NPO: Yes  Medications held per instructions/orders: N/A  Consent: pending  If applicable, pregnancy test status: NA  Patient oriented to room: Yes  Education/questions answered: Yes  Plan/other: proceed    Discharge Planning  Discharge name/phone number: wife [id number] [phone]  Overnight post sedation caregiver: same  Discharge location: home    Mick Peters RN

## 2022-04-01 NOTE — ANESTHESIA CARE TRANSFER NOTE
Patient: Rey Brown    Procedure: Procedure(s):  Ablation Focal Atrial Fibrillation [[id number]]       Diagnosis: atrial fibrilation  Diagnosis Additional Information: No value filed.    Anesthesia Type:   General     Note:    Oropharynx: oropharynx clear of all foreign objects and spontaneously breathing  Level of Consciousness: awake  Oxygen Supplementation: face mask  Level of Supplemental Oxygen (L/min / FiO2): 6  Independent Airway: airway patency satisfactory and stable  Dentition: dentition unchanged  Vital Signs Stable: post-procedure vital signs reviewed and stable  Report to RN Given: handoff report given  Patient transferred to: PACU  Comments: Neuromuscular blockade reversed after TOF 4/4, spontaneous respirations, adequate tidal volumes, followed commands to voice, oropharynx suctioned with soft flexible catheter, extubated atraumatically, extubated with suction, airway patent after extubation.  Oxygen via facemask at 6 liters per minute to PACU. Oxygen tubing connected to wall O2 in PACU, SpO2, NiBP, and EKG monitors and alarms on and functioning, Debra Hugger warmer connected to patient gown, report on patient's clinical status given to PACU RN, RN questions answered,   Handoff Report: Identifed the Patient, Identified the Reponsible Provider, Reviewed the pertinent medical history, Discussed the surgical course, Reviewed Intra-OP anesthesia mangement and issues during anesthesia, Set expectations for post-procedure period and Allowed opportunity for questions and acknowledgement of understanding      Vitals:  Vitals Value Taken Time   BP     Temp     Pulse 70 04/01/22 1103   Resp 16 04/01/22 1103   SpO2 100 % 04/01/22 1103   Vitals shown include unvalidated device data.    Electronically Signed By: JOSE CRUZ Blanca CRNA  April 1, 2022  11:04 AM

## 2022-04-01 NOTE — PROGRESS NOTES
1205 - Report received from Julianna BABB, PACU.     1210 - Pt back to , room 19. Right groin with stopcock in place, CDI/soft, left subclavian with gauze/tegaderm in place, CDI, soft. Pt given water/juice. Denies pain, NSR.

## 2022-04-01 NOTE — DISCHARGE INSTRUCTIONS
A Fib Ablation Discharge Instructions    After you go home:    Have an adult stay with you until tomorrow.    You may eat your normal diet, unless your doctor tells you otherwise.    RELAX and take it easy for 3 days.        For 24-48 hours (due to the sedation you received):    DO NOT DRIVE FOR 2 DAYS!     Do NOT make any important or legal decisions.    Do NOT drive or operate machines at home or at work.    Do NOT drink alcohol.    Care of Puncture Site:    Check the puncture site severy 1-2 hours while awake.    For 2-3 days, when you cough, sneeze, laugh or move your bowels, hold your hand over the puncture sites and press firmly.    Please remove Dressing after 24 hours.  Then apply a band aid daily for at least 3 days (if needed). If there is minor oozing, apply another band aid and remove it after 12 hours.     It is normal to have a small bruise or pea size lump at the sites.    You may shower. Do NOT take a bath, or use a hot tub or pool until groin site heals, which may take up to a week.  Do NOT scrub the site. Do NOT use lotion or powder near the puncture site.    Activity:    Do NOT lift, push or pull more than 10 pounds (equal to a gallon of milk) for 3 days.    NO repetitive motions such as loading , vacuuming, raking, shoveling.     Bleeding:    If you start bleeding from the groin site, lie down flat and press firmly on the site for 10 minutes or until bleeding stops.     Once bleeding stops, lay flat for 1-2 hours.    Call your A Fib nurse if bleeding does not stop or after hours will need to go to ER.       Go to ER or Call 911 right away if you have heavy bleeding or bleeding that does not stop.    Medications:    Take your medications, including blood thinners, unless your doctor tells you not to.    If you have stopped any other medicines, check with your nurse or provider about when to restart them.    If you have pain or a tightness in your chest, you MAY takeTylenol  (acetaminophen) and if this does not help, you MAY take Advil (ibuprofen-400 mg with food).    If you have constipation or prone to constipation,  take a fiber supplement, ie metamucil or stool softners.    Call the A Fib RN if:    Chest pain not relieved by acetaminophen or ibuprofen    Difficulty swallowing and/or coughing up blood    Shortness of breath    Increased groin pain or a large or growing hard lump around the site.    Groin site is red, swollen, hot or tender.    Blood or fluid is draining from the groin site.    You have chills or a fever greater than 101 F (38 C).    Your leg feels numb, cool or changes color.    If groin pain is not relieved by Tylenol or Ibuprofen.    Recurrent irregular or fast heart rate lasting over 2-3 hours.    Any questions or concerns.    Heart rhythms:  You may have some irregular heartbeats. These feel very strong. They may make you feel that the A Fib is going to start again.  Give it time. The irregular beats should occur less often.    Follow Up Appointments:    Tanya Quintana on 4/8 at 10:30 with an EKG    Dr Scott on 6/30 at 1:15 with an EKG    REMINDER THAT YOU WILL GET A CALL ON Monday TO SEE HOW YOUR WEEKEND WENT.  ANY ISSUES OVER THE WEEKEND PLEASE CALL THE ON CALL CARDIOLOGIST, PHONE NUMBER BELOW.      [facility]   A Fib clinic RN's Brooke Copeland [phone] (Mon-Fri, 8:00-4:30)   [phone] Option 2 (7 days a week) after hours for on call Cardiologist.

## 2022-04-01 NOTE — Clinical Note
Procedure is scheduled in [facility].   Verified Results  (1) URINALYSIS w URINE C/S REFLEX (will reflex a microscopy if leukocytes, occult blood, or nitrites are not within normal limits) 04Apr2017 02:16PM Fazal Lazcano Order Number: [id number]     Test Name Result Flag Reference   COLOR Yellow     CLARITY Clear     PH UA 6 0  4 5-8 0   LEUKOCYTE ESTERASE UA Negative  Negative   NITRITE UA Negative  Negative   PROTEIN UA Negative mg/dl  Negative   GLUCOSE UA Negative mg/dl  Negative   KETONES UA Negative mg/dl  Negative   UROBILINOGEN UA 0 2 E U /dl  0 2, 1 0 E U /dl   BILIRUBIN UA Negative  Negative   BLOOD UA Trace-lysed A Negative   SPECIFIC GRAVITY UA 1 010  1 003-1 030   BACTERIA None Seen /hpf  None Seen, Occasional   EPITHELIAL CELLS None Seen /hpf  None Seen, Occasional   RBC UA None Seen /hpf  None Seen   WBC UA None Seen /hpf  None Seen   CLINICAL REPORT (Report)     Test:        Urine culture  Specimen Type:   Urine  Specimen Date:   4/4/2017 2:16 PM  Result Date:    4/5/2017 2:58 PM  Result Status:   Final result  Resulting Lab:    [facility]            Tel: [phone]      CULTURE                                       ------------------                                   No Growth <1000 cfu/mL

## 2022-04-01 NOTE — ANESTHESIA PREPROCEDURE EVALUATION
Anesthesia Pre-Procedure Evaluation    Patient: Rey Brown   MRN: [id number] : 1945        Procedure : Procedure(s):  Ablation Focal Atrial Fibrillation [[id number]]          Past Medical History:   Diagnosis Date     Hypercholesterolemia      Lumbar disc disease      Migraines      Paroxysmal A-fib (H)       Past Surgical History:   Procedure Laterality Date     COLONOSCOPY       CYSTOSCOPY, RETROGRADES, COMBINED Left 2020    Procedure: LEFT RETROGRADE URETERAL PYELOGRAM;  Surgeon: Denilson Angulo MD;  Location:  OR     CYSTOSCOPY, TRANSURETHRAL RESECTION (TUR) PROSTATE, COMBINED N/A 2020    Procedure: CYSTOSCOPY WITH TRANSURETHRAL RESECTION PROSTATE;  Surgeon: Denilson Angulo MD;  Location: [facility] OR     OPEN REDUCTION INTERNAL FIXATION HAND       RHINOPLASTY       TONSILLECTOMY       wisdom teeth        Allergies   Allergen Reactions     Ace Inhibitors Cough      Social History     Tobacco Use     Smoking status: Never Smoker     Smokeless tobacco: Never Used   Substance Use Topics     Alcohol use: Yes     Alcohol/week: 2.0 - 3.0 standard drinks     Types: 2 - 3 Cans of beer per week     Comment: social only      Wt Readings from Last 1 Encounters:   22 79.8 kg (176 lb)        Anesthesia Evaluation            ROS/MED HX  ENT/Pulmonary:    (-) tobacco use and sleep apnea   Neurologic:     (+) migraines,     Cardiovascular:     (+) Dyslipidemia -----dysrhythmias, a-fib,     METS/Exercise Tolerance:     Hematologic:       Musculoskeletal:       GI/Hepatic:    (-) GERD   Renal/Genitourinary:       Endo:       Psychiatric/Substance Use:       Infectious Disease:       Malignancy:       Other:            Physical Exam    Airway        Mallampati: II   TM distance: > 3 FB   Neck ROM: full   Mouth opening: > 3 cm    Respiratory Devices and Support         Dental  no notable dental history         Cardiovascular   cardiovascular exam normal          Pulmonary   pulmonary exam normal                 OUTSIDE LABS:  CBC:   Lab Results   Component Value Date    WBC 5.8 03/23/2021    WBC 7.4 05/11/2020    HGB 15.4 03/23/2021    HGB 13.9 05/11/2020    HCT 45.5 03/23/2021    HCT 39.9 05/11/2020     03/23/2021     05/11/2020     BMP:   Lab Results   Component Value Date     03/23/2021     05/18/2020    POTASSIUM 4.5 03/23/2021    POTASSIUM 4.6 05/18/2020    CHLORIDE 103 03/23/2021    CHLORIDE 104 05/18/2020    CO2 30 05/14/2020    CO2 27 03/13/2020    BUN 24 03/23/2021    BUN 18 03/23/2021    CR 1.2 03/25/2022    CR 1.31 (H) 03/23/2021    GLC 79 03/23/2021    GLC 88 05/18/2020     COAGS:   Lab Results   Component Value Date    INR 0.99 07/27/2007     POC: No results found for: BGM, HCG, HCGS  HEPATIC:   Lab Results   Component Value Date    ALBUMIN 4.3 03/23/2021    PROTTOTAL 6.6 03/23/2021    ALT 10 03/23/2021    AST 18 03/23/2021    ALKPHOS 71 03/23/2021    BILITOTAL 0.7 03/23/2021     OTHER:   Lab Results   Component Value Date    ELIEZER 9.3 03/23/2021    LIPASE 58 03/10/2020    AMYLASE 59 03/10/2020    CRP <0.3 04/09/2015    SED 1 04/09/2015       Anesthesia Plan    ASA Status:  2   NPO Status:  NPO Appropriate    Anesthesia Type: General.     - Airway: ETT   Induction: Intravenous, Propofol.   Maintenance: Inhalation.        Consents    Anesthesia Plan(s) and associated risks, benefits, and realistic alternatives discussed. Questions answered and patient/representative(s) expressed understanding.    - Discussed:     - Discussed with:  Patient, Spouse         Postoperative Care    Pain management: IV analgesics, Oral pain medications.   PONV prophylaxis: Ondansetron (or other 5HT-3), Dexamethasone or Solumedrol     Comments:                Aram Navarro MD

## 2022-04-01 NOTE — PROGRESS NOTES
Care Suites Discharge Nursing Note    Patient Information  Name: eRy Brown  Age: 76 year old    Discharge Education:  Discharge instructions reviewed: Yes  Additional education/resources provided: NA  Patient/patient representative verbalizes understanding: Yes  Patient discharging on new medications: Yes. Flecainide  Medication education completed: Yes    Discharge Plans:   Discharge location: home  Discharge ride contacted: Yes  Approximate discharge time: 1420    Discharge Criteria:  Discharge criteria met and vital signs stable: Yes. Left subclavian site CDI/soft, Right groin site CDI/soft, stopcock/stitch out and pt ambulated/voided/ate. PIV pulled. Denies pain.     Patient Belongs:  Patient belongings returned to patient: Yes    Chelsey Recinos RN

## 2022-04-01 NOTE — ANESTHESIA POSTPROCEDURE EVALUATION
Patient: Rey Brown    Procedure: Procedure(s):  Ablation Focal Atrial Fibrillation [[id number]]       Anesthesia Type:  General    Note:  Disposition: Outpatient   Postop Pain Control: Uneventful            Sign Out: Well controlled pain   PONV: No   Neuro/Psych: Uneventful            Sign Out: Acceptable/Baseline neuro status   Airway/Respiratory: Uneventful            Sign Out: Acceptable/Baseline resp. status   CV/Hemodynamics: Uneventful            Sign Out: Acceptable CV status   Other NRE: NONE   DID A NON-ROUTINE EVENT OCCUR? No           Last vitals:  Vitals Value Taken Time   /73 04/01/22 1200   Temp 36.6  C (97.8  F) 04/01/22 1200   Pulse 59 04/01/22 1201   Resp 13 04/01/22 1201   SpO2 97 % 04/01/22 1204   Vitals shown include unvalidated device data.    Electronically Signed By: Aram Navarro MD  April 1, 2022  4:13 PM

## 2022-04-01 NOTE — INTERVAL H&P NOTE
I have reviewed the surgical (or preoperative) H&P that is linked to this encounter, and examined the patient. There are no significant changes    Clinical Conditions Present on Arrival:  Clinically Significant Risk Factors Present on Admission                 # Coagulation Defect: home medication list includes an anticoagulant medication

## 2022-04-04 ENCOUNTER — TELEPHONE (OUTPATIENT)
Dept: CARDIOLOGY | Facility: CLINIC | Age: 77
End: 2022-04-04
Payer: COMMERCIAL

## 2022-04-04 DIAGNOSIS — I48.0 PAROXYSMAL ATRIAL FIBRILLATION (H): Primary | ICD-10-CM

## 2022-04-04 PROBLEM — R91.8 PULMONARY NODULES: Status: ACTIVE | Noted: 2022-04-04

## 2022-04-04 LAB
ACT BLD: 143 SECONDS (ref 74–150)
ACT BLD: 199 SECONDS (ref 74–150)

## 2022-04-04 NOTE — TELEPHONE ENCOUNTER
4/4/22 Spoke w pt in follow up to Afib Ablation done 4/1. He states he feels well with no complaints. He has no pain and has removed groin dressing. He has a small bruise which is flat and non tender. He is eating, drinking and using bathroom without difficulty. He stated he has no questions on discharge instructions from hospital   Reminded of med change to stop Diltiazem and re-start Flecainide at 100 gm BID.   Discussed 5 mm groundglass pulmonary nodule on right lower lobe of lung found on CT done pre-Ablation and that per Radiologist report, no routine follow up is needed at this time but consider follow up in 2 and 4 years. Will forward to PCP Dr Sabas Staley.  Pt verified he has Afib RN phone # and after hours phone #. Reminded of follow up appt on 4/8 at 1030 am w Tanya Quintana PAC.  Pt voiced understanding and agreement with plan.   Den 1023 am

## 2022-04-05 DIAGNOSIS — I48.0 PAROXYSMAL ATRIAL FIBRILLATION (H): ICD-10-CM

## 2022-04-05 PROCEDURE — 93000 ELECTROCARDIOGRAM COMPLETE: CPT | Performed by: INTERNAL MEDICINE

## 2022-04-05 NOTE — TELEPHONE ENCOUNTER
Patient here for EKG.  Had Dr Scott review.  Patient in NSR at 72 bpm.  Patient reports he thought he may have been out but was not sure.  He has been having bouts of AF since his ablation on 4/1.  Recommended that patient keep diary of events and duration.  Patient has one week follow up on 4/8.  Also recommended that patient purchase a device to track his events (apple watch or Instacl[employer] pablo).  Patient will call if having any further episodes lasting >2 hours.  HOLLEY Johns

## 2022-04-05 NOTE — TELEPHONE ENCOUNTER
Patient called reports he went into AF last night after supper.  Reports HR fluctuating between .  Tolerating symptoms.  Will have patient come in for EKG to confirm rhythm today at 1pm.  HOLLEY Johns

## 2022-04-06 LAB
ATRIAL RATE - MUSE: 60 BPM
DIASTOLIC BLOOD PRESSURE - MUSE: NORMAL MMHG
INTERPRETATION ECG - MUSE: NORMAL
P AXIS - MUSE: 65 DEGREES
PR INTERVAL - MUSE: 196 MS
QRS DURATION - MUSE: 86 MS
QT - MUSE: 446 MS
QTC - MUSE: 446 MS
R AXIS - MUSE: 69 DEGREES
SYSTOLIC BLOOD PRESSURE - MUSE: NORMAL MMHG
T AXIS - MUSE: 58 DEGREES
VENTRICULAR RATE- MUSE: 60 BPM

## 2022-04-06 NOTE — PROGRESS NOTES
[facility] HEART CLINIC    I had the pleasure of seeing Anthony when he came for follow up of recent AFib ablation.  This 76 year old sees Dr. Scott for his history of:    1. Paroxysmal AFib -had done well on flecainide for years, but had recurrent arrhythmia during COVID infection.  Rate control was not effective in controlling symptoms, and now s/p AFib ablation with Dr. Scott 4/1/2022 (PV antrum, roof/floor lines and septal line)  2. Dyslipidemia  3. HTN   4. Chronic AC for CHADSVASc 2 (HTN, age) - Eliquis      Dr. Scott saw him 3/17 at which time they discussed that though AFib was well controlled in the 60s on diltiazem, he did not feel well with his AFib.  Therefore, on 4/1, he underwent PV antrum isolation and left atrial linear ablations (roof, floor, and over LA septum) for persistent AFib.  He was noted to have a very large LA with scattered posterior LA wall scar.  During the ablation, he went into atrial flutter, which was paced terminated.  He was discharged home the same day.  Diltiazem was stopped and flecainide 100 mg BID restarted.    Of note, pre-- procedure CT showed 5 mm groundglass pulmonary nodule in the RLL for which 2 and 4 y follow-up considered.    He called 4/5 noting that he had gone back into AFib the night previous, with HRs  bpm. EKG done showed he was back into NSR.     Interval History:  Overall Anthony is doing well, without c/o CP, SOB. No fever/chills. Had a sore throat the first few days but no issues swallowing.    He forgot to take his flecainide yesterday morning and noted he was back in AFib last night.  He remains in rate-controlled AFib at this time, which he can tell. Notes that when he's been in SR since the procedure, he's had 'a lot more energy.'    No pleuritic discomfort. No groin site discomfort but notes some swelling and bruising.     VITALS:  Vitals: /78   Pulse 100   Wt 78.5 kg (173 lb)   SpO2 99%   BMI 24.82 kg/m      Diagnostic Testing:  EKG today,  which I overread, showed AFib 83 bpm. QRS duration 106 ms on flecainide 100 mg BID  CTA Heart 3/25/202 - nl PV anatomy with normal aorta. 3 v CAC. Non-cardiac portion showed 5 mm groundglass pulmonary nodule RLL for which no routine follow-up needed. If suspicious, follow-up at 2 and 4 y rec'd      Plan:  Call us Monday with update if still in AFib as Dr. Scott may want to adjust flecainide vs DCCV    Assessment/Plan:    1. Persistent AFib    As above, did not tolerate rate control and flecainide was ineffective during and after his COVID infection    Now s/p extensive ablation 4/1/2022.  Started back on flecainide 100 mg BID at time of procedure; Diltiazem stopped      EKG today shows he's back in AFib. He was on Monday as well and when came in for an EKG could tell that he was already back in SR    He's now back in AFib starting last night      Remains on Eliquis for [id number] 2 (hypertension, age)    PLAN:    Continue current meds    He will contact us if he remains in AFib throughout the weekend and remains in the on Monday. Dr. Scott may want to adjust flecainide therapy vs set up DCCV    Continue AC and current dose of flecainide.    2. Hypertension    Remains on losartan    Since Diltiazem stopped, he's not checked BP    BP today looks good    PLAN:    Continue jamin Quintana PA-C, MSPAS      Orders Placed This Encounter   Procedures     EKG 12-lead complete w/read - Clinics (performed today)     No orders of the defined types were placed in this encounter.    There are no discontinued medications.      Encounter Diagnosis   Name Primary?     Paroxysmal atrial fibrillation (H)        CURRENT MEDICATIONS:  Current Outpatient Medications   Medication Sig Dispense Refill     acetaminophen (TYLENOL) 325 MG tablet Take 325-650 mg by mouth every 6 hours as needed for mild pain       apixaban ANTICOAGULANT (ELIQUIS) 5 MG tablet Take 1 tablet (5 mg) by mouth 2 times daily 180 tablet 3     flecainide  (TAMBOCOR) 100 MG tablet Take 1 tablet (100 mg) by mouth 2 times daily 180 tablet 3     losartan (COZAAR) 25 MG tablet TAKE 1 TABLET BY MOUTH  DAILY 90 tablet 3     sildenafil (VIAGRA) 100 MG tablet Take 1 tablet (100 mg) by mouth daily as needed 30 tablet 3     simvastatin (ZOCOR) 20 MG tablet Take 1/2 tablet ( 10 mg) daily (Patient taking differently: 20 mg Take 1/2 tablet ( 10 mg) daily) 90 tablet 3     diltiazem ER COATED BEADS (CARDIZEM CD) 180 MG 24 hr capsule Take 1 capsule (180 mg) by mouth daily (Patient not taking: Reported on 4/8/2022) 90 capsule 3     Nasal Dilators (BREATHE RIGHT LARGE) STRP nightly as needed         ALLERGIES     Allergies   Allergen Reactions     Ace Inhibitors Cough         Review of Systems:  Skin:  Negative     Eyes:  Negative    ENT:  Negative    Respiratory:  Negative for dyspnea on exertion;cough;shortness of breath  Cardiovascular:  Negative for;lightheadedness;dizziness fatigue;Positive for;palpitations  Gastroenterology: Negative for melena;hematochezia  Genitourinary:  Negative    Musculoskeletal:  Negative    Neurologic:  Negative    Psychiatric:  Negative    Heme/Lymph/Imm:  Negative    Endocrine:  Negative      Physical Exam:  Vitals: /78   Pulse 100   Wt 78.5 kg (173 lb)   SpO2 99%   BMI 24.82 kg/m      Constitutional:  cooperative, alert and oriented, well developed, well nourished, in no acute distress        Skin:  warm and dry to the touch        Head:  normocephalic        Eyes:  pupils equal and round        ENT:  no pallor or cyanosis        Neck:           Chest:  normal breath sounds, clear to auscultation, normal A-P diameter, normal symmetry, normal respiratory excursion, no use of accessory muscles        Cardiac:   irregularly irregular rhythm     systolic ejection murmur;grade 1          Abdomen:  abdomen soft        Vascular:                                        Extremities and Back:  no edema;no deformities, clubbing, cyanosis, erythema  observed        Neurological:  no gross motor deficits            PAST MEDICAL HISTORY:  Past Medical History:   Diagnosis Date     Hypercholesterolemia      Lumbar disc disease      Migraines      Paroxysmal A-fib (H)        PAST SURGICAL HISTORY:  Past Surgical History:   Procedure Laterality Date     COLONOSCOPY       CYSTOSCOPY, RETROGRADES, COMBINED Left 5/13/2020    Procedure: LEFT RETROGRADE URETERAL PYELOGRAM;  Surgeon: Denilson Angulo MD;  Location:  OR     CYSTOSCOPY, TRANSURETHRAL RESECTION (TUR) PROSTATE, COMBINED N/A 5/13/2020    Procedure: CYSTOSCOPY WITH TRANSURETHRAL RESECTION PROSTATE;  Surgeon: Denilson Angulo MD;  Location:  OR     EP ABLATION FOCAL AFIB N/A 4/1/2022    Procedure: Ablation Focal Atrial Fibrillation [[id number]];  Surgeon: Sharmin Scott MD;  Location:  HEART CARDIAC CATH LAB     OPEN REDUCTION INTERNAL FIXATION HAND       RHINOPLASTY       TONSILLECTOMY       wisdom teeth         FAMILY HISTORY:  Family History   Problem Relation Age of Onset     Dementia Father      Osteoarthritis Father      Cerebrovascular Disease Father      Myocardial Infarction Mother      C.A.D. Mother      Coronary Artery Disease Mother        SOCIAL HISTORY:  Social History     Socioeconomic History     Marital status:      Spouse name: Erin     Number of children: None     Years of education: None     Highest education level: None   Occupational History     None   Tobacco Use     Smoking status: Never Smoker     Smokeless tobacco: Never Used   Substance and Sexual Activity     Alcohol use: Yes     Alcohol/week: 2.0 - 3.0 standard drinks     Types: 2 - 3 Cans of beer per week     Comment: social only     Drug use: No     Sexual activity: Yes     Partners: Female   Other Topics Concern      Service Not Asked     Blood Transfusions Not Asked     Caffeine Concern Not Asked     Occupational Exposure Not Asked     Hobby Hazards Not Asked     Sleep Concern Not Asked     Stress Concern Not Asked      Weight Concern Not Asked     Special Diet No     Back Care Not Asked     Exercise Yes     Comment: 2-3x/week     Bike Helmet Not Asked     Seat Belt Not Asked     Self-Exams Not Asked     Parent/sibling w/ CABG, MI or angioplasty before 65F 55M? Not Asked   Social History Narrative     None     Social Determinants of Health     Financial Resource Strain: Not on file   Food Insecurity: Not on file   Transportation Needs: Not on file   Physical Activity: Not on file   Stress: Not on file   Social Connections: Not on file   Intimate Partner Violence: Not on file   Housing Stability: Not on file

## 2022-04-07 LAB
ATRIAL RATE - MUSE: 326 BPM
DIASTOLIC BLOOD PRESSURE - MUSE: NORMAL MMHG
INTERPRETATION ECG - MUSE: NORMAL
P AXIS - MUSE: NORMAL DEGREES
PR INTERVAL - MUSE: NORMAL MS
QRS DURATION - MUSE: 88 MS
QT - MUSE: 414 MS
QTC - MUSE: 427 MS
R AXIS - MUSE: 73 DEGREES
SYSTOLIC BLOOD PRESSURE - MUSE: NORMAL MMHG
T AXIS - MUSE: 61 DEGREES
VENTRICULAR RATE- MUSE: 64 BPM

## 2022-04-08 ENCOUNTER — OFFICE VISIT (OUTPATIENT)
Dept: CARDIOLOGY | Facility: CLINIC | Age: 77
End: 2022-04-08
Attending: INTERNAL MEDICINE
Payer: COMMERCIAL

## 2022-04-08 VITALS
SYSTOLIC BLOOD PRESSURE: 122 MMHG | DIASTOLIC BLOOD PRESSURE: 78 MMHG | OXYGEN SATURATION: 99 % | WEIGHT: 173 LBS | HEART RATE: 100 BPM | BODY MASS INDEX: 24.82 KG/M2

## 2022-04-08 DIAGNOSIS — I48.0 PAROXYSMAL ATRIAL FIBRILLATION (H): ICD-10-CM

## 2022-04-08 PROCEDURE — 99214 OFFICE O/P EST MOD 30 MIN: CPT | Performed by: PHYSICIAN ASSISTANT

## 2022-04-08 PROCEDURE — 93000 ELECTROCARDIOGRAM COMPLETE: CPT | Performed by: PHYSICIAN ASSISTANT

## 2022-04-08 NOTE — LETTER
4/8/2022    Sabas Staley MD  [address]  [facility] [address]    RE: Rey L Stephanie       Dear Colleague,     I had the pleasure of seeing eRy Brown in the [facility].  [facility] HEART [facility]    I had the pleasure of seeing Anthony when he came for follow up of recent AFib ablation.  This 76 year old sees Dr. Scott for his history of:    1. Paroxysmal AFib -had done well on flecainide for years, but had recurrent arrhythmia during COVID infection.  Rate control was not effective in controlling symptoms, and now s/p AFib ablation with Dr. Scott 4/1/2022 (PV antrum, roof/floor lines and septal line)  2. Dyslipidemia  3. HTN   4. Chronic AC for CHADSVASc 2 (HTN, age) - Eliquis      Dr. Scott saw him 3/17 at which time they discussed that though AFib was well controlled in the 60s on diltiazem, he did not feel well with his AFib.  Therefore, on 4/1, he underwent PV antrum isolation and left atrial linear ablations (roof, floor, and over LA septum) for persistent AFib.  He was noted to have a very large LA with scattered posterior LA wall scar.  During the ablation, he went into atrial flutter, which was paced terminated.  He was discharged home the same day.  Diltiazem was stopped and flecainide 100 mg BID restarted.    Of note, pre-- procedure CT showed 5 mm groundglass pulmonary nodule in the RLL for which 2 and 4 y follow-up considered.    He called 4/5 noting that he had gone back into AFib the night previous, with HRs  bpm. EKG done showed he was back into NSR.     Interval History:  Overall Anthony is doing well, without c/o CP, SOB. No fever/chills. Had a sore throat the first few days but no issues swallowing.    He forgot to take his flecainide yesterday morning and noted he was back in AFib last night.  He remains in rate-controlled AFib at this time, which he can tell. Notes that when he's been in SR since the procedure, he's had 'a lot more energy.'    No  pleuritic discomfort. No groin site discomfort but notes some swelling and bruising.     VITALS:  Vitals: /78   Pulse 100   Wt 78.5 kg (173 lb)   SpO2 99%   BMI 24.82 kg/m      Diagnostic Testing:  EKG today, which I overread, showed AFib 83 bpm. QRS duration 106 ms on flecainide 100 mg BID  CTA Heart 3/25/202 - nl PV anatomy with normal aorta. 3 v CAC. Non-cardiac portion showed 5 mm groundglass pulmonary nodule RLL for which no routine follow-up needed. If suspicious, follow-up at 2 and 4 y rec'd      Plan:  Call us Monday with update if still in AFib as Dr. Scott may want to adjust flecainide vs DCCV    Assessment/Plan:    1. Persistent AFib    As above, did not tolerate rate control and flecainide was ineffective during and after his COVID infection    Now s/p extensive ablation 4/1/2022.  Started back on flecainide 100 mg BID at time of procedure; Diltiazem stopped      EKG today shows he's back in AFib. He was on Monday as well and when came in for an EKG could tell that he was already back in SR    He's now back in AFib starting last night      Remains on Eliquis for [id number] 2 (hypertension, age)    PLAN:    Continue current meds    He will contact us if he remains in AFib throughout the weekend and remains in the on Monday. Dr. Scott may want to adjust flecainide therapy vs set up DCCV    Continue AC and current dose of flecainide.    2. Hypertension    Remains on losartan    Since Diltiazem stopped, he's not checked BP    BP today looks good    PLAN:    Continue jamin Quintana PA-C, MSPAS      Orders Placed This Encounter   Procedures     EKG 12-lead complete w/read - Clinics (performed today)     No orders of the defined types were placed in this encounter.    There are no discontinued medications.      Encounter Diagnosis   Name Primary?     Paroxysmal atrial fibrillation (H)        CURRENT MEDICATIONS:  Current Outpatient Medications   Medication Sig Dispense Refill     acetaminophen  (TYLENOL) 325 MG tablet Take 325-650 mg by mouth every 6 hours as needed for mild pain       apixaban ANTICOAGULANT (ELIQUIS) 5 MG tablet Take 1 tablet (5 mg) by mouth 2 times daily 180 tablet 3     flecainide (TAMBOCOR) 100 MG tablet Take 1 tablet (100 mg) by mouth 2 times daily 180 tablet 3     losartan (COZAAR) 25 MG tablet TAKE 1 TABLET BY MOUTH  DAILY 90 tablet 3     sildenafil (VIAGRA) 100 MG tablet Take 1 tablet (100 mg) by mouth daily as needed 30 tablet 3     simvastatin (ZOCOR) 20 MG tablet Take 1/2 tablet ( 10 mg) daily (Patient taking differently: 20 mg Take 1/2 tablet ( 10 mg) daily) 90 tablet 3     diltiazem ER COATED BEADS (CARDIZEM CD) 180 MG 24 hr capsule Take 1 capsule (180 mg) by mouth daily (Patient not taking: Reported on 4/8/2022) 90 capsule 3     Nasal Dilators (BREATHE RIGHT LARGE) STRP nightly as needed         ALLERGIES     Allergies   Allergen Reactions     Ace Inhibitors Cough         Review of Systems:  Skin:  Negative     Eyes:  Negative    ENT:  Negative    Respiratory:  Negative for dyspnea on exertion;cough;shortness of breath  Cardiovascular:  Negative for;lightheadedness;dizziness fatigue;Positive for;palpitations  Gastroenterology: Negative for melena;hematochezia  Genitourinary:  Negative    Musculoskeletal:  Negative    Neurologic:  Negative    Psychiatric:  Negative    Heme/Lymph/Imm:  Negative    Endocrine:  Negative      Physical Exam:  Vitals: /78   Pulse 100   Wt 78.5 kg (173 lb)   SpO2 99%   BMI 24.82 kg/m      Constitutional:  cooperative, alert and oriented, well developed, well nourished, in no acute distress        Skin:  warm and dry to the touch        Head:  normocephalic        Eyes:  pupils equal and round        ENT:  no pallor or cyanosis        Neck:           Chest:  normal breath sounds, clear to auscultation, normal A-P diameter, normal symmetry, normal respiratory excursion, no use of accessory muscles        Cardiac:   irregularly irregular  rhythm     systolic ejection murmur;grade 1          Abdomen:  abdomen soft        Vascular:                                        Extremities and Back:  no edema;no deformities, clubbing, cyanosis, erythema observed        Neurological:  no gross motor deficits            PAST MEDICAL HISTORY:  Past Medical History:   Diagnosis Date     Hypercholesterolemia      Lumbar disc disease      Migraines      Paroxysmal A-fib (H)        PAST SURGICAL HISTORY:  Past Surgical History:   Procedure Laterality Date     COLONOSCOPY       CYSTOSCOPY, RETROGRADES, COMBINED Left 5/13/2020    Procedure: LEFT RETROGRADE URETERAL PYELOGRAM;  Surgeon: Denilson Angulo MD;  Location:  OR     CYSTOSCOPY, TRANSURETHRAL RESECTION (TUR) PROSTATE, COMBINED N/A 5/13/2020    Procedure: CYSTOSCOPY WITH TRANSURETHRAL RESECTION PROSTATE;  Surgeon: Denilson Angulo MD;  Location:  OR     EP ABLATION FOCAL AFIB N/A 4/1/2022    Procedure: Ablation Focal Atrial Fibrillation [[id number]];  Surgeon: Sharmin Scott MD;  Location:  HEART CARDIAC CATH LAB     OPEN REDUCTION INTERNAL FIXATION HAND       RHINOPLASTY       TONSILLECTOMY       wisdom teeth         FAMILY HISTORY:  Family History   Problem Relation Age of Onset     Dementia Father      Osteoarthritis Father      Cerebrovascular Disease Father      Myocardial Infarction Mother      C.A.D. Mother      Coronary Artery Disease Mother        SOCIAL HISTORY:  Social History     Socioeconomic History     Marital status:      Spouse name: Erin     Number of children: None     Years of education: None     Highest education level: None   Occupational History     None   Tobacco Use     Smoking status: Never Smoker     Smokeless tobacco: Never Used   Substance and Sexual Activity     Alcohol use: Yes     Alcohol/week: 2.0 - 3.0 standard drinks     Types: 2 - 3 Cans of beer per week     Comment: social only     Drug use: No     Sexual activity: Yes     Partners: Female   Other Topics Concern       Service Not Asked     Blood Transfusions Not Asked     Caffeine Concern Not Asked     Occupational Exposure Not Asked     Hobby Hazards Not Asked     Sleep Concern Not Asked     Stress Concern Not Asked     Weight Concern Not Asked     Special Diet No     Back Care Not Asked     Exercise Yes     Comment: 2-3x/week     Bike Helmet Not Asked     Seat Belt Not Asked     Self-Exams Not Asked     Parent/sibling w/ CABG, MI or angioplasty before 65F 55M? Not Asked   Social History Narrative     None     Social Determinants of Health     Financial Resource Strain: Not on file   Food Insecurity: Not on file   Transportation Needs: Not on file   Physical Activity: Not on file   Stress: Not on file   Social Connections: Not on file   Intimate Partner Violence: Not on file   Housing Stability: Not on file       Thank you for allowing me to participate in the care of your patient.      Sincerely,     Mona Quintana PA-C     [facility]  cc:   Sharmin Scott MD  [address]  [address]  [place] [address]

## 2022-04-08 NOTE — PATIENT INSTRUCTIONS
"Anthony - it was really good to meet you today!    1. Reviewed that you're back in AFib with a controlled rate  2. Reviewed that overall things are going well.  3. Reviewed CT scan showing \"groundglass nodule 5 mm\" - this may be due to infection with COVID or could just be you!  This could be checked on again with CT in 2-4 years    PLAN:  1. Let us know if you're still in AFib straight through the weekend until Monday. [phone]    "

## 2022-04-11 ENCOUNTER — TELEPHONE (OUTPATIENT)
Dept: CARDIOLOGY | Facility: CLINIC | Age: 77
End: 2022-04-11
Payer: COMMERCIAL

## 2022-04-11 DIAGNOSIS — Z11.59 ENCOUNTER FOR SCREENING FOR OTHER VIRAL DISEASES: Primary | ICD-10-CM

## 2022-04-11 DIAGNOSIS — I48.0 PAROXYSMAL ATRIAL FIBRILLATION (H): Primary | ICD-10-CM

## 2022-04-11 DIAGNOSIS — E78.00 HYPERCHOLESTEROLEMIA: ICD-10-CM

## 2022-04-11 RX ORDER — DILTIAZEM HYDROCHLORIDE 120 MG/1
120 CAPSULE, EXTENDED RELEASE ORAL DAILY
Qty: 90 CAPSULE | Refills: 1 | Status: SHIPPED | OUTPATIENT
Start: 2022-04-11 | End: 2022-04-25

## 2022-04-11 NOTE — TELEPHONE ENCOUNTER
I think he needs a DCCV as AFib has continued.    1. Continue flecainide 100 mg BID  2. Restart Diltiazem but at reduced dose 120 mg daily. Unsure of which pharmacy he wants it sent to but got it ready in computer?    3. DCCV with COVID test prior. I ordered DCCV I can call him over lunch one day to update my OV and do H/P for DCCV    4. Pls ensure NO missed doses of Eliquis 5 mg BID since discharge.    Berhane Martínez

## 2022-04-11 NOTE — TELEPHONE ENCOUNTER
Spoke to patient with recommendations per SUMIT Woods:  I think he needs a DCCV as AFib has continued.    1. Continue flecainide 100 mg BID  2. Restart Diltiazem but at reduced dose 120 mg daily. Unsure of which pharmacy he wants it sent to but got it ready in computer?  Patient wants to know if he is to change his dosing of his simvastatin.  He is currently taking 20mg po daily.    3. DCCV with COVID test prior. I ordered DCCV I can call him over lunch one day to update my OV and do H/P for DCCV    4. Pls ensure NO missed doses of Eliquis 5 mg BID since discharge.  Patient reports missing a dose of eliqus on 4/7.  Updated SUMIT Woods.  Verbal order for AGUS to be done with cardioversion  Orders placed in epic.      Patient provided verbal understanding regarding above.  Will have SUMIT Woods review dosing for simvastatin.  HOLLEY Johns

## 2022-04-11 NOTE — TELEPHONE ENCOUNTER
----- Message from Mona Quintana PA-C sent at 4/8/2022 11:03 AM CDT -----  Regarding: SHANDA Ram was in AFib during follow-up appt 4/8 ... I asked him to call us Monday if he stays in it all weekend. Dr. Scott may want to change flecainide dose or DCCV    Tanya

## 2022-04-11 NOTE — TELEPHONE ENCOUNTER
Spoke to patient who reports he continues to be in AF.  Current HR is 80.  Tolerating symptoms.  Will update SUMIT Woods regarding above.  HOLLEY Johns

## 2022-04-12 RX ORDER — SIMVASTATIN 20 MG
TABLET ORAL
Qty: 90 TABLET | Refills: 3 | Status: SHIPPED | OUTPATIENT
Start: 2022-04-12 | End: 2022-05-24

## 2022-04-12 NOTE — TELEPHONE ENCOUNTER
4/12/22 Msg recd from Tanya Quintana PA-C. Called pt and explained recommendations   - Decrease simvastatin to 10 mg daily while on the Diltiazem 120 mg daily. Pls update Epic Med list- med list updated   -  Note need for AGUS given missed dose of Eliquis since ablation.   Pt voiced understanding and agreement with plan.   Den 210 pm

## 2022-04-12 NOTE — TELEPHONE ENCOUNTER
1. Decrease simvastatin to 10 mg daily while on the Diltiazem 120 mg daily. Pls update [facility] Med list  2. Note need for AGUS given missed dose of Eliquis since ablation.     Tanya

## 2022-04-14 NOTE — PROGRESS NOTES
"[facility]    I had the pleasure of seeing Anthony when he came for follow up of Atrial Fibrillation and preoperative evaluation prior to AGUS/DCCV.  This 77 year old sees Dr. Scott for his history of:    1. Paroxysmal AFib -had done well on flecainide for years, but had recurrent arrhythmia during COVID infection.  Rate control was not effective in controlling symptoms, and now s/p AFib ablation with Dr. Scott 4/1/2022 (PV antrum, roof/floor lines and septal line)  2. Dyslipidemia  3. HTN   4. Chronic AC for CHADSVASc 2 (HTN, age) - Eliquis      I just saw Anthony 4/8 to review his extensive AFib ablation on 4/1/2022.  Diltiazem has been stopped and flecainide 100 mg BID had been restarted.  When I saw him 4/8, he was back in AFib after he had forgotten a dose of flecainide.  Heart rate was under adequate control.  He contacted him Monday 4/11, and unfortunately, he had remained in AFib over the weekend.  Therefore, I recommended that we get him set up for cardioversion.  He had missed a dose of Eliquis, and therefore needed a AGUS as well.  Diltiazem 120 mg daily was started (had previously been on 240), simvastatin reduced to 10 mg daily    Interval History:  Anthony feels \"the same\" as he did when he saw me 4/8. Confirms he's stayed in AFib. Rates on Diltiazem have come down to 70-90s (had been 90-100s).    Remains without CP, SOB, dizziness, lightheadedness.  Biggest issues is \"no stamina\" in AFib. No true palpitaitons    VITALS:  Vitals: /73   Pulse 78   Ht 1.778 m (5' 10\")   Wt 79.6 kg (175 lb 6.4 oz)   BMI 25.17 kg/m      Diagnostic Testing:  EKG today on flecainide 100 mg  BID and Diltiazem 120 mg daily, which I overread, showed AFib 80 bpm. QRS duration 102 ms  EKG 4/8/2022 showed AFib 83 bpm. QRS duration 106 ms on flecainide 100 mg BID  CTA Heart 3/25/202 - nl PV anatomy with normal aorta. 3 v CAC. Non-cardiac portion showed 5 mm groundglass pulmonary nodule RLL for which no routine " follow-up needed. If suspicious, follow-up at 2 and 4 y rec'd       Plan:  1. AGUS/Cardioversion on Wednesday as planned    Assessment/Plan:    1. Persistent Atrial Fibrillation     As above, did not tolerate rate control and flecainide was initially ineffective during/after his COVID infection.    Underwent extensive AFib ablation  4/1/2022 with PV antrum isolation and left atrial linear ablations (roof, floor, and over LA septum) for persistent AFib.  He was noted to have a very large LA with scattered posterior LA wall scar.  During the ablation, he went into atrial flutter, which was paced terminated.  He was discharged home the same day.  Diltiazem was stopped and flecainide 100 mg BID restarted.    He had some pAFib initially, but has remained in AFib now since 4/7 PM    EKG on continued flecainide and the addition of diltiazem 120 mg as of 4/11 shows AFib 80s      Has remained on Eliquis 5 mg BID, but missed a dose 4/7.  [id number] 3 (hypertension, age)    PLAN:  AGUS/DCCV set up for 4/20. We discussed Risks, Benefits and Indications of proceeding with transesophageal echocardiogram (AGUS), including but not limited to use of conscious sedation, sore throat, esophageal injury and discomfort. We discussed Risk, Benefits and Indication of proceeding with direct current cardioversion (DCCV), including but not limited to use of anesthesia, surface burns, [id number] arrhythmias requiring further treatment, discomfort and stroke.  The patient voiced understanding and understands that a  will be needed given the use of conscious sedation. A consent form will be signed by the procedural physician   * COVID test 4/18/2022. To limit contacts between testing/procedure   * Arrive 0730 on 4/20 for his procedures   * Hold Diltiazem 120 mg in the morning of the procedure to avoid post-cardioversion bradycardia.   * We will continue all other medications, including Eliquis, before the procedure   * Mallampati Stage  III      Will call us if he thinks he's back in SR and DCCV could be cancelled if we can confirm nl rhythm    Will likely STOP Diltiazem following procedure as baseline sinus HR in 50s when on this    As planned, follow-up with Dr. Scott 6/30/2022    2. HTN    Remains on losartan and now diltiazem 120 mg daily started    BPs look fine today    PLAN:    Will continue to monitor; will likely be off of Diltiazem following procedure      Tanya Quintana PA-C, MSPAS      Orders Placed This Encounter   Procedures     EKG 12-lead complete w/read - Clinics (performed today)     No orders of the defined types were placed in this encounter.    There are no discontinued medications.      Encounter Diagnosis   Name Primary?     Paroxysmal atrial fibrillation (H) Yes       CURRENT MEDICATIONS:  Current Outpatient Medications   Medication Sig Dispense Refill     acetaminophen (TYLENOL) 325 MG tablet Take 325-650 mg by mouth every 6 hours as needed for mild pain       apixaban ANTICOAGULANT (ELIQUIS) 5 MG tablet Take 1 tablet (5 mg) by mouth 2 times daily 180 tablet 3     diltiazem ER (DILT-XR) 120 MG 24 hr capsule Take 1 capsule (120 mg) by mouth in the morning. 90 capsule 1     flecainide (TAMBOCOR) 100 MG tablet Take 1 tablet (100 mg) by mouth 2 times daily 180 tablet 3     losartan (COZAAR) 25 MG tablet TAKE 1 TABLET BY MOUTH  DAILY 90 tablet 3     Nasal Dilators (BREATHE RIGHT LARGE) STRP nightly as needed       sildenafil (VIAGRA) 100 MG tablet Take 1 tablet (100 mg) by mouth daily as needed 30 tablet 3     simvastatin (ZOCOR) 20 MG tablet Take 1/2 tablet ( 10 mg) daily 90 tablet 3       ALLERGIES     Allergies   Allergen Reactions     Ace Inhibitors Cough         Review of Systems:  Skin:  Negative     Eyes:  Negative    ENT:  Negative    Respiratory:  Negative for dyspnea on exertion;cough;shortness of breath  Cardiovascular:  Negative for;lightheadedness;dizziness fatigue;Positive for;palpitations  Gastroenterology: Negative for  "melena;hematochezia  Genitourinary:  Negative    Musculoskeletal:  Negative    Neurologic:  Negative    Psychiatric:  Negative    Heme/Lymph/Imm:  Negative    Endocrine:  Negative      Physical Exam:  Vitals: /73   Pulse 78   Ht 1.778 m (5' 10\")   Wt 79.6 kg (175 lb 6.4 oz)   BMI 25.17 kg/m      Constitutional:  cooperative, alert and oriented, well developed, well nourished, in no acute distress        Skin:  warm and dry to the touch        Head:  normocephalic        Eyes:  pupils equal and round        ENT:  no pallor or cyanosis        Neck:           Chest:  normal breath sounds, clear to auscultation, normal A-P diameter, normal symmetry, normal respiratory excursion, no use of accessory muscles        Cardiac:   irregularly irregular rhythm     systolic ejection murmur;grade 1          Abdomen:  abdomen soft        Vascular:                                        Extremities and Back:  no edema;no deformities, clubbing, cyanosis, erythema observed        Neurological:  no gross motor deficits            PAST MEDICAL HISTORY:  Past Medical History:   Diagnosis Date     Hypercholesterolemia      Lumbar disc disease      Migraines      Paroxysmal A-fib (H)        PAST SURGICAL HISTORY:  Past Surgical History:   Procedure Laterality Date     COLONOSCOPY       CYSTOSCOPY, RETROGRADES, COMBINED Left 5/13/2020    Procedure: LEFT RETROGRADE URETERAL PYELOGRAM;  Surgeon: Denilson Angulo MD;  Location:  OR     CYSTOSCOPY, TRANSURETHRAL RESECTION (TUR) PROSTATE, COMBINED N/A 5/13/2020    Procedure: CYSTOSCOPY WITH TRANSURETHRAL RESECTION PROSTATE;  Surgeon: Denilson Angulo MD;  Location:  OR     EP ABLATION FOCAL AFIB N/A 4/1/2022    Procedure: Ablation Focal Atrial Fibrillation [[id number]];  Surgeon: Sharmin Scott MD;  Location:  HEART CARDIAC CATH LAB     OPEN REDUCTION INTERNAL FIXATION HAND       RHINOPLASTY       TONSILLECTOMY       wisdom teeth         FAMILY HISTORY:  Family History   Problem " Relation Age of Onset     Dementia Father      Osteoarthritis Father      Cerebrovascular Disease Father      Myocardial Infarction Mother      C.A.D. Mother      Coronary Artery Disease Mother        SOCIAL HISTORY:  Social History     Socioeconomic History     Marital status:      Spouse name: Erin     Number of children: None     Years of education: None     Highest education level: None   Tobacco Use     Smoking status: Never Smoker     Smokeless tobacco: Never Used   Substance and Sexual Activity     Alcohol use: Yes     Alcohol/week: 2.0 - 3.0 standard drinks     Types: 2 - 3 Cans of beer per week     Comment: social only     Drug use: No     Sexual activity: Yes     Partners: Female   Other Topics Concern     Special Diet No     Exercise Yes     Comment: 2-3x/week

## 2022-04-18 ENCOUNTER — OFFICE VISIT (OUTPATIENT)
Dept: CARDIOLOGY | Facility: CLINIC | Age: 77
End: 2022-04-18
Payer: COMMERCIAL

## 2022-04-18 ENCOUNTER — LAB (OUTPATIENT)
Dept: URGENT CARE | Facility: URGENT CARE | Age: 77
End: 2022-04-18
Payer: COMMERCIAL

## 2022-04-18 VITALS
BODY MASS INDEX: 25.11 KG/M2 | HEART RATE: 78 BPM | SYSTOLIC BLOOD PRESSURE: 125 MMHG | WEIGHT: 175.4 LBS | DIASTOLIC BLOOD PRESSURE: 73 MMHG | HEIGHT: 70 IN

## 2022-04-18 DIAGNOSIS — I48.0 PAROXYSMAL ATRIAL FIBRILLATION (H): ICD-10-CM

## 2022-04-18 DIAGNOSIS — Z11.59 ENCOUNTER FOR SCREENING FOR OTHER VIRAL DISEASES: ICD-10-CM

## 2022-04-18 DIAGNOSIS — I48.0 PAROXYSMAL ATRIAL FIBRILLATION (H): Primary | ICD-10-CM

## 2022-04-18 PROCEDURE — 99213 OFFICE O/P EST LOW 20 MIN: CPT | Performed by: PHYSICIAN ASSISTANT

## 2022-04-18 PROCEDURE — U0005 INFEC AGEN DETEC AMPLI PROBE: HCPCS

## 2022-04-18 PROCEDURE — 93000 ELECTROCARDIOGRAM COMPLETE: CPT | Performed by: PHYSICIAN ASSISTANT

## 2022-04-18 PROCEDURE — U0003 INFECTIOUS AGENT DETECTION BY NUCLEIC ACID (DNA OR RNA); SEVERE ACUTE RESPIRATORY SYNDROME CORONAVIRUS 2 (SARS-COV-2) (CORONAVIRUS DISEASE [COVID-19]), AMPLIFIED PROBE TECHNIQUE, MAKING USE OF HIGH THROUGHPUT TECHNOLOGIES AS DESCRIBED BY CMS-2020-01-R: HCPCS

## 2022-04-18 NOTE — PATIENT INSTRUCTIONS
Anthony - it was good to see you today!    EKG confirms continued AFib  Reviewed rate is better on the Diltiazem    PLAN:    AGUS/Cardioversion on 4/20 as planned  COVID test 4/18/2022. To limit contacts between testing/procedure  Arrive           on 4/20 for procedure  Hold Diltiazem 120 mg in the morning of the procedure to avoid Post-cardioversion bradycardia.  We will continue all other medications, including Eliquis, before the procedure    2. Avoid sauna/dehydration settings    [phone]

## 2022-04-18 NOTE — LETTER
"4/18/2022    Sabas Staley MD  [address]  [facility] [address]    RE: Rey GUZMAN Stephanie       Dear Colleague,     I had the pleasure of seeing Rey Brown in the [facility].  [facility]    I had the pleasure of seeing Anthony when he came for follow up of Atrial Fibrillation and preoperative evaluation prior to AGUS/DCCV.  This 77 year old sees Dr. Scott for his history of:    1. Paroxysmal AFib -had done well on flecainide for years, but had recurrent arrhythmia during COVID infection.  Rate control was not effective in controlling symptoms, and now s/p AFib ablation with Dr. Scott 4/1/2022 (PV antrum, roof/floor lines and septal line)  2. Dyslipidemia  3. HTN   4. Chronic AC for CHADSVASc 2 (HTN, age) - Eliquis      I just saw Anthony 4/8 to review his extensive AFib ablation on 4/1/2022.  Diltiazem has been stopped and flecainide 100 mg BID had been restarted.  When I saw him 4/8, he was back in AFib after he had forgotten a dose of flecainide.  Heart rate was under adequate control.  He contacted him Monday 4/11, and unfortunately, he had remained in AFib over the weekend.  Therefore, I recommended that we get him set up for cardioversion.  He had missed a dose of Eliquis, and therefore needed a AGUS as well.  Diltiazem 120 mg daily was started (had previously been on 240), simvastatin reduced to 10 mg daily    Interval History:  Anthony feels \"the same\" as he did when he saw me 4/8. Confirms he's stayed in AFib. Rates on Diltiazem have come down to 70-90s (had been 90-100s).    Remains without CP, SOB, dizziness, lightheadedness.  Biggest issues is \"no stamina\" in AFib. No true palpitaitons    VITALS:  Vitals: /73   Pulse 78   Ht 1.778 m (5' 10\")   Wt 79.6 kg (175 lb 6.4 oz)   BMI 25.17 kg/m      Diagnostic Testing:  EKG today on flecainide 100 mg  BID and Diltiazem 120 mg daily, which I overread, showed AFib 80 bpm. QRS duration 102 ms  EKG " 4/8/2022 showed AFib 83 bpm. QRS duration 106 ms on flecainide 100 mg BID  CTA Heart 3/25/202 - nl PV anatomy with normal aorta. 3 v CAC. Non-cardiac portion showed 5 mm groundglass pulmonary nodule RLL for which no routine follow-up needed. If suspicious, follow-up at 2 and 4 y rec'd       Plan:  1. AGUS/Cardioversion on Wednesday as planned    Assessment/Plan:    1. Persistent Atrial Fibrillation     As above, did not tolerate rate control and flecainide was initially ineffective during/after his COVID infection.    Underwent extensive AFib ablation  4/1/2022 with PV antrum isolation and left atrial linear ablations (roof, floor, and over LA septum) for persistent AFib.  He was noted to have a very large LA with scattered posterior LA wall scar.  During the ablation, he went into atrial flutter, which was paced terminated.  He was discharged home the same day.  Diltiazem was stopped and flecainide 100 mg BID restarted.    He had some pAFib initially, but has remained in AFib now since 4/7 PM    EKG on continued flecainide and the addition of diltiazem 120 mg as of 4/11 shows AFib 80s      Has remained on Eliquis 5 mg BID, but missed a dose 4/7.  [id number] 3 (hypertension, age)    PLAN:  AGUS/DCCV set up for 4/20. We discussed Risks, Benefits and Indications of proceeding with transesophageal echocardiogram (AGUS), including but not limited to use of conscious sedation, sore throat, esophageal injury and discomfort. We discussed Risk, Benefits and Indication of proceeding with direct current cardioversion (DCCV), including but not limited to use of anesthesia, surface burns, [id number] arrhythmias requiring further treatment, discomfort and stroke.  The patient voiced understanding and understands that a  will be needed given the use of conscious sedation. A consent form will be signed by the procedural physician   * COVID test 4/18/2022. To limit contacts between testing/procedure   * Arrive 0730 on 4/20  for his procedures   * Hold Diltiazem 120 mg in the morning of the procedure to avoid post-cardioversion bradycardia.   * We will continue all other medications, including Eliquis, before the procedure   * Mallampati Stage III      Will call us if he thinks he's back in SR and DCCV could be cancelled if we can confirm nl rhythm    Will likely STOP Diltiazem following procedure as baseline sinus HR in 50s when on this    As planned, follow-up with Dr. Scott 6/30/2022    2. HTN    Remains on losartan and now diltiazem 120 mg daily started    BPs look fine today    PLAN:    Will continue to monitor; will likely be off of Diltiazem following procedure      Tanya Quintana PA-C, MSPAS      Orders Placed This Encounter   Procedures     EKG 12-lead complete w/read - Clinics (performed today)     No orders of the defined types were placed in this encounter.    There are no discontinued medications.      Encounter Diagnosis   Name Primary?     Paroxysmal atrial fibrillation (H) Yes       CURRENT MEDICATIONS:  Current Outpatient Medications   Medication Sig Dispense Refill     acetaminophen (TYLENOL) 325 MG tablet Take 325-650 mg by mouth every 6 hours as needed for mild pain       apixaban ANTICOAGULANT (ELIQUIS) 5 MG tablet Take 1 tablet (5 mg) by mouth 2 times daily 180 tablet 3     diltiazem ER (DILT-XR) 120 MG 24 hr capsule Take 1 capsule (120 mg) by mouth in the morning. 90 capsule 1     flecainide (TAMBOCOR) 100 MG tablet Take 1 tablet (100 mg) by mouth 2 times daily 180 tablet 3     losartan (COZAAR) 25 MG tablet TAKE 1 TABLET BY MOUTH  DAILY 90 tablet 3     Nasal Dilators (BREATHE RIGHT LARGE) STRP nightly as needed       sildenafil (VIAGRA) 100 MG tablet Take 1 tablet (100 mg) by mouth daily as needed 30 tablet 3     simvastatin (ZOCOR) 20 MG tablet Take 1/2 tablet ( 10 mg) daily 90 tablet 3       ALLERGIES     Allergies   Allergen Reactions     Ace Inhibitors Cough         Review of Systems:  Skin:  Negative     Eyes:   "Negative    ENT:  Negative    Respiratory:  Negative for dyspnea on exertion;cough;shortness of breath  Cardiovascular:  Negative for;lightheadedness;dizziness fatigue;Positive for;palpitations  Gastroenterology: Negative for melena;hematochezia  Genitourinary:  Negative    Musculoskeletal:  Negative    Neurologic:  Negative    Psychiatric:  Negative    Heme/Lymph/Imm:  Negative    Endocrine:  Negative      Physical Exam:  Vitals: /73   Pulse 78   Ht 1.778 m (5' 10\")   Wt 79.6 kg (175 lb 6.4 oz)   BMI 25.17 kg/m      Constitutional:  cooperative, alert and oriented, well developed, well nourished, in no acute distress        Skin:  warm and dry to the touch        Head:  normocephalic        Eyes:  pupils equal and round        ENT:  no pallor or cyanosis        Neck:           Chest:  normal breath sounds, clear to auscultation, normal A-P diameter, normal symmetry, normal respiratory excursion, no use of accessory muscles        Cardiac:   irregularly irregular rhythm     systolic ejection murmur;grade 1          Abdomen:  abdomen soft        Vascular:                                        Extremities and Back:  no edema;no deformities, clubbing, cyanosis, erythema observed        Neurological:  no gross motor deficits            PAST MEDICAL HISTORY:  Past Medical History:   Diagnosis Date     Hypercholesterolemia      Lumbar disc disease      Migraines      Paroxysmal A-fib (H)        PAST SURGICAL HISTORY:  Past Surgical History:   Procedure Laterality Date     COLONOSCOPY       CYSTOSCOPY, RETROGRADES, COMBINED Left 5/13/2020    Procedure: LEFT RETROGRADE URETERAL PYELOGRAM;  Surgeon: Denilson Angulo MD;  Location:  OR     CYSTOSCOPY, TRANSURETHRAL RESECTION (TUR) PROSTATE, COMBINED N/A 5/13/2020    Procedure: CYSTOSCOPY WITH TRANSURETHRAL RESECTION PROSTATE;  Surgeon: Denilson Angulo MD;  Location: [facility] OR     EP ABLATION FOCAL AFIB N/A 4/1/2022    Procedure: Ablation Focal Atrial Fibrillation " [[id number]];  Surgeon: Sharmin Scott MD;  Location: [facility] CARDI[facility] CATH LAB     OPEN REDUCTION INTERNAL FIXATION HAND       RHINOPLASTY       TONSILLECTOMY       wisdom teeth         FAMILY HISTORY:  Family History   Problem Relation Age of Onset     Dementia Father      Osteoarthritis Father      Cerebrovascular Disease Father      Myocardial Infarction Mother      C.A.D. Mother      Coronary Artery Disease Mother        SOCIAL HISTORY:  Social History     Socioeconomic History     Marital status:      Spouse name: Erin     Number of children: None     Years of education: None     Highest education level: None   Tobacco Use     Smoking status: Never Smoker     Smokeless tobacco: Never Used   Substance and Sexual Activity     Alcohol use: Yes     Alcohol/week: 2.0 - 3.0 standard drinks     Types: 2 - 3 Cans of beer per week     Comment: social only     Drug use: No     Sexual activity: Yes     Partners: Female   Other Topics Concern     Special Diet No     Exercise Yes     Comment: 2-3x/week         Thank you for allowing me to participate in the care of your patient.      Sincerely,     Mona Quintana PA-C     [facility]  cc:   No referring provider defined for this encounter.

## 2022-04-19 ENCOUNTER — TELEPHONE (OUTPATIENT)
Dept: CARDIOLOGY | Facility: CLINIC | Age: 77
End: 2022-04-19
Payer: COMMERCIAL

## 2022-04-19 LAB — SARS-COV-2 RNA RESP QL NAA+PROBE: NEGATIVE

## 2022-04-19 NOTE — TELEPHONE ENCOUNTER
Spoke to patient regarding instructions for scheduled AGUS/Cardioversion to be completed on 4/20. He is to arrive at 7:  EP CARDIOVERSION EXTERNAL  1) NPO for 8 hours prior to the procedure except for sips of water with medications.  2) Hold insulin or oral diabetic medications morning of procedure. May take   dose long acting insulin. Follow further instructions of PMD.  3) Continue daily Coumadin.  ~ If taking Digoxin, hold AM of procedure.  ~ Plan to have a responsible adult take you home after the exam. You may not drive, take a bus or taxi by yourself.  ~ A responsible adult must stay with you for 24 hours after the test.  Patient also to hold diltiazem day of procedure.    Patient provided verbal understanding regarding above.  COVID test pending results.  HOLLEY Johns

## 2022-04-20 ENCOUNTER — HOSPITAL ENCOUNTER (OUTPATIENT)
Dept: CARDIOLOGY | Facility: CLINIC | Age: 77
Discharge: HOME | End: 2022-04-20
Attending: PHYSICIAN ASSISTANT
Payer: COMMERCIAL

## 2022-04-20 ENCOUNTER — DOCUMENTATION ONLY (OUTPATIENT)
Dept: CARDIOLOGY | Facility: CLINIC | Age: 77
End: 2022-04-20

## 2022-04-20 ENCOUNTER — HOSPITAL ENCOUNTER (OUTPATIENT)
Dept: MEDSURG UNIT | Facility: CLINIC | Age: 77
Discharge: HOME | End: 2022-04-20
Attending: PHYSICIAN ASSISTANT
Payer: COMMERCIAL

## 2022-04-20 ENCOUNTER — HOSPITAL ENCOUNTER (OUTPATIENT)
Facility: CLINIC | Age: 77
Discharge: HOME | End: 2022-04-20
Payer: COMMERCIAL

## 2022-04-20 VITALS
RESPIRATION RATE: 11 BRPM | BODY MASS INDEX: 24.71 KG/M2 | DIASTOLIC BLOOD PRESSURE: 104 MMHG | OXYGEN SATURATION: 97 % | WEIGHT: 172.62 LBS | HEART RATE: 108 BPM | TEMPERATURE: 97.8 F | SYSTOLIC BLOOD PRESSURE: 139 MMHG | HEIGHT: 70 IN

## 2022-04-20 DIAGNOSIS — I48.0 PAROXYSMAL ATRIAL FIBRILLATION (H): ICD-10-CM

## 2022-04-20 DIAGNOSIS — I48.19 PERSISTENT ATRIAL FIBRILLATION (H): Primary | ICD-10-CM

## 2022-04-20 LAB
INR PPP: 1.46 (ref 0.85–1.15)
MAGNESIUM SERPL-MCNC: 2.5 MG/DL (ref 1.6–2.3)
POTASSIUM BLD-SCNC: 4 MMOL/L (ref 3.4–5.3)

## 2022-04-20 PROCEDURE — 250N000011 HC RX IP 250 OP 636: Performed by: INTERNAL MEDICINE

## 2022-04-20 PROCEDURE — 2894A ECHO TRANSEOPHAGEAL (TEE): CPT | Performed by: INTERNAL MEDICINE

## 2022-04-20 PROCEDURE — 250N000009 HC RX 250: Performed by: INTERNAL MEDICINE

## 2022-04-20 PROCEDURE — 93005 ELECTROCARDIOGRAM TRACING: CPT

## 2022-04-20 PROCEDURE — 999N000183 HC STATISTIC TEE INCLUDES SEDATION

## 2022-04-20 PROCEDURE — 36415 COLL VENOUS BLD VENIPUNCTURE: CPT | Performed by: INTERNAL MEDICINE

## 2022-04-20 PROCEDURE — 83735 ASSAY OF MAGNESIUM: CPT | Performed by: INTERNAL MEDICINE

## 2022-04-20 PROCEDURE — 999N000054 HC STATISTIC EKG NON-CHARGEABLE

## 2022-04-20 PROCEDURE — 999N000184 HC STATISTIC TELEMETRY

## 2022-04-20 PROCEDURE — 93010 ELECTROCARDIOGRAM REPORT: CPT | Mod: 76 | Performed by: INTERNAL MEDICINE

## 2022-04-20 PROCEDURE — 92960 CARDIOVERSION ELECTRIC EXT: CPT

## 2022-04-20 PROCEDURE — 84132 ASSAY OF SERUM POTASSIUM: CPT | Performed by: INTERNAL MEDICINE

## 2022-04-20 PROCEDURE — 85610 PROTHROMBIN TIME: CPT | Performed by: INTERNAL MEDICINE

## 2022-04-20 PROCEDURE — 250N000013 HC RX MED GY IP 250 OP 250 PS 637: Performed by: INTERNAL MEDICINE

## 2022-04-20 PROCEDURE — 93325 DOPPLER ECHO COLOR FLOW MAPG: CPT | Mod: 74

## 2022-04-20 PROCEDURE — 36591 DRAW BLOOD OFF VENOUS DEVICE: CPT

## 2022-04-20 PROCEDURE — 93312 ECHO TRANSESOPHAGEAL: CPT | Mod: 26 | Performed by: INTERNAL MEDICINE

## 2022-04-20 RX ORDER — SODIUM CHLORIDE 9 MG/ML
1000 INJECTION, SOLUTION INTRAVENOUS CONTINUOUS
Status: DISCONTINUED | OUTPATIENT
Start: 2022-04-20 | End: 2022-04-20 | Stop reason: HOSPADM

## 2022-04-20 RX ORDER — FLUMAZENIL 0.1 MG/ML
0.2 INJECTION, SOLUTION INTRAVENOUS
Status: DISCONTINUED | OUTPATIENT
Start: 2022-04-20 | End: 2022-04-20 | Stop reason: HOSPADM

## 2022-04-20 RX ORDER — FENTANYL CITRATE 50 UG/ML
25 INJECTION, SOLUTION INTRAMUSCULAR; INTRAVENOUS
Status: DISCONTINUED | OUTPATIENT
Start: 2022-04-20 | End: 2022-04-20 | Stop reason: HOSPADM

## 2022-04-20 RX ORDER — GLYCOPYRROLATE 0.2 MG/ML
0.1 INJECTION, SOLUTION INTRAMUSCULAR; INTRAVENOUS ONCE
Status: COMPLETED | OUTPATIENT
Start: 2022-04-20 | End: 2022-04-20

## 2022-04-20 RX ORDER — MAGNESIUM SULFATE HEPTAHYDRATE 40 MG/ML
2 INJECTION, SOLUTION INTRAVENOUS
Status: DISCONTINUED | OUTPATIENT
Start: 2022-04-20 | End: 2022-04-20 | Stop reason: HOSPADM

## 2022-04-20 RX ORDER — METOPROLOL TARTRATE 1 MG/ML
2.5-5 INJECTION, SOLUTION INTRAVENOUS
Status: DISCONTINUED | OUTPATIENT
Start: 2022-04-20 | End: 2022-04-20 | Stop reason: HOSPADM

## 2022-04-20 RX ORDER — POTASSIUM CHLORIDE 1500 MG/1
40 TABLET, EXTENDED RELEASE ORAL
Status: DISCONTINUED | OUTPATIENT
Start: 2022-04-20 | End: 2022-04-20 | Stop reason: HOSPADM

## 2022-04-20 RX ORDER — POTASSIUM CHLORIDE 1500 MG/1
20 TABLET, EXTENDED RELEASE ORAL
Status: DISCONTINUED | OUTPATIENT
Start: 2022-04-20 | End: 2022-04-20 | Stop reason: HOSPADM

## 2022-04-20 RX ORDER — NALOXONE HYDROCHLORIDE 0.4 MG/ML
0.4 INJECTION, SOLUTION INTRAMUSCULAR; INTRAVENOUS; SUBCUTANEOUS
Status: DISCONTINUED | OUTPATIENT
Start: 2022-04-20 | End: 2022-04-20 | Stop reason: HOSPADM

## 2022-04-20 RX ORDER — HYDRALAZINE HYDROCHLORIDE 20 MG/ML
10 INJECTION INTRAMUSCULAR; INTRAVENOUS ONCE
Status: COMPLETED | OUTPATIENT
Start: 2022-04-20 | End: 2022-04-20

## 2022-04-20 RX ORDER — NALOXONE HYDROCHLORIDE 0.4 MG/ML
0.2 INJECTION, SOLUTION INTRAMUSCULAR; INTRAVENOUS; SUBCUTANEOUS
Status: DISCONTINUED | OUTPATIENT
Start: 2022-04-20 | End: 2022-04-20 | Stop reason: HOSPADM

## 2022-04-20 RX ORDER — LIDOCAINE 50 MG/G
OINTMENT TOPICAL ONCE
Status: COMPLETED | OUTPATIENT
Start: 2022-04-20 | End: 2022-04-20

## 2022-04-20 RX ORDER — ATROPINE SULFATE 0.1 MG/ML
.5-1 INJECTION INTRAVENOUS
Status: DISCONTINUED | OUTPATIENT
Start: 2022-04-20 | End: 2022-04-20 | Stop reason: HOSPADM

## 2022-04-20 RX ORDER — DEXTROSE MONOHYDRATE 25 G/50ML
9.5 INJECTION, SOLUTION INTRAVENOUS
Status: DISCONTINUED | OUTPATIENT
Start: 2022-04-20 | End: 2022-04-20 | Stop reason: HOSPADM

## 2022-04-20 RX ORDER — LIDOCAINE HYDROCHLORIDE 40 MG/ML
1.5 SOLUTION TOPICAL ONCE
Status: COMPLETED | OUTPATIENT
Start: 2022-04-20 | End: 2022-04-20

## 2022-04-20 RX ADMIN — MIDAZOLAM HYDROCHLORIDE 1 MG: 1 INJECTION, SOLUTION INTRAMUSCULAR; INTRAVENOUS at 09:58

## 2022-04-20 RX ADMIN — TOPICAL ANESTHETIC 1 ML: 200 SPRAY DENTAL; PERIODONTAL at 09:49

## 2022-04-20 RX ADMIN — MIDAZOLAM HYDROCHLORIDE 1 MG: 1 INJECTION, SOLUTION INTRAMUSCULAR; INTRAVENOUS at 10:04

## 2022-04-20 RX ADMIN — MIDAZOLAM HYDROCHLORIDE 1 MG: 1 INJECTION, SOLUTION INTRAMUSCULAR; INTRAVENOUS at 10:07

## 2022-04-20 RX ADMIN — FENTANYL CITRATE 50 MCG: 50 INJECTION, SOLUTION INTRAMUSCULAR; INTRAVENOUS at 09:54

## 2022-04-20 RX ADMIN — FENTANYL CITRATE 12.5 MCG: 50 INJECTION, SOLUTION INTRAMUSCULAR; INTRAVENOUS at 10:01

## 2022-04-20 RX ADMIN — POTASSIUM CHLORIDE 20 MEQ: 1500 TABLET, EXTENDED RELEASE ORAL at 09:23

## 2022-04-20 RX ADMIN — FENTANYL CITRATE 12.5 MCG: 50 INJECTION, SOLUTION INTRAMUSCULAR; INTRAVENOUS at 10:10

## 2022-04-20 RX ADMIN — MIDAZOLAM HYDROCHLORIDE 1 MG: 1 INJECTION, SOLUTION INTRAMUSCULAR; INTRAVENOUS at 10:12

## 2022-04-20 RX ADMIN — LIDOCAINE HYDROCHLORIDE 1.5 ML: 40 SOLUTION TOPICAL at 09:19

## 2022-04-20 RX ADMIN — FENTANYL CITRATE 25 MCG: 50 INJECTION, SOLUTION INTRAMUSCULAR; INTRAVENOUS at 10:16

## 2022-04-20 RX ADMIN — GLYCOPYRROLATE 0.1 MG: 0.2 INJECTION, SOLUTION INTRAMUSCULAR; INTRAVENOUS at 09:20

## 2022-04-20 RX ADMIN — HYDRALAZINE HYDROCHLORIDE 10 MG: 20 INJECTION INTRAMUSCULAR; INTRAVENOUS at 10:15

## 2022-04-20 RX ADMIN — MIDAZOLAM HYDROCHLORIDE 1 MG: 1 INJECTION, SOLUTION INTRAMUSCULAR; INTRAVENOUS at 09:53

## 2022-04-20 NOTE — PROGRESS NOTES
Spoke to patient to let him know the plan for doing AGUS/Cardioversion under general anesthesia.  Informed patient that scheduling will be calling him to get this set up.  Discussed with patient that he is NOT to hold diltiazem day of procedure.  Patient provided verbal understanding.  HOLLEY Johns

## 2022-04-20 NOTE — PROGRESS NOTES
PATIENT/VISITOR WELLNESS SCREENING    Step 1 Patient Screening    1. In the last month, have you been in contact with someone who was confirmed or suspected to have Coronavirus/COVID-19? No    2. Do you have the following symptoms?  Fever/Chills? No   Cough? No   Shortness of breath? No   New loss of taste or smell? No  Sore throat? No  Muscle or body aches? No  Headaches? No  Fatigue? No  Vomiting or diarrhea? No    Step 2 Visitor Screening    1. Name of Visitor (1 visitor per patient): Erin    2. In the last month, have you been in contact with someone who was confirmed or suspected to have Coronavirus/COVID-19? No    3. Do you have the following symptoms?  Fever/Chills? No   Cough? No   Shortness of breath? No   Skin rash? No   Loss of taste or smell? No  Sore throat? No  Runny or stuffy nose? No  Muscle or body aches? No  Headaches? No  Fatigue? No  Vomiting or diarrhea? No

## 2022-04-20 NOTE — DISCHARGE INSTRUCTIONS
AGUS  (Transesophageal Echocardiogram)  with Cardioversion Discharge Instructions    After you go home:    Have an adult stay with you for 6 hours.       For 24 hours - due to the sedation you received:  Relax and take it easy.  Do NOT make any important or legal decisions.  Do NOT drive or operate machines at home or at work.  Do NOT drink alcohol.    Diet:    You may resume your normal diet, but no scratchy foods for two days.  If your throat is sore, eat cold, bland or soft foods.  You may have heartburn if the tube used in the exam entered your stomach.  If so:   - Do not eat acidic and spicy foods.   - Do not eat three hours before bedtime.  Clear liquids are okay.   - When lying down, use two pillows to raise your head.    Medicines:    Take your medications, including blood thinners, unless your provider tells you not to.  If you have stopped any medicines, check with your provider about when to restart them.  You may take Tylenol (Acetaminophen) if your throat is sore.  You may take antacids if you have heartburn.      Follow Up Appointments:    Follow up with your cardiologist at [facility] of patient preference as instructed.  Follow up with your primary care provider as needed.    If you ve had a cardioversion:    The skin on your chest or back may feel tender for 48 hours.  If your skin is tender, you may:  Use a cold pack on the site. Never use ice directly on your skin. Use the cold pack for 20 minutes. Remove it for at least 30 minutes before re-using.  Apply 1% hydrocortisone cream to the skin (sold at drug stores)  Take Advil (Ibuprofen) or Tylenol (Acetaminophen).      Call the clinic if:    You have heartburn that is severe or lasts more than 72 hours.  You have a sore throat that feels worse after 72 hours.  You have shortness of breath, neck pain, chest pain, fever, chills, coughing up blood, or other unusual signs.  Call your cardiologist right away if you have an irregular heartbeat,  shortness of breath or feel dizzy.  Questions or concerns      [facility] [place]:    [phone] UMP (7 days a week)

## 2022-04-20 NOTE — PROGRESS NOTES
Received call from Dr. Martinez/HARINI Cruz's BP was quite high despite losartan 25 mg 9had held the newly-added Diltiazem 120) and could not be brought down even with sedation/hydralazine.    Also noted he did not become sedated despite conscious sedation.  They were unable to do AGUS/DCCV today.    I reviewed with Dr. Washington in Dr. Scott's absence    1. Pls have pt rescheduled for AGUS/DCCV under general anesthesia. I've ordered. Doesn't need new H/P. Pls do ASAP !!!     2. I was concerned about post-procedure bradycardia (given Diltiazem was just added and his SR HR typically is in 50s but Dr. Martinez noted BP very high with holding it so he should TAKE it AM of next procedure.    Berhane Martínez

## 2022-04-20 NOTE — PRE-PROCEDURE
GENERAL PRE-PROCEDURE:   Procedure:  Transesophageal Echocardiogram and possible cardioversion   Date/Time:  4/20/2022 9:44 AM    Verbal consent obtained?: Yes    Written consent obtained?: Yes    Risks and benefits: Risks, benefits and alternatives were discussed    Consent given by:  Patient  Patient states understanding of procedure being performed: Yes    Patient's understanding of procedure matches consent: Yes    Procedure consent matches procedure scheduled: Yes    Expected level of sedation:  Moderate  Appropriately NPO:  Yes  ASA Class:  3  Mallampati  :  Grade 3- soft palate visible, posterior pharyngeal wall not visible  Lungs:  Lungs clear with good breath sounds bilaterally  Heart:  Normal heart sounds and rate  History & Physical reviewed:  History and physical reviewed and no updates needed  Statement of review:  I have reviewed the lab findings, diagnostic data, medications, and the plan for sedation

## 2022-04-20 NOTE — PROCEDURES
[facility]    Procedure: *AGUS with Cardioversion    Date/Time: 4/20/2022 10:33 AM  Performed by: Adriana Martinez MD  Authorized by: Adriana Martinez MD       UNIVERSAL PROTOCOL   Site Marked: Yes  Prior Images Obtained and Reviewed:  Yes  Required items: Required blood products, implants, devices and special equipment available    Patient identity confirmed:  Verbally with patient  Patient was reevaluated immediately before administering moderate or deep sedation or anesthesia  Confirmation Checklist:  Patient's identity using two indicators  Time out: Immediately prior to the procedure a time out was called    Universal Protocol: the Joint Commission Universal Protocol was followed      SEDATION  Patient Sedated: Yes    Sedation:  Fentanyl and midazolam  Vital signs: Vital signs monitored during sedation      PROCEDURE  Describe Procedure: Procedure aborted. 5 mg IV versed and 125 MCG IV Fentanyl given with inadequate sedation. Patient resisted procedure. Malignant hypertension resulted with /135 mmHg. IV hydralazine given. Patient to be rebooked with general anesthesia. Advise not withholding blood pressure medication pre procedure.  Patient complications:  Sedation insufficient and patient actively resisted during the procedure  Length of time physician/provider present for 1:1 monitoring during sedation: 60

## 2022-04-20 NOTE — PROGRESS NOTES
Care Suites Discharge Nursing Note    Patient Information  Name: Rey Brown  Age: 77 year old    Discharge Education:  Discharge instructions reviewed: Yes  Additional education/resources provided: NA  Patient/patient representative verbalizes understanding: Yes  Patient discharging on new medications: No  Medication education completed: N/A    Discharge Plans:   Discharge location: home  Discharge ride contacted: Yes  Approximate discharge time: 1135    Discharge Criteria:  Discharge criteria met and vital signs stable: Yes    Patient Belongs:  Patient belongings returned to patient: Yes    Dianne Rivers RN

## 2022-04-20 NOTE — PROGRESS NOTES
Care Suites Admission Nursing Note    Patient Information  Name: Rey Brown  Age: 77 year old  Reason for admission: AGUS/DCCV  Care Suites arrival time: 0800    Visitor Information  Name: Erin  Informed of visitor restrictions: Yes  1 visitor allowed per patient   Visitor must screen negative for COVID symptoms   Visitor must wear a mask  Waiting rooms closed to visitors    Patient Admission/Assessment   Pre-procedure assessment complete: Yes  If abnormal assessment/labs, provider notified: N/A  NPO: Yes  Medications held per instructions/orders: Yes  Consent: deferred  If applicable, pregnancy test status: deferred  Patient oriented to room: Yes  Education/questions answered: Yes  Plan/other: Proceed as scheduled.    Discharge Planning  Discharge name/phone number: Erin-wife [phone]  Overnight post sedation caregiver: Erin  Discharge location: home    Dianne Rivers RN       0800 A/O. Pt denies difficulty swallowing or sleep apnea. No dentures or loose teeth. NPO x 12+ hrs. Discharge / post procedure instructions given to pt pre procedure w/ verbal understanding received.  All questions & concerns addressed. Family at bedside.     AGUS: Unable to sedate patient adequetly to allow probe to pass and hypertensive throughout despite IV hydralazine.  Procedure cancelled.  Will be rescheduled with general anesthesia. Total sedation given - 5 mg Versed & 100 mcg Fentanyl.

## 2022-04-21 ENCOUNTER — TELEPHONE (OUTPATIENT)
Dept: CARDIOLOGY | Facility: CLINIC | Age: 77
End: 2022-04-21
Payer: COMMERCIAL

## 2022-04-21 NOTE — TELEPHONE ENCOUNTER
4/21/22 Spoke w pt and informed him to take Diltiazem along with all other meds as prescribed in the am  Pt voiced understanding and agreement with plan.   Den 150 pm

## 2022-04-21 NOTE — TELEPHONE ENCOUNTER
Spoke to pt who is aware of arrival time and time of procedure. Pt is aware to be NPO after midnight and sips of water with his AM medications. Pt wife will drop pt off and  when ready to go. Pt aware that he will get a deeper sedation then he did at the last attempted TTE/Cardioversion.  Pt has no medications that he will hold.  No questions at this time. Licha

## 2022-04-21 NOTE — TELEPHONE ENCOUNTER
4/21/22 Pt called stating he is re-scheduled for his DCCV for tomorrow. Wondering if he should take his Diltiazem tomorrow am.  Will check omari Spence PA-C and call pt back  Den 145 pm

## 2022-04-22 ENCOUNTER — HOSPITAL ENCOUNTER (OUTPATIENT)
Facility: CLINIC | Age: 77
Discharge: HOME | End: 2022-04-22
Admitting: INTERNAL MEDICINE
Payer: COMMERCIAL

## 2022-04-22 ENCOUNTER — ANESTHESIA (OUTPATIENT)
Dept: SURGERY | Facility: CLINIC | Age: 77
End: 2022-04-22
Payer: COMMERCIAL

## 2022-04-22 ENCOUNTER — HOSPITAL ENCOUNTER (OUTPATIENT)
Dept: CARDIOLOGY | Facility: CLINIC | Age: 77
Discharge: HOME | End: 2022-04-22
Attending: PHYSICIAN ASSISTANT
Payer: COMMERCIAL

## 2022-04-22 ENCOUNTER — ANESTHESIA EVENT (OUTPATIENT)
Dept: SURGERY | Facility: CLINIC | Age: 77
End: 2022-04-22
Payer: COMMERCIAL

## 2022-04-22 VITALS
HEART RATE: 60 BPM | OXYGEN SATURATION: 98 % | BODY MASS INDEX: 24.58 KG/M2 | SYSTOLIC BLOOD PRESSURE: 124 MMHG | HEIGHT: 70 IN | WEIGHT: 171.7 LBS | TEMPERATURE: 96.3 F | RESPIRATION RATE: 16 BRPM | DIASTOLIC BLOOD PRESSURE: 67 MMHG

## 2022-04-22 DIAGNOSIS — I48.19 PERSISTENT ATRIAL FIBRILLATION (H): ICD-10-CM

## 2022-04-22 LAB
ATRIAL RATE - MUSE: 208 BPM
ATRIAL RATE - MUSE: 220 BPM
DIASTOLIC BLOOD PRESSURE - MUSE: NORMAL MMHG
DIASTOLIC BLOOD PRESSURE - MUSE: NORMAL MMHG
INTERPRETATION ECG - MUSE: NORMAL
INTERPRETATION ECG - MUSE: NORMAL
LVEF ECHO: NORMAL
MAGNESIUM SERPL-MCNC: 2.4 MG/DL (ref 1.6–2.3)
P AXIS - MUSE: NORMAL DEGREES
P AXIS - MUSE: NORMAL DEGREES
POTASSIUM BLD-SCNC: 4 MMOL/L (ref 3.4–5.3)
PR INTERVAL - MUSE: NORMAL MS
PR INTERVAL - MUSE: NORMAL MS
QRS DURATION - MUSE: 104 MS
QRS DURATION - MUSE: 108 MS
QT - MUSE: 398 MS
QT - MUSE: 408 MS
QTC - MUSE: 502 MS
QTC - MUSE: 526 MS
R AXIS - MUSE: 86 DEGREES
R AXIS - MUSE: 91 DEGREES
SYSTOLIC BLOOD PRESSURE - MUSE: NORMAL MMHG
SYSTOLIC BLOOD PRESSURE - MUSE: NORMAL MMHG
T AXIS - MUSE: 65 DEGREES
T AXIS - MUSE: 73 DEGREES
VENTRICULAR RATE- MUSE: 100 BPM
VENTRICULAR RATE- MUSE: 96 BPM

## 2022-04-22 PROCEDURE — 250N000009 HC RX 250: Performed by: NURSE ANESTHETIST, CERTIFIED REGISTERED

## 2022-04-22 PROCEDURE — 93010 ELECTROCARDIOGRAM REPORT: CPT | Mod: 76 | Performed by: INTERNAL MEDICINE

## 2022-04-22 PROCEDURE — 250N000013 HC RX MED GY IP 250 OP 250 PS 637: Performed by: INTERNAL MEDICINE

## 2022-04-22 PROCEDURE — 250N000009 HC RX 250: Performed by: INTERNAL MEDICINE

## 2022-04-22 PROCEDURE — 258N000003 HC RX IP 258 OP 636: Performed by: NURSE ANESTHETIST, CERTIFIED REGISTERED

## 2022-04-22 PROCEDURE — 93312 ECHO TRANSESOPHAGEAL: CPT | Mod: 26 | Performed by: INTERNAL MEDICINE

## 2022-04-22 PROCEDURE — 370N000017 HC ANESTHESIA TECHNICAL FEE, PER MIN

## 2022-04-22 PROCEDURE — 83735 ASSAY OF MAGNESIUM: CPT | Performed by: INTERNAL MEDICINE

## 2022-04-22 PROCEDURE — 999N000184 HC STATISTIC TELEMETRY

## 2022-04-22 PROCEDURE — 93325 DOPPLER ECHO COLOR FLOW MAPG: CPT

## 2022-04-22 PROCEDURE — 36415 COLL VENOUS BLD VENIPUNCTURE: CPT | Performed by: INTERNAL MEDICINE

## 2022-04-22 PROCEDURE — 250N000025 HC SEVOFLURANE, PER MIN

## 2022-04-22 PROCEDURE — 999N000183 HC STATISTIC TEE INCLUDES SEDATION

## 2022-04-22 PROCEDURE — 250N000011 HC RX IP 250 OP 636: Performed by: NURSE ANESTHETIST, CERTIFIED REGISTERED

## 2022-04-22 PROCEDURE — 84132 ASSAY OF SERUM POTASSIUM: CPT | Performed by: INTERNAL MEDICINE

## 2022-04-22 PROCEDURE — 999N000054 HC STATISTIC EKG NON-CHARGEABLE

## 2022-04-22 PROCEDURE — 93320 DOPPLER ECHO COMPLETE: CPT | Mod: 26 | Performed by: INTERNAL MEDICINE

## 2022-04-22 PROCEDURE — 93005 ELECTROCARDIOGRAM TRACING: CPT

## 2022-04-22 PROCEDURE — 93325 DOPPLER ECHO COLOR FLOW MAPG: CPT | Mod: 26 | Performed by: INTERNAL MEDICINE

## 2022-04-22 PROCEDURE — 92960 CARDIOVERSION ELECTRIC EXT: CPT

## 2022-04-22 PROCEDURE — 999N000010 HC STATISTIC ANES STAT CODE-CRNA PER MINUTE

## 2022-04-22 RX ORDER — FENTANYL CITRATE 50 UG/ML
25 INJECTION, SOLUTION INTRAMUSCULAR; INTRAVENOUS
Status: DISCONTINUED | OUTPATIENT
Start: 2022-04-22 | End: 2022-04-22 | Stop reason: HOSPADM

## 2022-04-22 RX ORDER — HYDROMORPHONE HCL IN WATER/PF 6 MG/30 ML
0.2 PATIENT CONTROLLED ANALGESIA SYRINGE INTRAVENOUS EVERY 5 MIN PRN
Status: DISCONTINUED | OUTPATIENT
Start: 2022-04-22 | End: 2022-04-22 | Stop reason: HOSPADM

## 2022-04-22 RX ORDER — ATROPINE SULFATE 0.1 MG/ML
.5-1 INJECTION INTRAVENOUS
Status: DISCONTINUED | OUTPATIENT
Start: 2022-04-22 | End: 2022-04-22 | Stop reason: HOSPADM

## 2022-04-22 RX ORDER — FENTANYL CITRATE 50 UG/ML
INJECTION, SOLUTION INTRAMUSCULAR; INTRAVENOUS PRN
Status: DISCONTINUED | OUTPATIENT
Start: 2022-04-22 | End: 2022-04-22

## 2022-04-22 RX ORDER — ONDANSETRON 2 MG/ML
INJECTION INTRAMUSCULAR; INTRAVENOUS PRN
Status: DISCONTINUED | OUTPATIENT
Start: 2022-04-22 | End: 2022-04-22

## 2022-04-22 RX ORDER — SODIUM CHLORIDE, SODIUM LACTATE, POTASSIUM CHLORIDE, CALCIUM CHLORIDE 600; 310; 30; 20 MG/100ML; MG/100ML; MG/100ML; MG/100ML
INJECTION, SOLUTION INTRAVENOUS CONTINUOUS PRN
Status: DISCONTINUED | OUTPATIENT
Start: 2022-04-22 | End: 2022-04-22

## 2022-04-22 RX ORDER — FENTANYL CITRATE 50 UG/ML
25 INJECTION, SOLUTION INTRAMUSCULAR; INTRAVENOUS EVERY 5 MIN PRN
Status: DISCONTINUED | OUTPATIENT
Start: 2022-04-22 | End: 2022-04-22 | Stop reason: HOSPADM

## 2022-04-22 RX ORDER — ONDANSETRON 2 MG/ML
4 INJECTION INTRAMUSCULAR; INTRAVENOUS EVERY 30 MIN PRN
Status: DISCONTINUED | OUTPATIENT
Start: 2022-04-22 | End: 2022-04-22 | Stop reason: HOSPADM

## 2022-04-22 RX ORDER — MEPERIDINE HYDROCHLORIDE 25 MG/ML
12.5 INJECTION INTRAMUSCULAR; INTRAVENOUS; SUBCUTANEOUS
Status: DISCONTINUED | OUTPATIENT
Start: 2022-04-22 | End: 2022-04-22 | Stop reason: HOSPADM

## 2022-04-22 RX ORDER — SODIUM CHLORIDE, SODIUM LACTATE, POTASSIUM CHLORIDE, CALCIUM CHLORIDE 600; 310; 30; 20 MG/100ML; MG/100ML; MG/100ML; MG/100ML
INJECTION, SOLUTION INTRAVENOUS CONTINUOUS
Status: DISCONTINUED | OUTPATIENT
Start: 2022-04-22 | End: 2022-04-22 | Stop reason: HOSPADM

## 2022-04-22 RX ORDER — SODIUM CHLORIDE 9 MG/ML
1000 INJECTION, SOLUTION INTRAVENOUS CONTINUOUS
Status: DISCONTINUED | OUTPATIENT
Start: 2022-04-22 | End: 2022-04-22 | Stop reason: HOSPADM

## 2022-04-22 RX ORDER — ONDANSETRON 4 MG/1
4 TABLET, ORALLY DISINTEGRATING ORAL EVERY 30 MIN PRN
Status: DISCONTINUED | OUTPATIENT
Start: 2022-04-22 | End: 2022-04-22 | Stop reason: HOSPADM

## 2022-04-22 RX ORDER — METOPROLOL TARTRATE 1 MG/ML
2.5-5 INJECTION, SOLUTION INTRAVENOUS
Status: DISCONTINUED | OUTPATIENT
Start: 2022-04-22 | End: 2022-04-22 | Stop reason: HOSPADM

## 2022-04-22 RX ORDER — DEXTROSE MONOHYDRATE 25 G/50ML
9.5 INJECTION, SOLUTION INTRAVENOUS
Status: DISCONTINUED | OUTPATIENT
Start: 2022-04-22 | End: 2022-04-22 | Stop reason: HOSPADM

## 2022-04-22 RX ORDER — PROPOFOL 10 MG/ML
INJECTION, EMULSION INTRAVENOUS PRN
Status: DISCONTINUED | OUTPATIENT
Start: 2022-04-22 | End: 2022-04-22

## 2022-04-22 RX ORDER — POTASSIUM CHLORIDE 1500 MG/1
40 TABLET, EXTENDED RELEASE ORAL
Status: DISCONTINUED | OUTPATIENT
Start: 2022-04-22 | End: 2022-04-22 | Stop reason: HOSPADM

## 2022-04-22 RX ORDER — POTASSIUM CHLORIDE 1500 MG/1
20 TABLET, EXTENDED RELEASE ORAL
Status: DISCONTINUED | OUTPATIENT
Start: 2022-04-22 | End: 2022-04-22 | Stop reason: HOSPADM

## 2022-04-22 RX ORDER — LIDOCAINE HYDROCHLORIDE 20 MG/ML
INJECTION, SOLUTION INFILTRATION; PERINEURAL PRN
Status: DISCONTINUED | OUTPATIENT
Start: 2022-04-22 | End: 2022-04-22

## 2022-04-22 RX ORDER — MAGNESIUM SULFATE HEPTAHYDRATE 40 MG/ML
2 INJECTION, SOLUTION INTRAVENOUS
Status: DISCONTINUED | OUTPATIENT
Start: 2022-04-22 | End: 2022-04-22 | Stop reason: HOSPADM

## 2022-04-22 RX ADMIN — MIDAZOLAM 2 MG: 1 INJECTION INTRAMUSCULAR; INTRAVENOUS at 09:50

## 2022-04-22 RX ADMIN — SUGAMMADEX 200 MG: 100 INJECTION, SOLUTION INTRAVENOUS at 10:13

## 2022-04-22 RX ADMIN — PROPOFOL 20 MG: 10 INJECTION, EMULSION INTRAVENOUS at 10:05

## 2022-04-22 RX ADMIN — SODIUM CHLORIDE, POTASSIUM CHLORIDE, SODIUM LACTATE AND CALCIUM CHLORIDE: 600; 310; 30; 20 INJECTION, SOLUTION INTRAVENOUS at 09:47

## 2022-04-22 RX ADMIN — PROPOFOL 180 MG: 10 INJECTION, EMULSION INTRAVENOUS at 09:53

## 2022-04-22 RX ADMIN — FENTANYL CITRATE 50 MCG: 50 INJECTION, SOLUTION INTRAMUSCULAR; INTRAVENOUS at 09:53

## 2022-04-22 RX ADMIN — FENTANYL CITRATE 50 MCG: 50 INJECTION, SOLUTION INTRAMUSCULAR; INTRAVENOUS at 10:00

## 2022-04-22 RX ADMIN — TOPICAL ANESTHETIC 0.5 ML: 200 SPRAY DENTAL; PERIODONTAL at 09:49

## 2022-04-22 RX ADMIN — POTASSIUM CHLORIDE 20 MEQ: 1500 TABLET, EXTENDED RELEASE ORAL at 08:51

## 2022-04-22 RX ADMIN — ROCURONIUM BROMIDE 30 MG: 50 INJECTION, SOLUTION INTRAVENOUS at 09:54

## 2022-04-22 RX ADMIN — LIDOCAINE HYDROCHLORIDE 100 MG: 20 INJECTION, SOLUTION INFILTRATION; PERINEURAL at 09:53

## 2022-04-22 RX ADMIN — ONDANSETRON 4 MG: 2 INJECTION INTRAMUSCULAR; INTRAVENOUS at 10:06

## 2022-04-22 NOTE — ANESTHESIA PREPROCEDURE EVALUATION
Anesthesia Pre-Procedure Evaluation    Patient: Rey Brown   MRN: [id number] : 1945        Procedure : Procedure(s):  ANESTHESIA, FOR CARDIOVERSION FOLLOWING AGUS          Past Medical History:   Diagnosis Date     Hypercholesterolemia      Lumbar disc disease      Migraines      Paroxysmal A-fib (H)       Past Surgical History:   Procedure Laterality Date     COLONOSCOPY       CYSTOSCOPY, RETROGRADES, COMBINED Left 2020    Procedure: LEFT RETROGRADE URETERAL PYELOGRAM;  Surgeon: Denilson Angulo MD;  Location:  OR     CYSTOSCOPY, TRANSURETHRAL RESECTION (TUR) PROSTATE, COMBINED N/A 2020    Procedure: CYSTOSCOPY WITH TRANSURETHRAL RESECTION PROSTATE;  Surgeon: Denilson Angulo MD;  Location:  OR     EP ABLATION FOCAL AFIB N/A 2022    Procedure: Ablation Focal Atrial Fibrillation [[id number]];  Surgeon: Sharmin Scott MD;  Location:  HEART [facility]ARD[facility] CATH LAB     OPEN REDUCTION INTERNAL FIXATION HAND       RHINOPLASTY       TONSILLECTOMY       wisdom teeth        Allergies   Allergen Reactions     Ace Inhibitors Cough      Social History     Tobacco Use     Smoking status: Never Smoker     Smokeless tobacco: Never Used   Substance Use Topics     Alcohol use: Yes     Alcohol/week: 2.0 - 3.0 standard drinks     Types: 2 - 3 Cans of beer per week     Comment: social only      Wt Readings from Last 1 Encounters:   22 77.9 kg (171 lb 11.2 oz)        Anesthesia Evaluation   Pt has had prior anesthetic.     No history of anesthetic complications       ROS/MED HX  ENT/Pulmonary:  - neg pulmonary ROS     Neurologic:  - neg neurologic ROS     Cardiovascular:     (+) hypertension-----Taking blood thinners     METS/Exercise Tolerance:     Hematologic:  - neg hematologic  ROS     Musculoskeletal:  - neg musculoskeletal ROS     GI/Hepatic:  - neg GI/hepatic ROS     Renal/Genitourinary:     (+) renal disease, type: CRI,     Endo: Comment: Dyslipidemia       Psychiatric/Substance Use:  - neg  psychiatric ROS     Infectious Disease:  - neg infectious disease ROS     Malignancy:       Other:            Physical Exam    Airway  airway exam normal      Mallampati: II   TM distance: > 3 FB   Neck ROM: full   Mouth opening: > 3 cm    Respiratory Devices and Support         Dental  no notable dental history         Cardiovascular          Rhythm and rate: irregular     Pulmonary   pulmonary exam normal                OUTSIDE LABS:  CBC:   Lab Results   Component Value Date    WBC 5.8 04/01/2022    WBC 5.8 03/23/2021    HGB 16.0 04/01/2022    HGB 15.4 03/23/2021    HCT 45.6 04/01/2022    HCT 45.5 03/23/2021     04/01/2022     03/23/2021     BMP:   Lab Results   Component Value Date     04/01/2022     03/23/2021    POTASSIUM 4.0 04/22/2022    POTASSIUM 4.0 04/20/2022    CHLORIDE 109 04/01/2022    CHLORIDE 103 03/23/2021    CO2 28 04/01/2022    CO2 30 05/14/2020    BUN 22 04/01/2022    BUN 24 03/23/2021    BUN 18 03/23/2021    CR 1.22 04/01/2022    CR 1.2 03/25/2022    GLC 92 04/01/2022    GLC 79 03/23/2021     COAGS:   Lab Results   Component Value Date    INR 1.46 (H) 04/20/2022     POC: No results found for: BGM, HCG, HCGS  HEPATIC:   Lab Results   Component Value Date    ALBUMIN 4.3 03/23/2021    PROTTOTAL 6.6 03/23/2021    ALT 10 03/23/2021    AST 18 03/23/2021    ALKPHOS 71 03/23/2021    BILITOTAL 0.7 03/23/2021     OTHER:   Lab Results   Component Value Date    ELIEZER 8.9 04/01/2022    MAG 2.5 (H) 04/20/2022    LIPASE 58 03/10/2020    AMYLASE 59 03/10/2020    CRP <0.3 04/09/2015    SED 1 04/09/2015       Anesthesia Plan    ASA Status:  3      Anesthesia Type: General.     - Airway: ETT   Induction: Intravenous.   Maintenance: Balanced.        Consents    Anesthesia Plan(s) and associated risks, benefits, and realistic alternatives discussed. Questions answered and patient/representative(s) expressed understanding.    - Discussed:     - Discussed with:  Patient         Postoperative  Care    Pain management: IV analgesics, Multi-modal analgesia.   PONV prophylaxis: Ondansetron (or other 5HT-3)     Comments:           H&P reviewed: Unable to attach H&P to encounter due to EHR limitations. H&P Update: appropriate H&P reviewed, patient examined. No interval changes since H&P (within 30 days).         Roque Nava MD

## 2022-04-22 NOTE — ANESTHESIA CARE TRANSFER NOTE
Patient: Rey Brown    Procedure: Procedure(s):  ANESTHESIA, FOR CARDIOVERSION FOLLOWING AGUS       Diagnosis: Atrial fibrillation, unspecified type (H) [I48.91]  Diagnosis Additional Information: No value filed.    Anesthesia Type:   General     Note:    Oropharynx: oropharynx clear of all foreign objects and spontaneously breathing  Level of Consciousness: drowsy  Oxygen Supplementation: face mask  Level of Supplemental Oxygen (L/min / FiO2): 6  Independent Airway: airway patency satisfactory and stable  Dentition: dentition unchanged  Vital Signs Stable: post-procedure vital signs reviewed and stable  Report to RN Given: handoff report given  Destination: Care suites annex.          Vitals:  Vitals Value Taken Time   /90    Temp     Pulse 57    Resp 12    SpO2 98%        Electronically Signed By: JOSE CRUZ Herrera CRNA  April 22, 2022  10:36 AM

## 2022-04-22 NOTE — ANESTHESIA POSTPROCEDURE EVALUATION
Patient: Rey Brown    Procedure: Procedure(s):  ANESTHESIA, FOR CARDIOVERSION FOLLOWING AGUS       Anesthesia Type:  General    Note:  Disposition: Outpatient   Postop Pain Control: Uneventful            Sign Out: Well controlled pain   PONV: No   Neuro/Psych: Uneventful            Sign Out: Acceptable/Baseline neuro status   Airway/Respiratory: Uneventful            Sign Out: Acceptable/Baseline resp. status   CV/Hemodynamics: Uneventful            Sign Out: Acceptable CV status   Other NRE: NONE   DID A NON-ROUTINE EVENT OCCUR? No           Last vitals:  Vitals Value Taken Time   BP     Temp     Pulse     Resp     SpO2         Electronically Signed By: Roque Nava MD  April 22, 2022  11:40 AM

## 2022-04-22 NOTE — DISCHARGE INSTRUCTIONS
AGUS  (Transesophageal Echocardiogram)  with Cardioversion Discharge Instructions    After you go home:    Have an adult stay with you for 6 hours.       For 24 hours - due to the sedation you received:  Relax and take it easy.  Do NOT make any important or legal decisions.  Do NOT drive or operate machines at home or at work.  Do NOT drink alcohol.    Diet:    You may resume your normal diet, but no scratchy foods for two days.  If your throat is sore, eat cold, bland or soft foods.  You may have heartburn if the tube used in the exam entered your stomach.  If so:   - Do not eat acidic and spicy foods.   - Do not eat three hours before bedtime.  Clear liquids are okay.   - When lying down, use two pillows to raise your head.    Medicines:    Take your medications, including blood thinners, unless your provider tells you not to.  If you have stopped any medicines, check with your provider about when to restart them.  You may take Tylenol (Acetaminophen) if your throat is sore.  You may take antacids if you have heartburn.      Follow Up Appointments:    Follow up with your cardiologist at [facility] of patient preference as instructed.  Follow up with your primary care provider as needed.    If you ve had a cardioversion:    The skin on your chest or back may feel tender for 48 hours.  If your skin is tender, you may:  Use a cold pack on the site. Never use ice directly on your skin. Use the cold pack for 20 minutes. Remove it for at least 30 minutes before re-using.  Apply 1% hydrocortisone cream to the skin (sold at drug stores)  Take Advil (Ibuprofen) or Tylenol (Acetaminophen).      Call the clinic if:    You have heartburn that is severe or lasts more than 72 hours.  You have a sore throat that feels worse after 72 hours.  You have shortness of breath, neck pain, chest pain, fever, chills, coughing up blood, or other unusual signs.  Call your cardiologist right away if you have an irregular heartbeat,  shortness of breath or feel dizzy.  Questions or concerns      [facility] [place]:    [phone] [facility] (7 days a week)

## 2022-04-22 NOTE — ANESTHESIA PROCEDURE NOTES
Airway       Patient location during procedure: OR       Procedure Start/Stop Times: 4/22/2022 9:57 AM  Staff -        Anesthesiologist:  Roque Nava MD       CRNA: Carolee Alberts APRN CRNA       Performed By: CRNA  Consent for Airway        Urgency: elective  Indications and Patient Condition       Indications for airway management: brittney-procedural       Induction type:intravenous      Final Airway Details       Final airway type: endotracheal airway       Successful airway: ETT - single and Oral  Endotracheal Airway Details        ETT size (mm): 8.0       Cuffed: yes       Successful intubation technique: direct laryngoscopy       DL Blade Type: Rivers 2       Grade View of Cords: 1       Adjucts: stylet       Position: Right       Measured from: lips       Secured at (cm): 23       Bite block used: None    Post intubation assessment        Placement verified by: capnometry, equal breath sounds and chest rise        Number of attempts at approach: 1       Number of other approaches attempted: 0       Secured with: pink tape       Ease of procedure: easy       Dentition: Unchanged    Medication(s) Administered   Medication Administration Time: 4/22/2022 9:57 AM    Additional Comments       Lip injury after AGUS probe - not DL

## 2022-04-22 NOTE — PROGRESS NOTES
.Care Suites Discharge Nursing Note    Patient Information  Name: Rey Brown  Age: 77 year old    Discharge Education:  Discharge instructions reviewed: Yes  Additional education/resources provided: no  Patient/patient representative verbalizes understanding: Yes  Patient discharging on new medications: No  Medication education completed: Yes    Discharge Plans:   Discharge location: home  Discharge ride contacted: Yes  Approximate discharge time: 1130    Discharge Criteria:  Discharge criteria met and vital signs stable: Yes    Patient Belongs:  Patient belongings returned to patient: Yes    Charles Mcgovern RN

## 2022-04-22 NOTE — PROCEDURES
Cardioversion Note    Informed consent was signed by the patient. Confirmed with patient he has been compliant with all medications including eliquis.    A timeout was taken.  AGUS was performed which confirmed normal left atrial appendage, no evidence for thrombus.      Anesthesia was present for AGUS and cardioversion, see separate documentation for their sedation.    Baseline rhythm: Rate controlled atrial fibrillation/flutter with HR in the 80's  Synchronized cardioversion performed at 150J, with successful conversion to sinus bradycardia HR 45-50 bpm.    Post-DCCV rhythm: sinus bradycardia with HR 45-50 bpm    -No complications.  -Patient will hold diltiazem until seen in follow up with EP cardiology  -Continue flecainide and eliquis without interruption.     Tari Fairbanks MD [facility]  April 22, 2022

## 2022-04-22 NOTE — PROGRESS NOTES
PATIENT/VISITOR WELLNESS SCREENING    Step 1 Patient Screening    1. In the last month, have you been in contact with someone who was confirmed or suspected to have Coronavirus/COVID-19? No    2. Do you have the following symptoms?  Fever/Chills? No   Cough? No   Shortness of breath? No   New loss of taste or smell? No  Sore throat? No  Muscle or body aches? No  Headaches? No  Fatigue? No  Vomiting or diarrhea? No    NO SYMPTOM(S)    ACTIONS:  1. Standard rooming process  2. Provider to assess per normal protocol  3. Implement precautions as needed and per guidelines     POSITIVE SYMPTOM(S)  If positive for ANY of the following symptoms: fever, cough, shortness of breath, rash    ACTION:  1. Continue to have the patient wear a mask   2. Room patient as soon as possible  3. Don appropriate PPE when entering room  4. Provider evaluation

## 2022-04-22 NOTE — PRE-PROCEDURE
GENERAL PRE-PROCEDURE:   Procedure:  Kris, cardioversion  Date/Time:  4/22/2022 10:18 AM    Verbal consent obtained?: Yes    Written consent obtained?: Yes    Risks and benefits: Risks, benefits and alternatives were discussed    Consent given by:  Patient  Patient states understanding of procedure being performed: Yes    Patient's understanding of procedure matches consent: Yes    Procedure consent matches procedure scheduled: Yes    Appropriately NPO:  Yes  Mallampati  :  Grade 3- soft palate visible, posterior pharyngeal wall not visible  Lungs:  Lungs clear with good breath sounds bilaterally  Heart:  Normal heart sounds and rate and a-fib  History & Physical reviewed:  History and physical reviewed and no updates needed  Statement of review:  I have reviewed the lab findings, diagnostic data, medications, and the plan for sedation

## 2022-04-22 NOTE — PROGRESS NOTES
Patient waking up from anesthesia well.  VSS.  Small amount of blood with suctioning.  M.FEDERICA. aware.

## 2022-04-22 NOTE — PROGRESS NOTES
Care Suites Admission Nursing Note    Patient Information  Name: Rey Brown  Age: 77 year old  Reason for admission: AGUS with cardioversion   Care Suites arrival time: 0800      Patient Admission/Assessment   Pre-procedure assessment complete: Yes  If abnormal assessment/labs, provider notified: N/A  NPO: Yes  Medications held per instructions/orders: Yes  Consent: deferred  If applicable, pregnancy test status: deferred  Patient oriented to room: Yes  Education/questions answered: Yes  Plan/other: AGUS with cardioversion     Discharge Planning  Discharge name/phone number: Erin [phone]   Overnight post sedation caregiver: Erin   Discharge location: home    Charles Mcgovern RN

## 2022-04-25 ENCOUNTER — NURSE TRIAGE (OUTPATIENT)
Dept: CARDIOLOGY | Facility: CLINIC | Age: 77
End: 2022-04-25
Payer: COMMERCIAL

## 2022-04-25 DIAGNOSIS — I48.19 PERSISTENT ATRIAL FIBRILLATION (H): Primary | ICD-10-CM

## 2022-04-25 RX ORDER — AMIODARONE HYDROCHLORIDE 200 MG/1
200 TABLET ORAL 2 TIMES DAILY
Qty: 60 TABLET | Refills: 1 | Status: SHIPPED | OUTPATIENT
Start: 2022-04-25 | End: 2022-05-06

## 2022-04-25 NOTE — TELEPHONE ENCOUNTER
Sounds good.    He should continue simvastatin 10 mg (1/2 of a 20 mg tab) as is listed in Epic med list.    Berhane Martínez

## 2022-04-25 NOTE — TELEPHONE ENCOUNTER
Spoke to pt and made him aware the Tanya has reviewed his situation with Tanya and has given recommendations.     1. Pt will stop taking the Flecainide since it is not working.    2. Pt will start Amiodarone the day after stopping his Flecainide, which is pharmacy gets the script filled could be tomorrow.   Pt aware that he will take 200 mg twice a day. Escript has been sent to [facility].     3. Pt is to see Dr Scott in 7-10 days with an EKG. Will message Tanya, as Dr scott next available is 5/18.    4. Pt will stay off the Diltiazem which was held after the Cardioversion yesterday. Pt stated that his Simvastatin was increased to 20 mg per his discharge paperwork. Pt was taking 10 mg d/t being on Diltiazem.  Will message Tanya to see if pt is to stay on the 10 mg with the Amiodarone start.     JNelsonRN

## 2022-04-25 NOTE — TELEPHONE ENCOUNTER
"Received a call from the call center to discuss A fib.  Spoke to Anthony who says he is back in A fib this morning. He has a smart watch that verified he was in A fib and he is feeling \"fluttering\". His blood pressure on the call was 106/83 HR 101bpm. His previous HR this morning was 90 bpm. He denies chest pain, shortness of breath, lightheadedness. He says he noticed the change this morning after going up and down the stairs a couple times. He has taken his morning medications today. He says he is feeling alright.  Will send a message to EP team for follow up.  1. DESCRIPTION: \"Please describe your heart rate or heart beat that you are having\" (e.g., fast/slow, regular/irregular, skipped or extra beats, \"palpitations\") fluttering  2. ONSET: \"When did it start?\" (Minutes, hours or days) This morning  3. DURATION: \"How long does it last\" (e.g., seconds, minutes, hours)  4. PATTERN \"Does it come and go, or has it been constant since it started?\" \"Does it get worse with exertion?\" \"Are you feeling it now?\" Constant, feeling it now  5. TAP: \"Using your hand, can you tap out what you are feeling on a chair or table in front of you, so that I can hear?\" (Note: not all patients can do this)   6. HEART RATE: \"Can you tell me your heart rate?\" \"How many beats in 15 seconds?\" (Note: not all patients can do this) See above  7. RECURRENT SYMPTOM: \"Have you ever had this before?\" If so, ask: \"When was the last time?\" and \"What happened that time?\"   8. CAUSE: \"What do you think is causing the palpitations?\" A fib  9. CARDIAC HISTORY: \"Do you have any history of heart disease?\" (e.g., heart attack, angina, bypass surgery, angioplasty, arrhythmia) A fib  10. OTHER SYMPTOMS: \"Do you have any other symptoms?\" (e.g., dizziness, chest pain, sweating, difficulty breathing) Denies      Additional Information    Negative: Difficulty breathing    Negative: Dizziness, lightheadedness, or weakness    Negative: Heart beating very rapidly " (e.g., > 140 / minute) and present now (Exception: during exercise)    Negative: Heart beating very slowly (e.g., < 50 / minute) (Exception: athlete)    Protocols used: HEART RATE AND HEARTBEAT [id number]

## 2022-04-25 NOTE — TELEPHONE ENCOUNTER
Shoot!    Reviewed with Dr. Brennan in Dr. Scott's absence.    1. Stop flecainide as it's not working  2. Start Amiodarone the day after he stops the flecainide. Take 200 mg BID and have him see Dr. Sonia PINEDA (with EKG) - needs to be within next 7-10 days.    3. Stop Diltiazem once he starts Amiodarone.     Pls update [facility] Med list/send Rx.  THANK YOU!

## 2022-04-26 LAB
ATRIAL RATE - MUSE: 214 BPM
ATRIAL RATE - MUSE: 59 BPM
DIASTOLIC BLOOD PRESSURE - MUSE: NORMAL MMHG
DIASTOLIC BLOOD PRESSURE - MUSE: NORMAL MMHG
INTERPRETATION ECG - MUSE: NORMAL
INTERPRETATION ECG - MUSE: NORMAL
P AXIS - MUSE: 69 DEGREES
P AXIS - MUSE: 89 DEGREES
PR INTERVAL - MUSE: 254 MS
PR INTERVAL - MUSE: NORMAL MS
QRS DURATION - MUSE: 106 MS
QRS DURATION - MUSE: 112 MS
QT - MUSE: 404 MS
QT - MUSE: 494 MS
QTC - MUSE: 477 MS
QTC - MUSE: 489 MS
R AXIS - MUSE: 86 DEGREES
R AXIS - MUSE: 89 DEGREES
SYSTOLIC BLOOD PRESSURE - MUSE: NORMAL MMHG
SYSTOLIC BLOOD PRESSURE - MUSE: NORMAL MMHG
T AXIS - MUSE: 66 DEGREES
T AXIS - MUSE: 71 DEGREES
VENTRICULAR RATE- MUSE: 59 BPM
VENTRICULAR RATE- MUSE: 84 BPM

## 2022-04-29 NOTE — TELEPHONE ENCOUNTER
Message left with patient to set him up to see Dr Scott next week.  Dr Scott has agreed to see patient on 5/5 or 5/6 at 8am.  Awaiting return call from patient.  HOLLEY Johns

## 2022-04-29 NOTE — TELEPHONE ENCOUNTER
Spoke to patient to discuss coming to see Dr Scott next week.  Offered 5/5 or 5/6 as recommended per Dr Scott.  Patient agreed to see him on 5/6 at 8am.  Patient had failed cardioversion 4/22.  HOLLEY Johns

## 2022-05-06 ENCOUNTER — OFFICE VISIT (OUTPATIENT)
Dept: CARDIOLOGY | Facility: CLINIC | Age: 77
End: 2022-05-06
Attending: INTERNAL MEDICINE
Payer: COMMERCIAL

## 2022-05-06 VITALS
HEIGHT: 70 IN | BODY MASS INDEX: 24.91 KG/M2 | DIASTOLIC BLOOD PRESSURE: 82 MMHG | HEART RATE: 80 BPM | SYSTOLIC BLOOD PRESSURE: 135 MMHG | WEIGHT: 174 LBS

## 2022-05-06 DIAGNOSIS — I48.19 PERSISTENT ATRIAL FIBRILLATION (H): Primary | ICD-10-CM

## 2022-05-06 DIAGNOSIS — Z11.59 ENCOUNTER FOR SCREENING FOR OTHER VIRAL DISEASES: Primary | ICD-10-CM

## 2022-05-06 PROCEDURE — 99213 OFFICE O/P EST LOW 20 MIN: CPT | Performed by: INTERNAL MEDICINE

## 2022-05-06 PROCEDURE — 93000 ELECTROCARDIOGRAM COMPLETE: CPT | Performed by: INTERNAL MEDICINE

## 2022-05-06 RX ORDER — FLECAINIDE ACETATE 150 MG/1
150 TABLET ORAL 2 TIMES DAILY
Qty: 180 TABLET | Refills: 3 | Status: SHIPPED | OUTPATIENT
Start: 2022-05-06 | End: 2022-05-24

## 2022-05-06 RX ORDER — DILTIAZEM HYDROCHLORIDE 180 MG/1
180 CAPSULE, COATED, EXTENDED RELEASE ORAL DAILY
Qty: 90 CAPSULE | Refills: 3 | Status: ON HOLD | OUTPATIENT
Start: 2022-05-06 | End: 2022-05-12

## 2022-05-06 NOTE — PROGRESS NOTES
HPI and Plan:   See dictation        Orders Placed This Encounter   Procedures     Follow-Up with Cardiology EP     EKG 12-lead complete w/read - Clinics (performed today)     EKG 12-lead complete w/read (Future)- to be scheduled     Cardioversion External       Orders Placed This Encounter   Medications     flecainide (TAMBOCOR) 150 MG tablet     Sig: Take 1 tablet (150 mg) by mouth 2 times daily     Dispense:  180 tablet     Refill:  3     diltiazem ER COATED BEADS (CARDIZEM CD) 180 MG 24 hr capsule     Sig: Take 1 capsule (180 mg) by mouth daily     Dispense:  90 capsule     Refill:  3       Medications Discontinued During This Encounter   Medication Reason     amiodarone (PACERONE) 200 MG tablet      losartan (COZAAR) 25 MG tablet          Encounter Diagnosis   Name Primary?     Persistent atrial fibrillation (H) Yes       CURRENT MEDICATIONS:  Current Outpatient Medications   Medication Sig Dispense Refill     acetaminophen (TYLENOL) 325 MG tablet Take 325-650 mg by mouth every 6 hours as needed for mild pain       apixaban ANTICOAGULANT (ELIQUIS) 5 MG tablet Take 1 tablet (5 mg) by mouth 2 times daily 180 tablet 3     diltiazem ER COATED BEADS (CARDIZEM CD) 180 MG 24 hr capsule Take 1 capsule (180 mg) by mouth daily 90 capsule 3     flecainide (TAMBOCOR) 150 MG tablet Take 1 tablet (150 mg) by mouth 2 times daily 180 tablet 3     Nasal Dilators (BREATHE RIGHT LARGE) STRP nightly as needed       sildenafil (VIAGRA) 100 MG tablet Take 1 tablet (100 mg) by mouth daily as needed 30 tablet 3     simvastatin (ZOCOR) 20 MG tablet Take 1/2 tablet ( 10 mg) daily 90 tablet 3       ALLERGIES     Allergies   Allergen Reactions     Ace Inhibitors Cough       PAST MEDICAL HISTORY:  Past Medical History:   Diagnosis Date     Hypercholesterolemia      Lumbar disc disease      Migraines      Paroxysmal A-fib (H)        PAST SURGICAL HISTORY:  Past Surgical History:   Procedure Laterality Date     ANESTHESIA CARDIOVERSION N/A  4/22/2022    Procedure: ANESTHESIA, FOR CARDIOVERSION FOLLOWING AGUS;  Surgeon: GENERIC ANESTHESIA PROVIDER;  Location:  OR     COLONOSCOPY       CYSTOSCOPY, RETROGRADES, COMBINED Left 5/13/2020    Procedure: LEFT RETROGRADE URETERAL PYELOGRAM;  Surgeon: Denilson Angulo MD;  Location:  OR     CYSTOSCOPY, TRANSURETHRAL RESECTION (TUR) PROSTATE, COMBINED N/A 5/13/2020    Procedure: CYSTOSCOPY WITH TRANSURETHRAL RESECTION PROSTATE;  Surgeon: Denilson Angulo MD;  Location:  OR     EP ABLATION FOCAL AFIB N/A 4/1/2022    Procedure: Ablation Focal Atrial Fibrillation [[id number]];  Surgeon: Sharmin Scott MD;  Location:  HEART CARDI[facility] CATH LAB     OPEN REDUCTION INTERNAL FIXATION HAND       RHINOPLASTY       TONSILLECTOMY       wisdom teeth         FAMILY HISTORY:  Family History   Problem Relation Age of Onset     Dementia Father      Osteoarthritis Father      Cerebrovascular Disease Father      Myocardial Infarction Mother      C.A.D. Mother      Coronary Artery Disease Mother        SOCIAL HISTORY:  Social History     Socioeconomic History     Marital status:      Spouse name: Erin     Number of children: None     Years of education: None     Highest education level: None   Tobacco Use     Smoking status: Never Smoker     Smokeless tobacco: Never Used   Substance and Sexual Activity     Alcohol use: Yes     Alcohol/week: 2.0 - 3.0 standard drinks     Types: 2 - 3 Cans of beer per week     Comment: social only     Drug use: No     Sexual activity: Yes     Partners: Female   Other Topics Concern     Special Diet No     Exercise Yes     Comment: 2-3x/week       Review of Systems:  Skin:  Negative       Eyes:  Negative      ENT:  Negative      Respiratory:  Negative       Cardiovascular:    Positive for;palpitations    Gastroenterology: Negative      Genitourinary:  Negative      Musculoskeletal:  Positive for back pain    Neurologic:  Negative      Psychiatric:  Negative      Heme/Lymph/Imm:    allergies   "  Endocrine:  Negative        Physical Exam:  Vitals: /82 (BP Location: Left arm, Cuff Size: Adult Large)   Pulse 80   Ht 1.778 m (5' 10\")   Wt 78.9 kg (174 lb)   BMI 24.97 kg/m      Constitutional:  cooperative, alert and oriented, well developed, well nourished, in no acute distress        Skin:  warm and dry to the touch          Head:  normocephalic        Eyes:  pupils equal and round        Lymph:      ENT:  no pallor or cyanosis        Neck:           Respiratory:  normal breath sounds, clear to auscultation, normal A-P diameter, normal symmetry, normal respiratory excursion, no use of accessory muscles         Cardiac: regular rhythm, normal S1/S2, no S3 or S4, apical impulse not displaced, no murmurs, gallops or rubs irregularly irregular rhythm     systolic ejection murmur;grade 1                                                 GI:  abdomen soft        Extremities and Muscular Skeletal:  no edema;no deformities, clubbing, cyanosis, erythema observed              Neurological:  no gross motor deficits        Psych:  Alert and Oriented x 3;affect appropriate, oriented to time, person and place;oriented to time, person and place        CC  Sharmin Scott MD  [address]  [address]  [place] [id number]              "

## 2022-05-06 NOTE — H&P (VIEW-ONLY)
Service Date: 2022    HISTORY OF PRESENT ILLNESS:  I saw Mr. Dorantes for followup of atrial fibrillation ablation.  He is a 77-year-old white male with a history of symptomatic paroxysmal atrial fibrillation.  He was initially treated with flecainide successfully for years.  Subsequently, he had breakthrough atrial fibrillation.  He underwent ablation on 2022.  He had recurrent atrial flutter and underwent cardioversion on .  After the cardioversion, he has been on amiodarone.  Unfortunately, 2 days after the cardioversion, he reverted back to atrial flutter.  Symptomatically, he feels shortness of breath with exertion, but otherwise has no palpitation or dizziness.    PHYSICAL EXAMINATION:  Blood pressure was 135/82, heart rate 80 beats per minute, body weight 174 pounds.  Cardiovascular exam showed irregular, irregular cardiac rhythm with normal heart sounds.  There was no crackle or wheezing in the lungs.  There is no pedal edema.    EKG showed atypical atrial flutter with controlled ventricular rate.    ASSESSMENT AND RECOMMENDATIONS:  Mr. Dorantes has symptoms with exertion during atrial flutter.  I have stopped amiodarone and losartan.  He will have flecainide restarted at 150 mg p.o. b.i.d.  He will have a cardioversion again next week.  I will see him for followup in about 1 month.  At that time, we will discuss if we should continue flecainide or reschedule repeat ablation.    Sharmin Scott MD    cc:  Sabas Staley MD  [facility]  [address] [place]  [address]      Sharmin Scott MD        D: 2022   T: 2022   MT: jose raul    Name:     RO VELAZQUEZ  MRN:      [id number]        Account:      [id number]   :      1945           Service Date: 2022       Document: [id number]

## 2022-05-06 NOTE — PROGRESS NOTES
Service Date: 2022    HISTORY OF PRESENT ILLNESS:  I saw Mr. Dorantes for followup of atrial fibrillation ablation.  He is a 77-year-old white male with a history of symptomatic paroxysmal atrial fibrillation.  He was initially treated with flecainide successfully for years.  Subsequently, he had breakthrough atrial fibrillation.  He underwent ablation on 2022.  He had recurrent atrial flutter and underwent cardioversion on .  After the cardioversion, he has been on amiodarone.  Unfortunately, 2 days after the cardioversion, he reverted back to atrial flutter.  Symptomatically, he feels shortness of breath with exertion, but otherwise has no palpitation or dizziness.    PHYSICAL EXAMINATION:  Blood pressure was 135/82, heart rate 80 beats per minute, body weight 174 pounds.  Cardiovascular exam showed irregular, irregular cardiac rhythm with normal heart sounds.  There was no crackle or wheezing in the lungs.  There is no pedal edema.    EKG showed atypical atrial flutter with controlled ventricular rate.    ASSESSMENT AND RECOMMENDATIONS:  Mr. Dorantes has symptoms with exertion during atrial flutter.  I have stopped amiodarone and losartan.  He will have flecainide restarted at 150 mg p.o. b.i.d.  He will have a cardioversion again next week.  I will see him for followup in about 1 month.  At that time, we will discuss if we should continue flecainide or reschedule repeat ablation.    Sharmin Scott MD    cc:  Sabas Staley MD  [facility]  [address] [place]  [address]      Sharmin Scott MD        D: 2022   T: 2022   MT: jose raul    Name:     RO VELAZQUEZ  MRN:      [id number]        Account:      [id number]   :      1945           Service Date: 2022       Document: [id number]

## 2022-05-06 NOTE — LETTER
5/6/2022    Sabas Staley MD  [address] [facility] [address]    RE: Rey Brown       Dear Colleague,     I had the pleasure of seeing Rey Brown in the [facility].  HPI and Plan:   See dictation        Orders Placed This Encounter   Procedures     Follow-Up with Cardiology EP     EKG 12-lead complete w/read - Clinics (performed today)     EKG 12-lead complete w/read (Future)- to be scheduled     Cardioversion External       Orders Placed This Encounter   Medications     flecainide (TAMBOCOR) 150 MG tablet     Sig: Take 1 tablet (150 mg) by mouth 2 times daily     Dispense:  180 tablet     Refill:  3     diltiazem ER COATED BEADS (CARDIZEM CD) 180 MG 24 hr capsule     Sig: Take 1 capsule (180 mg) by mouth daily     Dispense:  90 capsule     Refill:  3       Medications Discontinued During This Encounter   Medication Reason     amiodarone (PACERONE) 200 MG tablet      losartan (COZAAR) 25 MG tablet          Encounter Diagnosis   Name Primary?     Persistent atrial fibrillation (H) Yes       CURRENT MEDICATIONS:  Current Outpatient Medications   Medication Sig Dispense Refill     acetaminophen (TYLENOL) 325 MG tablet Take 325-650 mg by mouth every 6 hours as needed for mild pain       apixaban ANTICOAGULANT (ELIQUIS) 5 MG tablet Take 1 tablet (5 mg) by mouth 2 times daily 180 tablet 3     diltiazem ER COATED BEADS (CARDIZEM CD) 180 MG 24 hr capsule Take 1 capsule (180 mg) by mouth daily 90 capsule 3     flecainide (TAMBOCOR) 150 MG tablet Take 1 tablet (150 mg) by mouth 2 times daily 180 tablet 3     Nasal Dilators (BREATHE RIGHT LARGE) STRP nightly as needed       sildenafil (VIAGRA) 100 MG tablet Take 1 tablet (100 mg) by mouth daily as needed 30 tablet 3     simvastatin (ZOCOR) 20 MG tablet Take 1/2 tablet ( 10 mg) daily 90 tablet 3       ALLERGIES     Allergies   Allergen Reactions     Ace Inhibitors Cough       PAST MEDICAL HISTORY:  Past Medical History:    Diagnosis Date     Hypercholesterolemia      Lumbar disc disease      Migraines      Paroxysmal A-fib (H)        PAST SURGICAL HISTORY:  Past Surgical History:   Procedure Laterality Date     ANESTHESIA CARDIOVERSION N/A 4/22/2022    Procedure: ANESTHESIA, FOR CARDIOVERSION FOLLOWING AGUS;  Surgeon: GENERIC ANESTHESIA PROVIDER;  Location:  OR     COLONOSCOPY       CYSTOSCOPY, RETROGRADES, COMBINED Left 5/13/2020    Procedure: LEFT RETROGRADE URETERAL PYELOGRAM;  Surgeon: Denilson Angulo MD;  Location:  OR     CYSTOSCOPY, TRANSURETHRAL RESECTION (TUR) PROSTATE, COMBINED N/A 5/13/2020    Procedure: CYSTOSCOPY WITH TRANSURETHRAL RESECTION PROSTATE;  Surgeon: Denilson Angulo MD;  Location:  OR     EP ABLATION FOCAL AFIB N/A 4/1/2022    Procedure: Ablation Focal Atrial Fibrillation [[id number]];  Surgeon: Sharmin Scott MD;  Location:  HEART CARDIAC CATH LAB     OPEN REDUCTION INTERNAL FIXATION HAND       RHINOPLASTY       TONSILLECTOMY       wisdom teeth         FAMILY HISTORY:  Family History   Problem Relation Age of Onset     Dementia Father      Osteoarthritis Father      Cerebrovascular Disease Father      Myocardial Infarction Mother      C.A.D. Mother      Coronary Artery Disease Mother        SOCIAL HISTORY:  Social History     Socioeconomic History     Marital status:      Spouse name: Erin     Number of children: None     Years of education: None     Highest education level: None   Tobacco Use     Smoking status: Never Smoker     Smokeless tobacco: Never Used   Substance and Sexual Activity     Alcohol use: Yes     Alcohol/week: 2.0 - 3.0 standard drinks     Types: 2 - 3 Cans of beer per week     Comment: social only     Drug use: No     Sexual activity: Yes     Partners: Female   Other Topics Concern     Special Diet No     Exercise Yes     Comment: 2-3x/week       Review of Systems:  Skin:  Negative       Eyes:  Negative      ENT:  Negative      Respiratory:  Negative       Cardiovascular:     "Positive for;palpitations    Gastroenterology: Negative      Genitourinary:  Negative      Musculoskeletal:  Positive for back pain    Neurologic:  Negative      Psychiatric:  Negative      Heme/Lymph/Imm:    allergies    Endocrine:  Negative        Physical Exam:  Vitals: /82 (BP Location: Left arm, Cuff Size: Adult Large)   Pulse 80   Ht 1.778 m (5' 10\")   Wt 78.9 kg (174 lb)   BMI 24.97 kg/m      Constitutional:  cooperative, alert and oriented, well developed, well nourished, in no acute distress        Skin:  warm and dry to the touch          Head:  normocephalic        Eyes:  pupils equal and round        Lymph:      ENT:  no pallor or cyanosis        Neck:           Respiratory:  normal breath sounds, clear to auscultation, normal A-P diameter, normal symmetry, normal respiratory excursion, no use of accessory muscles         Cardiac: regular rhythm, normal S1/S2, no S3 or S4, apical impulse not displaced, no murmurs, gallops or rubs irregularly irregular rhythm     systolic ejection murmur;grade 1                                                 GI:  abdomen soft        Extremities and Muscular Skeletal:  no edema;no deformities, clubbing, cyanosis, erythema observed              Neurological:  no gross motor deficits        Psych:  Alert and Oriented x 3;affect appropriate, oriented to time, person and place;oriented to time, person and place      CC  Sharmin Scott MD  [address]  [address] [address]  Thank you for allowing me to participate in the care of your patient.      Sincerely,     Sharmin Scott MD     [facility]  "

## 2022-05-09 ENCOUNTER — LAB (OUTPATIENT)
Dept: URGENT CARE | Facility: URGENT CARE | Age: 77
End: 2022-05-09
Payer: COMMERCIAL

## 2022-05-09 DIAGNOSIS — Z11.59 ENCOUNTER FOR SCREENING FOR OTHER VIRAL DISEASES: ICD-10-CM

## 2022-05-09 PROCEDURE — U0005 INFEC AGEN DETEC AMPLI PROBE: HCPCS

## 2022-05-09 PROCEDURE — U0003 INFECTIOUS AGENT DETECTION BY NUCLEIC ACID (DNA OR RNA); SEVERE ACUTE RESPIRATORY SYNDROME CORONAVIRUS 2 (SARS-COV-2) (CORONAVIRUS DISEASE [COVID-19]), AMPLIFIED PROBE TECHNIQUE, MAKING USE OF HIGH THROUGHPUT TECHNOLOGIES AS DESCRIBED BY CMS-2020-01-R: HCPCS

## 2022-05-10 LAB — SARS-COV-2 RNA RESP QL NAA+PROBE: NEGATIVE

## 2022-05-11 ENCOUNTER — ANESTHESIA EVENT (OUTPATIENT)
Dept: SURGERY | Facility: CLINIC | Age: 77
End: 2022-05-11
Payer: COMMERCIAL

## 2022-05-11 ENCOUNTER — TELEPHONE (OUTPATIENT)
Dept: CARDIOLOGY | Facility: CLINIC | Age: 77
End: 2022-05-11
Payer: COMMERCIAL

## 2022-05-11 NOTE — TELEPHONE ENCOUNTER
5/11/22 Called pt regarding DCCV scheduled for tomorrow . Notified of arrival time ( 830 am [facility])  NPO after midnight with sips of water for am meds. Discussed meds to be held ( OTC vitamins and supplements)  and that patient will need a  for ride home. Informed pt COVID test was negative and he should continue to quarantine until procedure time . Pt expressed understanding. Den 1230 pm

## 2022-05-12 ENCOUNTER — HOSPITAL ENCOUNTER (OUTPATIENT)
Dept: MEDSURG UNIT | Facility: CLINIC | Age: 77
Discharge: HOME | End: 2022-05-12
Attending: INTERNAL MEDICINE
Payer: COMMERCIAL

## 2022-05-12 ENCOUNTER — HOSPITAL ENCOUNTER (OUTPATIENT)
Facility: CLINIC | Age: 77
Discharge: HOME | End: 2022-05-12
Admitting: INTERNAL MEDICINE
Payer: COMMERCIAL

## 2022-05-12 ENCOUNTER — ANESTHESIA (OUTPATIENT)
Dept: SURGERY | Facility: CLINIC | Age: 77
End: 2022-05-12
Payer: COMMERCIAL

## 2022-05-12 VITALS
HEART RATE: 52 BPM | SYSTOLIC BLOOD PRESSURE: 155 MMHG | TEMPERATURE: 97.8 F | OXYGEN SATURATION: 98 % | DIASTOLIC BLOOD PRESSURE: 90 MMHG | RESPIRATION RATE: 12 BRPM | WEIGHT: 168.8 LBS | BODY MASS INDEX: 24.17 KG/M2 | HEIGHT: 70 IN

## 2022-05-12 DIAGNOSIS — I48.19 PERSISTENT ATRIAL FIBRILLATION (H): ICD-10-CM

## 2022-05-12 LAB
INR PPP: 1.25 (ref 0.85–1.15)
MAGNESIUM SERPL-MCNC: 2.3 MG/DL (ref 1.6–2.3)
POTASSIUM BLD-SCNC: 3.9 MMOL/L (ref 3.4–5.3)

## 2022-05-12 PROCEDURE — 999N000184 HC STATISTIC TELEMETRY

## 2022-05-12 PROCEDURE — 250N000013 HC RX MED GY IP 250 OP 250 PS 637: Performed by: INTERNAL MEDICINE

## 2022-05-12 PROCEDURE — 999N000010 HC STATISTIC ANES STAT CODE-CRNA PER MINUTE

## 2022-05-12 PROCEDURE — 999N000054 HC STATISTIC EKG NON-CHARGEABLE

## 2022-05-12 PROCEDURE — 85610 PROTHROMBIN TIME: CPT | Performed by: INTERNAL MEDICINE

## 2022-05-12 PROCEDURE — 83735 ASSAY OF MAGNESIUM: CPT | Performed by: INTERNAL MEDICINE

## 2022-05-12 PROCEDURE — 258N000003 HC RX IP 258 OP 636: Performed by: INTERNAL MEDICINE

## 2022-05-12 PROCEDURE — 92960 CARDIOVERSION ELECTRIC EXT: CPT

## 2022-05-12 PROCEDURE — 93010 ELECTROCARDIOGRAM REPORT: CPT | Mod: 76 | Performed by: INTERNAL MEDICINE

## 2022-05-12 PROCEDURE — 250N000011 HC RX IP 250 OP 636: Performed by: NURSE ANESTHETIST, CERTIFIED REGISTERED

## 2022-05-12 PROCEDURE — 84132 ASSAY OF SERUM POTASSIUM: CPT | Performed by: INTERNAL MEDICINE

## 2022-05-12 PROCEDURE — 370N000017 HC ANESTHESIA TECHNICAL FEE, PER MIN

## 2022-05-12 PROCEDURE — 93005 ELECTROCARDIOGRAM TRACING: CPT

## 2022-05-12 PROCEDURE — 36591 DRAW BLOOD OFF VENOUS DEVICE: CPT

## 2022-05-12 PROCEDURE — 36415 COLL VENOUS BLD VENIPUNCTURE: CPT | Performed by: INTERNAL MEDICINE

## 2022-05-12 RX ORDER — PROPOFOL 10 MG/ML
INJECTION, EMULSION INTRAVENOUS PRN
Status: DISCONTINUED | OUTPATIENT
Start: 2022-05-12 | End: 2022-05-12

## 2022-05-12 RX ORDER — SODIUM CHLORIDE 9 MG/ML
INJECTION, SOLUTION INTRAVENOUS CONTINUOUS
Status: DISCONTINUED | OUTPATIENT
Start: 2022-05-12 | End: 2022-05-12 | Stop reason: HOSPADM

## 2022-05-12 RX ORDER — POTASSIUM CHLORIDE 1500 MG/1
20 TABLET, EXTENDED RELEASE ORAL
Status: DISCONTINUED | OUTPATIENT
Start: 2022-05-12 | End: 2022-05-12 | Stop reason: HOSPADM

## 2022-05-12 RX ORDER — NALOXONE HYDROCHLORIDE 0.4 MG/ML
0.4 INJECTION, SOLUTION INTRAMUSCULAR; INTRAVENOUS; SUBCUTANEOUS
Status: DISCONTINUED | OUTPATIENT
Start: 2022-05-12 | End: 2022-05-12 | Stop reason: HOSPADM

## 2022-05-12 RX ORDER — MAGNESIUM SULFATE HEPTAHYDRATE 40 MG/ML
2 INJECTION, SOLUTION INTRAVENOUS
Status: DISCONTINUED | OUTPATIENT
Start: 2022-05-12 | End: 2022-05-12 | Stop reason: HOSPADM

## 2022-05-12 RX ORDER — NALOXONE HYDROCHLORIDE 0.4 MG/ML
0.2 INJECTION, SOLUTION INTRAMUSCULAR; INTRAVENOUS; SUBCUTANEOUS
Status: DISCONTINUED | OUTPATIENT
Start: 2022-05-12 | End: 2022-05-12 | Stop reason: HOSPADM

## 2022-05-12 RX ORDER — ATROPINE SULFATE 0.1 MG/ML
.5-1 INJECTION INTRAVENOUS
Status: DISCONTINUED | OUTPATIENT
Start: 2022-05-12 | End: 2022-05-12 | Stop reason: HOSPADM

## 2022-05-12 RX ORDER — FLUMAZENIL 0.1 MG/ML
0.2 INJECTION, SOLUTION INTRAVENOUS
Status: DISCONTINUED | OUTPATIENT
Start: 2022-05-12 | End: 2022-05-12 | Stop reason: HOSPADM

## 2022-05-12 RX ORDER — POTASSIUM CHLORIDE 1500 MG/1
40 TABLET, EXTENDED RELEASE ORAL
Status: DISCONTINUED | OUTPATIENT
Start: 2022-05-12 | End: 2022-05-12 | Stop reason: HOSPADM

## 2022-05-12 RX ADMIN — SODIUM CHLORIDE: 9 INJECTION, SOLUTION INTRAVENOUS at 09:37

## 2022-05-12 RX ADMIN — PROPOFOL 30 MG: 10 INJECTION, EMULSION INTRAVENOUS at 10:44

## 2022-05-12 RX ADMIN — PROPOFOL 60 MG: 10 INJECTION, EMULSION INTRAVENOUS at 10:43

## 2022-05-12 RX ADMIN — POTASSIUM CHLORIDE 20 MEQ: 1500 TABLET, EXTENDED RELEASE ORAL at 10:12

## 2022-05-12 ASSESSMENT — ENCOUNTER SYMPTOMS: DYSRHYTHMIAS: 1

## 2022-05-12 NOTE — PROGRESS NOTES
Discharge instructions reviewed prior to sedation --all questions answered and pt appears to accept and understand.

## 2022-05-12 NOTE — INTERVAL H&P NOTE
I have reviewed the surgical (or preoperative) H&P that is linked to this encounter, and examined the patient. There are no significant changes     Clear bilaterally, pupils equal, round and reactive to light.

## 2022-05-12 NOTE — PROGRESS NOTES
Care Suites Admission Nursing Note    Patient Information  Name: Rey Brown  Age: 77 year old  Reason for admission: [facility]  Care Suites arrival time: 0830         Patient Admission/Assessment   Pre-procedure assessment complete: Yes  If abnormal assessment/labs, provider notified: pending  NPO: Yes  Medications held per instructions/orders: N/A  Consent: MD to obtain  If applicable, pregnancy test status: N/A  Patient oriented to room: Yes  Education/questions answered: Yes  Plan/other: per procedural plan of caRE    Discharge Planning  Discharge name/phone number: Pt will call wife for ride to home   Overnight post sedation caregiver: yes  Discharge location: home    Arlene Son RN

## 2022-05-12 NOTE — PROGRESS NOTES
Sedation Post Procedure Summary:    Immediately prior to starting the procedure a Time Out was conducted with procedural staff and re-confirmed the patient s name, procedure, and site/side.             Indication:  Sedation was required to allow for Cardioversion    Sedatives: Propofol    Vital signs, airway, and pulse oximetry were monitored throughout the procedure and sedation was maintained until the procedure was complete.      Patient tolerance: Patient tolerated the procedure well with no immediate complications.    Post-procedure report given to: N/A RN and pt transferred to N/A by N/A.    (See Doc Flow-sheets and MAR for additional information)    Arlene Son RN

## 2022-05-12 NOTE — PROGRESS NOTES
Care Suites Discharge Summary    Discharge Criteria:   Discharge Criteria met per MD orders: Yes.   Vital signs stable.     Pt demonstrates ability to ambulate safely: Yes.  (See discharge questionnaire for additional information)    Discharge instructions & education:   Discharge instructions reviewed with patient and wife. Patient verbalizes  understanding.   Additional patient education provided:  Per procedural plan of care.     Medications:   Patient will be discharging on new medications- No. Patient verbalizes reason for use, start date, and side effects NA.    Items returned to patient:   Home and hospital acquired medications returned to patient NA   Listed belongings gathered and returned to patient: Yes    Patient discharged to home via wheelchair with wife driving.    Arlene Son RN

## 2022-05-12 NOTE — PROGRESS NOTES
Pt discharged to home via wheelchair and tolerated ambulation safely and independently. No additional questions at this time.

## 2022-05-12 NOTE — ANESTHESIA POSTPROCEDURE EVALUATION
Patient: Rey Brown    Procedure: Procedure(s):  ANESTHESIA, FOR CARDIOVERSION       Anesthesia Type:  General    Note:  Disposition: Outpatient   Postop Pain Control: Uneventful            Sign Out: Well controlled pain   PONV: No   Neuro/Psych: Uneventful            Sign Out: Acceptable/Baseline neuro status   Airway/Respiratory: Uneventful            Sign Out: Acceptable/Baseline resp. status   CV/Hemodynamics: Uneventful            Sign Out: Acceptable CV status   Other NRE: NONE   DID A NON-ROUTINE EVENT OCCUR? No           Last vitals:  Vitals Value Taken Time   /90 05/12/22 1105   Temp     Pulse 52 05/12/22 1111   Resp 10 05/12/22 1111   SpO2 96 % 05/12/22 1111   Vitals shown include unvalidated device data.    Electronically Signed By: Roque Nava MD  May 12, 2022  11:12 AM

## 2022-05-12 NOTE — PROGRESS NOTES
PATIENT/VISITOR WELLNESS SCREENING    Step 1 Patient Screening    1. In the last month, have you been in contact with someone who was confirmed or suspected to have Coronavirus/COVID-19? No    2. Do you have the following symptoms?  Fever/Chills? No   Cough? No   Shortness of breath? No   New loss of taste or smell? No  Sore throat? No  Muscle or body aches? No  Headaches? No  Fatigue? No  Vomiting or diarrhea? No

## 2022-05-12 NOTE — ANESTHESIA PREPROCEDURE EVALUATION
Anesthesia Pre-Procedure Evaluation    Patient: Rey Brown   MRN: [id number] : 1945        Procedure : Procedure(s):  ANESTHESIA, FOR CARDIOVERSION          Past Medical History:   Diagnosis Date     Hypercholesterolemia      Lumbar disc disease      Migraines      Paroxysmal A-fib (H)       Past Surgical History:   Procedure Laterality Date     ANESTHESIA CARDIOVERSION N/A 2022    Procedure: ANESTHESIA, FOR CARDIOVERSION FOLLOWING AGUS;  Surgeon: GENERIC ANESTHESIA PROVIDER;  Location:  OR     COLONOSCOPY       CYSTOSCOPY, RETROGRADES, COMBINED Left 2020    Procedure: LEFT RETROGRADE URETERAL PYELOGRAM;  Surgeon: Denilson Angulo MD;  Location:  OR     CYSTOSCOPY, TRANSURETHRAL RESECTION (TUR) PROSTATE, COMBINED N/A 2020    Procedure: CYSTOSCOPY WITH TRANSURETHRAL RESECTION PROSTATE;  Surgeon: Denilson Angulo MD;  Location:  OR     EP ABLATION FOCAL AFIB N/A 2022    Procedure: Ablation Focal Atrial Fibrillation [[id number]];  Surgeon: Sharmin Scott MD;  Location:  HEART CARDIAC CATH LAB     OPEN REDUCTION INTERNAL FIXATION HAND       RHINOPLASTY       TONSILLECTOMY       wisdom teeth        Allergies   Allergen Reactions     Ace Inhibitors Cough      Social History     Tobacco Use     Smoking status: Never Smoker     Smokeless tobacco: Never Used   Substance Use Topics     Alcohol use: Yes     Alcohol/week: 2.0 - 3.0 standard drinks     Types: 2 - 3 Cans of beer per week     Comment: social only      Wt Readings from Last 1 Encounters:   22 78.9 kg (174 lb)        Anesthesia Evaluation   Pt has had prior anesthetic.     No history of anesthetic complications       ROS/MED HX  ENT/Pulmonary:  - neg pulmonary ROS     Neurologic:  - neg neurologic ROS     Cardiovascular: Comment:     Interpretation Summary     1. Left ventricular systolic function is normal. The visual ejection fraction  is 60-65%.  2. The right ventricle is normal in structure, function and size.  3.  There is mild (1+) mitral regurgitation.  4. No thrombus is detected in the left atrial appendage.  5. There is no color Doppler evidence of an atrial shunt. A contrast injection  (Bubble Study) was performed that was negative for flow across the interatrial  septum.    (+) hypertension-----dysrhythmias, a-fib and a-flutter,     METS/Exercise Tolerance:     Hematologic:  - neg hematologic  ROS     Musculoskeletal:  - neg musculoskeletal ROS     GI/Hepatic:  - neg GI/hepatic ROS     Renal/Genitourinary:     (+) renal disease, type: CRI,     Endo: Comment: Dyslipidemia      Psychiatric/Substance Use:  - neg psychiatric ROS     Infectious Disease:  - neg infectious disease ROS     Malignancy:       Other:  - neg other ROS          Physical Exam    Airway  airway exam normal      Mallampati: II       Respiratory Devices and Support         Dental  no notable dental history         Cardiovascular          Rhythm and rate: irregular     Pulmonary                   OUTSIDE LABS:  CBC:   Lab Results   Component Value Date    WBC 5.8 04/01/2022    WBC 5.8 03/23/2021    HGB 16.0 04/01/2022    HGB 15.4 03/23/2021    HCT 45.6 04/01/2022    HCT 45.5 03/23/2021     04/01/2022     03/23/2021     BMP:   Lab Results   Component Value Date     04/01/2022     03/23/2021    POTASSIUM 4.0 04/22/2022    POTASSIUM 4.0 04/20/2022    CHLORIDE 109 04/01/2022    CHLORIDE 103 03/23/2021    CO2 28 04/01/2022    CO2 30 05/14/2020    BUN 22 04/01/2022    BUN 24 03/23/2021    BUN 18 03/23/2021    CR 1.22 04/01/2022    CR 1.2 03/25/2022    GLC 92 04/01/2022    GLC 79 03/23/2021     COAGS:   Lab Results   Component Value Date    INR 1.46 (H) 04/20/2022     POC: No results found for: BGM, HCG, HCGS  HEPATIC:   Lab Results   Component Value Date    ALBUMIN 4.3 03/23/2021    PROTTOTAL 6.6 03/23/2021    ALT 10 03/23/2021    AST 18 03/23/2021    ALKPHOS 71 03/23/2021    BILITOTAL 0.7 03/23/2021     OTHER:   Lab Results    Component Value Date    ELIEZER 8.9 04/01/2022    MAG 2.4 (H) 04/22/2022    LIPASE 58 03/10/2020    AMYLASE 59 03/10/2020    CRP <0.3 04/09/2015    SED 1 04/09/2015       Anesthesia Plan    ASA Status:  3      Anesthesia Type: General.     - Airway: Mask Only   Induction: Intravenous, Propofol.           Consents    Anesthesia Plan(s) and associated risks, benefits, and realistic alternatives discussed. Questions answered and patient/representative(s) expressed understanding.    - Discussed:     - Discussed with:  Patient         Postoperative Care            Comments:                Roque Nava MD

## 2022-05-12 NOTE — ANESTHESIA CARE TRANSFER NOTE
Patient: Rey Brown    Procedure: Procedure(s):  ANESTHESIA, FOR CARDIOVERSION       Diagnosis: Atrial fibrillation, unspecified type (H) [I48.91]  Diagnosis Additional Information: No value filed.    Anesthesia Type:   General     Note:    Oropharynx: oropharynx clear of all foreign objects and spontaneously breathing  Level of Consciousness: awake  Oxygen Supplementation: nasal cannula  Level of Supplemental Oxygen (L/min / FiO2): 2  Independent Airway: airway patency satisfactory and stable  Dentition: dentition unchanged  Vital Signs Stable: post-procedure vital signs reviewed and stable  Report to RN Given: handoff report given  Destination: Care Suites 17.  Comments: Pt exhibits spontaneous respirations, all monitors and alarms on in care suites 17, VSS, patent IV, report and transfer of care to RN.          Vitals:  Vitals Value Taken Time   /77 05/12/22 1100   Temp     Pulse 54 05/12/22 1104   Resp 6 05/12/22 1104   SpO2 99 % 05/12/22 1104   Vitals shown include unvalidated device data.    Electronically Signed By: JOSE CRUZ Koo CRNA  May 12, 2022  11:05 AM

## 2022-05-12 NOTE — PROGRESS NOTES
Pt tolerated procedure well with Dr Martinez and anesthesia team for DCCV. Pt shocked x 1 with 150 joules. Pt's wife here post procedure and will be  to home.

## 2022-05-12 NOTE — DISCHARGE INSTRUCTIONS
Cardioversion Discharge Instructions    After you go home:       For 24 hours - due to the sedation you received:    Have an adult stay with you for 24 hours.   Relax and take it easy.  Do NOT make any important or legal decisions.  Do NOT drive or operate machines at home or at work.  Do NOT drink alcohol.    Diet:    Start with clear liquids and progress to your normal diet as you feel able.    Medicines:    Take your medications, including blood thinners, unless your provider tells you not to.  If you have stopped any medications, check with your provider about when to restart them.    Follow Up Appointments:    Follow up with your cardiologist at [facility] of patient preference as instructed.  Follow up with your primary care provider as needed.    Post cardioversion:    The skin on your chest or back may feel tender for 48 hours.  If your skin is tender, you may:    Use a cold pack on the site. Never use ice directly on your skin. Use the cold pack for 20 minutes. Remove it for at least 30 minutes before re-using.  Apply 1% hydrocortisone cream to the skin (sold at drug stores)  Take Advil (Ibuprofen) or Tylenol (Acetaminophen) per your provider's recommendations.      Call your provider if you have:    Weakness, dizziness, lightheadedness, or fainting.  Shortness of breath.  Irregular heartbeat, feelings of your heart fluttering or beating fast, hard or palpitations.   More than minor skin discomfort or redness where the cardioversion pads were placed.  Questions or concerns.      Call 911 if you have:    Pain in your chest, arm, shoulder, neck, or upper back.  You have problems speaking or seeing.  Weakness in your arm or leg.  You are unable to move your arm or leg.  You have uncontrolled bleeding.         [facility] [place]:    [phone] [facility] (7 days a week)

## 2022-05-13 ENCOUNTER — TELEPHONE (OUTPATIENT)
Dept: CARDIOLOGY | Facility: CLINIC | Age: 77
End: 2022-05-13
Payer: COMMERCIAL

## 2022-05-13 NOTE — TELEPHONE ENCOUNTER
Pt doing well post Cardioversion and states that he is still in Sinus rhythm and heading down to [facility]. Pt aware of his f/u with Dr Scott on 6/30 and will call if there are any issues before that date. Licha

## 2022-05-14 LAB
ATRIAL RATE - MUSE: 138 BPM
ATRIAL RATE - MUSE: 52 BPM
DIASTOLIC BLOOD PRESSURE - MUSE: NORMAL MMHG
DIASTOLIC BLOOD PRESSURE - MUSE: NORMAL MMHG
INTERPRETATION ECG - MUSE: NORMAL
INTERPRETATION ECG - MUSE: NORMAL
P AXIS - MUSE: 56 DEGREES
P AXIS - MUSE: 79 DEGREES
PR INTERVAL - MUSE: 252 MS
PR INTERVAL - MUSE: NORMAL MS
QRS DURATION - MUSE: 128 MS
QRS DURATION - MUSE: 128 MS
QT - MUSE: 446 MS
QT - MUSE: 518 MS
QTC - MUSE: 481 MS
QTC - MUSE: 501 MS
R AXIS - MUSE: 118 DEGREES
R AXIS - MUSE: 78 DEGREES
SYSTOLIC BLOOD PRESSURE - MUSE: NORMAL MMHG
SYSTOLIC BLOOD PRESSURE - MUSE: NORMAL MMHG
T AXIS - MUSE: 67 DEGREES
T AXIS - MUSE: 78 DEGREES
VENTRICULAR RATE- MUSE: 52 BPM
VENTRICULAR RATE- MUSE: 76 BPM

## 2022-05-20 ENCOUNTER — HOSPITAL ENCOUNTER (INPATIENT)
Facility: CLINIC | Age: 77
LOS: 4 days | Discharge: HOME | DRG: 247 | End: 2022-05-24
Attending: EMERGENCY MEDICINE | Admitting: INTERNAL MEDICINE
Payer: COMMERCIAL

## 2022-05-20 ENCOUNTER — APPOINTMENT (OUTPATIENT)
Dept: GENERAL RADIOLOGY | Facility: CLINIC | Age: 77
DRG: 247 | End: 2022-05-20
Attending: EMERGENCY MEDICINE
Payer: COMMERCIAL

## 2022-05-20 DIAGNOSIS — I25.10 CORONARY ARTERY DISEASE WITHOUT ANGINA PECTORIS, UNSPECIFIED VESSEL OR LESION TYPE, UNSPECIFIED WHETHER NATIVE OR TRANSPLANTED HEART: Primary | ICD-10-CM

## 2022-05-20 DIAGNOSIS — I21.4 NSTEMI (NON-ST ELEVATED MYOCARDIAL INFARCTION) (H): ICD-10-CM

## 2022-05-20 DIAGNOSIS — I50.21 ACUTE SYSTOLIC HEART FAILURE (H): ICD-10-CM

## 2022-05-20 DIAGNOSIS — N52.9 VASCULOGENIC ERECTILE DYSFUNCTION, UNSPECIFIED VASCULOGENIC ERECTILE DYSFUNCTION TYPE: ICD-10-CM

## 2022-05-20 LAB
ANION GAP SERPL CALCULATED.3IONS-SCNC: 6 MMOL/L (ref 3–14)
ATRIAL RATE - MUSE: 70 BPM
BASOPHILS # BLD AUTO: 0 10E3/UL (ref 0–0.2)
BASOPHILS NFR BLD AUTO: 0 %
BUN SERPL-MCNC: 21 MG/DL (ref 7–30)
CALCIUM SERPL-MCNC: 9 MG/DL (ref 8.5–10.1)
CHLORIDE BLD-SCNC: 106 MMOL/L (ref 94–109)
CO2 SERPL-SCNC: 24 MMOL/L (ref 20–32)
CREAT SERPL-MCNC: 1.15 MG/DL (ref 0.66–1.25)
DIASTOLIC BLOOD PRESSURE - MUSE: NORMAL MMHG
EOSINOPHIL # BLD AUTO: 0 10E3/UL (ref 0–0.7)
EOSINOPHIL NFR BLD AUTO: 0 %
ERYTHROCYTE [DISTWIDTH] IN BLOOD BY AUTOMATED COUNT: 12.8 % (ref 10–15)
GFR SERPL CREATININE-BSD FRML MDRD: 66 ML/MIN/1.73M2
GLUCOSE BLD-MCNC: 167 MG/DL (ref 70–99)
HCT VFR BLD AUTO: 45.3 % (ref 40–53)
HGB BLD-MCNC: 15.3 G/DL (ref 13.3–17.7)
HOLD SPECIMEN: NORMAL
IMM GRANULOCYTES # BLD: 0.1 10E3/UL
IMM GRANULOCYTES NFR BLD: 1 %
INTERPRETATION ECG - MUSE: NORMAL
LYMPHOCYTES # BLD AUTO: 0.7 10E3/UL (ref 0.8–5.3)
LYMPHOCYTES NFR BLD AUTO: 8 %
MCH RBC QN AUTO: 30.7 PG (ref 26.5–33)
MCHC RBC AUTO-ENTMCNC: 33.8 G/DL (ref 31.5–36.5)
MCV RBC AUTO: 91 FL (ref 78–100)
MONOCYTES # BLD AUTO: 0.9 10E3/UL (ref 0–1.3)
MONOCYTES NFR BLD AUTO: 9 %
NEUTROPHILS # BLD AUTO: 8.2 10E3/UL (ref 1.6–8.3)
NEUTROPHILS NFR BLD AUTO: 82 %
NRBC # BLD AUTO: 0 10E3/UL
NRBC BLD AUTO-RTO: 0 /100
P AXIS - MUSE: 92 DEGREES
PLATELET # BLD AUTO: 252 10E3/UL (ref 150–450)
POTASSIUM BLD-SCNC: 4.3 MMOL/L (ref 3.4–5.3)
PR INTERVAL - MUSE: 290 MS
QRS DURATION - MUSE: 104 MS
QT - MUSE: 444 MS
QTC - MUSE: 479 MS
R AXIS - MUSE: 100 DEGREES
RBC # BLD AUTO: 4.99 10E6/UL (ref 4.4–5.9)
SODIUM SERPL-SCNC: 136 MMOL/L (ref 133–144)
SYSTOLIC BLOOD PRESSURE - MUSE: NORMAL MMHG
T AXIS - MUSE: 81 DEGREES
TROPONIN I SERPL HS-MCNC: 921 NG/L
VENTRICULAR RATE- MUSE: 70 BPM
WBC # BLD AUTO: 9.9 10E3/UL (ref 4–11)

## 2022-05-20 PROCEDURE — 210N000002 HC R&B HEART CARE

## 2022-05-20 PROCEDURE — 250N000013 HC RX MED GY IP 250 OP 250 PS 637: Performed by: EMERGENCY MEDICINE

## 2022-05-20 PROCEDURE — 93005 ELECTROCARDIOGRAM TRACING: CPT

## 2022-05-20 PROCEDURE — 84484 ASSAY OF TROPONIN QUANT: CPT | Performed by: EMERGENCY MEDICINE

## 2022-05-20 PROCEDURE — 36415 COLL VENOUS BLD VENIPUNCTURE: CPT | Performed by: EMERGENCY MEDICINE

## 2022-05-20 PROCEDURE — 85025 COMPLETE CBC W/AUTO DIFF WBC: CPT | Performed by: EMERGENCY MEDICINE

## 2022-05-20 PROCEDURE — 80048 BASIC METABOLIC PNL TOTAL CA: CPT | Performed by: EMERGENCY MEDICINE

## 2022-05-20 PROCEDURE — U0003 INFECTIOUS AGENT DETECTION BY NUCLEIC ACID (DNA OR RNA); SEVERE ACUTE RESPIRATORY SYNDROME CORONAVIRUS 2 (SARS-COV-2) (CORONAVIRUS DISEASE [COVID-19]), AMPLIFIED PROBE TECHNIQUE, MAKING USE OF HIGH THROUGHPUT TECHNOLOGIES AS DESCRIBED BY CMS-2020-01-R: HCPCS | Performed by: EMERGENCY MEDICINE

## 2022-05-20 PROCEDURE — 71046 X-RAY EXAM CHEST 2 VIEWS: CPT

## 2022-05-20 PROCEDURE — C9803 HOPD COVID-19 SPEC COLLECT: HCPCS

## 2022-05-20 PROCEDURE — 99285 EMERGENCY DEPT VISIT HI MDM: CPT

## 2022-05-20 RX ORDER — NITROGLYCERIN 0.4 MG/1
0.4 TABLET SUBLINGUAL EVERY 5 MIN PRN
Status: DISCONTINUED | OUTPATIENT
Start: 2022-05-20 | End: 2022-05-21

## 2022-05-20 RX ORDER — NITROGLYCERIN 0.4 MG/1
0.4 TABLET SUBLINGUAL EVERY 5 MIN PRN
Status: DISCONTINUED | OUTPATIENT
Start: 2022-05-20 | End: 2022-05-20

## 2022-05-20 RX ADMIN — NITROGLYCERIN 0.4 MG: 0.4 TABLET SUBLINGUAL at 23:10

## 2022-05-20 ASSESSMENT — ENCOUNTER SYMPTOMS: SHORTNESS OF BREATH: 0

## 2022-05-20 NOTE — Clinical Note
The first balloon was inserted into the left anterior descending and middle left anterior descending.Max pressure = 16 malgorzata. Total duration = 21 seconds.     Max pressure = 15 malgorzata. Total duration = 13 seconds.    Balloon reinflated a second time: Max pressure = 15 malgorzata. Total duration = 13 seconds.

## 2022-05-20 NOTE — Clinical Note
The first balloon was inserted into the left anterior descending and middle left anterior descending.Max pressure = 14 mlagorzata. Total duration = 14 seconds.

## 2022-05-20 NOTE — Clinical Note
Stent deployed in the middle left anterior descending. Max pressure = 10 malgorzata. Total duration = 224 seconds.

## 2022-05-21 LAB
ANION GAP SERPL CALCULATED.3IONS-SCNC: 8 MMOL/L (ref 3–14)
APTT PPP: 56 SECONDS (ref 22–38)
APTT PPP: 71 SECONDS (ref 22–38)
BUN SERPL-MCNC: 21 MG/DL (ref 7–30)
CALCIUM SERPL-MCNC: 8.5 MG/DL (ref 8.5–10.1)
CHLORIDE BLD-SCNC: 106 MMOL/L (ref 94–109)
CHOLEST SERPL-MCNC: 146 MG/DL
CO2 SERPL-SCNC: 23 MMOL/L (ref 20–32)
CREAT SERPL-MCNC: 0.99 MG/DL (ref 0.66–1.25)
ERYTHROCYTE [DISTWIDTH] IN BLOOD BY AUTOMATED COUNT: 12.7 % (ref 10–15)
GFR SERPL CREATININE-BSD FRML MDRD: 79 ML/MIN/1.73M2
GLUCOSE BLD-MCNC: 151 MG/DL (ref 70–99)
HCT VFR BLD AUTO: 46.3 % (ref 40–53)
HDLC SERPL-MCNC: 59 MG/DL
HGB BLD-MCNC: 15.7 G/DL (ref 13.3–17.7)
LDLC SERPL CALC-MCNC: 77 MG/DL
MCH RBC QN AUTO: 30.8 PG (ref 26.5–33)
MCHC RBC AUTO-ENTMCNC: 33.9 G/DL (ref 31.5–36.5)
MCV RBC AUTO: 91 FL (ref 78–100)
NONHDLC SERPL-MCNC: 87 MG/DL
PLATELET # BLD AUTO: 283 10E3/UL (ref 150–450)
POTASSIUM BLD-SCNC: 3.9 MMOL/L (ref 3.4–5.3)
POTASSIUM BLD-SCNC: 4.2 MMOL/L (ref 3.4–5.3)
RBC # BLD AUTO: 5.1 10E6/UL (ref 4.4–5.9)
SARS-COV-2 RNA RESP QL NAA+PROBE: NEGATIVE
SODIUM SERPL-SCNC: 137 MMOL/L (ref 133–144)
TRIGL SERPL-MCNC: 48 MG/DL
TROPONIN I SERPL HS-MCNC: 1117 NG/L
TROPONIN I SERPL HS-MCNC: 632 NG/L
UFH PPP CHRO-ACNC: >1.1 IU/ML
WBC # BLD AUTO: 14.7 10E3/UL (ref 4–11)

## 2022-05-21 PROCEDURE — 85027 COMPLETE CBC AUTOMATED: CPT | Performed by: INTERNAL MEDICINE

## 2022-05-21 PROCEDURE — 99223 1ST HOSP IP/OBS HIGH 75: CPT | Mod: AI | Performed by: INTERNAL MEDICINE

## 2022-05-21 PROCEDURE — 36415 COLL VENOUS BLD VENIPUNCTURE: CPT | Performed by: INTERNAL MEDICINE

## 2022-05-21 PROCEDURE — 210N000002 HC R&B HEART CARE

## 2022-05-21 PROCEDURE — 85730 THROMBOPLASTIN TIME PARTIAL: CPT | Performed by: INTERNAL MEDICINE

## 2022-05-21 PROCEDURE — 99207 PR NO CHARGE LOS: CPT | Performed by: INTERNAL MEDICINE

## 2022-05-21 PROCEDURE — 84484 ASSAY OF TROPONIN QUANT: CPT | Performed by: INTERNAL MEDICINE

## 2022-05-21 PROCEDURE — 250N000011 HC RX IP 250 OP 636: Performed by: INTERNAL MEDICINE

## 2022-05-21 PROCEDURE — 80061 LIPID PANEL: CPT | Performed by: INTERNAL MEDICINE

## 2022-05-21 PROCEDURE — 250N000013 HC RX MED GY IP 250 OP 250 PS 637: Performed by: INTERNAL MEDICINE

## 2022-05-21 PROCEDURE — 84132 ASSAY OF SERUM POTASSIUM: CPT | Performed by: INTERNAL MEDICINE

## 2022-05-21 PROCEDURE — 85520 HEPARIN ASSAY: CPT | Performed by: INTERNAL MEDICINE

## 2022-05-21 PROCEDURE — 99222 1ST HOSP IP/OBS MODERATE 55: CPT | Mod: 25 | Performed by: INTERNAL MEDICINE

## 2022-05-21 PROCEDURE — 258N000003 HC RX IP 258 OP 636: Performed by: INTERNAL MEDICINE

## 2022-05-21 PROCEDURE — 93010 ELECTROCARDIOGRAM REPORT: CPT | Performed by: INTERNAL MEDICINE

## 2022-05-21 PROCEDURE — 93005 ELECTROCARDIOGRAM TRACING: CPT

## 2022-05-21 RX ORDER — AMOXICILLIN 250 MG
2 CAPSULE ORAL 2 TIMES DAILY PRN
Status: DISCONTINUED | OUTPATIENT
Start: 2022-05-21 | End: 2022-05-24 | Stop reason: HOSPADM

## 2022-05-21 RX ORDER — ASPIRIN 81 MG/1
81 TABLET, CHEWABLE ORAL DAILY
Status: DISCONTINUED | OUTPATIENT
Start: 2022-05-22 | End: 2022-05-23 | Stop reason: CLARIF

## 2022-05-21 RX ORDER — SIMVASTATIN 5 MG
5 TABLET ORAL AT BEDTIME
Status: DISCONTINUED | OUTPATIENT
Start: 2022-05-21 | End: 2022-05-21

## 2022-05-21 RX ORDER — ATORVASTATIN CALCIUM 80 MG/1
80 TABLET, FILM COATED ORAL EVERY EVENING
Status: DISCONTINUED | OUTPATIENT
Start: 2022-05-21 | End: 2022-05-23 | Stop reason: CLARIF

## 2022-05-21 RX ORDER — ONDANSETRON 2 MG/ML
4 INJECTION INTRAMUSCULAR; INTRAVENOUS EVERY 6 HOURS PRN
Status: DISCONTINUED | OUTPATIENT
Start: 2022-05-21 | End: 2022-05-22

## 2022-05-21 RX ORDER — AMOXICILLIN 250 MG
1 CAPSULE ORAL 2 TIMES DAILY PRN
Status: DISCONTINUED | OUTPATIENT
Start: 2022-05-21 | End: 2022-05-24 | Stop reason: HOSPADM

## 2022-05-21 RX ORDER — ONDANSETRON 4 MG/1
4 TABLET, ORALLY DISINTEGRATING ORAL EVERY 6 HOURS PRN
Status: DISCONTINUED | OUTPATIENT
Start: 2022-05-21 | End: 2022-05-22

## 2022-05-21 RX ORDER — FLECAINIDE ACETATE 50 MG/1
150 TABLET ORAL 2 TIMES DAILY
Status: DISCONTINUED | OUTPATIENT
Start: 2022-05-21 | End: 2022-05-22

## 2022-05-21 RX ORDER — LIDOCAINE 40 MG/G
CREAM TOPICAL
Status: DISCONTINUED | OUTPATIENT
Start: 2022-05-21 | End: 2022-05-24 | Stop reason: HOSPADM

## 2022-05-21 RX ORDER — SODIUM CHLORIDE 9 MG/ML
INJECTION, SOLUTION INTRAVENOUS CONTINUOUS
Status: DISCONTINUED | OUTPATIENT
Start: 2022-05-21 | End: 2022-05-21

## 2022-05-21 RX ORDER — ASPIRIN 81 MG/1
324 TABLET, CHEWABLE ORAL ONCE
Status: COMPLETED | OUTPATIENT
Start: 2022-05-21 | End: 2022-05-21

## 2022-05-21 RX ORDER — HEPARIN SODIUM 10000 [USP'U]/100ML
0-5000 INJECTION, SOLUTION INTRAVENOUS CONTINUOUS
Status: DISCONTINUED | OUTPATIENT
Start: 2022-05-21 | End: 2022-05-21

## 2022-05-21 RX ORDER — ACETAMINOPHEN 325 MG/1
325-650 TABLET ORAL EVERY 6 HOURS PRN
Status: DISCONTINUED | OUTPATIENT
Start: 2022-05-21 | End: 2022-05-23

## 2022-05-21 RX ORDER — HEPARIN SODIUM 10000 [USP'U]/100ML
0-5000 INJECTION, SOLUTION INTRAVENOUS CONTINUOUS
Status: DISCONTINUED | OUTPATIENT
Start: 2022-05-21 | End: 2022-05-23 | Stop reason: CLARIF

## 2022-05-21 RX ORDER — NITROGLYCERIN 0.4 MG/1
0.4 TABLET SUBLINGUAL EVERY 5 MIN PRN
Status: DISCONTINUED | OUTPATIENT
Start: 2022-05-21 | End: 2022-05-22

## 2022-05-21 RX ADMIN — FLECAINIDE ACETATE 150 MG: 50 TABLET ORAL at 20:24

## 2022-05-21 RX ADMIN — ONDANSETRON 4 MG: 4 TABLET, ORALLY DISINTEGRATING ORAL at 14:18

## 2022-05-21 RX ADMIN — ASPIRIN 81 MG CHEWABLE TABLET 324 MG: 81 TABLET CHEWABLE at 01:39

## 2022-05-21 RX ADMIN — HEPARIN SODIUM 900 UNITS/HR: 10000 INJECTION, SOLUTION INTRAVENOUS at 07:31

## 2022-05-21 RX ADMIN — FLECAINIDE ACETATE 150 MG: 50 TABLET ORAL at 08:27

## 2022-05-21 RX ADMIN — SODIUM CHLORIDE: 9 INJECTION, SOLUTION INTRAVENOUS at 01:40

## 2022-05-21 RX ADMIN — ACETAMINOPHEN 650 MG: 325 TABLET ORAL at 05:26

## 2022-05-21 RX ADMIN — ATORVASTATIN CALCIUM 80 MG: 80 TABLET, FILM COATED ORAL at 20:23

## 2022-05-21 ASSESSMENT — ACTIVITIES OF DAILY LIVING (ADL)
ADLS_ACUITY_SCORE: 21
ADLS_ACUITY_SCORE: 18
ADLS_ACUITY_SCORE: 21
ADLS_ACUITY_SCORE: 18
ADLS_ACUITY_SCORE: 21
ADLS_ACUITY_SCORE: 20
ADLS_ACUITY_SCORE: 35
ADLS_ACUITY_SCORE: 20
ADLS_ACUITY_SCORE: 18
ADLS_ACUITY_SCORE: 20
ADLS_ACUITY_SCORE: 18
ADLS_ACUITY_SCORE: 21

## 2022-05-21 NOTE — PLAN OF CARE
"Patient called nurse in to report he felt \"gurgling\" in his lungs. Sat 90% on RA occ to 80's. LS bilat crackles, right up to mid lobe. BP elevated. O2 up to 4L, titrate back to 3l, sat now 92-97 %. EKG done, Reported findings to Dr Crawford, order to stop IV fluid. Intermittent hot flashes and nausea. Denies any chest symptoms. Did report some acid reflux which pt states is not CP with acid up to mouth.. Sitting up in bed. Tylenol for HA. Feeling better, dozing. Sat's still fluctuating. BP remains elevated. SR with 1st degree AVB. Order to start heparin this am at 0800 due to Eliquis prior to coming to hospital. Reported 0200  trending up trop to Dr Crawford. Asymptomatic prior to this mornings symptoms. Continue to closely monitor  "

## 2022-05-21 NOTE — ED PROVIDER NOTES
History   Chief Complaint:  Chest Pain       The history is provided by the patient.      Rey Brown is a 77 year old male with history of hypercholesteremia and atrial fibrillation, on Eliquis, who presents with chest pain. Earlier tonight around 1800, the patient was playing guitar and singing at a party when he noticed he was having chest discomfort and a rapid heart rate. When he went home, his chest pressure continued and at its worst, his pain was a 3/10 and is now a 1/10. His pain had radiated to his neck at one point but this has resolved. He denies any exacerbation with breathing. At home, he took Excedrin at 2130 without much relief. He also felt his blood pressure was elevated at home with a reading of 150/90. 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 Additionally, he went on a recent [employer] cruise where he drove to and from [place]. He denies any shortness of breath or leg swelling. He denies any personal history of heart attacks and has never had an angiogram. Both of his parents had heart disease and his sister  an early death from heart disease as well.       Review of Systems   Respiratory: Negative for shortness of breath.    Cardiovascular: Positive for chest pain. Negative for leg swelling.   All other systems reviewed and are negative.        Allergies:  Ace Inhibitors    Medications:  Eliquis  Flecainide  Viagra  Simvastatin     Past Medical History:     Hypercholesteremia   Lumbar disc disease  Migraines  Paroxysmal atrial fibrillation     Past Surgical History:    Cardioversion x2  Colonoscopy  Cystoscopy, left retrograde ureteral pyelogram   Cystoscopy, TUR prostate   Ablation focal atrial fibrillation   ORIF hand  Rhinoplasty   Tonsillectomy  Bimble teeth extraction      Family History:    Father: dementia, osteoarthritis, CVD  Mother: MI, CAD    Social History:  The patient presents to the ED with his wife. 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       Physical Exam     Patient Vitals for the past 24 hrs:   BP Temp Temp src Pulse Resp SpO2 Height Weight   05/21/22 0451 -- -- -- -- -- 91 % -- --   05/21/22 0450 (!) 150/109 98.7  F (37.1  C) Oral 66 20 95 % -- --   05/21/22 0445 (!) 156/98 -- -- 68 -- 92 % -- --   05/21/22 0417 -- -- -- -- -- -- -- 80 kg (176 lb 4.8 oz)   05/21/22 0115 (!) 148/97 -- -- 63 -- -- -- --   05/21/22 0100 135/89 97.9  F (36.6  C) -- 74 16 98 % -- --   05/21/22 0000 130/86 -- -- 60 10 -- -- --   05/20/22 2202 (!) 142/88 97.4  F (36.3  C) Temporal 72 16 96 % 1.778 m (5' 10\") 76.2 kg (168 lb)       Physical Exam    GENERAL: well developed, pleasant  HEAD: atraumatic  EYES: pupils reactive, extraocular muscles intact, conjunctivae normal  ENT:  mucus membranes moist  NECK:  trachea midline, normal range of motion  RESPIRATORY: no tachypnea, breath sounds clear to auscultation   CVS: normal S1/S2, no murmurs, intact distal pulses  ABDOMEN: soft, nontender, nondistention  MUSCULOSKELETAL: no deformities  SKIN: warm and dry, no acute rashes or ulceration  NEURO: GCS 15, cranial nerves intact, alert and oriented x3  PSYCH:  Mood/affect normal        Emergency Department Course   ECG  ECG results from 05/20/22   EKG 12-lead, tracing only     Value    Systolic Blood Pressure     Diastolic Blood Pressure     Ventricular Rate 70    Atrial Rate 70    OK Interval 290    QRS Duration 104        QTc 479    P Axis 92    R AXIS 100    T Axis 81    Interpretation ECG       Suspect arm lead reversal, interpretation assumes no reversal  Sinus rhythm with 1st degree A-V block  Rightward axis  Septal infarct , age undetermined  Abnormal ECG  When compared with ECG of 12-MAY-2022 10:52,  Septal infarct is now Present  Confirmed by GENERATED REPORT, COMPUTER ([id number]),  Aasen, Bradley ([id number]) on 5/20/2022 10:15:42 PM           Imaging:  XR Chest 2 Views   Final Result   IMPRESSION: Mild nonspecific interstitial prominence in the lungs. No " focal acute appearing infiltrates or consolidation. Normal heart size and pulmonary vascularity. No significant bony abnormalities. Otherwise unremarkable.      Echocardiogram Complete    (Results Pending)     Report per radiology    Laboratory:  Labs Ordered and Resulted from Time of ED Arrival to Time of ED Departure   BASIC METABOLIC PANEL - Abnormal       Result Value    Sodium 136      Potassium 4.3      Chloride 106      Carbon Dioxide (CO2) 24      Anion Gap 6      Urea Nitrogen 21      Creatinine 1.15      Calcium 9.0      Glucose 167 (*)     GFR Estimate 66     TROPONIN I - Abnormal    Troponin I High Sensitivity 921 (*)    CBC WITH PLATELETS AND DIFFERENTIAL - Abnormal    WBC Count 9.9      RBC Count 4.99      Hemoglobin 15.3      Hematocrit 45.3      MCV 91      MCH 30.7      MCHC 33.8      RDW 12.8      Platelet Count 252      % Neutrophils 82      % Lymphocytes 8      % Monocytes 9      % Eosinophils 0      % Basophils 0      % Immature Granulocytes 1      NRBCs per 100 WBC 0      Absolute Neutrophils 8.2      Absolute Lymphocytes 0.7 (*)     Absolute Monocytes 0.9      Absolute Eosinophils 0.0      Absolute Basophils 0.0      Absolute Immature Granulocytes 0.1      Absolute NRBCs 0.0     COVID-19 VIRUS (CORONAVIRUS) BY PCR - Normal    SARS CoV2 PCR Negative          Procedures      Emergency Department Course:             Reviewed:  I reviewed nursing notes, vitals and past medical history    Assessments:  2305 I obtained history and examined the patient as noted above.   2338 I rechecked the patient and explained findings.     Consults:  2333 I spoke with Dr. Crawford, hospitalist, who agreed to accept the patient.     Interventions:  2310 Nitrostat 0.4 mL SL    Disposition:  The patient was admitted to the hospital under the care of Dr. Crawford.     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  He notes some degree of stress and anxiety and excitement over this as he has not performed in some time.  Does have a history of A. fib with prior ablation but did check his pulse and noted that it was fast but not irregular.  Patient developed some mild discomfort rated 1 out of 10.  EKG shows no ischemic changes although initial troponin is elevated.  Patient is already on Eliquis.  Patient also notes taking Excedrin prior to arrival.  Patient given nitroglycerin here with complete relief of pain.  Patient has not had a recent stress test.  Does not sound to be PE in nature.  Discussed with the hospitalist regarding admission.    Critical Care Time: was 0 minutes for this patient excluding procedures    Diagnosis:    ICD-10-CM    1. NSTEMI (non-ST elevated myocardial infarction) (H)  I21.4          Scribe Disclosure:  I, Alysia Vilchis, am serving as a scribe at 11:01 PM on 5/20/2022 to document services personally performed by Eduardo Baird MD based on my observations and the provider's statements to me.              Eduardo Baird MD  05/21/22 3979

## 2022-05-21 NOTE — H&P
[facility]    History and Physical - Hospitalist Service       Date of Admission:  5/20/2022  Dictation #: [id number]  Brief Summary (see dictation for more details): 78 y/o male with PMH of Paroxysmal Afib s/p recent ablation and cardioversion, HTN, HLP- presented for evaluation of chest discomfort, relieved by NTG; EKG- NSR, troponin 921;   - NSTEMI- [place] [facility] Eliquis last night, plan to hold Eliquis tonight, start heparin drip in am, Echo, cardiology consult.     Clinically Significant Risk Factors Present on Admission               # Coagulation Defect: home medication list includes an anticoagulant medication  # Platelet Defect: home medication list includes an antiplatelet medication         Alicia Crawford MD  Hospitalist Service  [facility]  Securely message with the Vocera Web Console (learn more here)  Text page via [employer] Paging/Directory

## 2022-05-21 NOTE — H&P
"Admitted: 05/20/2022    CHIEF COMPLAINT:  Chest pain.    HISTORY OF PRESENT ILLNESS:  Has been obtained from the patient, who is a good historian.  I did discuss with ER attending, Dr. Baird, and I reviewed his chart as well.    Mr. Rey Brown is a very pleasant 77-year-old gentleman with past medical history of paroxysmal atrial fibrillation, status post ablation and cardioversion x 2 on Eliquis, hypertension, hypercholesterolemia, and migraine headaches, who presented to ER for evaluation of chest discomfort.    The patient has history of paroxysmal atrial fibrillation.  He follows with Dr. Scott from Cardiology.  It seems that he had been on flecainide for years, then he had a breakthrough atrial fibrillation and underwent ablation on 04/01/2022.  He had recurrent atrial flutter and underwent cardioversion on 04/22 but then he had reverted back to atrial flutter and had repeat cardioversion on 05/12.  He had been maintained on flecainide.    He states that last evening he started having some chest discomfort while he was playing at a party.  He states that he was playing guitar and singing at the same time.  He states that after the first set he \"felt funny\", described as having some chest discomfort, 3/10 in intensity.  He states that his watch showed that his heart rate was 90, which was high for him, since he has a regular heart rate is in the 60s.  EKG on his watch was read as a normal sinus rhythm.  He continued to play a second set and soon thereafter, he went home.  When he was at home, he continued to not feel well.  He started having some left-sided neck pain and a headache.  He took some Excedrin, which did not help, and decided to come to ER.  His wife drove him to ER.      Here in ER, he was seen by Dr. Baird.  He was given nitroglycerin under the tongue, which relieved his chest pain.  It did cause some nausea, but that resolved as well.  The patient reports that he never had similar symptoms " before.  He did report having an episode of angina a few months ago, but at that time, the chest was more severe and lasted for a few minutes.  This time, the pain was milder, but lasted for hours.    Upon further questioning, currently he denies any headache.  He denies any nausea.  He denies vomiting.  No recent fevers, no coughing.  He denies associated shortness of breath.  No diaphoresis.  He denies abdominal pain, no diarrhea, no constipation, no dysuria, no leg swellings.    The patient did travel recently through [place] and [place].    In ER, he was seen by Dr. Baird  His vitals showed a blood pressure of 142/88, repeat is 130/86, heart rate 72, respiratory rate 16, oxygen saturation 96% on room air, and temperature 97.4.     He did have basic blood work, which showed a BMP with a sodium of 136, potassium 4.3, chloride 106, bicarbonate 24, BUN 21, creatinine 1.15, calcium of 9, anion gap of 6.  Troponin initial 921.  His glucose was 167.    CBC with white blood cells 9.9, hemoglobin 15.3, hematocrit 45.3 and platelet count 252.      His chest x-ray showed mild nonspecific interstitial prominence in the lungs.  No focal acute appearing infiltrate or consolidation.      His EKG showed normal sinus rhythm at the rate of 70 beats per minute with Q-waves in leads V1-V3.    His COVID-19 PCR is negative.      Of note, the patient did take his Eliquis and flecainide tonight.    PAST MEDICAL HISTORY:    1.  Paroxysmal atrial fibrillation, status post ablation in 04/01/2022 and cardioversion x 2 on 04/22 and 05/12/2022  2.  Hypertension.  3.  Dyslipidemia.  4.  Migraines.  5.  Lumbar disk disease.    PAST SURGICAL HISTORY:    Past Surgical History:   Procedure Laterality Date     ANESTHESIA CARDIOVERSION N/A 4/22/2022    Procedure: ANESTHESIA, FOR CARDIOVERSION FOLLOWING AGUS;  Surgeon: GENERIC ANESTHESIA PROVIDER;  Location:  OR     ANESTHESIA CARDIOVERSION N/A 5/12/2022    Procedure: ANESTHESIA, FOR CARDIOVERSION;   Surgeon: GENERIC ANESTHESIA PROVIDER;  Location:  OR     COLONOSCOPY       CYSTOSCOPY, RETROGRADES, COMBINED Left 5/13/2020    Procedure: LEFT RETROGRADE URETERAL PYELOGRAM;  Surgeon: Denilson Angulo MD;  Location:  OR     CYSTOSCOPY, TRANSURETHRAL RESECTION (TUR) PROSTATE, COMBINED N/A 5/13/2020    Procedure: CYSTOSCOPY WITH TRANSURETHRAL RESECTION PROSTATE;  Surgeon: Denilson Angulo MD;  Location:  OR     EP ABLATION FOCAL AFIB N/A 4/1/2022    Procedure: Ablation Focal Atrial Fibrillation [[id number]];  Surgeon: Sharmin Scott MD;  Location:  HEART CARDIAC CATH LAB     OPEN REDUCTION INTERNAL FIXATION HAND       RHINOPLASTY       TONSILLECTOMY       wisdom teeth         SOCIAL HISTORY:  The patient denies smoking.  He reports drinking 2-3 alcoholic beverages weekly.  He denies illicit drug abuse.    FAMILY HISTORY:   Family History     Problem (# of Occurrences) Relation (Name,Age of Onset)    C.A.D. (1) Mother    Cerebrovascular Disease (1) Father    Coronary Artery Disease (1) Mother    Dementia (1) Father    Myocardial Infarction (1) Mother    Osteoarthritis (1) Father          VYHMA-AC-GGUSCKSCO MEDICATIONS:  acetaminophen (TYLENOL) 325 MG tablet Take 325-650 mg by mouth every 6 hours as needed for mild pain   Yes Reported, Patient   apixaban ANTICOAGULANT (ELIQUIS) 5 MG tablet Take 1 tablet (5 mg) by mouth 2 times daily 5/20/2022 at pm Yes Sharmin Scott MD   aspirin-acetaminophen-caffeine (EXCEDRIN MIGRAINE) 250-250-65 MG tablet Take 1 tablet by mouth every 6 hours as needed for headaches 5/20/2022 at x2 Yes Unknown, Entered By History   flecainide (TAMBOCOR) 150 MG tablet Take 1 tablet (150 mg) by mouth 2 times daily 5/20/2022 at pm Yes Sharmin Scott MD   sildenafil (VIAGRA) 100 MG tablet Take 1 tablet (100 mg) by mouth daily as needed   Yes Sabas Staley MD   simvastatin (ZOCOR) 20 MG tablet Take 1/2 tablet ( 10 mg) daily 5/20/2022 at Unknown time Yes Mona Quintana PA-C      ALLERGIES:     Allergies   Allergen Reactions     Ace Inhibitors Cough       REVIEW OF SYSTEMS:  A 10-point review of systems was conducted and it was negative except for pertinent positives mentioned in history of present illness.    PHYSICAL EXAMINATION:    VITALS:  Blood pressure is 130/86, heart rate 60, respiratory rate 16, oxygen saturation 96% on room air and temperature 97.4.    GENERAL:  The patient is awake, alert, no acute distress at the time of my examination  HEENT:  Normocephalic, atraumatic.  Pupils are equally round and reactive to light.  Oral mucosa is moist.  NECK:  Supple, no cervical lymphadenopathy, no thyromegaly.  CHEST:  There is bilateral air entry.  No wheezing, no rales, no crackles.  CARDIOVASCULAR:  There is normal S1 and S2, regular rate and rhythm, no murmurs, no rubs.  ABDOMEN:  Soft, nontender, nondistended.  Bowel sounds are present.  EXTREMITIES:  There is leg swelling.  No calf tenderness, 2+ peripheral pulses are palpable.  SKIN:  Intact.  No rashes, no cyanosis.    NEUROLOGIC:  The patient is awake, alert, oriented to self, place and time.  There are no focal neurological deficits.  PSYCHIATRIC:  Normal mood, very pleasant.  MUSCULOSKELETAL:  He moves all extremities freely.  There are no obvious joint deformities.    LABORATORY DATA:  Reviewed and dictated above.    IMPRESSION:  Mr. Rey Brown is a very pleasant 77-year-old gentleman with a past medical history of paroxysmal atrial fibrillation, status post ablation and cardioversion x 2, history of hypertension, dyslipidemia, and migraine headache, who presented for evaluation of chest discomfort.    PLAN:    1.  Non-ST elevation MI.  The patient reported chest discomfort/chest pain while he was playing guitar and singing at a party.  He did admit being mildly anxious at that time, as he had not played in a long time.  He states that his watch showed that his heart rate was 90, which was high for him, and EKG showed that he was  in normal sinus rhythm.  At home, his blood pressure was also on the higher side 150/90.  Chest discomfort was constant since 6:00 p.m. until he arrived in ER, so more than five hours.  It was relieved by nitroglycerin.  His troponin is 921.  He was chest pain free at the time of my examination.  His EKG showed normal sinus rhythm with some Q-waves in V1 to V3.  He is admitted to [facility] [place].  He will be monitored on telemetry.  We will follow up serial troponins.  He did get his Eliquis last evening before he came to ER, so we will not start on heparin drip at this time.  We will keep him n.p.o. for now with Cardiology consult in the morning.  If no emergent coronary angiogram tomorrow.  I anticipate that he cannot eat.  Also, if he will remain in the hospital for angiogram on Monday, he should be started on heparin drip tomorrow.  We will order an echocardiogram.    2.  Dyslipidemia.    We will check fasting lipid profile.  Meanwhile, we will resume to admission Zocor.    3.  Paroxysmal atrial fibrillation.    He follows with Dr. Scott.  He is status post a recent ablation 04/01/2022 and two cardioversions on 04/22 and 05/12/2022  Currently, he is in normal sinus rhythm.  We will continue his [id number]-bk-[id number] flecainide.  Also, he is on Eliquis at home and he did take it last night.  Plan to hold his Eliquis for now and start heparin drip in the morning.    4.  Hypertension.  Currently, he is not on any antihypertensive medications.  Historically, he had been on losartan.  Blood pressure seems to be reasonably controlled at this time.  We will monitor blood pressures.    DVT PROPHYLAXIS:  Eliquis.    CODE STATUS:  Discussed with the patient.  The patient is FULL CODE.    DISPOSITION:  Admit inpatient.  I anticipate a couple of days of hospitalization, pending cardiac workup.    Alicia Crawford MD        D: 05/21/2022   T: 05/21/2022   MT: [id number]    Name:     RO VELAZQUEZ  MRN:      [id number]        Account:      [id number]   :      1945           Admitted:    2022       Document: [id number]

## 2022-05-21 NOTE — PHARMACY-ADMISSION MEDICATION HISTORY
Pharmacy Medication History  Admission medication history interview status for the 5/20/2022  admission is complete. See EPIC admission navigator for prior to admission medications     Location of Interview: Patient room  Medication history sources: Patient, Patient's family/friend (wife), Surescripts and Care Everywhere    Significant changes made to the medication list:  None    In the past week, patient estimated taking medication this percent of the time: greater than 90%    Additional medication history information:   none    Medication reconciliation completed by provider prior to medication history? No    Time spent in this activity: 5 mins    Prior to Admission medications    Medication Sig Last Dose Taking? Auth Provider   acetaminophen (TYLENOL) 325 MG tablet Take 325-650 mg by mouth every 6 hours as needed for mild pain  Yes Reported, Patient   apixaban ANTICOAGULANT (ELIQUIS) 5 MG tablet Take 1 tablet (5 mg) by mouth 2 times daily 5/20/2022 at pm Yes Sharmin Scott MD   aspirin-acetaminophen-caffeine (EXCEDRIN MIGRAINE) 250-250-65 MG tablet Take 1 tablet by mouth every 6 hours as needed for headaches 5/20/2022 at x2 Yes Unknown, Entered By History   flecainide (TAMBOCOR) 150 MG tablet Take 1 tablet (150 mg) by mouth 2 times daily 5/20/2022 at pm Yes Sharmin Scott MD   sildenafil (VIAGRA) 100 MG tablet Take 1 tablet (100 mg) by mouth daily as needed  Yes Sabas Staley MD   simvastatin (ZOCOR) 20 MG tablet Take 1/2 tablet ( 10 mg) daily 5/20/2022 at Unknown time Yes Mona Quintana PA-C       The information provided in this note is only as accurate as the sources available at the time of update(s)

## 2022-05-21 NOTE — PROGRESS NOTES
Patient is resting comfortably, admitted today morning by my colleague, visited with him in the room, he is denying any chest pain, waiting to have a cardiology input regarding timing of the angiogram, is wondering whether he needs to remain in the hospital until his angiogram versus going home and come back later, Troponins are trending down, was 921-632, white count is 14.7, repeat CBC in a.m.  His LDL is 77.

## 2022-05-21 NOTE — ED NOTES
"[facility]  ED Nurse Handoff Report    ED Chief complaint: Chest Pain      ED Diagnosis:   Final diagnoses:   NSTEMI (non-ST elevated myocardial infarction) (H)       Code Status: Full Code    Allergies:   Allergies   Allergen Reactions     Ace Inhibitors Cough       Patient Story: pt with hx of ablation last month reports left sided chest pain since 1800 - full relief with one dose of sublingual nitroglycerin in ED.  Focused Assessment:  Denies sob. Denies n/v. Skin pink and warm. Left sided non radiating cp.     Treatments and/or interventions provided: sublingual nitroglycerin, labs show elevated troponin  Patient's response to treatments and/or interventions: fair    To be done/followed up on inpatient unit:  cardiac monitoring    Does this patient have any cognitive concerns?: none    Activity level - Baseline/Home:  Independent  Activity Level - Current:   Independent    Patient's Preferred language: English   Needed?: No    Isolation: Contact  Infection: ESBLPatient tested for COVID 19 prior to admission: YES  Bariatric?: No    Vital Signs:   Vitals:    05/20/22 2202   BP: (!) 142/88   Pulse: 72   Resp: 16   Temp: 97.4  F (36.3  C)   TempSrc: Temporal   SpO2: 96%   Weight: 76.2 kg (168 lb)   Height: 1.778 m (5' 10\")       Cardiac Rhythm:     Was the PSS-3 completed:   Yes  What interventions are required if any?               Family Comments: wife at bedside  OBS brochure/video discussed/provided to patient/family: N/A              Name of person given brochure if not patient: n/a              Relationship to patient: n/a    For the majority of the shift this patient's behavior was Green.   Behavioral interventions performed were reassurance and information.    ED NURSE PHONE NUMBER: *[phone]         "

## 2022-05-21 NOTE — PLAN OF CARE
RECEIVING UNIT ED HANDOFF REVIEW    ED Nurse Handoff Report was reviewed by: Dina Becerra RN on May 21, 2022 at 12:32 AM

## 2022-05-21 NOTE — ED TRIAGE NOTES
Patient had an ablation on 4/1/22, presenting today with complaints of left-sided chest pain that began around 1800, which has improved since then but he was advised by cardiologist to come to the ED for evaluation.       Triage Assessment     Row Name 05/20/22 4021       Triage Assessment (Adult)    Airway WDL WDL       Respiratory WDL    Respiratory WDL WDL       Skin Circulation/Temperature WDL    Skin Circulation/Temperature WDL WDL       Cardiac WDL    Cardiac WDL chest pain;X       Chest Pain Assessment    Chest Pain Location anterior chest, left       Peripheral/Neurovascular WDL    Peripheral Neurovascular WDL WDL       Cognitive/Neuro/Behavioral WDL    Cognitive/Neuro/Behavioral WDL WDL

## 2022-05-22 ENCOUNTER — APPOINTMENT (OUTPATIENT)
Dept: CARDIOLOGY | Facility: CLINIC | Age: 77
DRG: 247 | End: 2022-05-22
Attending: INTERNAL MEDICINE
Payer: COMMERCIAL

## 2022-05-22 LAB
ANION GAP SERPL CALCULATED.3IONS-SCNC: 4 MMOL/L (ref 3–14)
APTT PPP: 53 SECONDS (ref 22–38)
APTT PPP: 58 SECONDS (ref 22–38)
APTT PPP: 79 SECONDS (ref 22–38)
BUN SERPL-MCNC: 29 MG/DL (ref 7–30)
CALCIUM SERPL-MCNC: 8.4 MG/DL (ref 8.5–10.1)
CHLORIDE BLD-SCNC: 106 MMOL/L (ref 94–109)
CO2 SERPL-SCNC: 28 MMOL/L (ref 20–32)
CREAT SERPL-MCNC: 1.31 MG/DL (ref 0.66–1.25)
GFR SERPL CREATININE-BSD FRML MDRD: 57 ML/MIN/1.73M2
GLUCOSE BLD-MCNC: 77 MG/DL (ref 70–99)
LVEF ECHO: NORMAL
MAGNESIUM SERPL-MCNC: 2.2 MG/DL (ref 1.6–2.3)
POTASSIUM BLD-SCNC: 4.5 MMOL/L (ref 3.4–5.3)
SODIUM SERPL-SCNC: 138 MMOL/L (ref 133–144)

## 2022-05-22 PROCEDURE — 36415 COLL VENOUS BLD VENIPUNCTURE: CPT | Performed by: INTERNAL MEDICINE

## 2022-05-22 PROCEDURE — 93005 ELECTROCARDIOGRAM TRACING: CPT

## 2022-05-22 PROCEDURE — 99232 SBSQ HOSP IP/OBS MODERATE 35: CPT | Performed by: INTERNAL MEDICINE

## 2022-05-22 PROCEDURE — 255N000002 HC RX 255 OP 636: Performed by: INTERNAL MEDICINE

## 2022-05-22 PROCEDURE — 85730 THROMBOPLASTIN TIME PARTIAL: CPT | Performed by: INTERNAL MEDICINE

## 2022-05-22 PROCEDURE — 93306 TTE W/DOPPLER COMPLETE: CPT | Mod: 26 | Performed by: INTERNAL MEDICINE

## 2022-05-22 PROCEDURE — 83735 ASSAY OF MAGNESIUM: CPT | Performed by: INTERNAL MEDICINE

## 2022-05-22 PROCEDURE — 80048 BASIC METABOLIC PNL TOTAL CA: CPT | Performed by: INTERNAL MEDICINE

## 2022-05-22 PROCEDURE — 250N000011 HC RX IP 250 OP 636: Performed by: INTERNAL MEDICINE

## 2022-05-22 PROCEDURE — 93010 ELECTROCARDIOGRAM REPORT: CPT | Performed by: INTERNAL MEDICINE

## 2022-05-22 PROCEDURE — 99232 SBSQ HOSP IP/OBS MODERATE 35: CPT | Mod: 25 | Performed by: INTERNAL MEDICINE

## 2022-05-22 PROCEDURE — 250N000013 HC RX MED GY IP 250 OP 250 PS 637: Performed by: INTERNAL MEDICINE

## 2022-05-22 PROCEDURE — 210N000002 HC R&B HEART CARE

## 2022-05-22 RX ADMIN — HUMAN ALBUMIN MICROSPHERES AND PERFLUTREN 3 ML: 10; .22 INJECTION, SOLUTION INTRAVENOUS at 10:32

## 2022-05-22 RX ADMIN — ATORVASTATIN CALCIUM 80 MG: 80 TABLET, FILM COATED ORAL at 19:44

## 2022-05-22 RX ADMIN — FLECAINIDE ACETATE 150 MG: 50 TABLET ORAL at 08:26

## 2022-05-22 RX ADMIN — ASPIRIN 81 MG CHEWABLE TABLET 81 MG: 81 TABLET CHEWABLE at 08:27

## 2022-05-22 RX ADMIN — HEPARIN SODIUM 1050 UNITS/HR: 10000 INJECTION, SOLUTION INTRAVENOUS at 03:04

## 2022-05-22 ASSESSMENT — ACTIVITIES OF DAILY LIVING (ADL)
ADLS_ACUITY_SCORE: 21
ADLS_ACUITY_SCORE: 19
ADLS_ACUITY_SCORE: 21
ADLS_ACUITY_SCORE: 19
ADLS_ACUITY_SCORE: 21
ADLS_ACUITY_SCORE: 21
ADLS_ACUITY_SCORE: 19
ADLS_ACUITY_SCORE: 21

## 2022-05-22 NOTE — PROGRESS NOTES
[facility]    Hospitalist Progress Note    Assessment & Plan    Mr. Rey Brown is a very pleasant 77-year-old gentleman with a past medical history of paroxysmal atrial fibrillation, status post ablation and cardioversion x 2, history of hypertension, dyslipidemia, and migraine headache, who presented for evaluation of chest discomfort.  NSTEMI  Hyperlipidemia  --Patient had a troponin of 921, EKG showed Q waves in V1 to V3  -Continue telemetry, patient was on Eliquis prior to admission, currently on heparin drip  --Appreciate cardiology input, plan for angiogram 5/23.  --Continue statin and aspirin  Paroxysmal atrial fibrillation  --He had recent ablation 4/1/2022, currently in normal sinus rhythm  -- Patient was continued on PTA flecainide, it was discontinued since he had mild chest pain and QT prolongation noted on 5/22.  --He was PTA on Eliquis, currently on heparin drip  Hypertension  --Not on any medications, blood pressure stable.  DVT Prophylaxis: Heparin drip  Code Status: Full Code     Disposition: Expected discharge in 1 to 2 days following angiogram    Aria Howard MD, MD  Text Page   (7am to 6pm)    Interval History   Patient is resting, mentions having 1 episode of chest pain which lasted less than a couple of minutes.  Denies any nausea or shortness of breath.    -Data reviewed today: I reviewed all new labs and imaging results over the last 24 hours.  Physical Exam   Temp: 98  F (36.7  C) Temp src: Oral BP: 130/79 Pulse: 58   Resp: 18 SpO2: 93 % O2 Device: None (Room air)    Vitals:    05/21/22 0631 05/22/22 0544 05/22/22 0607   Weight: 80 kg (176 lb 4.8 oz) 79.9 kg (176 lb 3.2 oz) 78 kg (171 lb 14.4 oz)     Vital Signs with Ranges  Temp:  [97.7  F (36.5  C)-98.3  F (36.8  C)] 98  F (36.7  C)  Pulse:  [58-84] 58  Resp:  [16-18] 18  BP: (110-130)/(67-79) 130/79  SpO2:  [93 %-100 %] 93 %  I/O last 3 completed shifts:  In: 206.75 [I.V.:206.75]  Out: -      Constitutional: Awake, alert, cooperative, no apparent distress  Respiratory: Clear to auscultation bilaterally, no crackles or wheezing  Cardiovascular: Regular rate and rhythm, normal S1 and S2, and no murmur noted  GI: Normal bowel sounds, soft, non-distended, non-tender  Skin/Integumen: No rashes, no cyanosis, no edema  Neuro : moving all 4 extremities, no focal deficit noted     Medications     - MEDICATION INSTRUCTIONS -       heparin 1,050 Units/hr (05/22/22 0304)     - MEDICATION INSTRUCTIONS -       ACE/ARB/ARNI NOT PRESCRIBED       BETA BLOCKER NOT PRESCRIBED         aspirin  81 mg Oral Daily     atorvastatin  80 mg Oral QPM     flecainide  150 mg Oral BID     sodium chloride (PF)  3 mL Intracatheter Q8H       Data   Recent Labs   Lab 05/21/22  0548 05/21/22  0209 05/20/22  2222   WBC  --  14.7* 9.9   HGB  --  15.7 15.3   MCV  --  91 91   PLT  --  283 252     --  136   POTASSIUM 3.9 4.2 4.3   CHLORIDE 106  --  106   CO2 23  --  24   BUN 21  --  21   CR 0.99  --  1.15   ANIONGAP 8  --  6   ELIEZER 8.5  --  9.0   *  --  167*     Recent Labs   Lab 05/21/22  0548 05/20/22  2222   * 167*       Imaging:   No results found for this or any previous visit (from the past 24 hour(s)).

## 2022-05-22 NOTE — PROGRESS NOTES
[facility]    Cardiology Consultation     Date of Admission:  5/20/2022    Assessment & Plan   Rey Brown is a 77 year old male who was admitted on 5/20/2022. I was asked to see the patient for Chest pain and non-STEMI.    The patient has a history of paroxysmal atrial fibrillation, status post recent ablation and cardioversion, hypertension, hyperlipidemia.    The patient was playing guitar at a party.  He developed chest pain.  The chest pressure was rated 3 out of 10.  It was substernal.  It was relieved with nitroglycerin.    Patient is under no major stressors during the guitar playing.  This was light activity.    Most recently, the patient had a AGUS guided cardioversion on 5/12/2022.  This was in light of a atrial fibrillation ablation on 4/1/2022.  The patient has had 3 total cardioversion since the ablation.    The patient's troponin assay has resulted in 921, 1117, 632.    He has been treated with heparin and is chest pain-free currently.    Twelve-lead ECG does not note any significant ST elevation.    In preparation for his atrial fibrillation ablation patient underwent a CTA for pulmonary vein assessment.  This was dated 3/25/2022.  I reviewed his CT scan.  There is significant calcification in the LAD as well as in the circumflex.  This is not commented on in the report as this was directed towards pulmonary veins however there is clearly evident coronary artery disease and calcification on CT scan.    Overall I am concerned that the patient has had a non-STEMI.  We will continue heparin and perform a coronary angiogram on Monday.    Problems:  Non-STEMI  Coronary artery calcifications seen on CTA for pulmonary vein isolation, scan dated 3/25/2022  Hypertension  Hyperlipidemia  Recent A. fib ablation and multiple cardioversions following    Interval:  4-5 minutes of chest pain this morning that resolved.    QT prolongation noted on Telemetry.  EKG noted prolonged  QTc.  I think the ECG is over-estimating the QTc but out of precaution I am stopping flecainide and zofran.     Plan:  Continue heparin    Coronary angiogram on Monday, I discussed the risk and benefits with the patient patient would like to proceed.    Do not given sub-lingual nitroglycerin.  If patient has further pain we could trial morphine or nitroglycerin infusion.     Continue aspirin    Stop flecainide for now (QTc prolongation on ECG- I think it is over-estimated)    Changed Zocor to high-dose Lipitor    N.p.o. at midnight on Sunday    Please call cardiology with any additional symptoms of chest pain.    Kel Perez MD     Code Status    Full Code  Physical Exam   Temp: 98  F (36.7  C) Temp src: Oral BP: 130/79 Pulse: 58   Resp: 18 SpO2: 93 % O2 Device: None (Room air)    Vital Signs with Ranges  Temp:  [97.7  F (36.5  C)-98.3  F (36.8  C)] 98  F (36.7  C)  Pulse:  [58-84] 58  Resp:  [16-18] 18  BP: (110-130)/(67-79) 130/79  SpO2:  [93 %-100 %] 93 %  171 lbs 14.4 oz    GENERAL APPEARANCE: Alert and oriented x3. No acute distress.  SKIN: Inspection of the skin reveals no rashes, ulcerations, warm, dry  NECK: Supple and symmetric.   Normal JVP  LUNGS: Clear breath sounds throughout bilaterally, no wheezes, no rhonchi  CARDIOVASCULAR: S1, S2, regular rate and rhythm without any murmurs, gallops, rubs. The carotid pulses were normal and 2+ bilaterally without bruits. Peripheral pulses were 2+ and symmetric.  ABDOMEN: Soft, non-tender, non-distended with normal bowel sounds.  No ascites noted.  EXTREMITIES: No edema.  NEUROLOGIC:  Normal mood and affect.  Sensation to touch was normal.      Data   Reviewed all pertinent data

## 2022-05-22 NOTE — CONSULTS
[facility]    Cardiology Consultation     Date of Admission:  5/20/2022    Assessment & Plan   Rey Brown is a 77 year old male who was admitted on 5/20/2022. I was asked to see the patient for Chest pain and non-STEMI.    The patient has a history of paroxysmal atrial fibrillation, status post recent ablation and cardioversion, hypertension, hyperlipidemia.    The patient was playing guitar at a party.  He developed chest pain.  The chest pressure was rated 3 out of 10.  It was substernal.  It was relieved with nitroglycerin.    Patient is under no major stressors during the guitar playing.  This was light activity.    Most recently, the patient had a AGUS guided cardioversion on 5/12/2022.  This was in light of a atrial fibrillation ablation on 4/1/2022.  The patient has had 3 total cardioversion since the ablation.    The patient's troponin assay has resulted in 921, 1117, 632.    He has been treated with heparin and is chest pain-free currently.    Twelve-lead ECG does not note any significant ST elevation.    In preparation for his atrial fibrillation ablation patient underwent a CTA for pulmonary vein assessment.  This was dated 3/25/2022.  I reviewed his CT scan.  There is significant calcification in the LAD as well as in the circumflex.  This is not commented on in the report as this was directed towards pulmonary veins however there is clearly evident coronary artery disease and calcification on CT scan.    Overall I am concerned that the patient has had a non-STEMI.  We will continue heparin and perform a coronary angiogram on Monday.    Problems:  Non-STEMI  Coronary artery calcifications seen on CTA for pulmonary vein isolation, scan dated 3/25/2022  Hypertension  Hyperlipidemia  Recent A. fib ablation and multiple cardioversions following    Plan:  Continue heparin  Coronary angiogram on Monday, I discussed the risk and benefits with the patient patient would like to  proceed.  Continue aspirin  Continue flecainide for now  I will change Zocor to high-dose Lipitor    N.p.o. at midnight on Sunday    Please call cardiology with any additional symptoms of chest pain.    Kel Perez MD     Code Status    Full Code    Reason for Consult   Reason for consult: Chest pain and non-STEMI    Primary Care Physician   Sabas Staley    Chief Complaint   Chest pain    History is obtained from the patient    History of Present Illness   Rey Brown is a 77 year old male who was admitted on 5/20/2022. I was asked to see the patient for Chest pain and non-STEMI.    The patient has a history of paroxysmal atrial fibrillation, status post recent ablation and cardioversion, hypertension, hyperlipidemia.    The patient was playing guitar at a party.  He developed chest pain.  The chest pressure was rated 3 out of 10.  It was substernal.  It was relieved with nitroglycerin.    Patient is under no major stressors during the guitar playing.  This was light activity.    Most recently, the patient had a AGUS guided cardioversion on 5/12/2022.  This was in light of a atrial fibrillation ablation on 4/1/2022.  The patient has had 3 total cardioversion since the ablation.    The patient's troponin assay has resulted in 921, 1117, 632.    He has been treated with heparin and is chest pain-free currently.    Twelve-lead ECG does not note any significant ST elevation.    In preparation for his atrial fibrillation ablation patient underwent a CTA for pulmonary vein assessment.  This was dated 3/25/2022.  I reviewed his CT scan.  There is significant calcification in the LAD as well as in the circumflex.  This is not commented on in the report as this was directed towards pulmonary veins however there is clearly evident coronary artery disease and calcification on CT scan.    Overall I am concerned that the patient has had a non-STEMI.  We will continue heparin and perform a coronary  angiogram on Monday.    Past Medical History   I have reviewed this patient's medical history and updated it with pertinent information if needed.   Past Medical History:   Diagnosis Date     Hypercholesterolemia      Lumbar disc disease      Migraines      Paroxysmal A-fib (H)        Past Surgical History   I have reviewed this patient's surgical history and updated it with pertinent information if needed.  Past Surgical History:   Procedure Laterality Date     ANESTHESIA CARDIOVERSION N/A 4/22/2022    Procedure: ANESTHESIA, FOR CARDIOVERSION FOLLOWING AGUS;  Surgeon: GENERIC ANESTHESIA PROVIDER;  Location:  OR     ANESTHESIA CARDIOVERSION N/A 5/12/2022    Procedure: ANESTHESIA, FOR CARDIOVERSION;  Surgeon: GENERIC ANESTHESIA PROVIDER;  Location:  OR     COLONOSCOPY       CYSTOSCOPY, RETROGRADES, COMBINED Left 5/13/2020    Procedure: LEFT RETROGRADE URETERAL PYELOGRAM;  Surgeon: Denilson Angulo MD;  Location:  OR     CYSTOSCOPY, TRANSURETHRAL RESECTION (TUR) PROSTATE, COMBINED N/A 5/13/2020    Procedure: CYSTOSCOPY WITH TRANSURETHRAL RESECTION PROSTATE;  Surgeon: Denilson Angulo MD;  Location:  OR     EP ABLATION FOCAL AFIB N/A 4/1/2022    Procedure: Ablation Focal Atrial Fibrillation [[id number]];  Surgeon: Sharmin Scott MD;  Location:  [facility]     OPEN REDUCTION INTERNAL FIXATION HAND       RHINOPLASTY       TONSILLECTOMY       wisdom teeth         Prior to Admission Medications   Prior to Admission Medications   Prescriptions Last Dose Informant Patient Reported? Taking?   acetaminophen (TYLENOL) 325 MG tablet  Self Yes Yes   Sig: Take 325-650 mg by mouth every 6 hours as needed for mild pain   apixaban ANTICOAGULANT (ELIQUIS) 5 MG tablet 5/20/2022 at pm Self No Yes   Sig: Take 1 tablet (5 mg) by mouth 2 times daily   aspirin-acetaminophen-caffeine (EXCEDRIN MIGRAINE) 250-250-65 MG tablet 5/20/2022 at x2 Self Yes Yes   Sig: Take 1 tablet by mouth every 6 hours as needed for headaches    flecainide (TAMBOCOR) 150 MG tablet 5/20/2022 at pm Self No Yes   Sig: Take 1 tablet (150 mg) by mouth 2 times daily   sildenafil (VIAGRA) 100 MG tablet  Self No Yes   Sig: Take 1 tablet (100 mg) by mouth daily as needed   simvastatin (ZOCOR) 20 MG tablet 5/20/2022 at Unknown time Self No Yes   Sig: Take 1/2 tablet ( 10 mg) daily      Facility-Administered Medications: None     Allergies   Allergies   Allergen Reactions     Ace Inhibitors Cough       Social History   I have reviewed this patient's social history and updated it with pertinent information if needed. eRy Brown  reports that he has never smoked. He has never used smokeless tobacco. He reports current alcohol use of about 2.0 - 3.0 standard drinks of alcohol per week. He reports that he does not use drugs.    Family History   I have reviewed this patient's family history and updated it with pertinent information if needed.   Family History   Problem Relation Age of Onset     Dementia Father      Osteoarthritis Father      Cerebrovascular Disease Father      Myocardial Infarction Mother      C.A.D. Mother      Coronary Artery Disease Mother        Review of Systems   The 12 point Review of Systems is negative other than noted in the HPI or here.     Physical Exam   Temp: 98.3  F (36.8  C) Temp src: Oral BP: 110/67 Pulse: 84   Resp: 16 SpO2: 96 % O2 Device: None (Room air) Oxygen Delivery: 2 LPM  Vital Signs with Ranges  Temp:  [97.4  F (36.3  C)-98.7  F (37.1  C)] 98.3  F (36.8  C)  Pulse:  [60-84] 84  Resp:  [10-20] 16  BP: (110-160)/() 110/67  SpO2:  [85 %-98 %] 96 %  176 lbs 4.8 oz    GENERAL APPEARANCE: Alert and oriented x3. No acute distress.  SKIN: Inspection of the skin reveals no rashes, ulcerations, warm, dry  NECK: Supple and symmetric.   Normal JVP  LUNGS: Clear breath sounds throughout bilaterally, no wheezes, no rhonchi  CARDIOVASCULAR: S1, S2, regular rate and rhythm without any murmurs, gallops, rubs. The carotid pulses  were normal and 2+ bilaterally without bruits. Peripheral pulses were 2+ and symmetric.  ABDOMEN: Soft, non-tender, non-distended with normal bowel sounds.  No ascites noted.  EXTREMITIES: No edema.  NEUROLOGIC:  Normal mood and affect.  Sensation to touch was normal.      Data   Reviewed all pertinent data

## 2022-05-22 NOTE — PLAN OF CARE
A&Ox4. VSS. RA. Short episode of chest pan which resolved w/o intervention. Echo completed. Heparin gtt infusing. NPO at midnight for probable angio tomorrow.

## 2022-05-22 NOTE — PLAN OF CARE
Shift Summary    No significant changes occurring overnight. Vital signs within parameters. Cardiac monitor showing sinus rhythm with no episodes. No complaints of pain. Heparin ggt adjusted per protocol. Resting between cares.

## 2022-05-23 LAB
ACT BLD: 267 SECONDS (ref 74–150)
ACT BLD: 280 SECONDS (ref 74–150)
ANION GAP SERPL CALCULATED.3IONS-SCNC: 4 MMOL/L (ref 3–14)
APTT PPP: 81 SECONDS (ref 22–38)
BUN SERPL-MCNC: 24 MG/DL (ref 7–30)
CALCIUM SERPL-MCNC: 8.6 MG/DL (ref 8.5–10.1)
CHLORIDE BLD-SCNC: 107 MMOL/L (ref 94–109)
CHOLEST SERPL-MCNC: 135 MG/DL
CO2 SERPL-SCNC: 28 MMOL/L (ref 20–32)
CREAT SERPL-MCNC: 1.24 MG/DL (ref 0.66–1.25)
ERYTHROCYTE [DISTWIDTH] IN BLOOD BY AUTOMATED COUNT: 12.9 % (ref 10–15)
GFR SERPL CREATININE-BSD FRML MDRD: 61 ML/MIN/1.73M2
GLUCOSE BLD-MCNC: 84 MG/DL (ref 70–99)
HCT VFR BLD AUTO: 42.6 % (ref 40–53)
HDLC SERPL-MCNC: 52 MG/DL
HGB BLD-MCNC: 14.3 G/DL (ref 13.3–17.7)
LDLC SERPL CALC-MCNC: 67 MG/DL
MCH RBC QN AUTO: 31.1 PG (ref 26.5–33)
MCHC RBC AUTO-ENTMCNC: 33.6 G/DL (ref 31.5–36.5)
MCV RBC AUTO: 93 FL (ref 78–100)
NONHDLC SERPL-MCNC: 83 MG/DL
PLATELET # BLD AUTO: 224 10E3/UL (ref 150–450)
POTASSIUM BLD-SCNC: 4 MMOL/L (ref 3.4–5.3)
RBC # BLD AUTO: 4.6 10E6/UL (ref 4.4–5.9)
SODIUM SERPL-SCNC: 139 MMOL/L (ref 133–144)
TRIGL SERPL-MCNC: 78 MG/DL
WBC # BLD AUTO: 7.2 10E3/UL (ref 4–11)

## 2022-05-23 PROCEDURE — 85027 COMPLETE CBC AUTOMATED: CPT | Performed by: HOSPITALIST

## 2022-05-23 PROCEDURE — 250N000009 HC RX 250: Performed by: INTERNAL MEDICINE

## 2022-05-23 PROCEDURE — 93454 CORONARY ARTERY ANGIO S&I: CPT | Mod: 26 | Performed by: INTERNAL MEDICINE

## 2022-05-23 PROCEDURE — 99232 SBSQ HOSP IP/OBS MODERATE 35: CPT | Performed by: HOSPITALIST

## 2022-05-23 PROCEDURE — 250N000011 HC RX IP 250 OP 636: Performed by: INTERNAL MEDICINE

## 2022-05-23 PROCEDURE — 93010 ELECTROCARDIOGRAM REPORT: CPT | Mod: 76 | Performed by: INTERNAL MEDICINE

## 2022-05-23 PROCEDURE — C1725 CATH, TRANSLUMIN NON-LASER: HCPCS | Performed by: INTERNAL MEDICINE

## 2022-05-23 PROCEDURE — 258N000003 HC RX IP 258 OP 636: Performed by: NURSE PRACTITIONER

## 2022-05-23 PROCEDURE — C1874 STENT, COATED/COV W/DEL SYS: HCPCS | Performed by: INTERNAL MEDICINE

## 2022-05-23 PROCEDURE — 80061 LIPID PANEL: CPT | Performed by: STUDENT IN AN ORGANIZED HEALTH CARE EDUCATION/TRAINING PROGRAM

## 2022-05-23 PROCEDURE — 250N000013 HC RX MED GY IP 250 OP 250 PS 637: Performed by: NURSE PRACTITIONER

## 2022-05-23 PROCEDURE — C1887 CATHETER, GUIDING: HCPCS | Performed by: INTERNAL MEDICINE

## 2022-05-23 PROCEDURE — 99152 MOD SED SAME PHYS/QHP 5/>YRS: CPT | Performed by: INTERNAL MEDICINE

## 2022-05-23 PROCEDURE — C1894 INTRO/SHEATH, NON-LASER: HCPCS | Performed by: INTERNAL MEDICINE

## 2022-05-23 PROCEDURE — 93005 ELECTROCARDIOGRAM TRACING: CPT

## 2022-05-23 PROCEDURE — B2111ZZ FLUOROSCOPY OF MULTIPLE CORONARY ARTERIES USING LOW OSMOLAR CONTRAST: ICD-10-PCS | Performed by: INTERNAL MEDICINE

## 2022-05-23 PROCEDURE — C1769 GUIDE WIRE: HCPCS | Performed by: INTERNAL MEDICINE

## 2022-05-23 PROCEDURE — 93454 CORONARY ARTERY ANGIO S&I: CPT | Performed by: INTERNAL MEDICINE

## 2022-05-23 PROCEDURE — 99153 MOD SED SAME PHYS/QHP EA: CPT | Performed by: INTERNAL MEDICINE

## 2022-05-23 PROCEDURE — 250N000013 HC RX MED GY IP 250 OP 250 PS 637: Performed by: INTERNAL MEDICINE

## 2022-05-23 PROCEDURE — 272N000001 HC OR GENERAL SUPPLY STERILE: Performed by: INTERNAL MEDICINE

## 2022-05-23 PROCEDURE — 99152 MOD SED SAME PHYS/QHP 5/>YRS: CPT | Mod: GC | Performed by: INTERNAL MEDICINE

## 2022-05-23 PROCEDURE — 250N000013 HC RX MED GY IP 250 OP 250 PS 637: Performed by: STUDENT IN AN ORGANIZED HEALTH CARE EDUCATION/TRAINING PROGRAM

## 2022-05-23 PROCEDURE — C9600 PERC DRUG-EL COR STENT SING: HCPCS | Mod: LD | Performed by: INTERNAL MEDICINE

## 2022-05-23 PROCEDURE — 85347 COAGULATION TIME ACTIVATED: CPT

## 2022-05-23 PROCEDURE — 80048 BASIC METABOLIC PNL TOTAL CA: CPT | Performed by: HOSPITALIST

## 2022-05-23 PROCEDURE — 99232 SBSQ HOSP IP/OBS MODERATE 35: CPT | Mod: 25 | Performed by: INTERNAL MEDICINE

## 2022-05-23 PROCEDURE — 36415 COLL VENOUS BLD VENIPUNCTURE: CPT | Performed by: HOSPITALIST

## 2022-05-23 PROCEDURE — 210N000002 HC R&B HEART CARE

## 2022-05-23 PROCEDURE — 027034Z DILATION OF CORONARY ARTERY, ONE ARTERY WITH DRUG-ELUTING INTRALUMINAL DEVICE, PERCUTANEOUS APPROACH: ICD-10-PCS | Performed by: INTERNAL MEDICINE

## 2022-05-23 PROCEDURE — 92928 PRQ TCAT PLMT NTRAC ST 1 LES: CPT | Mod: LD | Performed by: INTERNAL MEDICINE

## 2022-05-23 PROCEDURE — 85730 THROMBOPLASTIN TIME PARTIAL: CPT | Performed by: INTERNAL MEDICINE

## 2022-05-23 PROCEDURE — 36415 COLL VENOUS BLD VENIPUNCTURE: CPT | Performed by: INTERNAL MEDICINE

## 2022-05-23 DEVICE — IMPLANTABLE DEVICE: Type: IMPLANTABLE DEVICE | Status: FUNCTIONAL

## 2022-05-23 RX ORDER — LORAZEPAM 2 MG/ML
0.5 INJECTION INTRAMUSCULAR
Status: CANCELLED | OUTPATIENT
Start: 2022-05-23

## 2022-05-23 RX ORDER — ASPIRIN 81 MG/1
81 TABLET, CHEWABLE ORAL ONCE
Status: DISCONTINUED | OUTPATIENT
Start: 2022-05-23 | End: 2022-05-23 | Stop reason: CLARIF

## 2022-05-23 RX ORDER — ACETAMINOPHEN 325 MG/1
650 TABLET ORAL EVERY 4 HOURS PRN
Status: DISCONTINUED | OUTPATIENT
Start: 2022-05-23 | End: 2022-05-24 | Stop reason: HOSPADM

## 2022-05-23 RX ORDER — IOPAMIDOL 755 MG/ML
INJECTION, SOLUTION INTRAVASCULAR
Status: DISCONTINUED | OUTPATIENT
Start: 2022-05-23 | End: 2022-05-23 | Stop reason: HOSPADM

## 2022-05-23 RX ORDER — POTASSIUM CHLORIDE 1500 MG/1
20 TABLET, EXTENDED RELEASE ORAL
Status: CANCELLED | OUTPATIENT
Start: 2022-05-23

## 2022-05-23 RX ORDER — SODIUM CHLORIDE 9 MG/ML
INJECTION, SOLUTION INTRAVENOUS CONTINUOUS
Status: CANCELLED | OUTPATIENT
Start: 2022-05-23

## 2022-05-23 RX ORDER — LORAZEPAM 0.5 MG/1
0.5 TABLET ORAL
Status: CANCELLED | OUTPATIENT
Start: 2022-05-23

## 2022-05-23 RX ORDER — OXYCODONE HYDROCHLORIDE 5 MG/1
5 TABLET ORAL EVERY 4 HOURS PRN
Status: DISCONTINUED | OUTPATIENT
Start: 2022-05-23 | End: 2022-05-24 | Stop reason: HOSPADM

## 2022-05-23 RX ORDER — LIDOCAINE 40 MG/G
CREAM TOPICAL
Status: CANCELLED | OUTPATIENT
Start: 2022-05-23

## 2022-05-23 RX ORDER — HYDRALAZINE HYDROCHLORIDE 20 MG/ML
10 INJECTION INTRAMUSCULAR; INTRAVENOUS EVERY 4 HOURS PRN
Status: DISCONTINUED | OUTPATIENT
Start: 2022-05-23 | End: 2022-05-24 | Stop reason: HOSPADM

## 2022-05-23 RX ORDER — ASPIRIN 325 MG
325 TABLET ORAL ONCE
Status: CANCELLED | OUTPATIENT
Start: 2022-05-23 | End: 2022-05-23

## 2022-05-23 RX ORDER — NITROGLYCERIN 0.4 MG/1
0.4 TABLET SUBLINGUAL EVERY 5 MIN PRN
Status: DISCONTINUED | OUTPATIENT
Start: 2022-05-23 | End: 2022-05-24 | Stop reason: HOSPADM

## 2022-05-23 RX ORDER — ONDANSETRON 4 MG/1
4 TABLET, ORALLY DISINTEGRATING ORAL EVERY 6 HOURS PRN
Status: DISCONTINUED | OUTPATIENT
Start: 2022-05-23 | End: 2022-05-24 | Stop reason: HOSPADM

## 2022-05-23 RX ORDER — NALOXONE HYDROCHLORIDE 0.4 MG/ML
0.4 INJECTION, SOLUTION INTRAMUSCULAR; INTRAVENOUS; SUBCUTANEOUS
Status: ACTIVE | OUTPATIENT
Start: 2022-05-23 | End: 2022-05-23

## 2022-05-23 RX ORDER — LORAZEPAM 2 MG/ML
0.5 INJECTION INTRAMUSCULAR
Status: DISCONTINUED | OUTPATIENT
Start: 2022-05-23 | End: 2022-05-23 | Stop reason: HOSPADM

## 2022-05-23 RX ORDER — ONDANSETRON 2 MG/ML
4 INJECTION INTRAMUSCULAR; INTRAVENOUS EVERY 6 HOURS PRN
Status: DISCONTINUED | OUTPATIENT
Start: 2022-05-23 | End: 2022-05-24 | Stop reason: HOSPADM

## 2022-05-23 RX ORDER — POTASSIUM CHLORIDE 1500 MG/1
20 TABLET, EXTENDED RELEASE ORAL
Status: DISCONTINUED | OUTPATIENT
Start: 2022-05-23 | End: 2022-05-23 | Stop reason: HOSPADM

## 2022-05-23 RX ORDER — METOPROLOL TARTRATE 1 MG/ML
5 INJECTION, SOLUTION INTRAVENOUS
Status: DISCONTINUED | OUTPATIENT
Start: 2022-05-23 | End: 2022-05-24 | Stop reason: HOSPADM

## 2022-05-23 RX ORDER — FLUMAZENIL 0.1 MG/ML
0.2 INJECTION, SOLUTION INTRAVENOUS
Status: ACTIVE | OUTPATIENT
Start: 2022-05-23 | End: 2022-05-23

## 2022-05-23 RX ORDER — ATORVASTATIN CALCIUM 80 MG/1
80 TABLET, FILM COATED ORAL DAILY
Qty: 90 TABLET | Refills: 3 | Status: SHIPPED | OUTPATIENT
Start: 2022-05-23 | End: 2022-05-26

## 2022-05-23 RX ORDER — NALOXONE HYDROCHLORIDE 0.4 MG/ML
0.2 INJECTION, SOLUTION INTRAMUSCULAR; INTRAVENOUS; SUBCUTANEOUS
Status: ACTIVE | OUTPATIENT
Start: 2022-05-23 | End: 2022-05-23

## 2022-05-23 RX ORDER — SODIUM CHLORIDE 9 MG/ML
INJECTION, SOLUTION INTRAVENOUS CONTINUOUS
Status: ACTIVE | OUTPATIENT
Start: 2022-05-23 | End: 2022-05-23

## 2022-05-23 RX ORDER — ASPIRIN 81 MG/1
81 TABLET ORAL DAILY
Status: DISCONTINUED | OUTPATIENT
Start: 2022-05-24 | End: 2022-05-24 | Stop reason: HOSPADM

## 2022-05-23 RX ORDER — LIDOCAINE 40 MG/G
CREAM TOPICAL
Status: DISCONTINUED | OUTPATIENT
Start: 2022-05-23 | End: 2022-05-23 | Stop reason: HOSPADM

## 2022-05-23 RX ORDER — ATROPINE SULFATE 0.1 MG/ML
0.5 INJECTION INTRAVENOUS
Status: ACTIVE | OUTPATIENT
Start: 2022-05-23 | End: 2022-05-23

## 2022-05-23 RX ORDER — NITROGLYCERIN 5 MG/ML
VIAL (ML) INTRAVENOUS
Status: DISCONTINUED | OUTPATIENT
Start: 2022-05-23 | End: 2022-05-23 | Stop reason: HOSPADM

## 2022-05-23 RX ORDER — ATORVASTATIN CALCIUM 80 MG/1
80 TABLET, FILM COATED ORAL DAILY
Status: DISCONTINUED | OUTPATIENT
Start: 2022-05-23 | End: 2022-05-24 | Stop reason: HOSPADM

## 2022-05-23 RX ORDER — ASPIRIN 81 MG/1
243 TABLET, CHEWABLE ORAL ONCE
Status: CANCELLED | OUTPATIENT
Start: 2022-05-23

## 2022-05-23 RX ORDER — FENTANYL CITRATE 50 UG/ML
25 INJECTION, SOLUTION INTRAMUSCULAR; INTRAVENOUS
Status: DISCONTINUED | OUTPATIENT
Start: 2022-05-23 | End: 2022-05-24 | Stop reason: HOSPADM

## 2022-05-23 RX ORDER — HEPARIN SODIUM 1000 [USP'U]/ML
INJECTION, SOLUTION INTRAVENOUS; SUBCUTANEOUS
Status: DISCONTINUED | OUTPATIENT
Start: 2022-05-23 | End: 2022-05-23 | Stop reason: HOSPADM

## 2022-05-23 RX ORDER — VERAPAMIL HYDROCHLORIDE 2.5 MG/ML
INJECTION, SOLUTION INTRAVENOUS
Status: DISCONTINUED | OUTPATIENT
Start: 2022-05-23 | End: 2022-05-23 | Stop reason: HOSPADM

## 2022-05-23 RX ORDER — OXYCODONE HYDROCHLORIDE 5 MG/1
10 TABLET ORAL EVERY 4 HOURS PRN
Status: DISCONTINUED | OUTPATIENT
Start: 2022-05-23 | End: 2022-05-24 | Stop reason: HOSPADM

## 2022-05-23 RX ORDER — SODIUM CHLORIDE 9 MG/ML
INJECTION, SOLUTION INTRAVENOUS CONTINUOUS
Status: DISCONTINUED | OUTPATIENT
Start: 2022-05-23 | End: 2022-05-23 | Stop reason: HOSPADM

## 2022-05-23 RX ORDER — FENTANYL CITRATE 50 UG/ML
INJECTION, SOLUTION INTRAMUSCULAR; INTRAVENOUS
Status: DISCONTINUED | OUTPATIENT
Start: 2022-05-23 | End: 2022-05-23 | Stop reason: HOSPADM

## 2022-05-23 RX ORDER — ASPIRIN 325 MG
325 TABLET ORAL ONCE
Status: COMPLETED | OUTPATIENT
Start: 2022-05-23 | End: 2022-05-23

## 2022-05-23 RX ORDER — LORAZEPAM 0.5 MG/1
0.5 TABLET ORAL
Status: DISCONTINUED | OUTPATIENT
Start: 2022-05-23 | End: 2022-05-23 | Stop reason: HOSPADM

## 2022-05-23 RX ORDER — ASPIRIN 81 MG/1
81 TABLET, CHEWABLE ORAL DAILY
Qty: 7 TABLET | Refills: 0 | Status: SHIPPED | OUTPATIENT
Start: 2022-05-24 | End: 2022-05-24

## 2022-05-23 RX ORDER — ASPIRIN 81 MG/1
243 TABLET, CHEWABLE ORAL ONCE
Status: COMPLETED | OUTPATIENT
Start: 2022-05-23 | End: 2022-05-23

## 2022-05-23 RX ADMIN — ASPIRIN 81 MG CHEWABLE TABLET 243 MG: 81 TABLET CHEWABLE at 12:07

## 2022-05-23 RX ADMIN — TICAGRELOR 90 MG: 90 TABLET ORAL at 23:35

## 2022-05-23 RX ADMIN — ATORVASTATIN CALCIUM 80 MG: 80 TABLET, FILM COATED ORAL at 14:48

## 2022-05-23 RX ADMIN — ASPIRIN 81 MG CHEWABLE TABLET 81 MG: 81 TABLET CHEWABLE at 09:14

## 2022-05-23 RX ADMIN — HEPARIN SODIUM 1050 UNITS/HR: 10000 INJECTION, SOLUTION INTRAVENOUS at 02:59

## 2022-05-23 RX ADMIN — SENNOSIDES AND DOCUSATE SODIUM 2 TABLET: 50; 8.6 TABLET ORAL at 23:34

## 2022-05-23 RX ADMIN — SODIUM CHLORIDE: 9 INJECTION, SOLUTION INTRAVENOUS at 12:07

## 2022-05-23 RX ADMIN — ACETAMINOPHEN 650 MG: 325 TABLET ORAL at 23:34

## 2022-05-23 ASSESSMENT — ACTIVITIES OF DAILY LIVING (ADL)
ADLS_ACUITY_SCORE: 19

## 2022-05-23 NOTE — PLAN OF CARE
VSS on RA ex mild HTN at times (140s-150s systolic). Slight leaking at radial site when attempting to remove air from TR band, TR band still in place, will continue to attempt to wean per orders. Site is soft and otherwise WDL, CMS intact. Denies pain and SOB.

## 2022-05-23 NOTE — DISCHARGE INSTRUCTIONS
Cardiac Angiogram Discharge Instructions - Radial    After you go home:    Have an adult stay with you until tomorrow.  Drink extra fluids for 2 days.  You may resume your normal diet.  No smoking       For 24 hours - due to the sedation you received:  Relax and take it easy.  Do NOT make any important or legal decisions.  Do NOT drive or operate machines at home or at work.  Do NOT drink alcohol.    Care of Wrist Puncture Site:    For the first 24 hrs - check the puncture site every 1-2 hours while awake.  It is normal to have soreness at the puncture site and mild tingling in your hand for up to 3 days.  Remove the bandaid after 24 hours. If there is minor oozing, apply another bandaid and remove it after 12 hours.  You may shower today  Do NOT take a bath, or use a hot tub or pool for at least 3 days. Do NOT scrub the site. Do not use lotion or powder near the puncture site.           Activity:        For 2 days: 24th and 25th  do not use your hand or arm to support your weight (such as rising from a chair)   do not bend your wrist (such as lifting a garage door).  do not lift more than 5 pounds or exercise your arm (such as tennis, golf or bowling).  Do NOT do any heavy activity such as exercise, lifting, or straining.     Bleeding:    If you start bleeding from the site in your wrist, sit down and press firmly on/above the site for 10 minutes.   Once bleeding stops, keep arm still for 2 hours.   Call [facility] as soon as you can.       Call 911 right away if you have heavy bleeding or bleeding that does not stop.      Medicines:    If you are taking an antiplatelet medication such as Plavix, Brilinta or Effient, do not stop taking it until you talk to your cardiologist.      Call the clinic if:    You have a large or growing hard lump around the site.  The site is red, swollen, hot or tender.  Blood or fluid is draining from the site.  You have chills or a fever greater than 101 F (38 C).  Your arm feels numb,  cool or changes color.  You have hives, a rash or unusual itching.  Any questions or concerns.    [facility] at [place]:  [phone] UM (24/7 days a week)

## 2022-05-23 NOTE — PROGRESS NOTES
[facility]    Medicine Progress Note - Hospitalist Service    Date of Admission:  5/20/2022    Assessment & Plan          This is a 77-year-old gentleman with medical history which includes paroxysmal atrial fibrillation status post ablation and cardioversion, hypertension, dyslipidemia and migraine headache who presented to the ED with a chief complaint of chest discomfort and was found to have NSTEMI    1) NSTEMI  -On admission presented today with chest pain which had typical and atypical features and that did not get relieved by nitroglycerin  -EKG on admission did not show ST elevation and his troponins peaked at 921 and have trended down  -He had the echo done which showed that his EF has dropped from 60 to 65% to 30 to 35% with wall motion changes  -He was evaluated by cardiology team and started on heparin drip and is currently on aspirin and statin and on cardiac monitoring and plan is for angiogram today    2) paroxysmal atrial fibrillation  -His flecainide was held during this admission along with Eliquis and he has been started on heparin drip and we will continue the same at this time    3) hypertension  -His blood pressure is currently well controlled and he is not on any medications at home currently but used to be on losartan and we will monitor    4) hyperlipidemia  -We will continue the patient with statin    5) CKD stage III  -I did review his renal function and that his baseline runs between 1.3-1.5 even though his creatinine on 5/21 was 0.99 which did increase to 1.31 which was his baseline and morning labs are pending    6) leukocytosis-resolved   -He did had elevated white count of 14.1 yesterday and a.m. labs are pending and he has no other signs and symptoms concerning for infection and has been afebrile    7) New onset systolic heart failure not in exacerbation  - echo noted as above and shows Diastolic Doppler findings (E/E' ratio and/or other parameters)suggest  left ventricular filling pressures are increased. The visual ejectionfraction is 30-35%. There is moderate to severe anterior, septal, and apicalwall hypokinesis.  - On exam has no signs of heart failure and we will monitor and will need ace/arb and beta blocker at some point    Addendum  456  [facility] [place] S/P PCI and spoke with Dr cruz and andreina to be started in am and he will need EP opinion regarding rate control        Diet: Low Saturated Fat Na <2400 mg    DVT Prophylaxis: Heparin drip   Faria Catheter: Not present  Central Lines: None  Cardiac Monitoring: ACTIVE order. Indication: Chest pain/ ACS rule out (24 hours)  Code Status: Full Code      Disposition Plan   Expected Discharge: 05/24/2022     Anticipated discharge location:  Awaiting care coordination huddle  Delays:           The patient's care was discussed with the Bedside Nurse and Patient.    Kirsten Herzog MD  Hospitalist Service  [facility]  Securely message with the Vocera Web Console (learn more here)  Text page via [employer] Paging/Directory         Clinically Significant Risk Factors Present on Admission                 ______________________________________________________________________    Interval History       I saw the patient this morning and he denies any fever, chills, nausea or vomiting or any chest pain or shortness of breath.  Patient was updated on the results of his echo and plan for angiogram which he agrees and understand the risk and benefit    Data reviewed today: I reviewed all medications, new labs and imaging results over the last 24 hours. I personally reviewed the ECHO image(s) showing EF HAS DROPPED TO 30-35% with likely increased diasolic dysfucntion.    Physical Exam   Vital Signs: Temp: 98.3  F (36.8  C) Temp src: Oral BP: 128/74 Pulse: 55   Resp: 18 SpO2: 100 % O2 Device: None (Room air)    Weight: 170 lbs 9.6 oz        General: Patient appears comfortable and in no acute distress.  HEENT: Head is  atraumatic, normocephalic.  Pupils are equal, round and reactive to light.  No scleral icterus. Oral mucosa is moist   Neck: Neck is supple and No Lymphadenopathy   Respiratory: Lungs are clear to auscultation bilaterally with no wheeze or crackles   Cardiovascular: Regular rate , S1 and S2 normal with no murmer or rubs or gallops  Abdomen:   soft , non tender , non distended and bowel sound present   Skin: No skin rashes or lesions to inspection or palpation.  Neurologic: Higher functions are within normal limits. No obvious defects in speech, language and memory. No facial droop  Musculoskeletal: Normal Range of motion over upper and lower extremities bilaterally   Psychiatric: cooperative   Data   Recent Labs   Lab 22  1050 22  1600 22  0548 22  0209 22  2222   WBC 7.2  --   --  14.7* 9.9   HGB 14.3  --   --  15.7 15.3   MCV 93  --   --  91 91     --   --  283 252    138 137  --  136   POTASSIUM 4.0 4.5 3.9 4.2 4.3   CHLORIDE 107 106 106  --  106   CO2 28 28 23  --  24   BUN 24 29 21  --  21   CR 1.24 1.31* 0.99  --  1.15   ANIONGAP 4 4 8  --  6   ELIEZER 8.6 8.4* 8.5  --  9.0   GLC 84 77 151*  --  167*     Recent Results (from the past 24 hour(s))   Echocardiogram Complete   Result Value    LVEF  30-35%    Narrative    [id number]  [id number]  [id number]  [id number]^MANNY^EMIILO^KM     [facility]  Echocardiography Laboratory  [address] [place] [address]     Name: RO VELAZQUEZ  MRN: [id number]  : 1945  Study Date: 2022 10:06 AM  Age: 77 yrs  Gender: Male  Patient Location: [facility]  Reason For Study: Chest Pain, Chest Tightness, Chest Pressure  Ordering Physician: EMILIO BRYANT  Referring Physician: Sabas Staley  Performed By: Khadijah Roberson RDCS     BSA: 1.9 m2  Height: 70 in  Weight: 168 lb  HR: 64  BP: 135/89 mmHg  ______________________________________________________________________________  Procedure  Complete  Portable Echo Adult. Optison (NDC #[id number]) given intravenously.  ______________________________________________________________________________  Interpretation Summary     The left ventricle is normal in size.  Diastolic Doppler findings (E/E' ratio and/or other parameters) suggest left  ventricular filling pressures are increased.  The visual ejection fraction is 30-35%.  There is moderate to severe anterior, septal, and apical wall hypokinesis.  No significant valvular heart disease.  ______________________________________________________________________________  Left Ventricle  The left ventricle is normal in size. There is normal left ventricular wall  thickness. Diastolic Doppler findings (E/E' ratio and/or other parameters)  suggest left ventricular filling pressures are increased. The visual ejection  fraction is 30-35%. There is moderate to severe anterior, septal, and apical  wall hypokinesis.     Right Ventricle  The right ventricle is normal in size and function.     Atria  Normal left atrial size. Right atrial size is normal. There is no color  Doppler evidence of an atrial shunt.     Mitral Valve  The mitral valve leaflets are mildly thickened. There is trace mitral  regurgitation.     Tricuspid Valve  There is mild to moderate (1-2+) tricuspid regurgitation. The right  ventricular systolic pressure is approximated at 25.8 mmHg plus the right  atrial pressure. IVC diameter and respiratory changes fall into an  intermediate range suggesting an RA pressure of 8 mmHg.     Aortic Valve  There is trivial trileaflet aortic sclerosis. There is trace to mild aortic  regurgitation.     Pulmonic Valve  There is trace pulmonic valvular regurgitation.     Vessels  Normal size aorta. The aortic root is normal size.     Pericardium  There is no pericardial effusion.     Rhythm  Sinus rhythm was noted.  ______________________________________________________________________________  MMode/2D Measurements &  Calculations  IVSd: 1.0 cm     LVIDd: 4.9 cm  LVIDs: 3.7 cm  LVPWd: 1.3 cm  FS: 23.4 %  LV mass(C)d: 208.1 grams  LV mass(C)dI: 107.3 grams/m2  Ao root diam: 3.5 cm  LA dimension: 3.5 cm  asc Aorta Diam: 3.5 cm  LA/Ao: 0.99  LVOT diam: 2.0 cm  LVOT area: 3.1 cm2  RWT: 0.52     Time Measurements  Aortic HR: 63.0 BPM     Doppler Measurements & Calculations  MV E max deana: 86.3 cm/sec  MV A max deana: 54.8 cm/sec  MV E/A: 1.6  MV dec slope: 385.3 cm/sec2  MV dec time: 0.23 sec  AI P1/2t: 520.5 msec  LV V1 max P.2 mmHg  LV V1 max: 124.2 cm/sec  LV V1 VTI: 21.5 cm     CO(LVOT): 4.2 l/min  CI(LVOT): 2.2 l/min/m2  SV(LVOT): 67.0 ml  SI(LVOT): 34.6 ml/m2  TR max deana: 253.4 cm/sec  TR max P.8 mmHg  E/E' avg: 15.6  Lateral E/e': 14.6  Medial E/e': 16.7     ______________________________________________________________________________  Report approved by: Sameera Pritchard 2022 12:46 PM           Medications     - MEDICATION INSTRUCTIONS -       heparin 1,050 Units/hr (22 0259)     - MEDICATION INSTRUCTIONS -       ACE/ARB/ARNI NOT PRESCRIBED       BETA BLOCKER NOT PRESCRIBED         aspirin  81 mg Oral Daily     atorvastatin  80 mg Oral QPM     sodium chloride (PF)  3 mL Intracatheter Q8H

## 2022-05-23 NOTE — PLAN OF CARE
A&OX4, RA, VSS, denies pain, Tele- SR. On heparin gtt infusing at 1050 units/hr. Isolation for ESBL, up independently.     Plan: NPO for angiogram

## 2022-05-23 NOTE — PROGRESS NOTES
[facility]    Cardiology Progress Note    Date of Service: 05/23/2022     Assessment & Plan   Rey Brown is a 77 year old male with past medical history of hypertension, hyperlipidemia, atrial fibrillation ablation with multiple cardioversions, last 5/12/2022, coronary artery calcifications.  This patient was admitted on 5/20/2022 with symptoms of chest pain and elevated troponin    This plan has been devised under the supervision of Dr. Pizano; please see his note for details    1.  NonSTEMI with NEW CM, EF 30-35%  -Peak troponin 1117  -CT for A. fib ablation reviewed by Dr. Perez.  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 0.1-0.3% (for diagnostic angio) and 1-2% (for PCI)  risk of stroke, MI, death, emergent bypass for diagnostic angio, risk of contrast induced allergic reaction, renal dysfunction, vascular complications were discussed. Pros and cons of bare metal Vs drug eluting stents was discussed. Patient understands and wishes to proceed with it.  -Continue IV Heparin    2.  History of atrial fibrillation    -Status post ablation 4/1/2022  -Subsequent cardioversions since the ablation x2, most recently 5/12/2022   -Continue IV heparin  - Flecainide stopped over the weekend  Sinus cd currently  States amiodarone was not effective  On apixaban PTA-last dose Friday PM      3.  Question of prolonged QT interval  -Check EKG again today  -off zofran  As  noted, flecainde stopped    4.  5 mm groundglass pulmonary nodule right lower lobe      5. Dyslipidemia-zocor changed to high dose lipitor  LDL 77 HDL  59 TG 48      6. Tricuspid insufficiency 1-2+    Villa Harrison, MSN, APRN, CNP  [facility]      Interval History   No chest pain      Review of Systems:  The Review of Systems is negative other than noted in the HPI    Physical Exam   Temp: 98.3  F (36.8  C) Temp src: Oral BP: 128/74 Pulse: 55   Resp: 18 SpO2: 100 % O2 Device: None (Room air)    Vitals:    05/22/22 0544 05/22/22 0607 05/23/22 0500   Weight: 79.9 kg (176 lb 3.2 oz) 78 kg (171 lb 14.4 oz) 77.4 kg (170 lb 9.6 oz)     Vital Signs with Ranges  Temp:  [98.1  F (36.7  C)-98.3  F (36.8  C)] 98.3  F (36.8  C)  Pulse:  [52-55] 55  Resp:  [16-18] 18  BP: (111-140)/(62-79) 128/74  SpO2:  [97 %-100 %] 100 %  I/O last 3 completed shifts:  In: 480 [P.O.:480]  Out: -     Constitutional     alert and oriented, in no acute distress.     Skin     warm and dry to touch    ENT     no pallor or cyanosis    Neck    Supple, JVP normal, no carotid bruit    Chest     no tenderness to palpation     Lungs  clear to auscultation     Cardiac  regular rhythm, S1 normal, S2 normal, No S3 or S4, no murmurs, no rubs; dc    Abdomen     abdomen soft, bowel sounds normoactive, no hepatosplenomegaly    Extremities and Back     no clubbing, cyanosis. No edema observed.        Neurological     no gross motor deficits noted, affect appropriate, oriented to time, person and place.        Medications     - MEDICATION INSTRUCTIONS -       heparin 850 Units/hr (05/23/22 0913)     - MEDICATION INSTRUCTIONS -       ACE/ARB/ARNI NOT PRESCRIBED       BETA BLOCKER NOT PRESCRIBED         aspirin  81 mg Oral Daily     atorvastatin  80 mg Oral QPM     sodium chloride (PF)  3 mL Intracatheter Q8H          Data:     ROUTINE IP LABS (Last four results)  BMP  Recent Labs   Lab 05/23/22  1050 05/22/22  1600 05/21/22  0548 05/21/22  0209  05/20/22  2222    138 137  --  136   POTASSIUM 4.0 4.5 3.9 4.2 4.3   CHLORIDE 107 106 106  --  106   ELIEZER  --  8.4* 8.5  --  9.0   CO2  --  28 23  --  24   BUN  --  29 21  --  21   CR  --  1.31* 0.99  --  1.15   GLC  --  77 151*  --  167*     CHOLESTEROL/HEPATIC  Recent Labs   Lab 05/21/22  0548   CHOL 146   TRIG 48   LDL 77   HDL 59     CBC  Recent Labs   Lab 05/23/22  1050 05/21/22  0209 05/20/22  2222   WBC 7.2 14.7* 9.9   RBC 4.60 5.10 4.99   HGB 14.3 15.7 15.3   HCT 42.6 46.3 45.3   MCV 93 91 91   MCH 31.1 30.8 30.7   MCHC 33.6 33.9 33.8   RDW 12.9 12.7 12.8    283 252     TROP: No lab results found in last 7 days.   BNP:  No results for input(s): NTBNPI in the last 168 hours.  INRNo lab results found in last 7 days.  No results found for: TSH    EKG results:  Reviewed if available     Imaging:  No results found for this or any previous visit (from the past 24 hour(s)).  Telemetry:

## 2022-05-24 ENCOUNTER — APPOINTMENT (OUTPATIENT)
Dept: PHYSICAL THERAPY | Facility: CLINIC | Age: 77
DRG: 247 | End: 2022-05-24
Attending: INTERNAL MEDICINE
Payer: COMMERCIAL

## 2022-05-24 VITALS
DIASTOLIC BLOOD PRESSURE: 87 MMHG | HEIGHT: 70 IN | HEART RATE: 60 BPM | OXYGEN SATURATION: 96 % | TEMPERATURE: 98.2 F | WEIGHT: 170 LBS | SYSTOLIC BLOOD PRESSURE: 157 MMHG | BODY MASS INDEX: 24.34 KG/M2 | RESPIRATION RATE: 18 BRPM

## 2022-05-24 LAB
ANION GAP SERPL CALCULATED.3IONS-SCNC: 5 MMOL/L (ref 3–14)
BUN SERPL-MCNC: 18 MG/DL (ref 7–30)
CALCIUM SERPL-MCNC: 8.6 MG/DL (ref 8.5–10.1)
CHLORIDE BLD-SCNC: 108 MMOL/L (ref 94–109)
CO2 SERPL-SCNC: 27 MMOL/L (ref 20–32)
CREAT SERPL-MCNC: 1.28 MG/DL (ref 0.66–1.25)
GFR SERPL CREATININE-BSD FRML MDRD: 58 ML/MIN/1.73M2
GLUCOSE BLD-MCNC: 80 MG/DL (ref 70–99)
POTASSIUM BLD-SCNC: 4.4 MMOL/L (ref 3.4–5.3)
SODIUM SERPL-SCNC: 140 MMOL/L (ref 133–144)

## 2022-05-24 PROCEDURE — 36415 COLL VENOUS BLD VENIPUNCTURE: CPT | Performed by: HOSPITALIST

## 2022-05-24 PROCEDURE — 80048 BASIC METABOLIC PNL TOTAL CA: CPT | Performed by: HOSPITALIST

## 2022-05-24 PROCEDURE — 93005 ELECTROCARDIOGRAM TRACING: CPT

## 2022-05-24 PROCEDURE — 250N000013 HC RX MED GY IP 250 OP 250 PS 637: Performed by: HOSPITALIST

## 2022-05-24 PROCEDURE — 97161 PT EVAL LOW COMPLEX 20 MIN: CPT | Mod: GP

## 2022-05-24 PROCEDURE — 97530 THERAPEUTIC ACTIVITIES: CPT | Mod: GP

## 2022-05-24 PROCEDURE — 99239 HOSP IP/OBS DSCHRG MGMT >30: CPT | Performed by: HOSPITALIST

## 2022-05-24 PROCEDURE — 99232 SBSQ HOSP IP/OBS MODERATE 35: CPT | Performed by: INTERNAL MEDICINE

## 2022-05-24 PROCEDURE — 97110 THERAPEUTIC EXERCISES: CPT | Mod: GP

## 2022-05-24 PROCEDURE — 93010 ELECTROCARDIOGRAM REPORT: CPT | Performed by: INTERNAL MEDICINE

## 2022-05-24 PROCEDURE — 250N000013 HC RX MED GY IP 250 OP 250 PS 637: Performed by: STUDENT IN AN ORGANIZED HEALTH CARE EDUCATION/TRAINING PROGRAM

## 2022-05-24 RX ORDER — CLOPIDOGREL BISULFATE 75 MG/1
75 TABLET ORAL DAILY
Status: DISCONTINUED | OUTPATIENT
Start: 2022-05-25 | End: 2022-05-24 | Stop reason: HOSPADM

## 2022-05-24 RX ORDER — SILDENAFIL 100 MG/1
100 TABLET, FILM COATED ORAL DAILY PRN
Qty: 30 TABLET | Refills: 3 | COMMUNITY
Start: 2022-05-24 | End: 2022-06-01

## 2022-05-24 RX ORDER — ASPIRIN 81 MG/1
81 TABLET, CHEWABLE ORAL DAILY
Qty: 6 TABLET | Refills: 0 | Status: SHIPPED | OUTPATIENT
Start: 2022-05-25 | End: 2022-06-01

## 2022-05-24 RX ORDER — NITROGLYCERIN 0.4 MG/1
TABLET SUBLINGUAL
Qty: 20 TABLET | Refills: 0 | Status: SHIPPED | OUTPATIENT
Start: 2022-05-24 | End: 2023-01-27

## 2022-05-24 RX ORDER — CLOPIDOGREL BISULFATE 75 MG/1
TABLET ORAL
Qty: 30 TABLET | Refills: 11 | Status: SHIPPED | OUTPATIENT
Start: 2022-05-24 | End: 2022-06-01

## 2022-05-24 RX ORDER — LOSARTAN POTASSIUM 25 MG/1
12.5 TABLET ORAL DAILY
Qty: 15 TABLET | Refills: 0 | Status: SHIPPED | OUTPATIENT
Start: 2022-05-25 | End: 2022-06-01

## 2022-05-24 RX ORDER — CLOPIDOGREL BISULFATE 75 MG/1
150 TABLET ORAL
Status: DISCONTINUED | OUTPATIENT
Start: 2022-05-24 | End: 2022-05-24 | Stop reason: HOSPADM

## 2022-05-24 RX ADMIN — APIXABAN 5 MG: 5 TABLET, FILM COATED ORAL at 08:50

## 2022-05-24 RX ADMIN — ATORVASTATIN CALCIUM 80 MG: 80 TABLET, FILM COATED ORAL at 08:50

## 2022-05-24 RX ADMIN — LOSARTAN POTASSIUM 12.5 MG: 25 TABLET, FILM COATED ORAL at 12:55

## 2022-05-24 RX ADMIN — TICAGRELOR 90 MG: 90 TABLET ORAL at 08:50

## 2022-05-24 RX ADMIN — ASPIRIN 81 MG: 81 TABLET, COATED ORAL at 08:50

## 2022-05-24 ASSESSMENT — ACTIVITIES OF DAILY LIVING (ADL)
ADLS_ACUITY_SCORE: 19

## 2022-05-24 NOTE — CONSULTS
Patient has Medicare D through [facility].    Brilinta   May Upon receipt of RX, Discharge Pharmacy can dispense 1 month free.   Shaina: $102   July-Dec $116/mo (coverage gap)       Clopidogrel: $12/mo.     -AMBERLY Piña, Pharmacy Technician/Liaison, Discharge Pharmacy [phone]

## 2022-05-24 NOTE — CONSULTS
NUTRITION EDUCATION    REASON FOR ASSESSMENT:  Nutrition education on American Heart Association (AHA) Heart Healthy Diet.    NUTRITION HISTORY:  Information obtained from patient. His wife does much of the cooking. He reports that a lot of their meals are made from scratch.     CURRENT DIET ORDER:  Low Sat Fat, Na <2400 mg     NUTRITION DIAGNOSIS:  Food- and nutrition-related knowledge deficit R/t heart healthy diet AEB no prior education received.      INTERVENTIONS:  Nutrition Prescription:    Recommended AHA Heart Healthy Diet    2g Na diet     Implementation:     Nutrition Education (Content):  a) reviewed Heart Healthy Diet guidelines  b) provided heart healthy diet handout    Nutrition Education (Application):  a) Discussed current eating habits and recommended alternative food choices    Anticipate good compliance    Diet Education - refer to Education flowsheet    Goals:    Patient verbalizes understanding of diet     All of the above goals met during education session    Follow Up/Monitoring:    Provided RD contact information for future questions    Naomi Ross RD, LD  [facility], [facility], Orth[facility], [facility]  Pager: [phone]  Weekend Pager: [phone]

## 2022-05-24 NOTE — PROGRESS NOTES
05/24/22 1132   Quick Adds   Type of Visit Initial PT Evaluation   Living Environment   People in Home spouse   Current Living Arrangements house   Transportation Anticipated family or friend will provide   Self-Care   Usual Activity Tolerance excellent   Current Activity Tolerance good   Activity/Exercise/Self-Care Comment Pt IND with mobility   General Information   Onset of Illness/Injury or Date of Surgery 05/20/22   Referring Physician Elham Matson MD   Pertinent History of Current Problem (include personal factors and/or comorbidities that impact the POC) Rey Brown is a 77 year old male with past medical history of hypertension, hyperlipidemia, atrial fibrillation ablation with multiple cardioversions, last 5/12/2022, coronary artery calcifications.  This patient was admitted on 5/20/2022 with symptoms of chest pain and elevated troponin   Heart Disease Risk Factors Family history;Age;Gender   Cognition   Affect/Mental Status (Cognition) WNL   Pain Assessment   Patient Currently in Pain No   Strength (Manual Muscle Testing)   Strength Comments WNL   Bed Mobility   Comment, (Bed Mobility) IND   Transfers   Comment, (Transfers) IND   Gait/Stairs (Locomotion)   Comment, (Gait/Stairs) IND   Balance   Balance Comments no deficits   Clinical Impression   Criteria for Skilled Therapeutic Intervention Yes, treatment indicated   PT Diagnosis (PT) pt being followed for CR as IP, CR phase I, pt IND with mobility, s/p PCI   Influenced by the following impairments Pt IND with mobility, pt demonstrating impaired functional activity tolerance, plan for OP CR phase II at PR   Functional limitations due to impairments IND with mobility, impaired activity tolerance   Clinical Presentation (PT Evaluation Complexity) Stable/Uncomplicated   Clinical Presentation Rationale clinical judgement   Clinical Decision Making (Complexity) low complexity   Planned Therapy Interventions (PT) patient/family  education;progressive activity/exercise;risk factor education   Risk & Benefits of therapy have been explained evaluation/treatment results reviewed;care plan/treatment goals reviewed;risks/benefits reviewed;patient   PT Discharge Planning   PT Discharge Recommendation (DC Rec) home;home with outpatient cardiac rehab   PT Rationale for DC Rec Pt IND with mobility, HTN post activity with CR see vitals flowsheet. Denied symptoms, tolerated 10 minutes moderate pace ambulation. Provided CR education packet. Plan for CR phase II at DC   Total Evaluation Time   Total Evaluation Time (Minutes) 15   Physical Therapy Goals   PT Frequency 2x/day   PT Predicted Duration/Target Date for Goal Attainment 05/26/22   PT Goals Cardiac Phase 1   PT: Understanding of cardiac education to maximize quality of life, condition management, and health outcomes Patient   PT: Perform aerobic activity with stable cardiovascular response continuous;treadmill;20 minutes   PT: Functional/aerobic ambulation tolerance with stable cardiovascular response in order to return to home and community environment Independent;Greater than 300 feet   PT: Navigation of stairs simulating home set up with stable cardiovascular response in order to return to home and community environment Independent;Greater than 10 stairs

## 2022-05-24 NOTE — DISCHARGE SUMMARY
[facility]  Hospitalist Discharge Summary      Date of Admission:  5/20/2022  Date of Discharge:  5/24/2022  Discharging Provider: Kirsten Herzog MD  Discharge Service: Hospitalist Service    Discharge Diagnoses     NSTEMI with cad s/p PCI  paroxysmal atrial fibrillation   hypertension  Hyperlipidemia  CKD stage III  New onset systolic heart failure not in exacerbation    Follow-ups Needed After Discharge   Follow-up Appointments     Add follow up information to the AVS prior to printing      Follow-up and recommended labs and tests       Follow up with primary care provider, Sabas Staley, within 3-4 days   for hospital follow- up.  The following labs/tests are recommended: basic   metabolic panel to check creatinine.    Follow with EP and general cardiology as outpatient         {Additional follow-up instructions/to-do's for PCP    :    Unresulted Labs Ordered in the Past 30 Days of this Admission     No orders found from 4/20/2022 to 5/21/2022.      These results will be followed up by     Discharge Disposition   Discharged to home  Condition at discharge: Stable        Hospital Course     This is a 77-year-old gentleman with medical history which includes paroxysmal atrial fibrillation status post ablation and cardioversion, hypertension, dyslipidemia and migraine headache who presented to the ED with a chief complaint of chest discomfort and was found to have NSTEMI. He was Started on heparin drip and cardiology team was consulted and he was also started on aspirin and his flecainide was held which she was taking for atrial fibrillation.  His renal function was being monitored and his creatinine has been stable between his baseline of 1.3.  He had echo done which showed that his EF has dropped from 60% to 65% to 30 to 35% and he had wall motion changes.  Of note patient did had leukocytosis on admission which resolved on its own and he had no signs of infection.    Patient was seen  by cardiology and he underwent left heart cath and underwent stent placement to LAD and he was started on dual antiplatelet therapy including aspirin, Brilinta.  He was seen by cardiology on day of discharge and he was also started on low-dose losartan at 12.5 and of note he used to be on losartan 25 mg as outpatient at one point in time.  His renal function on day of discharge was stable at 1.28.    Patient could not be started on beta-blocker given his low heart rate and he will need to follow as outpatient with EP and he will continue with his Eliquis.    Patient was seen by pharmacy liaison and he cannot afford Brilinta and decision was made to switch him to Plavix.  On day of discharge in the evening patient will take 150 mg of Plavix one time and then he will continue from 5/25/2022 with 75 mg Plavix and continue with Eliquis and he will take aspirin 81 mg for 6 days and will stop aspirin and he understood.    Patient will need to see his primary care physician and basic metabolic panel should be done within the next 3 to 4 days to check his creatinine as he started on losartan and also got contrast few days back.    Patient was given as needed nitroglycerin for as needed basis for chest pain and of note he also takes Viagra at times and was made aware of the risk factors of concomitant use of significant and nitroglycerin and I talked to patient in person and also written instructions were given to him and he understood and verbalized back.    On day of discharge he was in agreement with going home and his medications were sent to pharmacy of his choice at [facility].    He will need to follow as outpatient with primary care in 3 to 4 days and will follow as outpatient with EP and cardiology      Consultations This Hospital Stay   CARDIAC REHAB IP CONSULT  CARDIOLOGY IP CONSULT  PHARMACY IP CONSULT  PHARMACY IP CONSULT  PHARMACY IP CONSULT  PHARMACY IP CONSULT  NUTRITION SERVICES ADULT IP CONSULT  CARDIAC REHAB  IP CONSULT  PHARMACY IP CONSULT  PHARMACY IP CONSULT  PHARMACY LIAISON FOR MEDICATION COVERAGE CONSULT  SMOKING CESSATION PROGRAM IP CONSULT  SMOKING CESSATION PROGRAM IP CONSULT    Code Status   Full Code    Time Spent on this Encounter   I, Kirsten Herzog MD, personally saw the patient today and spent greater than 30 minutes discharging this patient.       Kirsten Herzog MD  [facility]ART [facility]  [address], [address]  [place] [address]  Phone: [phone]  ______________________________________________________________________    Physical Exam   Vital Signs: Temp: 98.2  F (36.8  C) Temp src: Oral BP: (!) 157/87 Pulse: 60   Resp: 18 SpO2: 96 % O2 Device: None (Room air)    Weight: 170 lbs 0 oz        General: Patient appears comfortable and in no acute distress.  HEENT: Head is atraumatic, normocephalic.  Pupils are equal, round and reactive to light.    Neck: Neck is supple   Respiratory: Lungs are clear to auscultation bilaterally with no wheeze or crackles   Cardiovascular: Regular rate , S1 and S2 normal with no murmer or rubs or gallops  Abdomen:   soft , non tender , non distended and bowel sound present   Skin: No skin rashes or lesions to inspection or palpation.  Neurologic: . No facial droop  Musculoskeletal: Normal Range of motion over upper and lower extremities bilaterally   Psychiatric: cooperative        Primary Care Physician   Sabas Staley    Discharge Orders      CARDIAC REHAB REFERRAL      Follow-Up with Cardiology NOLAN      Follow-Up with Cardiology      Follow-Up with Cardiology NOLAN      Medication Instructions - Anticoagulants    Do NOT stop your aspirin or platelet inhibitor unless directed by your Cardiologist.  These medications help to prevent platelets in your blood from sticking together and forming a clot.  Examples of these medications are:  Ticagrelor (Brilinta), Clopidigrel (Plavix), Prasugrel (Effient)     When to call - Contact the Heart  Clinic    You may experience symptoms that require follow-up before your scheduled appointment. Contact the Heart Clinic if you develop: Fever over 100.4o Fahrenheit, that lasts more than one day; Redness, heat, or pus at the puncture site; Change in color or temperature in your hand or arm.     When to call - Reasons to Call 911    If your wrist puncture site starts bleeding after discharge, sit down and apply firm pressure with your thumb against the puncture site and fingers against the back of the wrist for 10 minutes. If the bleeding stops, continue to rest, keeping your wrist still for 2 hours. Notify your doctor as soon as possible.  IF BLEEDING DOES NOT STOP OR THERE IS A LARGER AMOUNT OF BLEEDING OR SPURTING CALL 9-1-1 immediately.DO NOT drive yourself to the hospital.     Precautions - Lifting    DO NOT lift more than 5 pounds with affected arm for 48 hours     Precautions - Household Activities    Avoid excessive bending or movement of your wrist for 72 hours.  Do not subject hand/arm to any forceful movements for 24 hours, such as supporting weight when rising from a chair or bed.     Remove the band-aid on the puncture site after 24 hours and leave open to air. If minor oozing, you may apply a band-aid and remove after 12 hours.     Precautions - Active Sports Activities    DO NOT engage in vigorous exercise using your affected arm for 3 days after discharge.  This includes golf, tennis or swimming.     Precautions - Operating yard equipment or vehicles    Do not operate a chainsaw, lawnmower, motorcycle, or all-terrain vehicle for 48 hours after the procedure.     Precautions - Elective Dental Work    NO elective dental work for 6 weeks after receiving a stent.     Comfort and Pain Management - Bruising after Surgery    Expect mild tingling of hand and tenderness at the wrist puncture site for up to 3 days. You may take Tylenol or a pain medicine recommended by your doctor.     Activity - Cardiac Rehab     You are encouraged to enroll in an Outpatient Cardiac Rehab program after discharge from the hospital.  Our Cardiac Rehab staff may visit briefly with you while you're in the hospital.  If they miss you, someone will contact you after you are home.     Return to Driving    Driving is NOT permitted for 24 hours after surgery     Return to work    You may return to work after 72 hours if you are feeling well and your job does not involve heavy lifting.     Shower / Bathing    You may shower on the day after your procedure.  DO NOT soak of wrist with the puncture site in water for 3 days to prevent infection. DO NOT take a tub bath or wash dishes for 3 days after the procedure     Dressing Removal    Remove the band-aid on the puncture site after 24 hours and leave open to air. If minor oozing, you may apply a band-aid and remove after 12 hours     Reason for your hospital stay    Cad     Activity    Your activity upon discharge: activity as tolerated     Follow-up and recommended labs and tests     Follow up with primary care provider, Sabas Staley, within 3-4 days for hospital follow- up.  The following labs/tests are recommended: basic metabolic panel to check creatinine.    Follow with EP and general cardiology as outpatient     Activity    Your activity upon discharge: activity as tolerated     Monitor and record    Weigh yourself every morning     When to contact your care team    Contact your care team If weight gain of 2 pounds a day for 2 days in a row OR 5 pounds in 1 week.     Discharge Follow Up - with Primary Care clinic within 3-5 days (RN to schedule prior to d/c for HE/[facility]ira primary care     Add follow up information to the AVS prior to printing     Monitor and record    weight every day     Discharge Instructions    Please dont take viagra and Nitroglycerine together on same day as it can cause your blood pressure to drop     Echocardiogram Complete    Administration of IV contrast will be  tailored to this examination per the appropriate written protocol listed in the Echocardiography department Protocol Book, or by the supervising Cardiologist. This may result in an order change.    Use of contrast is at the discretion of the supervising Cardiologist.     Diet    Follow this diet upon discharge: Orders Placed This Encounter      Low Saturated Fat Na <2400 mg     Diet    Follow this diet upon discharge: Orders Placed This Encounter      Low Saturated Fat Na <2400 mg       Significant Results and Procedures   Most Recent 3 CBC's:Recent Labs   Lab Test 22  1050 22  0209 22  2222   WBC 7.2 14.7* 9.9   HGB 14.3 15.7 15.3   MCV 93 91 91    283 252     Most Recent 3 BMP's:Recent Labs   Lab Test 22  0701 22  1050 22  1600    139 138   POTASSIUM 4.4 4.0 4.5   CHLORIDE 108 107 106   CO2 27 28 28   BUN 18 24 29   CR 1.28* 1.24 1.31*   ANIONGAP 5 4 4   ELIEZER 8.6 8.6 8.4*   GLC 80 84 77   ,   Results for orders placed or performed during the hospital encounter of 22   XR Chest 2 Views    Narrative    EXAM: XR CHEST 2 VW  LOCATION: [facility]  DATE/TIME: 2022 11:16 PM    INDICATION: Chest pain  COMPARISON: None.      Impression    IMPRESSION: Mild nonspecific interstitial prominence in the lungs. No focal acute appearing infiltrates or consolidation. Normal heart size and pulmonary vascularity. No significant bony abnormalities. Otherwise unremarkable.   Echocardiogram Complete     Value    LVEF  30-35%    Narrative    [id number]  [id number]  [id number]  [id number]^MANNY^EMILIO^[facility]  Echocardiography Laboratory  [address] [place] [address]     Name: RO VELAZQUEZ  MRN: [id number]  : 1945  Study Date: 2022 10:06 AM  Age: 77 yrs  Gender: Male  Patient Location: [facility]  Reason For Study: Chest Pain, Chest Tightness, Chest Pressure  Ordering Physician: EMILIO BRYANT  KM  Referring Physician: Sabas Staley  Performed By: Khadijah Roberson RDCS     BSA: 1.9 m2  Height: 70 in  Weight: 168 lb  HR: 64  BP: 135/89 mmHg  ______________________________________________________________________________  Procedure  Complete Portable Echo Adult. Optison (NDC #[id number]) given intravenously.  ______________________________________________________________________________  Interpretation Summary     The left ventricle is normal in size.  Diastolic Doppler findings (E/E' ratio and/or other parameters) suggest left  ventricular filling pressures are increased.  The visual ejection fraction is 30-35%.  There is moderate to severe anterior, septal, and apical wall hypokinesis.  No significant valvular heart disease.  ______________________________________________________________________________  Left Ventricle  The left ventricle is normal in size. There is normal left ventricular wall  thickness. Diastolic Doppler findings (E/E' ratio and/or other parameters)  suggest left ventricular filling pressures are increased. The visual ejection  fraction is 30-35%. There is moderate to severe anterior, septal, and apical  wall hypokinesis.     Right Ventricle  The right ventricle is normal in size and function.     Atria  Normal left atrial size. Right atrial size is normal. There is no color  Doppler evidence of an atrial shunt.     Mitral Valve  The mitral valve leaflets are mildly thickened. There is trace mitral  regurgitation.     Tricuspid Valve  There is mild to moderate (1-2+) tricuspid regurgitation. The right  ventricular systolic pressure is approximated at 25.8 mmHg plus the right  atrial pressure. IVC diameter and respiratory changes fall into an  intermediate range suggesting an RA pressure of 8 mmHg.     Aortic Valve  There is trivial trileaflet aortic sclerosis. There is trace to mild aortic  regurgitation.     Pulmonic Valve  There is trace pulmonic valvular regurgitation.      Vessels  Normal size aorta. The aortic root is normal size.     Pericardium  There is no pericardial effusion.     Rhythm  Sinus rhythm was noted.  ______________________________________________________________________________  MMode/2D Measurements & Calculations  IVSd: 1.0 cm     LVIDd: 4.9 cm  LVIDs: 3.7 cm  LVPWd: 1.3 cm  FS: 23.4 %  LV mass(C)d: 208.1 grams  LV mass(C)dI: 107.3 grams/m2  Ao root diam: 3.5 cm  LA dimension: 3.5 cm  asc Aorta Diam: 3.5 cm  LA/Ao: 0.99  LVOT diam: 2.0 cm  LVOT area: 3.1 cm2  RWT: 0.52     Time Measurements  Aortic HR: 63.0 BPM     Doppler Measurements & Calculations  MV E max deana: 86.3 cm/sec  MV A max deana: 54.8 cm/sec  MV E/A: 1.6  MV dec slope: 385.3 cm/sec2  MV dec time: 0.23 sec  AI P1/2t: 520.5 msec  LV V1 max P.2 mmHg  LV V1 max: 124.2 cm/sec  LV V1 VTI: 21.5 cm     CO(LVOT): 4.2 l/min  CI(LVOT): 2.2 l/min/m2  SV(LVOT): 67.0 ml  SI(LVOT): 34.6 ml/m2  TR max deana: 253.4 cm/sec  TR max P.8 mmHg  E/E' avg: 15.6  Lateral E/e': 14.6  Medial E/e': 16.7     ______________________________________________________________________________  Report approved by: Sameera Pritchard 2022 12:46 PM         Cardiac Catheterization    Narrative      Prox RCA lesion is 10% stenosed.    Dist RCA lesion is 10% stenosed.    Prox LAD lesion is 40% stenosed.    Ost Cx to Prox Cx lesion is 20% stenosed.    Mid LAD to Dist LAD lesion is 80% stenosed.     Single Vessel CAD with 85% stenosis of the mid to distal LAD.  PCI of the LAD with a 2.5x15 MARYCARMEN           Discharge Medications   Discharge Medication List as of 2022  1:56 PM      START taking these medications    Details   atorvastatin (LIPITOR) 80 MG tablet Take 1 tablet (80 mg) by mouth daily, Disp-90 tablet, R-3, Local Print      clopidogrel (PLAVIX) 75 MG tablet Take 150 mg on evening of 2022 and from 2022 start 75 mg daily, Disp-30 tablet, R-11, E-Prescribe      losartan (COZAAR) 25 MG tablet Take 0.5 tablets (12.5  mg) by mouth daily, Disp-15 tablet, R-0, E-Prescribe      nitroGLYcerin (NITROSTAT) 0.4 MG sublingual tablet For chest pain place 1 tablet under the tongue every 5 minutes for 3 doses. If symptoms persist 5 minutes after 1st dose call 911.Please done take viagra along with nnitroglycerine, Disp-20 tablet, R-0, E-Prescribe         CONTINUE these medications which have CHANGED    Details   aspirin (ASA) 81 MG chewable tablet Take 1 tablet (81 mg) by mouth daily for 6 days Starting tomorrow., Disp-6 tablet, R-0, E-Prescribe      sildenafil (VIAGRA) 100 MG tablet Take 1 tablet (100 mg) by mouth daily as needed, Disp-30 tablet, R-3, Historical         CONTINUE these medications which have NOT CHANGED    Details   acetaminophen (TYLENOL) 325 MG tablet Take 325-650 mg by mouth every 6 hours as needed for mild pain, Historical      apixaban ANTICOAGULANT (ELIQUIS) 5 MG tablet Take 1 tablet (5 mg) by mouth 2 times daily, Disp-180 tablet, R-3, E-Prescribe         STOP taking these medications       aspirin-acetaminophen-caffeine (EXCEDRIN MIGRAINE) 250-250-65 MG tablet Comments:   Reason for Stopping:         flecainide (TAMBOCOR) 150 MG tablet Comments:   Reason for Stopping:         simvastatin (ZOCOR) 20 MG tablet Comments:   Reason for Stopping:             Allergies   Allergies   Allergen Reactions     Ace Inhibitors Cough

## 2022-05-24 NOTE — PROGRESS NOTES
Pt is A & O x 4, calm and cooperative for cares. VSS on RA, Denied pain and SOB. Left radial site soft to touch, WNL, CMS intact. Tele SB. Plan cardiac rehab today. Continue to plan of care.

## 2022-05-24 NOTE — PROGRESS NOTES
[facility]    Cardiology Progress Note    Date of Service: 05/24/2022     Assessment & Plan   Rey Brown is a 77 year old male with past medical history of hypertension, hyperlipidemia, atrial fibrillation ablation with multiple cardioversions, last 5/12/2022, coronary artery calcifications.  This patient was admitted on 5/20/2022 with symptoms of chest pain and elevated troponin    This plan has been devised under the supervision of Dr. Pizano; please see his note for details    1.  NonSTEMI with NEW CM, EF 30-35%  -Peak troponin 1117  -CT for A. fib ablation reviewed by Dr. Perez.  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 [employer] otherwise no unusual stress  - Creatinine 1.31--1.24--1.29; typically 0.99-1.31  -he has a cough in the past on ACE  -No contrast allergies  -Weight down 6 pounds  Angiogram yesterday: mid to distal LAD 80%; proximal LAD 40%  RCA 10% Ostial circumflex 20%  S/p PCI with MARYCARMEN 2.5 X 15mm Synergy stent to LAD  Heart rate too low for BB  BP high;  arb llow dose added but with his CKD, creatinine will need to be watched for increase (ace cough)  Titrate as OP  Appears euvolemic--no diuretic    PLAN:  Triple therapy with ASA/Eliquis/Brilinta for one week then Eliquis/Brilinta  Given cost of Brilinta $115.00 per month versus 12.00 for Plavix    PLAN FOR ANTICOAGULATION  ASA 81mg daily for 6 more days then STOP  Continue Eliquis 5mg twice daily  Stop Brilinta due to cost and start Plavix 150mg once tonite then 75mg daily for 12 months    Start Losartan 12. 5mg daily  Will schedule OP follow up with us    2.  History of atrial fibrillation    -Status post ablation 4/1/2022  -Subsequent cardioversions since the ablation x2, most recently  5/12/2022   -Continue IV heparin  - Flecainide stopped over the weekend  Sinus dc currently  States amiodarone was not effective  eliquis restarted this am  Will need EP followup--coordinator will schedule  Heart rates too low to start BB here        3.  Question of prolonged QT interval  -Check EKG again today  -off zofran  As noted, flecainde stopped    4.  5 mm groundglass pulmonary nodule right lower lobe      5. Dyslipidemia-zocor changed to high dose lipitor  LDL 77 HDL  59 TG 48  Will need lipid panel with alt in 6-8 weeks      6. Tricuspid insufficiency 1-2+    7. CKD,  Would arrange BMP at PCP within one week with addition of losartan and recent dye load.    Discussed with hospitalist.  Ok for discharge after seen by Dr. Harinder Harrison, MSN, APRN, CNP  [facility]      Interval History   No chest pain      Review of Systems:  The Review of Systems is negative other than noted in the HPI    Physical Exam   Temp: 98.2  F (36.8  C) Temp src: Oral BP: (!) 157/87 Pulse: 60   Resp: 18 SpO2: 96 % O2 Device: None (Room air) Oxygen Delivery: 4 LPM  Vitals:    05/22/22 0607 05/23/22 0500 05/24/22 0702   Weight: 78 kg (171 lb 14.4 oz) 77.4 kg (170 lb 9.6 oz) 77.1 kg (170 lb)     Vital Signs with Ranges  Temp:  [97.5  F (36.4  C)-98.2  F (36.8  C)] 98.2  F (36.8  C)  Pulse:  [52-62] 60  Resp:  [12-18] 18  BP: (117-157)/() 157/87  SpO2:  [96 %-99 %] 96 %  No intake/output data recorded.    Constitutional     alert and oriented, in no acute distress.     Skin     warm and dry to touch    ENT     no pallor or cyanosis    Neck    Supple, JVP normal, no carotid bruit    Chest     no tenderness to palpation     Lungs  clear to auscultation     Cardiac  regular rhythm, S1 normal, S2 normal, No S3 or S4, no murmurs, no rubs; dc    Abdomen     abdomen soft, bowel sounds normoactive, no hepatosplenomegaly    Extremities and Back     no clubbing, cyanosis. No edema observed.  Left radial site clean  with out hum or bruit      Neurological     no gross motor deficits noted, affect appropriate, oriented to time, person and place.        Medications     - MEDICATION INSTRUCTIONS -       - MEDICATION INSTRUCTIONS -       Percutaneous Coronary Intervention orders placed (this is information for BPA alerting)       ACE/ARB/ARNI NOT PRESCRIBED       BETA BLOCKER NOT PRESCRIBED         apixaban ANTICOAGULANT  5 mg Oral BID     aspirin  81 mg Oral Daily     atorvastatin  80 mg Oral Daily     losartan  12.5 mg Oral Daily     sodium chloride (PF)  3 mL Intracatheter Q8H     ticagrelor  90 mg Oral Q12H          Data:     ROUTINE IP LABS (Last four results)  BMP  Recent Labs   Lab 05/24/22  0701 05/23/22  1050 05/22/22  1600 05/21/22  0548    139 138 137   POTASSIUM 4.4 4.0 4.5 3.9   CHLORIDE 108 107 106 106   ELIEZER 8.6 8.6 8.4* 8.5   CO2 27 28 28 23   BUN 18 24 29 21   CR 1.28* 1.24 1.31* 0.99   GLC 80 84 77 151*     CHOLESTEROL/HEPATIC  Recent Labs   Lab 05/23/22  1050 05/21/22  0548   CHOL 135 146   TRIG 78 48   LDL 67 77   HDL 52 59     CBC  Recent Labs   Lab 05/23/22  1050 05/21/22  0209 05/20/22  2222   WBC 7.2 14.7* 9.9   RBC 4.60 5.10 4.99   HGB 14.3 15.7 15.3   HCT 42.6 46.3 45.3   MCV 93 91 91   MCH 31.1 30.8 30.7   MCHC 33.6 33.9 33.8   RDW 12.9 12.7 12.8    283 252     TROP: No lab results found in last 7 days.   BNP:  No results for input(s): NTBNPI in the last 168 hours.  INRNo lab results found in last 7 days.  No results found for: TSH    EKG results:  Reviewed if available     Imaging:  Recent Results (from the past 24 hour(s))   Cardiac Catheterization    Narrative      Prox RCA lesion is 10% stenosed.    Dist RCA lesion is 10% stenosed.    Prox LAD lesion is 40% stenosed.    Ost Cx to Prox Cx lesion is 20% stenosed.    Mid LAD to Dist LAD lesion is 80% stenosed.     Single Vessel CAD with 85% stenosis of the mid to distal LAD.  PCI of the LAD with a 2.5x15 MARYCARMEN        Telemetry:

## 2022-05-24 NOTE — PLAN OF CARE
Physical Therapy Discharge Summary    Reason for therapy discharge:    Discharged to home with outpatient therapy.    Progress towards therapy goal(s). See goals on Care Plan in [facility] electronic health record for goal details.  Goals met    Therapy recommendation(s):    Continued therapy is recommended.  Rationale/Recommendations:  Pt IND with mobility, HTN post activity with CR see vitals flowsheet. Denied symptoms, tolerated 10 minutes moderate pace ambulation. Provided CR education packet. Plan for CR phase II at [place].

## 2022-05-24 NOTE — PLAN OF CARE
Vitals: HTN - started losartan. Close OP follow up.   Lungs: WNL  CV: NSR/SB. Denies CP.   GI: WNL  Uro: WNL  CMS: WNL, left radial site WNL  Neuro: no deficits noted   Skin: ecchymotic to puncture site.   Psych: WNL  MSK: Indp  Pain: denies   Labs: monitor crt closely op. Stable.   Tests/Procedures: na   Other:  Pt. Discharge home. Education/Review of AVS including medications, diet, follow up plan, care/restrictions, and when to seek medical attention. Pt. Picking up medications at primary pharmacy.

## 2022-05-25 ENCOUNTER — PATIENT OUTREACH (OUTPATIENT)
Dept: CARE COORDINATION | Facility: CLINIC | Age: 77
End: 2022-05-25
Payer: COMMERCIAL

## 2022-05-25 ENCOUNTER — TELEPHONE (OUTPATIENT)
Dept: CARDIOLOGY | Facility: CLINIC | Age: 77
End: 2022-05-25
Payer: COMMERCIAL

## 2022-05-25 DIAGNOSIS — Z71.89 OTHER SPECIFIED COUNSELING: ICD-10-CM

## 2022-05-25 NOTE — TELEPHONE ENCOUNTER
Writer attempted to call pt, but now, no answer. VM left to return my phone call. See Dr. Pizano's response below. JENNIFFER Fonseca RN.

## 2022-05-25 NOTE — TELEPHONE ENCOUNTER
Patient was evaluated by cardiology while inpatient for chest pain and elevated troponin-NSTEMI. PMH: HTN, HLD, A. Fib ablation with multiple DCCV's, last 5/12/22, coronary artery calcifications, ED. Echo showed EF of 30 to 35% with moderate to severe anterior, septal, apical wall hypokinesis (ejection fraction normal 4 )-new onset CM. 5/23/22: Coronary angiogram via LRA showed mid to distal LAD 80%; proximal LAD 40%, RCA 10% Ostial circumflex 20%, s/p PCI with MARYCARMEN 2.5 X 15mm Synergy stent to LAD. No BB secondary to low HR. TAPT with ASA/Eliquis/Plavix for one week then Eliquis/Plavix. Pt was started on Lipitor, Plavix, Cozaar and NTG at time of discharge. PTA Flecainide and Zocor were discontinued. Writer notes pt also has an rx for Viagra 100 mg daily PRN. Will route this note to Dr. Pizano to advise regarding concurrent use of PRN Viagra and PRN SL NTG. Writer will then call the pt. Pt is scheduled for cardiac rehab on 6/2/22 at 0745. Scheduled for an OV with Dr. Pizano on 6/1/22 at 1245 and an OV scheduled on 6/10/22 at 1245 with Dr. Scott, both at our [place] Office. JENNIFFER Fonseca RN.

## 2022-05-25 NOTE — PROGRESS NOTES
Clinic Care Coordination Contact  [facility]: Post-Discharge Note  SITUATION                                                      Admission:    Admission Date: 05/20/22   Reason for Admission: CP  Discharge:   Discharge Date: 05/24/22    BACKGROUND                                                      Per hospital discharge summary and inpatient provider notes:  This is a 77-year-old gentleman with medical history which includes paroxysmal atrial fibrillation status post ablation and cardioversion, hypertension, dyslipidemia and migraine headache who presented to the ED with a chief complaint of chest discomfort and was found to have NSTEMI. He was Started on heparin drip and cardiology team was consulted and he was also started on aspirin and his flecainide was held which she was taking for atrial fibrillation.  His renal function was being monitored and his creatinine has been stable between his baseline of 1.3.  He had echo done which showed that his EF has dropped from 60% to 65% to 30 to 35% and he had wall motion changes.  Of note patient did had leukocytosis on admission which resolved on its own and he had no signs of infection.     Patient was seen by cardiology and he underwent left heart cath and underwent stent placement to LAD and he was started on dual antiplatelet therapy including aspirin, Brilinta.  He was seen by cardiology on day of discharge and he was also started on low-dose losartan at 12.5 and of note he used to be on losartan 25 mg as outpatient at one point in time.  His renal function on day of discharge was stable at 1.28.     Patient could not be started on beta-blocker given his low heart rate and he will need to follow as outpatient with EP and he will continue with his Eliquis.     Patient was seen by pharmacy liaison and he cannot afford Brilinta and decision was made to switch him to Plavix.  On day of discharge in the evening patient will take 150 mg of Plavix one time and  "then he will continue from 5/25/2022 with 75 mg Plavix and continue with Eliquis and he will take aspirin 81 mg for 6 days and will stop aspirin and he understood.     Patient will need to see his primary care physician and basic metabolic panel should be done within the next 3 to 4 days to check his creatinine as he started on losartan and also got contrast few days back.     Patient was given as needed nitroglycerin for as needed basis for chest pain and of note he also takes Viagra at times and was made aware of the risk factors of concomitant use of significant and nitroglycerin and I talked to patient in person and also written instructions were given to him and he understood and verbalized back.     On day of discharge he was in agreement with going home and his medications were sent to pharmacy of his choice at [facility].     He will need to follow as outpatient with primary care in 3 to 4 days and will follow as outpatient with EP and cardiology    ASSESSMENT      Enrollment  Primary Care Care Coordination Status: Not a Candidate    Discharge Assessment  How are you doing now that you are home?: \" I'm doing ok \"  How are your symptoms? (Red Flag symptoms escalate to triage hotline per guidelines): Improved  Do you feel your condition is stable enough to be safe at home until your provider visit?: Yes  Does the patient have their discharge instructions? : Yes  Does the patient have questions regarding their discharge instructions? : No  Were you started on any new medications or were there changes to any of your previous medications? : Yes  Does the patient have all of their medications?: Yes  Do you have questions regarding any of your medications? : No  Do you have all of your needed medical supplies or equipment (DME)?  (i.e. oxygen tank, CPAP, cane, etc.): Yes  Discharge follow-up appointment scheduled within 14 calendar days? : Yes  Discharge Follow Up Appointment Date: 06/01/22  Discharge Follow Up " Appointment Scheduled with?: Specialty Care Provider    Post-op ([facility] Only)  If the patient had a surgery or procedure, do they have any questions for a nurse?: No             PLAN                                                      Outpatient Plan: Add follow up information to the AVS prior to printing      Follow-up and recommended labs and tests       Follow up with primary care provider, Sabas Staley, within 3-4 days   for hospital follow- up.  The following labs/tests are recommended: basic   metabolic panel to check creatinine.    Follow with EP and general cardiology as outpatient        Future Appointments   Date Time Provider Department Center   6/1/2022  1:45 PM [facility]   6/2/2022  8:00 AM [facility]   6/10/2022 12:45 PM [facility]   6/30/2022  1:15 PM [facility]         For any urgent concerns, please contact our 24 hour nurse triage line: [phone] ([phone])         Suyapa Brantley

## 2022-05-26 ENCOUNTER — TELEPHONE (OUTPATIENT)
Dept: CARDIOLOGY | Facility: CLINIC | Age: 77
End: 2022-05-26
Payer: COMMERCIAL

## 2022-05-26 DIAGNOSIS — I21.4 NSTEMI (NON-ST ELEVATED MYOCARDIAL INFARCTION) (H): ICD-10-CM

## 2022-05-26 LAB
ATRIAL RATE - MUSE: 51 BPM
ATRIAL RATE - MUSE: 61 BPM
ATRIAL RATE - MUSE: 66 BPM
DIASTOLIC BLOOD PRESSURE - MUSE: NORMAL MMHG
INTERPRETATION ECG - MUSE: NORMAL
P AXIS - MUSE: 44 DEGREES
P AXIS - MUSE: 83 DEGREES
P AXIS - MUSE: 91 DEGREES
PR INTERVAL - MUSE: 216 MS
PR INTERVAL - MUSE: 284 MS
PR INTERVAL - MUSE: 286 MS
QRS DURATION - MUSE: 100 MS
QRS DURATION - MUSE: 110 MS
QRS DURATION - MUSE: 114 MS
QT - MUSE: 484 MS
QT - MUSE: 622 MS
QT - MUSE: 638 MS
QTC - MUSE: 507 MS
QTC - MUSE: 588 MS
QTC - MUSE: 626 MS
R AXIS - MUSE: 101 DEGREES
R AXIS - MUSE: 86 DEGREES
R AXIS - MUSE: 95 DEGREES
SYSTOLIC BLOOD PRESSURE - MUSE: NORMAL MMHG
T AXIS - MUSE: 123 DEGREES
T AXIS - MUSE: 129 DEGREES
T AXIS - MUSE: 95 DEGREES
VENTRICULAR RATE- MUSE: 51 BPM
VENTRICULAR RATE- MUSE: 61 BPM
VENTRICULAR RATE- MUSE: 66 BPM

## 2022-05-26 RX ORDER — ATORVASTATIN CALCIUM 80 MG/1
80 TABLET, FILM COATED ORAL DAILY
Qty: 90 TABLET | Refills: 3 | Status: SHIPPED | OUTPATIENT
Start: 2022-05-26 | End: 2022-06-01

## 2022-05-26 NOTE — TELEPHONE ENCOUNTER
[facility] Call Center    Phone Message    May a detailed message be left on voicemail: yes     Reason for Call: Other: Anthony called stating that [id number] - [place], [place] - [address] never received a prescription for his lipitor. Please advise. Thank you.      Action Taken: Message routed to:  Other: Cardio    Travel Screening: Not Applicable

## 2022-05-29 LAB
ATRIAL RATE - MUSE: 52 BPM
DIASTOLIC BLOOD PRESSURE - MUSE: NORMAL MMHG
INTERPRETATION ECG - MUSE: NORMAL
P AXIS - MUSE: 83 DEGREES
PR INTERVAL - MUSE: 216 MS
QRS DURATION - MUSE: 112 MS
QT - MUSE: 572 MS
QTC - MUSE: 531 MS
R AXIS - MUSE: 66 DEGREES
SYSTOLIC BLOOD PRESSURE - MUSE: NORMAL MMHG
T AXIS - MUSE: 127 DEGREES
VENTRICULAR RATE- MUSE: 52 BPM

## 2022-05-30 ENCOUNTER — NURSE TRIAGE (OUTPATIENT)
Dept: NURSING | Facility: CLINIC | Age: 77
End: 2022-05-30
Payer: COMMERCIAL

## 2022-05-30 ENCOUNTER — RESULTS ONLY (OUTPATIENT)
Dept: ADMINISTRATIVE | Facility: CLINIC | Age: 77
End: 2022-05-30
Payer: COMMERCIAL

## 2022-05-30 ENCOUNTER — HOSPITAL ENCOUNTER (EMERGENCY)
Facility: CLINIC | Age: 77
Discharge: HOME | End: 2022-05-30
Attending: EMERGENCY MEDICINE | Admitting: EMERGENCY MEDICINE
Payer: COMMERCIAL

## 2022-05-30 VITALS
RESPIRATION RATE: 15 BRPM | TEMPERATURE: 98.3 F | HEART RATE: 66 BPM | DIASTOLIC BLOOD PRESSURE: 82 MMHG | SYSTOLIC BLOOD PRESSURE: 101 MMHG | BODY MASS INDEX: 24.05 KG/M2 | WEIGHT: 168 LBS | HEIGHT: 70 IN | OXYGEN SATURATION: 98 %

## 2022-05-30 DIAGNOSIS — Z01.89 ENCOUNTER FOR CARDIOVERSION PROCEDURE: ICD-10-CM

## 2022-05-30 DIAGNOSIS — I48.92 ATRIAL FLUTTER WITH RAPID VENTRICULAR RESPONSE (H): ICD-10-CM

## 2022-05-30 LAB
ALBUMIN SERPL-MCNC: 3.3 G/DL (ref 3.4–5)
ALP SERPL-CCNC: 101 U/L (ref 40–150)
ALT SERPL W P-5'-P-CCNC: 21 U/L (ref 0–70)
ANION GAP SERPL CALCULATED.3IONS-SCNC: 7 MMOL/L (ref 3–14)
AST SERPL W P-5'-P-CCNC: 20 U/L (ref 0–45)
ATRIAL RATE - MUSE: 246 BPM
ATRIAL RATE - MUSE: 65 BPM
BASOPHILS # BLD AUTO: 0 10E3/UL (ref 0–0.2)
BASOPHILS NFR BLD AUTO: 0 %
BILIRUB SERPL-MCNC: 0.5 MG/DL (ref 0.2–1.3)
BUN SERPL-MCNC: 28 MG/DL (ref 7–30)
CALCIUM SERPL-MCNC: 8.9 MG/DL (ref 8.5–10.1)
CHLORIDE BLD-SCNC: 106 MMOL/L (ref 94–109)
CO2 SERPL-SCNC: 24 MMOL/L (ref 20–32)
CREAT SERPL-MCNC: 1.15 MG/DL (ref 0.66–1.25)
DIASTOLIC BLOOD PRESSURE - MUSE: NORMAL MMHG
DIASTOLIC BLOOD PRESSURE - MUSE: NORMAL MMHG
EOSINOPHIL # BLD AUTO: 0 10E3/UL (ref 0–0.7)
EOSINOPHIL NFR BLD AUTO: 0 %
ERYTHROCYTE [DISTWIDTH] IN BLOOD BY AUTOMATED COUNT: 13 % (ref 10–15)
GFR SERPL CREATININE-BSD FRML MDRD: 66 ML/MIN/1.73M2
GLUCOSE BLD-MCNC: 196 MG/DL (ref 70–99)
HCT VFR BLD AUTO: 47.4 % (ref 40–53)
HGB BLD-MCNC: 16.1 G/DL (ref 13.3–17.7)
IMM GRANULOCYTES # BLD: 0.1 10E3/UL
IMM GRANULOCYTES NFR BLD: 1 %
INTERPRETATION ECG - MUSE: NORMAL
INTERPRETATION ECG - MUSE: NORMAL
LYMPHOCYTES # BLD AUTO: 1.2 10E3/UL (ref 0.8–5.3)
LYMPHOCYTES NFR BLD AUTO: 17 %
MAGNESIUM SERPL-MCNC: 2.6 MG/DL (ref 1.6–2.3)
MCH RBC QN AUTO: 30.7 PG (ref 26.5–33)
MCHC RBC AUTO-ENTMCNC: 34 G/DL (ref 31.5–36.5)
MCV RBC AUTO: 91 FL (ref 78–100)
MONOCYTES # BLD AUTO: 0.8 10E3/UL (ref 0–1.3)
MONOCYTES NFR BLD AUTO: 11 %
NEUTROPHILS # BLD AUTO: 5 10E3/UL (ref 1.6–8.3)
NEUTROPHILS NFR BLD AUTO: 71 %
NRBC # BLD AUTO: 0 10E3/UL
NRBC BLD AUTO-RTO: 0 /100
P AXIS - MUSE: 257 DEGREES
P AXIS - MUSE: NORMAL DEGREES
PLATELET # BLD AUTO: 277 10E3/UL (ref 150–450)
POTASSIUM BLD-SCNC: 3.8 MMOL/L (ref 3.4–5.3)
PR INTERVAL - MUSE: NORMAL MS
PR INTERVAL - MUSE: NORMAL MS
PROT SERPL-MCNC: 6.9 G/DL (ref 6.8–8.8)
QRS DURATION - MUSE: 94 MS
QRS DURATION - MUSE: 94 MS
QT - MUSE: 360 MS
QT - MUSE: 362 MS
QTC - MUSE: 515 MS
QTC - MUSE: 518 MS
R AXIS - MUSE: 78 DEGREES
R AXIS - MUSE: 78 DEGREES
RBC # BLD AUTO: 5.24 10E6/UL (ref 4.4–5.9)
SODIUM SERPL-SCNC: 137 MMOL/L (ref 133–144)
SYSTOLIC BLOOD PRESSURE - MUSE: NORMAL MMHG
SYSTOLIC BLOOD PRESSURE - MUSE: NORMAL MMHG
T AXIS - MUSE: -72 DEGREES
T AXIS - MUSE: -82 DEGREES
T4 FREE SERPL-MCNC: 0.98 NG/DL (ref 0.76–1.46)
TROPONIN I SERPL HS-MCNC: 62 NG/L
TSH SERPL DL<=0.005 MIU/L-ACNC: 4.71 MU/L (ref 0.4–4)
VENTRICULAR RATE- MUSE: 123 BPM
VENTRICULAR RATE- MUSE: 123 BPM
WBC # BLD AUTO: 7 10E3/UL (ref 4–11)

## 2022-05-30 PROCEDURE — 84484 ASSAY OF TROPONIN QUANT: CPT | Performed by: EMERGENCY MEDICINE

## 2022-05-30 PROCEDURE — 83735 ASSAY OF MAGNESIUM: CPT | Performed by: EMERGENCY MEDICINE

## 2022-05-30 PROCEDURE — 85025 COMPLETE CBC W/AUTO DIFF WBC: CPT | Performed by: EMERGENCY MEDICINE

## 2022-05-30 PROCEDURE — 99285 EMERGENCY DEPT VISIT HI MDM: CPT | Mod: 25

## 2022-05-30 PROCEDURE — 96374 THER/PROPH/DIAG INJ IV PUSH: CPT | Mod: 59

## 2022-05-30 PROCEDURE — 93005 ELECTROCARDIOGRAM TRACING: CPT | Mod: 59

## 2022-05-30 PROCEDURE — 36415 COLL VENOUS BLD VENIPUNCTURE: CPT | Performed by: EMERGENCY MEDICINE

## 2022-05-30 PROCEDURE — 999N000158 HC STATISTIC RCP TIME ED VENT EA 10 MIN

## 2022-05-30 PROCEDURE — 84439 ASSAY OF FREE THYROXINE: CPT | Performed by: EMERGENCY MEDICINE

## 2022-05-30 PROCEDURE — 250N000011 HC RX IP 250 OP 636: Performed by: EMERGENCY MEDICINE

## 2022-05-30 PROCEDURE — 92960 CARDIOVERSION ELECTRIC EXT: CPT

## 2022-05-30 PROCEDURE — 80053 COMPREHEN METABOLIC PANEL: CPT | Performed by: EMERGENCY MEDICINE

## 2022-05-30 PROCEDURE — 84443 ASSAY THYROID STIM HORMONE: CPT | Performed by: EMERGENCY MEDICINE

## 2022-05-30 RX ORDER — PROPOFOL 10 MG/ML
100 INJECTION, EMULSION INTRAVENOUS ONCE
Status: DISCONTINUED | OUTPATIENT
Start: 2022-05-30 | End: 2022-05-30 | Stop reason: HOSPADM

## 2022-05-30 RX ORDER — PROPOFOL 10 MG/ML
INJECTION, EMULSION INTRAVENOUS DAILY PRN
Status: COMPLETED | OUTPATIENT
Start: 2022-05-30 | End: 2022-05-30

## 2022-05-30 RX ADMIN — PROPOFOL 80 MG: 10 INJECTION, EMULSION INTRAVENOUS at 20:55

## 2022-05-30 ASSESSMENT — ENCOUNTER SYMPTOMS
NAUSEA: 0
COUGH: 0
DIARRHEA: 0
LIGHT-HEADEDNESS: 0
SHORTNESS OF BREATH: 0
PALPITATIONS: 1
VOMITING: 0
DIZZINESS: 0

## 2022-05-30 NOTE — ED TRIAGE NOTES
[facility]  ED Arrival Note    Arrives through triage. ABC's intact. A &O X4. . Pt arrives with c/o palpations since last night. Hx of A-fib, patient is anticoagulated. Concerned that he went into A-fib over night. Denies chest pair or SOB. Seen recently, stents placed.       Visitors during triage: Spouse      Triage Interventions: EKG    Ambulatory: Yes    Meets Stroke Criteria?: No    Meets Trauma Criteria?: No    Shock Index: <0.8, for provider reference    Directed to: Main ED    Pronouns: he/him       Triage Assessment     Row Name 05/30/22 7027       Triage Assessment (Adult)    Airway WDL WDL       Respiratory WDL    Respiratory WDL WDL       Skin Circulation/Temperature WDL    Skin Circulation/Temperature WDL WDL       Cardiac WDL    Cardiac WDL X  Tachycardic       Peripheral/Neurovascular WDL    Peripheral Neurovascular WDL WDL       Cognitive/Neuro/Behavioral WDL    Cognitive/Neuro/Behavioral WDL WDL

## 2022-05-30 NOTE — TELEPHONE ENCOUNTER
"Patient calling because he has been having cardiac symptoms of atrial fibrillation.    Patient states was cardioverted 18 days ago for atrial fibrillation (5/12/22 per Chart Review). He states went into A fib this morning at 0038; his smart watch said he was in a fib at that time. He states he did not feel like his heart rhythm went back to a normal rhythm last night when he was awake about 45 minutes, but then he went back to sleep. He doesn't feel any extra or missed beats currently, just slightly faster heart rate. He had a mild heart attack Friday 5/20/22, angiogram done and put stent in. Denies chest pain right now. He says he is calling because his wife wanted him to.    Pulse was 116 at 12:50 pm. Checked again and pulse is 116. BP was ok when he checked it earlier: 127/81. Smart watch keeps reading \"inconclusive\" for heart rhythm at this time.    Per protocol, patient should be seen within 4 hours due to cardiac history and current symptoms. Recommended patient go to ED. Patient says \"no promises\" to go within 4 hours. Paging on-call cardiologist for 2nd level triage. Dr Perez (on-call cardiologist) also recommends patient be seen in ED. Writer called patient back and reiterated the disposition for patient to be seen in ED; he did not say whether he agreed or not, just thanked writer for the call back.    Tiffany Peralta RN  [facility] Nurse Advisor  1:58 PM  5/30/2022    COVID 19 Nurse Triage Plan/Patient Instructions    Please be aware that novel coronavirus (COVID-19) may be circulating in the community. If you develop symptoms such as fever, cough, or SOB or if you have concerns about the presence of another infection including coronavirus (COVID-19), please contact your health care provider or visit [email or web address].     Disposition/Instructions    Additional COVID19 information to add for patients.   How can I protect others?  If you have symptoms (fever, cough, body aches or trouble " "breathing): Stay home and away from others (self-isolate) until:    At least 10 days have passed since your symptoms started, And     You ve had no fever--and no medicine that reduces fever--for 1 full day (24 hours), And      Your other symptoms have resolved (gotten better).     If you don t have symptoms, but a test showed that you have COVID-19 (you tested positive):    Stay home and away from others (self-isolate). Follow the tips under \"How do I self-isolate?\" below for 10 days (20 days if you have a weak immune system).    You don't need to be retested for COVID-19 before going back to school or work. As long as you're fever-free and feeling better, you can go back to school, work and other activities after waiting the 10 or 20 days.     How do I self-isolate?    Stay in your own room, even for meals. Use your own bathroom if you can.     Stay away from others in your home. No hugging, kissing or shaking hands. No visitors.    Don t go to work, school or anywhere else.     Clean  high touch  surfaces often (doorknobs, counters, handles, etc.). Use a household cleaning spray or wipes. You ll find a full list on the EPA website:  www.epa.gov/pesticide-registration/list-n-disinfectants-use-against-sars-cov-2.    Cover your mouth and nose with a mask, tissue or washcloth to avoid spreading germs.    Wash your hands and face often. Use soap and water.    Caregivers in these groups are at risk for severe illness due to COVID-19:  o People 65 years and older  o People who live in a nursing home or long-term care facility  o People with chronic disease (lung, heart, cancer, diabetes, kidney, liver, immunologic)  o People who have a weakened immune system, including those who:  - Are in cancer treatment  - Take medicine that weakens the immune system, such as corticosteroids  - Had a bone marrow or organ transplant  - Have an immune deficiency  - Have poorly controlled HIV or AIDS  - Are obese (body mass index of 40 " or higher)  - Smoke regularly    Caregivers should wear gloves while washing dishes, handling laundry and cleaning bedrooms and bathrooms.    Use caution when washing and drying laundry: Don t shake dirty laundry, and use the warmest water setting that you can.    For more tips, go to www.cdc.gov/coronavirus/2019-ncov/downloads/10Things.pdf.    How can I take care of myself?  1. Get lots of rest. Drink extra fluids (unless a doctor has told you not to).     2. Take Tylenol (acetaminophen) for fever or pain. If you have liver or kidney problems, ask your family doctor if it s okay to take Tylenol.     Adults can take either:     650 mg (two 325 mg pills) every 4 to 6 hours, or     1,000 mg (two 500 mg pills) every 8 hours as needed.     Note: Don t take more than 3,000 mg in one day.   Acetaminophen is found in many medicines (both prescribed and over-the-counter medicines). Read all labels to be sure you don t take too much.     For children, check the Tylenol bottle for the right dose. The dose is based on the child s age or weight.    3. If you have other health problems (like cancer, heart failure, an organ transplant or severe kidney disease): Call your specialty clinic if you don t feel better in the next 2 days.    4. Know when to call 911: Emergency warning signs include:    Trouble breathing or shortness of breath    Pain or pressure in the chest that doesn t go away    Feeling confused like you haven t felt before, or not being able to wake up    Bluish-colored lips or face    What are the symptoms of COVID-19?     The most common symptoms are cough, fever and trouble breathing.     Less common symptoms include body aches, chills, diarrhea (loose, watery poops), fatigue (feeling very tired), headache, runny nose, sore throat and loss of smell.    COVID-19 can cause severe coughing (bronchitis) and lung infection (pneumonia).    How does it spread?     The virus may spread when a person coughs or sneezes  into the air. The virus can travel about 6 feet this way, and it can live on surfaces.      Common  (household disinfectants) will kill the virus.    Who is at risk?  Anyone can catch COVID-19 if they re around someone who has the virus.    How can others protect themselves?     Stay away from people who have COVID-19 (or symptoms of COVID-19).    Wash hands often with soap and water. Or, use hand  with at least 60% alcohol.    Avoid touching the eyes, nose or mouth.     Wear a face mask when you go out in public, when sick or when caring for a sick person.    Where can I get more information?     [facility]: About COVID-19: [email or web address]    CDC: What to Do If You re Sick: www.cdc.gov/coronavirus/2019-ncov/about/steps-when-sick.html    CDC: Ending Home Isolation: www.cdc.gov/coronavirus/2019-ncov/hcp/disposition-in-home-patients.html     CDC: Caring for Someone: www.cdc.gov/coronavirus/2019-ncov/if-you-are-sick/care-for-someone.html     [facility]: Interim Guidance for Hospital Discharge to Home: www.health.[facility].[place]./diseases/coronavirus/hcp/hospdischarge.pdf    [facility] clinical trials (COVID-19 research studies): clinicalaffairs.[facility]-clinical-trials     Below are the COVID-19 hotlines at the [place] Department of Health ([facility]). Interpreters are available.   o For health questions: Call 553-477-5234 or 1-649.643.6620 (7 a.m. to 7 p.m.)  o For questions about schools and childcare: Call 339-938-5413 or 1-160.109.5255 (7 a.m. to 7 p.m.)          Thank you for taking steps to prevent the spread of this virus.  o Limit your contact with others.  o Wear a simple mask to cover your cough.  o Wash your hands well and often.    Resources    [facility]: About COVID-19: [email or web address]    CDC: What to Do If You're Sick: www.cdc.gov/coronavirus/2019-ncov/about/steps-when-sick.html    CDC: Ending Home Isolation:  www.cdc.gov/coronavirus/2019-ncov/hcp/disposition-in-home-patients.html     CDC: Caring for Someone: www.cdc.gov/coronavirus/2019-ncov/if-you-are-sick/care-for-someone.html     [facility]: Interim Guidance for Hospital Discharge to Home: www.health.[facility].[place]/diseases/coronavirus/hcp/hospdischarge.pdf    [facility] clinical trials (COVID-19 research studies): clinicalaffairs.[facility]/umn-clinical-trials     Below are the COVID-19 hotlines at the [place] Department of Health ([facility]). Interpreters are available.   o For health questions: Call 900-778-5805 or 1-797.100.6352 (7 a.m. to 7 p.m.)  o For questions about schools and childcare: Call 583-949-6788 or 1-824.747.7510 (7 a.m. to 7 p.m.)                                     Reason for Disposition    History of heart disease  (i.e., heart attack, bypass surgery, angina, angioplasty, CHF) (Exception: brief heart beat symptoms that went away and now feels well)    Additional Information    Negative: Passed out (i.e., lost consciousness, collapsed and was not responding)    Negative: Shock suspected (e.g., cold/pale/clammy skin, too weak to stand, low BP, rapid pulse)    Negative: Difficult to awaken or acting confused (e.g., disoriented, slurred speech)    Negative: Visible sweat on face or sweat dripping down face    Negative: Unable to walk, or can only walk with assistance (e.g., requires support)    Negative: [1] Received SHOCK from implantable cardiac defibrillator AND [2] persisting symptoms (i.e., palpitations, lightheadedness)    Negative: [1] Dizziness, lightheadedness, or weakness AND [2] heart beating very rapidly (e.g., > 140 / minute)    Negative: [1] Dizziness, lightheadedness, or weakness AND [2] heart beating very slowly  (e.g., < 50 / minute)    Negative: Sounds like a life-threatening emergency to the triager    Negative: Chest pain    Negative: Difficulty breathing    Negative: Dizziness, lightheadedness, or weakness    Negative: [1] Heart  beating very rapidly (e.g., > 140 / minute) AND [2] present now  (Exception: during exercise)    Negative: Heart beating very slowly (e.g., < 50 / minute)  (Exception: athlete)    Negative: Patient sounds very sick or weak to the triager    Negative: [1] Heart beating very rapidly (e.g., > 140 / minute) AND [2] not present now  (Exception: during exercise)    Negative: New or worsened shortness of breath with activity (dyspnea on exertion)    Negative: [1] Skipped or extra beat(s) AND [2] increases with exercise or exertion    Negative: [1] Skipped or extra beat(s) AND [2] occurs 4 or more times per minute    Negative: New or worsened ankle swelling    Protocols used: HEART RATE AND HEARTBEAT [id number]

## 2022-05-31 ENCOUNTER — TELEPHONE (OUTPATIENT)
Dept: CARDIOLOGY | Facility: CLINIC | Age: 77
End: 2022-05-31
Payer: COMMERCIAL

## 2022-05-31 DIAGNOSIS — I48.19 PERSISTENT ATRIAL FIBRILLATION (H): ICD-10-CM

## 2022-05-31 DIAGNOSIS — I48.19 PERSISTENT ATRIAL FIBRILLATION (H): Primary | ICD-10-CM

## 2022-05-31 LAB
ATRIAL RATE - MUSE: 67 BPM
DIASTOLIC BLOOD PRESSURE - MUSE: NORMAL MMHG
INTERPRETATION ECG - MUSE: NORMAL
P AXIS - MUSE: 72 DEGREES
PR INTERVAL - MUSE: 172 MS
QRS DURATION - MUSE: 86 MS
QT - MUSE: 424 MS
QTC - MUSE: 448 MS
R AXIS - MUSE: 62 DEGREES
SYSTOLIC BLOOD PRESSURE - MUSE: NORMAL MMHG
T AXIS - MUSE: 95 DEGREES
VENTRICULAR RATE- MUSE: 67 BPM

## 2022-05-31 PROCEDURE — 93010 ELECTROCARDIOGRAM REPORT: CPT | Performed by: INTERNAL MEDICINE

## 2022-05-31 NOTE — TELEPHONE ENCOUNTER
"Writer called pt and he denies any chest pain, SOB, palpitations or lightheadedness. Pt states at 0040 yesterday AM, woke up to use bathroom and felt his heart \"pounding\". Checked his Galaxy watch, and showed A. Fib with rates 120's. Continued to monitor it throughout the day, and finally went to ED last evening at 1830, and found to be in A. Flutter. Flecainide had been discontinued at time of recent discharge due to ACS. States he had a DCCV and converted to NSR after shock x 1. No medication changes made. Pt denies any medication questions, has been taking all prescribed medications without interruption, and took last ASA yesterday. DAPT of Eliquis and Plavix continued. Reminded to not use Viagra at this time until authorized by cardiology. LRA access site has healed without swelling, bleeding or signs of infection. Reminded pt of Dr. Pizano appt tomorrow and reviewed other appts as below. Pt verbalized understanding of all instructions without further questions. JENNIFFER Fonseca RN.  "

## 2022-05-31 NOTE — ED PROVIDER NOTES
History   Chief Complaint:  Palpitations     The history is provided by the patient.      Rey Brown is a 77 year old male on Eliquis and Plavix with a history of Afib who presents with palpitations. Patient reports that he felt palpitations started last night around 0038, when he got up to use the bathroom.  He states he has a history of Afib and has had to be cardioverted twice, most recently 18 days ago. No associated chest pain, shortness of breath, lightheadedness, dizziness, nausea, vomiting or diarrhea. No recent cough, sore throat or congestion. He most recently ate at 1730.  He adds that he recently had a cardiac stent placed as well.    Review of Systems   HENT: Negative for congestion.    Respiratory: Negative for cough and shortness of breath.    Cardiovascular: Positive for palpitations. Negative for chest pain.   Gastrointestinal: Negative for diarrhea, nausea and vomiting.   Neurological: Negative for dizziness and light-headedness.   All other systems reviewed and are negative.    Allergies:  Ace Inhibitors    Medications:  Eliquis   Aspirin 81 mg   Lipitor  Plavix   Cozaar   Nitrostat  Viagra     Past Medical History:     Afib  Migraines  Lumbar disk disease  Hyperlipidemia    Past Surgical History:    Cardioversion for Afib x2  CV coronary angiogram  CV PCI  EP ablation for Afib    Family History:    Father - dementia, osteoarthritis, cerebrovascular disease  Mother - MI, CAD    Social History:  Presents with his wife    Presents via private vehicle    Physical Exam     Patient Vitals for the past 24 hrs:   BP Temp Temp src Pulse Resp SpO2 Height Weight   05/30/22 2100 101/82 -- -- 61 15 100 % -- --   05/30/22 2055 (!) 133/91 -- -- 118 10 100 % -- --   05/30/22 2050 127/86 -- -- 86 13 99 % -- --   05/30/22 2049 -- -- -- 120 16 100 % -- --   05/30/22 2030 (!) 132/98 -- -- 118 18 98 % -- --   05/30/22 2000 123/88 -- -- 118 18 97 % -- --   05/30/22 1930 (!) 120/90 -- -- 120 16 97 %  "-- --   05/30/22 1855 136/83 -- -- -- -- -- -- --   05/30/22 1852 -- 98.3  F (36.8  C) Temporal (!) 121 18 97 % 1.778 m (5' 10\") 76.2 kg (168 lb)       Physical Exam  Nursing note and vitals reviewed.  Constitutional:  Oriented to person, place, and time. Cooperative.   HENT:   Nose:    Nose normal.   Mouth/Throat:   Facemask in place.  Eyes:    Conjunctivae normal and EOM are normal.      Pupils are equal, round, and reactive to light.   Neck:    Trachea normal.   Cardiovascular:  Tachycardic rate, irregular rhythm, normal heart sounds and normal pulses. No murmur heard.  Pulmonary/Chest:  Effort normal and breath sounds normal.   Abdominal:   Soft. Normal appearance and bowel sounds are normal.      There is no tenderness.      There is no rebound and no CVA tenderness.   Musculoskeletal:  Extremities atraumatic x 4.   Lymphadenopathy:  No cervical adenopathy.   Neurological:   Alert and oriented to person, place, and time. Normal strength.      No cranial nerve deficit or sensory deficit. GCS eye subscore is 4. GCS verbal subscore is 5. GCS motor subscore is 6.   Skin:    Skin is intact. No rash noted.   Psychiatric:   Normal mood and affect.    Emergency Department Course   ECG pre cardioversion  ECG taken at 1901, ECG read at 1914  Atrial flutter with 2:1 AV conduction  ST elevation, consider lateral injury or acute infarct   Rate 123 bpm. GA interval * ms. QRS duration 94 ms. QT/QTc 360/515 ms. P-R-T axes 257 78 -72.     ECG post cardioversion  ECG taken at 2101, ECG read at 2105  Normal sinus rhythm  Septal infarct, age undetermined  T wave abnormality, consider anterior ischemia  Rate 67 bpm. GA interval 172 ms. QRS duration 86 ms. QT/QTc 424/448 ms. P-R-T axes 72 62 95.     Imaging:  -Cardioversion External    (Results Pending)     Laboratory:  Labs Ordered and Resulted from Time of ED Arrival to Time of ED Departure   COMPREHENSIVE METABOLIC PANEL - Abnormal       Result Value    Sodium 137      Potassium " 3.8      Chloride 106      Carbon Dioxide (CO2) 24      Anion Gap 7      Urea Nitrogen 28      Creatinine 1.15      Calcium 8.9      Glucose 196 (*)     Alkaline Phosphatase 101      AST 20      ALT 21      Protein Total 6.9      Albumin 3.3 (*)     Bilirubin Total 0.5      GFR Estimate 66     TSH WITH FREE T4 REFLEX - Abnormal    TSH 4.71 (*)    MAGNESIUM - Abnormal    Magnesium 2.6 (*)    TROPONIN I - Normal    Troponin I High Sensitivity 62     T4 FREE - Normal    Free T4 0.98     CBC WITH PLATELETS AND DIFFERENTIAL    WBC Count 7.0      RBC Count 5.24      Hemoglobin 16.1      Hematocrit 47.4      MCV 91      MCH 30.7      MCHC 34.0      RDW 13.0      Platelet Count 277      % Neutrophils 71      % Lymphocytes 17      % Monocytes 11      % Eosinophils 0      % Basophils 0      % Immature Granulocytes 1      NRBCs per 100 WBC 0      Absolute Neutrophils 5.0      Absolute Lymphocytes 1.2      Absolute Monocytes 0.8      Absolute Eosinophils 0.0      Absolute Basophils 0.0      Absolute Immature Granulocytes 0.1      Absolute NRBCs 0.0          [facility]    -Cardioversion External    Date/Time: 5/30/2022 8:29 PM  Performed by: Dann Crain MD  Authorized by: Dann Crain MD     Risks, benefits and alternatives discussed.    ED EVALUATION:      Assessment Time: 5/30/2022 8:47 PM      I have performed an Emergency Department Evaluation including taking a history and physical examination, this evaluation will be documented in the electronic medical record for this ED encounter.     Indication: Atrial Fibrillation    ASA Class: Class 2- mild systemic disease, no acute problems, no functional limitations    Mallampati: Grade 1- soft palate, uvula, tonsillar pillars, and posterior pharyngeal wall visible    NPO Status: appropriately NPO for procedure    UNIVERSAL PROTOCOL   Site Marked: NA  Prior Images Obtained and Reviewed:  NA  Required items: Required blood products, implants,  devices and special equipment available    Patient identity confirmed:  Verbally with patient  Patient was reevaluated immediately before administering moderate or deep sedation or anesthesia  Confirmation Checklist:  Patient's identity using two indicators, relevant allergies, procedure was appropriate and matched the consent or emergent situation and correct equipment/implants were available  Time out: Immediately prior to the procedure a time out was called    Universal Protocol: the Joint Commission Universal Protocol was followed    Preparation: Patient was prepped and draped in usual sterile fashion      SEDATION  Patient Sedated: Yes    Sedation Type:  Deep  Sedation:  Propofol  Vital signs: Vital signs monitored during sedation      PRE-PROCEDURE DETAILS:     Cardioversion basis:  Elective    Rhythm:  Atrial flutter    Electrode placement:  Anterior-posterior  Attempt one:     Cardioversion mode:  Synchronous    Waveform:  Biphasic    Shock (Joules):  120    Shock outcome:  Conversion to normal sinus rhythm  Post-procedure details:     Patient status:  Awake      PROCEDURE    Patient Tolerance:  Patient tolerated the procedure well with no immediate complications  Length of time physician/provider present for 1:1 monitoring during sedation: 15    Emergency Department Course:    Reviewed:  I reviewed nursing notes, vitals, past medical history and Care Everywhere    Assessments:  1925 I obtained history and examined the patient as noted above.   2045 I cardioverted the patient.   2104    Updated the patient's wife.   2130   Rechecked the patient who is stable for discharge.     Interventions:  2055 Propofol 80 mg IV    Disposition:  The patient was discharged to home.     Impression & Plan   Medical Decision Making:  This is a 77-year-old male with a history of paroxysmal atrial fibrillation, who is anticoagulated on Eliquis and Plavix who came in for further evaluation of palpitations and concerned he might  be in atrial fibrillation again.  He is in fact in what appears to be atrial flutter with RVR.  I felt it was reasonable to check the above blood work to ensure no signs of cardiac ischemia or electrolyte disturbance.  He preferred to proceed with cardioversion, which I felt was reasonable and appropriate as well.  Therefore I proceeded with the cardioversion per the above procedure note, which was successful and converting him to a sinus rhythm.  Therefore at this point I feel that he is safe for discharge.  He has an appointment with one of his cardiologists in just a couple of days, and he knows to discuss this with them as well.  He should return here with any concerns or worsening symptoms as well.    Diagnosis:    ICD-10-CM    1. Atrial flutter with rapid ventricular response (H)  I48.92    2. Encounter for cardioversion procedure  Z01.89        Discharge Medications:  New Prescriptions    No medications on file       Scribe Disclosure:  Angel RICH, am serving as a scribe at 7:23 PM on 5/30/2022 to document services personally performed by Dann Crain MD based on my observations and the provider's statements to me.            Dann Crain MD  05/30/22 [id number]

## 2022-05-31 NOTE — TELEPHONE ENCOUNTER
Pt LM on Sunday morning at 0929, that he went into A Fib Saturday night at 12:38.  Pt then went to ER later in the day and was found to be in flutter and Cardioverted. Pt has an OV tomorrow with Dr Pizano and seeing Dr Scott on 6/10. Pt Flecainide was stopped at last hospital visit when he had a STEMI. Pt continues on Eliquis along with Plavix.  LM for pt to call back. Will also make Dr Scott aware. Licha

## 2022-06-01 ENCOUNTER — OFFICE VISIT (OUTPATIENT)
Dept: CARDIOLOGY | Facility: CLINIC | Age: 77
End: 2022-06-01
Attending: NURSE PRACTITIONER
Payer: COMMERCIAL

## 2022-06-01 ENCOUNTER — TELEPHONE (OUTPATIENT)
Dept: FAMILY MEDICINE | Facility: CLINIC | Age: 77
End: 2022-06-01

## 2022-06-01 VITALS
DIASTOLIC BLOOD PRESSURE: 56 MMHG | HEIGHT: 70 IN | SYSTOLIC BLOOD PRESSURE: 108 MMHG | HEART RATE: 72 BPM | BODY MASS INDEX: 24.22 KG/M2 | WEIGHT: 169.2 LBS

## 2022-06-01 DIAGNOSIS — I48.0 PAROXYSMAL ATRIAL FIBRILLATION (H): ICD-10-CM

## 2022-06-01 DIAGNOSIS — I25.5 ISCHEMIC CARDIOMYOPATHY: ICD-10-CM

## 2022-06-01 DIAGNOSIS — I25.10 CORONARY ARTERY DISEASE INVOLVING NATIVE CORONARY ARTERY OF NATIVE HEART WITHOUT ANGINA PECTORIS: Primary | ICD-10-CM

## 2022-06-01 DIAGNOSIS — N18.31 STAGE 3A CHRONIC KIDNEY DISEASE (H): ICD-10-CM

## 2022-06-01 DIAGNOSIS — I48.3 TYPICAL ATRIAL FLUTTER (H): ICD-10-CM

## 2022-06-01 DIAGNOSIS — I10 PRIMARY HYPERTENSION: ICD-10-CM

## 2022-06-01 PROCEDURE — 99214 OFFICE O/P EST MOD 30 MIN: CPT | Performed by: INTERNAL MEDICINE

## 2022-06-01 RX ORDER — ATORVASTATIN CALCIUM 80 MG/1
80 TABLET, FILM COATED ORAL DAILY
Qty: 90 TABLET | Refills: 3 | Status: SHIPPED | OUTPATIENT
Start: 2022-06-01 | End: 2023-03-31

## 2022-06-01 RX ORDER — CLOPIDOGREL BISULFATE 75 MG/1
75 TABLET ORAL DAILY
Qty: 90 TABLET | Refills: 3 | Status: SHIPPED | OUTPATIENT
Start: 2022-06-01 | End: 2023-04-24

## 2022-06-01 RX ORDER — LOSARTAN POTASSIUM 25 MG/1
25 TABLET ORAL DAILY
Qty: 90 TABLET | Refills: 3 | Status: SHIPPED | OUTPATIENT
Start: 2022-06-01 | End: 2022-08-04

## 2022-06-01 NOTE — PROGRESS NOTES
HPI and Plan:   I had the pleasure of seeing Rey Dorantes in cardiology clinic in follow-up after his recent hospitalization for non-ST elevation myocardial infarction.  He has history of hypertension, paroxysmal atrial fibrillation status post ablation in April and then followed by recurrence and cardioversion.  He is also had episodes of atrial flutter.  He has chronic renal insufficiency.  During his admission he presented with a non-ST elevation MI as well as cardiomyopathy with regional wall motion abnormalities in the LAD distribution.  Ejection fraction was 30 to 35%.  He then underwent coronary angiography and LAD stent on May 23, 2022.  The mid to distal LAD was intervened.  There was proximal LAD lesion of 40%.  20% in the ostial to proximal circumflex.    Patient was initially put on aspirin and Brilinta but because Brilinta is expensive, he was transitioned to Plavix.  He is on Eliquis for stroke prophylaxis.  He completed triple therapy with aspirin for a week and now is off aspirin.    On the 30th, he had episodes of palpitations and went to the emergency room and was noted in rapid atrial flutter.  He was electively cardioverted at that time.  He converted to sinus rhythm immediately after the cardioversion.  He has not had any recurrent palpitations.  During his hospitalization, his flecainide was discontinued because of low ejection fraction and recent LAD stent.  He is not on any antiarrhythmic at this time.    Overall, is doing well.  He starting cardiac rehab tomorrow.  There is no dizziness or syncope.  No chest pain.    Exam  See below    Impression    1.  Recent non-ST elevation myocardial infarction, s/p mid distal LAD stent on 23 May.  Now pain-free.  Has 40% proximal LAD lesion that has been managed medically.  His ejection fraction was 30 to 35%.  He is on losartan 12.5 mg daily which I will increase to 25 mg daily.  Continue metoprolol XL.  Would recommend a follow-up echocardiogram  in 2 to 3 months to reassess EF and wall motion.  If EF remains low, we may have to transition losartan to Entresto.  Encouraged him to start cardiac rehab tomorrow.    2.  Ischemic cardiomyopathy, EF 30 to 35%.  No overt congestive heart failure symptoms.  Discussed importance of salt restriction.  Follow-up echocardiogram in few months.    3.  Recurrent atrial arrhythmias, had an episode of atrial flutter off antiarrhythmic agent on 5/30, he was cardioverted in the emergency room.  I reviewed the emergency room notes.  He is scheduled to see his electrophysiologist on the 10th.    4.  Hypertension, well controlled on losartan and metoprolol XL.    5.  Hyperlipidemia, on atorvastatin.  Last LDL 67 HDL 52 on 23 May.    6.  Chronic renal insufficiency, creatinine is improved from 1.28 on 24th to normal range of 1.15.    Thank you for allowing us to participate in the care of this nice patient.  If he has recurrent atrial arrhythmias, he may need a different antiarrhythmic like amiodarone or repeat ablation.\      Sincerely,    Geraldo Pizano MD          Today's clinic visit entailed:  34 minutes spent on the date of the encounter doing chart review, review of test results, patient visit and documentation   Provider  Link to [facility] Help Grid     The level of medical decision making during this visit was of moderate complexity.      Orders Placed This Encounter   Procedures     Follow-Up with Cardiology NOLAN     Echocardiogram Complete       Orders Placed This Encounter   Medications     atorvastatin (LIPITOR) 80 MG tablet     Sig: Take 1 tablet (80 mg) by mouth daily     Dispense:  90 tablet     Refill:  3     clopidogrel (PLAVIX) 75 MG tablet     Sig: Take 1 tablet (75 mg) by mouth daily     Dispense:  90 tablet     Refill:  3     losartan (COZAAR) 25 MG tablet     Sig: Take 1 tablet (25 mg) by mouth daily     Dispense:  90 tablet     Refill:  3       Medications Discontinued During This Encounter   Medication Reason      aspirin (ASA) 81 MG chewable tablet Stopped by Patient     clopidogrel (PLAVIX) 75 MG tablet Reorder     atorvastatin (LIPITOR) 80 MG tablet Reorder     sildenafil (VIAGRA) 100 MG tablet      losartan (COZAAR) 25 MG tablet          Encounter Diagnoses   Name Primary?     Coronary artery disease involving native coronary artery of native heart without angina pectoris Yes     Primary hypertension      Paroxysmal atrial fibrillation (H)      Typical atrial flutter (H)      Stage 3a chronic kidney disease (H)      Ischemic cardiomyopathy        CURRENT MEDICATIONS:  Current Outpatient Medications   Medication Sig Dispense Refill     acetaminophen (TYLENOL) 325 MG tablet Take 325-650 mg by mouth every 6 hours as needed for mild pain       apixaban ANTICOAGULANT (ELIQUIS) 5 MG tablet Take 1 tablet (5 mg) by mouth 2 times daily 180 tablet 3     atorvastatin (LIPITOR) 80 MG tablet Take 1 tablet (80 mg) by mouth daily 90 tablet 3     clopidogrel (PLAVIX) 75 MG tablet Take 1 tablet (75 mg) by mouth daily 90 tablet 3     losartan (COZAAR) 25 MG tablet Take 1 tablet (25 mg) by mouth daily 90 tablet 3     nitroGLYcerin (NITROSTAT) 0.4 MG sublingual tablet For chest pain place 1 tablet under the tongue every 5 minutes for 3 doses. If symptoms persist 5 minutes after 1st dose call 911.Please done take viagra along with nnitroglycerine 20 tablet 0       ALLERGIES     Allergies   Allergen Reactions     Ace Inhibitors Cough       PAST MEDICAL HISTORY:  Past Medical History:   Diagnosis Date     Hypercholesterolemia      Lumbar disc disease      Migraines      Paroxysmal A-fib (H)        PAST SURGICAL HISTORY:  Past Surgical History:   Procedure Laterality Date     ANESTHESIA CARDIOVERSION N/A 4/22/2022    Procedure: ANESTHESIA, FOR CARDIOVERSION FOLLOWING AGUS;  Surgeon: GENERIC ANESTHESIA PROVIDER;  Location:  OR     ANESTHESIA CARDIOVERSION N/A 5/12/2022    Procedure: ANESTHESIA, FOR CARDIOVERSION;  Surgeon: GENERIC ANESTHESIA  PROVIDER;  Location:  OR     COLONOSCOPY       CV CORONARY ANGIOGRAM N/A 5/23/2022    Procedure: Coronary Angiogram;  Surgeon: Jacqueline Ramirez MD;  Location:  HEART CARDIAC CATH LAB     CV PCI N/A 5/23/2022    Procedure: Percutaneous Coronary Intervention;  Surgeon: Jacqueline Ramirez MD;  Location:  HEART CARDIAC CATH LAB     CYSTOSCOPY, RETROGRADES, COMBINED Left 5/13/2020    Procedure: LEFT RETROGRADE URETERAL PYELOGRAM;  Surgeon: Denilson Angulo MD;  Location:  OR     CYSTOSCOPY, TRANSURETHRAL RESECTION (TUR) PROSTATE, COMBINED N/A 5/13/2020    Procedure: CYSTOSCOPY WITH TRANSURETHRAL RESECTION PROSTATE;  Surgeon: Denilson Angulo MD;  Location:  OR     EP ABLATION FOCAL AFIB N/A 4/1/2022    Procedure: Ablation Focal Atrial Fibrillation [[id number]];  Surgeon: Sharmin Scott MD;  Location:  HEART CARDIAC CATH LAB     OPEN REDUCTION INTERNAL FIXATION HAND       RHINOPLASTY       TONSILLECTOMY       wisdom teeth         FAMILY HISTORY:  Family History   Problem Relation Age of Onset     Dementia Father      Osteoarthritis Father      Cerebrovascular Disease Father      Myocardial Infarction Mother      C.A.D. Mother      Coronary Artery Disease Mother        SOCIAL HISTORY:  Social History     Socioeconomic History     Marital status:      Spouse name: Erin     Number of children: None     Years of education: None     Highest education level: None   Tobacco Use     Smoking status: Never Smoker     Smokeless tobacco: Never Used   Substance and Sexual Activity     Alcohol use: Yes     Alcohol/week: 2.0 - 3.0 standard drinks     Types: 2 - 3 Cans of beer per week     Comment: 2 beers week     Drug use: No     Sexual activity: Yes     Partners: Female   Other Topics Concern     Special Diet No     Exercise Yes     Comment: 2-3x/week       Review of Systems:  Skin:  Negative       Eyes:         ENT:         Respiratory:  Negative       Cardiovascular:  Negative;palpitations;chest pain;syncope or  "near-syncope;cyanosis;lightheadedness;dizziness;fatigue;exercise intolerance;edema Positive for energy level improving  Gastroenterology:        Genitourinary:         Musculoskeletal:         Neurologic:         Psychiatric:         Heme/Lymph/Imm:  Positive for easy bruising    Endocrine:  Negative        Physical Exam:  Vitals: /56   Pulse 72   Ht 1.778 m (5' 10\")   Wt 76.7 kg (169 lb 3.2 oz)   BMI 24.28 kg/m      Constitutional:  in no acute distress        Skin:  warm and dry to the touch          Head:  normocephalic        Eyes:  sclera white        Lymph:      ENT:  no pallor or cyanosis        Neck:           Respiratory:  clear to auscultation         Cardiac: normal S1 and S2     no presence of murmur                                                   GI:  not assessed this visit        Extremities and Muscular Skeletal:  no edema              Neurological:  no gross motor deficits        Psych:  Alert and Oriented x 3;oriented to time, person and place          Geraldo Pizano MD  [id number]  [id number]  [place] [id number]              "

## 2022-06-01 NOTE — LETTER
6/1/2022    Sabas Staley MD  [address]  [facility] [address]    RE: Rey Brown       Dear Colleague,     I had the pleasure of seeing Rey Brown in the [facility].  HPI and Plan:   I had the pleasure of seeing Rey Dorantes in cardiology clinic in follow-up after his recent hospitalization for non-ST elevation myocardial infarction.  He has history of hypertension, paroxysmal atrial fibrillation status post ablation in April and then followed by recurrence and cardioversion.  He is also had episodes of atrial flutter.  He has chronic renal insufficiency.  During his admission he presented with a non-ST elevation MI as well as cardiomyopathy with regional wall motion abnormalities in the LAD distribution.  Ejection fraction was 30 to 35%.  He then underwent coronary angiography and LAD stent on May 23, 2022.  The mid to distal LAD was intervened.  There was proximal LAD lesion of 40%.  20% in the ostial to proximal circumflex.    Patient was initially put on aspirin and Brilinta but because Brilinta is expensive, he was transitioned to Plavix.  He is on Eliquis for stroke prophylaxis.  He completed triple therapy with aspirin for a week and now is off aspirin.    On the 30th, he had episodes of palpitations and went to the emergency room and was noted in rapid atrial flutter.  He was electively cardioverted at that time.  He converted to sinus rhythm immediately after the cardioversion.  He has not had any recurrent palpitations.  During his hospitalization, his flecainide was discontinued because of low ejection fraction and recent LAD stent.  He is not on any antiarrhythmic at this time.    Overall, is doing well.  He starting cardiac rehab tomorrow.  There is no dizziness or syncope.  No chest pain.    Exam  See below    Impression    1.  Recent non-ST elevation myocardial infarction, s/p mid distal LAD stent on 23 May.  Now pain-free.  Has 40% proximal  LAD lesion that has been managed medically.  His ejection fraction was 30 to 35%.  He is on losartan 12.5 mg daily which I will increase to 25 mg daily.  Continue metoprolol XL.  Would recommend a follow-up echocardiogram in 2 to 3 months to reassess EF and wall motion.  If EF remains low, we may have to transition losartan to Entresto.  Encouraged him to start cardiac rehab tomorrow.    2.  Ischemic cardiomyopathy, EF 30 to 35%.  No overt congestive heart failure symptoms.  Discussed importance of salt restriction.  Follow-up echocardiogram in few months.    3.  Recurrent atrial arrhythmias, had an episode of atrial flutter off antiarrhythmic agent on 5/30, he was cardioverted in the emergency room.  I reviewed the emergency room notes.  He is scheduled to see his electrophysiologist on the 10th.    4.  Hypertension, well controlled on losartan and metoprolol XL.    5.  Hyperlipidemia, on atorvastatin.  Last LDL 67 HDL 52 on 23 May.    6.  Chronic renal insufficiency, creatinine is improved from 1.28 on 24th to normal range of 1.15.    Thank you for allowing us to participate in the care of this nice patient.  If he has recurrent atrial arrhythmias, he may need a different antiarrhythmic like amiodarone or repeat ablation.\      Sincerely,    Geraldo Pizano MD          Today's clinic visit entailed:  34 minutes spent on the date of the encounter doing chart review, review of test results, patient visit and documentation   Provider  Link to [facility] Help Grid     The level of medical decision making during this visit was of moderate complexity.      Orders Placed This Encounter   Procedures     Follow-Up with Cardiology NOLAN     Echocardiogram Complete       Orders Placed This Encounter   Medications     atorvastatin (LIPITOR) 80 MG tablet     Sig: Take 1 tablet (80 mg) by mouth daily     Dispense:  90 tablet     Refill:  3     clopidogrel (PLAVIX) 75 MG tablet     Sig: Take 1 tablet (75 mg) by mouth daily     Dispense:   90 tablet     Refill:  3     losartan (COZAAR) 25 MG tablet     Sig: Take 1 tablet (25 mg) by mouth daily     Dispense:  90 tablet     Refill:  3       Medications Discontinued During This Encounter   Medication Reason     aspirin (ASA) 81 MG chewable tablet Stopped by Patient     clopidogrel (PLAVIX) 75 MG tablet Reorder     atorvastatin (LIPITOR) 80 MG tablet Reorder     sildenafil (VIAGRA) 100 MG tablet      losartan (COZAAR) 25 MG tablet          Encounter Diagnoses   Name Primary?     Coronary artery disease involving native coronary artery of native heart without angina pectoris Yes     Primary hypertension      Paroxysmal atrial fibrillation (H)      Typical atrial flutter (H)      Stage 3a chronic kidney disease (H)      Ischemic cardiomyopathy        CURRENT MEDICATIONS:  Current Outpatient Medications   Medication Sig Dispense Refill     acetaminophen (TYLENOL) 325 MG tablet Take 325-650 mg by mouth every 6 hours as needed for mild pain       apixaban ANTICOAGULANT (ELIQUIS) 5 MG tablet Take 1 tablet (5 mg) by mouth 2 times daily 180 tablet 3     atorvastatin (LIPITOR) 80 MG tablet Take 1 tablet (80 mg) by mouth daily 90 tablet 3     clopidogrel (PLAVIX) 75 MG tablet Take 1 tablet (75 mg) by mouth daily 90 tablet 3     losartan (COZAAR) 25 MG tablet Take 1 tablet (25 mg) by mouth daily 90 tablet 3     nitroGLYcerin (NITROSTAT) 0.4 MG sublingual tablet For chest pain place 1 tablet under the tongue every 5 minutes for 3 doses. If symptoms persist 5 minutes after 1st dose call 911.Please done take viagra along with nnitroglycerine 20 tablet 0       ALLERGIES     Allergies   Allergen Reactions     Ace Inhibitors Cough       PAST MEDICAL HISTORY:  Past Medical History:   Diagnosis Date     Hypercholesterolemia      Lumbar disc disease      Migraines      Paroxysmal A-fib (H)        PAST SURGICAL HISTORY:  Past Surgical History:   Procedure Laterality Date     ANESTHESIA CARDIOVERSION N/A 4/22/2022     Procedure: ANESTHESIA, FOR CARDIOVERSION FOLLOWING AGUS;  Surgeon: GENERIC ANESTHESIA PROVIDER;  Location:  OR     ANESTHESIA CARDIOVERSION N/A 5/12/2022    Procedure: ANESTHESIA, FOR CARDIOVERSION;  Surgeon: GENERIC ANESTHESIA PROVIDER;  Location:  OR     COLONOSCOPY       CV CORONARY ANGIOGRAM N/A 5/23/2022    Procedure: Coronary Angiogram;  Surgeon: Jacqueline Ramirez MD;  Location:  HEART CARDIAC CATH LAB     CV PCI N/A 5/23/2022    Procedure: Percutaneous Coronary Intervention;  Surgeon: Jacqueline Ramirez MD;  Location:  HEART CARDIAC CATH LAB     CYSTOSCOPY, RETROGRADES, COMBINED Left 5/13/2020    Procedure: LEFT RETROGRADE URETERAL PYELOGRAM;  Surgeon: Denilson Angulo MD;  Location:  OR     CYSTOSCOPY, TRANSURETHRAL RESECTION (TUR) PROSTATE, COMBINED N/A 5/13/2020    Procedure: CYSTOSCOPY WITH TRANSURETHRAL RESECTION PROSTATE;  Surgeon: Denilson Angulo MD;  Location:  OR     EP ABLATION FOCAL AFIB N/A 4/1/2022    Procedure: Ablation Focal Atrial Fibrillation [[id number]];  Surgeon: Sharmin Scott MD;  Location:  HEART CARDIAC CATH LAB     OPEN REDUCTION INTERNAL FIXATION HAND       RHINOPLASTY       TONSILLECTOMY       wisdom teeth         FAMILY HISTORY:  Family History   Problem Relation Age of Onset     Dementia Father      Osteoarthritis Father      Cerebrovascular Disease Father      Myocardial Infarction Mother      C.A.D. Mother      Coronary Artery Disease Mother        SOCIAL HISTORY:  Social History     Socioeconomic History     Marital status:      Spouse name: Erin     Number of children: None     Years of education: None     Highest education level: None   Tobacco Use     Smoking status: Never Smoker     Smokeless tobacco: Never Used   Substance and Sexual Activity     Alcohol use: Yes     Alcohol/week: 2.0 - 3.0 standard drinks     Types: 2 - 3 Cans of beer per week     Comment: 2 beers week     Drug use: No     Sexual activity: Yes     Partners: Female   Other Topics  "Concern     Special Diet No     Exercise Yes     Comment: 2-3x/week       Review of Systems:  Skin:  Negative       Eyes:         ENT:         Respiratory:  Negative       Cardiovascular:  Negative;palpitations;chest pain;syncope or near-syncope;cyanosis;lightheadedness;dizziness;fatigue;exercise intolerance;edema Positive for energy level improving  Gastroenterology:        Genitourinary:         Musculoskeletal:         Neurologic:         Psychiatric:         Heme/Lymph/Imm:  Positive for easy bruising    Endocrine:  Negative        Physical Exam:  Vitals: /56   Pulse 72   Ht 1.778 m (5' 10\")   Wt 76.7 kg (169 lb 3.2 oz)   BMI 24.28 kg/m      Constitutional:  in no acute distress        Skin:  warm and dry to the touch          Head:  normocephalic        Eyes:  sclera white        Lymph:      ENT:  no pallor or cyanosis        Neck:           Respiratory:  clear to auscultation         Cardiac: normal S1 and S2     no presence of murmur                                                   GI:  not assessed this visit        Extremities and Muscular Skeletal:  no edema              Neurological:  no gross motor deficits        Psych:  Alert and Oriented x 3;oriented to time, person and place          Geraldo Pizano MD  [address]  [place] [address]    Thank you for allowing me to participate in the care of your patient.      Sincerely,     Geraldo Pizano MD     [facility]  "

## 2022-06-01 NOTE — TELEPHONE ENCOUNTER
"Hi, my name is @ [email or web address]@  @, a Medical Assistant, and I am calling on behalf of Sabas Sibley's office at [facility].  I am calling to follow up and see how things are going for you after your recent visit.\"    \"I see that you were in the (ER/UC/IP) on 5/30/22.    How are you doing now that you are home?\" Good    Is patient experiencing symptoms that may require a hospital visit?  no    Discharge Instructions    \"Let's review your discharge instructions.  What is/are the follow-up recommendations?  Pt. Response: Cardio and PCP    \"Were you instructed to make a follow-up appointment?\"  Pt. Response: Yes.  Has appointment been made?   Yes      \"When you see the provider, I would recommend that you bring your discharge instructions with you. And bring medications/or list along with you.    Medications    \"How many new medications are you on since your hospitalization/ED visit?\"    0-1  \"How many of your current medicines changed (dose, timing, name, etc.) while you were in the hospital/ED visit?\"   0-1  \"Do you have questions about your medications?\"   No  \"Were you newly diagnosed with heart failure, COPD, diabetes, dvt, blood clot, pulmonary emboli or did you have a heart attack?\"   No  For patients on insulin: \"Did you start on insulin in the hospital or did you have your insulin dose changed?\"   No    Medication reconciliation completed? Yes    Was MTM referral placed (*Make sure to put transitions as reason for referral)?   No    Call Summary    \"Do you have any questions or concerns about your condition or care plan at the moment?\"    No  Triage advice given: PT will follow up with PCP             PT will continue with ED advise             PT will  contact hospital, if symptoms return or get worse    Patient was in ER 3 in the past year (assess appropriateness of ER visits.)      \"If you have questions or things don't continue to improve, we encourage you contact us through the main clinic " "number,  [phone]. If the clinic is not open, the on-call provider is available to help you.     \"Thank you for your time and take care!\"        silverio  "

## 2022-06-02 ENCOUNTER — HOSPITAL ENCOUNTER (OUTPATIENT)
Dept: CARDIAC REHAB | Facility: CLINIC | Age: 77
Discharge: HOME | End: 2022-06-02
Attending: STUDENT IN AN ORGANIZED HEALTH CARE EDUCATION/TRAINING PROGRAM
Payer: COMMERCIAL

## 2022-06-02 DIAGNOSIS — I21.4 NSTEMI (NON-ST ELEVATED MYOCARDIAL INFARCTION) (H): ICD-10-CM

## 2022-06-02 PROCEDURE — 93798 PHYS/QHP OP CAR RHAB W/ECG: CPT | Performed by: OCCUPATIONAL THERAPIST

## 2022-06-02 PROCEDURE — 93797 PHYS/QHP OP CAR RHAB WO ECG: CPT | Mod: 59 | Performed by: OCCUPATIONAL THERAPIST

## 2022-06-03 LAB
ATRIAL RATE - MUSE: 55 BPM
DIASTOLIC BLOOD PRESSURE - MUSE: NORMAL MMHG
INTERPRETATION ECG - MUSE: NORMAL
P AXIS - MUSE: 88 DEGREES
PR INTERVAL - MUSE: 196 MS
QRS DURATION - MUSE: 108 MS
QT - MUSE: 526 MS
QTC - MUSE: 503 MS
R AXIS - MUSE: -42 DEGREES
SYSTOLIC BLOOD PRESSURE - MUSE: NORMAL MMHG
T AXIS - MUSE: 132 DEGREES
VENTRICULAR RATE- MUSE: 55 BPM

## 2022-06-04 ENCOUNTER — HEALTH MAINTENANCE LETTER (OUTPATIENT)
Age: 77
End: 2022-06-04

## 2022-06-05 ENCOUNTER — HOSPITAL ENCOUNTER (EMERGENCY)
Facility: CLINIC | Age: 77
Discharge: HOME | End: 2022-06-05
Attending: EMERGENCY MEDICINE | Admitting: EMERGENCY MEDICINE
Payer: COMMERCIAL

## 2022-06-05 ENCOUNTER — DOCUMENTATION ONLY (OUTPATIENT)
Dept: CARDIOLOGY | Facility: CLINIC | Age: 77
End: 2022-06-05

## 2022-06-05 VITALS
WEIGHT: 160 LBS | RESPIRATION RATE: 16 BRPM | SYSTOLIC BLOOD PRESSURE: 120 MMHG | OXYGEN SATURATION: 99 % | HEART RATE: 68 BPM | HEIGHT: 70 IN | TEMPERATURE: 98.1 F | BODY MASS INDEX: 22.9 KG/M2 | DIASTOLIC BLOOD PRESSURE: 79 MMHG

## 2022-06-05 DIAGNOSIS — I48.92 ATRIAL FLUTTER WITH RAPID VENTRICULAR RESPONSE (H): ICD-10-CM

## 2022-06-05 LAB
ANION GAP SERPL CALCULATED.3IONS-SCNC: 7 MMOL/L (ref 3–14)
ATRIAL RATE - MUSE: 129 BPM
BUN SERPL-MCNC: 27 MG/DL (ref 7–30)
CALCIUM SERPL-MCNC: 9.5 MG/DL (ref 8.5–10.1)
CHLORIDE BLD-SCNC: 107 MMOL/L (ref 94–109)
CO2 SERPL-SCNC: 26 MMOL/L (ref 20–32)
CREAT SERPL-MCNC: 1.43 MG/DL (ref 0.66–1.25)
DIASTOLIC BLOOD PRESSURE - MUSE: NORMAL MMHG
GFR SERPL CREATININE-BSD FRML MDRD: 51 ML/MIN/1.73M2
GLUCOSE BLD-MCNC: 90 MG/DL (ref 70–99)
HOLD SPECIMEN: NORMAL
INTERPRETATION ECG - MUSE: NORMAL
MAGNESIUM SERPL-MCNC: 2.5 MG/DL (ref 1.6–2.3)
P AXIS - MUSE: NORMAL DEGREES
POTASSIUM BLD-SCNC: 3.9 MMOL/L (ref 3.4–5.3)
PR INTERVAL - MUSE: 136 MS
QRS DURATION - MUSE: 108 MS
QT - MUSE: 366 MS
QTC - MUSE: 536 MS
R AXIS - MUSE: 93 DEGREES
SODIUM SERPL-SCNC: 140 MMOL/L (ref 133–144)
SYSTOLIC BLOOD PRESSURE - MUSE: NORMAL MMHG
T AXIS - MUSE: 89 DEGREES
VENTRICULAR RATE- MUSE: 129 BPM

## 2022-06-05 PROCEDURE — 96375 TX/PRO/DX INJ NEW DRUG ADDON: CPT | Mod: 59

## 2022-06-05 PROCEDURE — 92960 CARDIOVERSION ELECTRIC EXT: CPT

## 2022-06-05 PROCEDURE — 83735 ASSAY OF MAGNESIUM: CPT | Performed by: EMERGENCY MEDICINE

## 2022-06-05 PROCEDURE — 99285 EMERGENCY DEPT VISIT HI MDM: CPT | Mod: 25

## 2022-06-05 PROCEDURE — 272N000240 HC CARDIOVERT/DEFIB/PACER SUPP

## 2022-06-05 PROCEDURE — 80048 BASIC METABOLIC PNL TOTAL CA: CPT | Performed by: EMERGENCY MEDICINE

## 2022-06-05 PROCEDURE — 96374 THER/PROPH/DIAG INJ IV PUSH: CPT | Mod: 59

## 2022-06-05 PROCEDURE — 96361 HYDRATE IV INFUSION ADD-ON: CPT | Mod: 59

## 2022-06-05 PROCEDURE — 258N000003 HC RX IP 258 OP 636: Performed by: EMERGENCY MEDICINE

## 2022-06-05 PROCEDURE — 93005 ELECTROCARDIOGRAM TRACING: CPT

## 2022-06-05 PROCEDURE — 250N000011 HC RX IP 250 OP 636: Performed by: EMERGENCY MEDICINE

## 2022-06-05 PROCEDURE — 250N000009 HC RX 250: Performed by: EMERGENCY MEDICINE

## 2022-06-05 PROCEDURE — 36415 COLL VENOUS BLD VENIPUNCTURE: CPT | Performed by: EMERGENCY MEDICINE

## 2022-06-05 RX ORDER — PROPOFOL 10 MG/ML
INJECTION, EMULSION INTRAVENOUS DAILY PRN
Status: COMPLETED | OUTPATIENT
Start: 2022-06-05 | End: 2022-06-05

## 2022-06-05 RX ORDER — PROPOFOL 10 MG/ML
70 INJECTION, EMULSION INTRAVENOUS ONCE
Status: DISCONTINUED | OUTPATIENT
Start: 2022-06-05 | End: 2022-06-05 | Stop reason: HOSPADM

## 2022-06-05 RX ORDER — DILTIAZEM HYDROCHLORIDE 5 MG/ML
10 INJECTION INTRAVENOUS ONCE
Status: COMPLETED | OUTPATIENT
Start: 2022-06-05 | End: 2022-06-05

## 2022-06-05 RX ADMIN — DILTIAZEM HYDROCHLORIDE 10 MG: 5 INJECTION, SOLUTION INTRAVENOUS at 14:17

## 2022-06-05 RX ADMIN — SODIUM CHLORIDE 1000 ML: 9 INJECTION, SOLUTION INTRAVENOUS at 13:19

## 2022-06-05 RX ADMIN — PROPOFOL 70 MG: 10 INJECTION, EMULSION INTRAVENOUS at 15:35

## 2022-06-05 NOTE — PROGRESS NOTES
SHANDA Nguyen, Your patient developed arrhythmia again and called this Sunday morning.  Tachycardia has been ongoing for 12 hours, the rate is 130 and currently feels regular, likely atrial flutter.    I asked him to go to the emergency room for cardioversion.  This will be his fourth or fifth CV after his ablation on April 1.    The patient said he has amiodarone at home but he has not been asked to use it.    D

## 2022-06-05 NOTE — ED PROVIDER NOTES
"  History   Chief Complaint:  \"A Fib\"     The history is provided by the patient and medical records.      Rey Brown is a 77 year old male with history of NSTEMI, MI, CAD, stage III CKD, hypertension, hypercholesteremia, and atrial fibrillation, on Eliquis, who presents with palpitations. On April 1, the patient had an ablation for his atrial fibrillation by Dr. Scott with [facility].  He subsequently had cardioversions on April 20, April 22, and May 12.  He was then admitted here from 5/20-5/24 for a myocardial infarction where he had a drug eluting stent placed in the LAD. At that time, his ejection fraction was 30-35%. They readjusted his blood thinners at that time and he is still taking Eliquis and Plavix, no missed doses of Eliquis lately.  He was in this emergency department on May 30 with recurrent atrial flutter and underwent propofol sedation for electrical cardioversion and was subsequently discharged home.  Today, he again abruptly started having palpitations this morning at 0030 while he was still awake.  His symptoms have persisted thus prompting his ED visit. He denies any current chest pain and was also feeling normal yesterday. He is no longer on medications for his heart rhythm.  Beta-blocker not initiated after recent stent placement due to baseline low heart rate.    Review of Systems   All other systems reviewed and are negative.    Allergies:  Ace Inhibitors    Medications:  Eliquis  Lipitor  Plavix   Losartan   Nitrostat     Past Medical History:     Hypercholesterolemia   Lumbar disc disease  Migraines  Paroxysmal A-fib   MI  NSTEMI   CAD  CKD, stage III    Past Surgical History:    Anesthesia cardioversion x2  Colonoscopy  Coronary angiogram   CV PCI   Left retrograde ureteral pyelogram   Cytoscopy with TURP   Ablation focal atrial fibrillation   ORIF hand  Rhinoplasty   Tonsillectomy  [place] teeth extraction      Family History:    Father: dementia, osteoarthritis, CVD  Mother: " "MI, CAD    Social History:  The patient presents to the ED with his wife.    Physical Exam     Patient Vitals for the past 24 hrs:   BP Temp Temp src Pulse Resp SpO2 Height Weight   06/05/22 1615 120/79 -- -- 68 16 99 % -- --   06/05/22 1600 117/74 -- -- 62 10 -- -- --   06/05/22 1545 92/65 -- -- 65 20 99 % -- --   06/05/22 1545 -- -- -- -- 20 -- -- --   06/05/22 1541 134/82 -- -- 66 12 100 % -- --   06/05/22 1535 (!) 138/98 -- -- 64 11 100 % -- --   06/05/22 1526 (!) 136/93 -- -- 120 16 98 % -- --   06/05/22 1219 114/70 98.1  F (36.7  C) Temporal (!) 131 18 100 % 1.778 m (5' 10\") 72.6 kg (160 lb)     Physical Exam  General: Nontoxic-appearing male sitting upright in room 24  HENT: mucous membranes moist, thyroid nonpalpable  CV: Heart rate fixed at 117 and regular on the monitor during my initial evaluation, no lower extremity edema, no JVD, palpable symmetric radial pulses, no murmur audible  Resp: normal effort, speaks in full phrases, no stridor, no cough observed  GI: abdomen soft and nontender  MSK: no bony tenderness   Skin: appropriately warm and dry  Neuro: alert, clear speech, oriented   Psych: cooperative    Emergency Department Course   ECG  ECG taken at 1228, ECG read at 1412  ST & T wave abnormality, consider anterior ischemia. Suspect A-flutter.   Rate 129 bpm. WI interval 136 ms. QRS duration 108 ms. QT/QTc 366/536 ms. P-R-T axes * 93 89.     ECG: Post cardioversion  ECG taken at 1535, ECG read at 1536  Normal sinus rhythm.    Rate 63 bpm. WI interval 160 ms. QRS duration 80 ms. QT/QTc 444/454 ms. P-R-T axes 87 63 99.       Laboratory:  Labs Ordered and Resulted from Time of ED Arrival to Time of ED Departure   BASIC METABOLIC PANEL - Abnormal       Result Value    Sodium 140      Potassium 3.9      Chloride 107      Carbon Dioxide (CO2) 26      Anion Gap 7      Urea Nitrogen 27      Creatinine 1.43 (*)     Calcium 9.5      Glucose 90      GFR Estimate 51 (*)    MAGNESIUM - Abnormal    Magnesium 2.5 " (*)         Procedures      Sedation     Procedure: Procedural Sedation    Sedation Level: Deep    Indication: Cardioversion    Consent: Written from Patient    Universal protocol: Universal protocol was followed and time out conducted just prior to starting procedure, confirming patient identity, site/side, procedure, patient position, and availability of correct equipment and implants.     Last PO Intake: Emergent procedure    ASA Class: Class 2 - A patient with mild systemic disease     Exam:  Mallampati:  Grade 1 - Soft palate, uvula, and pillars visible    Lungs: Clear   Heart: tachycardic, Regular rhythm     Medication: Propofol  Dose: 70 mg IVP once    Monitoring:  Monitoring consisted of heart rate, cardiac, continuous pulse oximetry, frequent blood pressure checks.   There was constant attendance by RN until patient recovered and constant attendance by physician until patient stable.   Intubation and emergency airway equipment available.     Response: Vital signs stable, oxygen saturations greater than 92%       Patient Status: Post procedure patient was alert.     Total Physician Drug Administration / Monitoring Time: 15 minutes.     Patient was monitored during recovery and returned to pre-procedure baseline.     Electrical Cardioversion of Atrial Flutter        Procedure: Electrical Cardioversion        Indication: Atrial Fluter     Consent: Written from Patient     Universal Protocol: Universal protocol was followed and time out conducted just prior to starting procedure, confirming patient identity, site/side, procedure, patient position, and availability of correct equipment and implants.      Sedation: The patient was sedated, see separate procedure note for details.      Procedure Detail:   Electrodes were placed: Anterior/posterior  Trial #1: Synchronized Cardioversion at 100 Joules   Cardioversion was successful      Patient Status:  The patient tolerated the procedure well: Yes. There were no  complications.      Emergency Department Course:       Reviewed:  I reviewed nursing notes, vitals and past medical history    Assessments:  1405 I obtained history and examined the patient as noted above.   1435 I rechecked the patient and explained the plan for cardioversion.  1532 Sedation and cardioversion as detailed above.      Consults:  1428 I spoke with Karine Perez and Javi, cardiology, to discuss the patient's presentation and treatment plan.     Interventions:  1319 NS 1L IV Bolus  1417 Diltiazem 10 mg IV  1535 Propofol 70 mg IV    Disposition:  The patient was discharged to home.     Impression & Plan   Medical Decision Making:  He presents again with paroxysmal atrial fibrillation/flutter, which she has had on a number of occasions lately.  Symptoms are compatible with his prior episodes neither the patient nor I suspected or identified evidence of pulmonary embolism, metabolic abnormality, acute coronary syndrome, infection, or other more dangerous precipitant.  Chart review shows that he has had numerous cardioversions over the last few months, and this following closely after an ablation.  For this reason, I did speak with the on-call cardiology service, speculating that he might benefit from hospitalization for reconsideration of pharmacologic therapies and possible expedited ablation given that he is not experiencing any sustained benefit from repeated cardioversions.  However, Dr. Caldwell felt strongly that the patient should not be hospitalized and instead should be cardioverted in the emergency department and discharged home for outpatient follow-up with Dr. Scott, the patient's primary electrophysiologist.  No medication changes were advised.  The patient did undergo successful sedation with propofol followed by a single shock at 100 J with prompt conversion to a sinus rhythm, from which he recovered uneventfully and was thereafter deemed an appropriate candidate for discharge.  While I would not  be surprised if he returns to care in the near future under similar circumstances, at this time I do think that discharge home is reasonable, and patient agrees with this.  Return precautions reviewed and agreed upon.    Diagnosis:    ICD-10-CM    1. Atrial flutter with rapid ventricular response (H)  I48.92        Scribe Disclosure:  I, Alysia Vilchis, am serving as a scribe at 1:56 PM on 6/5/2022 to document services personally performed by Michael Grissom MD based on my observations and the provider's statements to me.              Michael Grissom MD  06/05/22 0501

## 2022-06-06 ENCOUNTER — HOSPITAL ENCOUNTER (OUTPATIENT)
Dept: CARDIAC REHAB | Facility: CLINIC | Age: 77
Discharge: HOME | End: 2022-06-06
Attending: STUDENT IN AN ORGANIZED HEALTH CARE EDUCATION/TRAINING PROGRAM
Payer: COMMERCIAL

## 2022-06-06 ENCOUNTER — OFFICE VISIT (OUTPATIENT)
Dept: FAMILY MEDICINE | Facility: CLINIC | Age: 77
End: 2022-06-06

## 2022-06-06 VITALS
HEART RATE: 62 BPM | OXYGEN SATURATION: 97 % | DIASTOLIC BLOOD PRESSURE: 64 MMHG | SYSTOLIC BLOOD PRESSURE: 126 MMHG | WEIGHT: 167 LBS | BODY MASS INDEX: 23.96 KG/M2

## 2022-06-06 DIAGNOSIS — N18.31 STAGE 3A CHRONIC KIDNEY DISEASE (H): ICD-10-CM

## 2022-06-06 DIAGNOSIS — E78.00 HYPERCHOLESTEROLEMIA: ICD-10-CM

## 2022-06-06 DIAGNOSIS — I10 PRIMARY HYPERTENSION: ICD-10-CM

## 2022-06-06 DIAGNOSIS — I48.3 TYPICAL ATRIAL FLUTTER (H): Primary | ICD-10-CM

## 2022-06-06 PROCEDURE — 93798 PHYS/QHP OP CAR RHAB W/ECG: CPT | Performed by: REHABILITATION PRACTITIONER

## 2022-06-06 PROCEDURE — 99495 TRANSJ CARE MGMT MOD F2F 14D: CPT | Mod: 25 | Performed by: FAMILY MEDICINE

## 2022-06-06 NOTE — TELEPHONE ENCOUNTER
Start amiodarone 400 mg bid for 1 week, then 200 mg daily.  I will discuss possible redo ablation when I see him in the clinic.

## 2022-06-06 NOTE — PROGRESS NOTES
6/6/22 Called pt in follow up to recent ED visit on 6/5 for successful DCCV.  In review  4/1- Afib Ablation  4/20 DCCV  4/22 DCCV  5/12 DCCV  Admit 5/20 with MI and LAD stent  5/30- DCCV  6/5 DCCV   Pt verifies he is taking  Eliquis 5 mg BID  Plavix 75 mg daily  Losartan 25 mg daily  Will update Dr Scott and call pt back w recommendations.   Pt has 3 m follow up appt on 6/10 w Dr Sonia Crenshaw 1040 am

## 2022-06-06 NOTE — PROGRESS NOTES
"Post Discharge Outreach 5/25/2022   Admission Date 5/20/2022   Reason for Admission CP   Discharge Date 5/24/2022   How are you doing now that you are home? \" I'm doing ok \"   How are your symptoms? (Red Flag symptoms escalate to triage hotline per guidelines) Improved   Do you feel your condition is stable enough to be safe at home until your provider visit? Yes   Does the patient have their discharge instructions?  Yes   Does the patient have questions regarding their discharge instructions?  No   Were you started on any new medications or were there changes to any of your previous medications?  Yes   Does the patient have all of their medications? Yes   Do you have questions regarding any of your medications?  No   Do you have all of your needed medical supplies or equipment (DME)?  (i.e. oxygen tank, CPAP, cane, etc.) Yes   Discharge follow-up appointment scheduled within 14 calendar days?  Yes   Discharge Follow Up Appointment Date 6/1/2022   Discharge Follow Up Appointment Scheduled with? Specialty Care Provider     In the ER yesterday.  Was cardioverted.  Feeling well, had cardiac rehab this am.  Back in [facility]  Seeing cards on Friday.      Hospital Follow-up Visit:    Hospital/Nursing Home/IP Rehab Facility: [facility]  Date of Admission: 5/20/2  Date of Discharge: 5/24/22  Reason(s) for Admission: chest discomfort      Was your hospitalization related to COVID-19? No   Problems taking medications regularly:  None  Medication changes since discharge: Start: Atorvastatin, Plavix, Losartan, Nitroglycerin. Stop: Excedrin, Flecainide, Simvastatin  Problems adhering to non-medication therapy:  None    Summary of hospitalization:  [facility] discharge summary reviewed  Diagnostic Tests/Treatments reviewed.  Follow up needed: none  Other Healthcare Providers Involved in Patient s Care:         None  Update since discharge: improved.       Post Discharge Medication " Reconciliation: discharge medications reconciled, continue medications without change.  Plan of care communicated with patient     Assessment/Plan:  Rey was seen today for hospital f/u.    Diagnoses and all orders for this visit:    Typical atrial flutter (H)    Stage 3a chronic kidney disease (H)    Hypercholesterolemia    Primary hypertension    see cards as scheduled  CKD is up a bit and will need to follow in 4-6 weeks    Sabas Staley MD   [facility]  [phone]

## 2022-06-07 RX ORDER — AMIODARONE HYDROCHLORIDE 200 MG/1
200 TABLET ORAL DAILY
Qty: 30 TABLET | Refills: 3 | Status: ON HOLD | OUTPATIENT
Start: 2022-06-15 | End: 2022-07-08

## 2022-06-07 RX ORDER — AMIODARONE HYDROCHLORIDE 200 MG/1
400 TABLET ORAL 2 TIMES DAILY
Refills: 0 | COMMUNITY
Start: 2022-06-07 | End: 2022-06-16

## 2022-06-07 NOTE — PROGRESS NOTES
Spoke with pt and made him aware that Dr Scott would like pt to start taking Amiodarone 400 mg twice a day for one week, then reduce to 200 mg daily. Pt still has enough to start at home. Pt has OV on Friday 6/10 with Dr Scott. Licha

## 2022-06-07 NOTE — TELEPHONE ENCOUNTER
Spoke to pt and made him aware that Dr Scott would like pt to start on Amiodarone 400 mg twice a day for a week and then drop to 200 mg daily. Pt will be seeing Dr Scott on Friday 6/10 to discuss ablation. Pt states understanding and has Amiodarone available.  Will send to pharmacy a refill to have if needed. Licha

## 2022-06-10 ENCOUNTER — OFFICE VISIT (OUTPATIENT)
Dept: CARDIOLOGY | Facility: CLINIC | Age: 77
End: 2022-06-10
Payer: COMMERCIAL

## 2022-06-10 VITALS
SYSTOLIC BLOOD PRESSURE: 116 MMHG | HEIGHT: 70 IN | HEART RATE: 58 BPM | BODY MASS INDEX: 23.19 KG/M2 | WEIGHT: 162 LBS | DIASTOLIC BLOOD PRESSURE: 66 MMHG | OXYGEN SATURATION: 98 %

## 2022-06-10 DIAGNOSIS — I48.0 PAROXYSMAL ATRIAL FIBRILLATION (H): Primary | ICD-10-CM

## 2022-06-10 PROCEDURE — 93000 ELECTROCARDIOGRAM COMPLETE: CPT | Performed by: INTERNAL MEDICINE

## 2022-06-10 PROCEDURE — 99213 OFFICE O/P EST LOW 20 MIN: CPT | Performed by: INTERNAL MEDICINE

## 2022-06-10 NOTE — PROGRESS NOTES
Service Date: 06/10/2022    CLINIC NOTE    HISTORY OF PRESENT ILLNESS:  I saw Mr. Brown for followup of atrial fibrillation ablation.  He is a 77-year-old white male with a history of recurrent symptomatic atrial fibrillation.  He was previously treated with flecainide but failed to maintain sinus rhythm.  He underwent ablation of atrial fibrillation on 04/01.  Before the ablation, he was in persistent atrial fibrillation.  He was found to have a very large left atrium with scattered left atrial scar.  He received a pulmonary vein isolation plus left atrial linear ablation.  Unfortunately, after the ablation, he has had recurrent atrial flutter.  He had several cardioversions.  He was diagnosed to have non-ST elevation myocardial infarct around 05/23 and received a mid-LAD drug-eluting stent.  The patient continued to have recurrent atrial flutter and required the last cardioversion on 06/05.  Yesterday, he had another spell that lasted for about 6 hours with spontaneous resolution.  He tolerated the arrhythmia reasonably well.  He is currently taking amiodarone 400 mg once daily.    PHYSICAL EXAMINATION:    VITAL SIGNS:  Blood pressure 116/66, heart rate 58 beats per minute, body weight 162 pounds.  HEENT:  The eyes and ENT were unremarkable.  LUNGS:  Clear.  CARDIAC:  Rhythm was regular and heart sounds were normal with no murmur.  ABDOMINAL EXAMINATION:  Showed no hepatomegaly.  EXTREMITIES:  There was no pedal edema.    IMAGING STUDIES:  EKG today showed normal sinus rhythm.    ASSESSMENT AND RECOMMENDATIONS:  Mr. Dorantes is doing okay today.  I hope he does not have further recurrent atrial flutter.  If he does, I will move ahead to proceed with a repeat ablation.  Otherwise, he will continue amiodarone with loading, followed by the maintenance dose.  He will continue Eliquis and Plavix because of recent AFib ablation and drug-eluting stent.  I will see him for followup in 3 months.    Sharmin Scott MD      cc:  Sabas Staley MD   [facility]  [address] [place] [address]    Sharmin Scott MD        D: 06/10/2022   T: 06/10/2022   MT: [email or web address]    Name:     RO VELAZQUEZ  MRN:      [id number]        Account:      [id number]   :      1945           Service Date: 06/10/2022       Document: [id number]

## 2022-06-10 NOTE — LETTER
6/10/2022    Sabas Staley MD  [address] [facility] [address]    RE: Rey Brown       Dear Colleague,     I had the pleasure of seeing Rey Brown in the [facility].  HPI and Plan:   See dictation      Orders Placed This Encounter   Procedures     Follow-Up with Cardiology EP     EKG 12-lead complete w/read - Clinics (performed today)     EKG 12-lead complete w/read (Future)- to be scheduled       No orders of the defined types were placed in this encounter.      There are no discontinued medications.      Encounter Diagnosis   Name Primary?     Paroxysmal atrial fibrillation (H) Yes       CURRENT MEDICATIONS:  Current Outpatient Medications   Medication Sig Dispense Refill     acetaminophen (TYLENOL) 325 MG tablet Take 325-650 mg by mouth every 6 hours as needed for mild pain       amiodarone (PACERONE) 200 MG tablet Take 2 tablets (400 mg) by mouth 2 times daily  0     [START ON 6/15/2022] amiodarone (PACERONE) 200 MG tablet Take 1 tablet (200 mg) by mouth daily 30 tablet 3     apixaban ANTICOAGULANT (ELIQUIS) 5 MG tablet Take 1 tablet (5 mg) by mouth 2 times daily 180 tablet 3     atorvastatin (LIPITOR) 80 MG tablet Take 1 tablet (80 mg) by mouth daily 90 tablet 3     clopidogrel (PLAVIX) 75 MG tablet Take 1 tablet (75 mg) by mouth daily 90 tablet 3     losartan (COZAAR) 25 MG tablet Take 1 tablet (25 mg) by mouth daily 90 tablet 3     nitroGLYcerin (NITROSTAT) 0.4 MG sublingual tablet For chest pain place 1 tablet under the tongue every 5 minutes for 3 doses. If symptoms persist 5 minutes after 1st dose call 911.Please done take viagra along with nnitroglycerine 20 tablet 0       ALLERGIES     Allergies   Allergen Reactions     Ace Inhibitors Cough       PAST MEDICAL HISTORY:  Past Medical History:   Diagnosis Date     CAD (coronary artery disease)     mid LAD MARYCARMEN 5/23/2022     Hypercholesterolemia      Lumbar disc disease      Migraines      Paroxysmal  A-fib (H)        PAST SURGICAL HISTORY:  Past Surgical History:   Procedure Laterality Date     ANESTHESIA CARDIOVERSION N/A 4/22/2022    Procedure: ANESTHESIA, FOR CARDIOVERSION FOLLOWING AGUS;  Surgeon: GENERIC ANESTHESIA PROVIDER;  Location:  OR     ANESTHESIA CARDIOVERSION N/A 5/12/2022    Procedure: ANESTHESIA, FOR CARDIOVERSION;  Surgeon: GENERIC ANESTHESIA PROVIDER;  Location:  OR     COLONOSCOPY       CV CORONARY ANGIOGRAM N/A 5/23/2022    Procedure: Coronary Angiogram;  Surgeon: Jacqueline Ramirez MD;  Location:  HEART CARDIAC CATH LAB     CV PCI N/A 5/23/2022    Procedure: Percutaneous Coronary Intervention;  Surgeon: Jacqueline Ramirez MD;  Location:  HEART CARDIAC CATH LAB     CYSTOSCOPY, RETROGRADES, COMBINED Left 5/13/2020    Procedure: LEFT RETROGRADE URETERAL PYELOGRAM;  Surgeon: Denilson Angulo MD;  Location:  OR     CYSTOSCOPY, TRANSURETHRAL RESECTION (TUR) PROSTATE, COMBINED N/A 5/13/2020    Procedure: CYSTOSCOPY WITH TRANSURETHRAL RESECTION PROSTATE;  Surgeon: Denilson Angulo MD;  Location:  OR     EP ABLATION FOCAL AFIB N/A 4/1/2022    Procedure: Ablation Focal Atrial Fibrillation [[id number]];  Surgeon: Sharmin Scott MD;  Location:  HEART CARDIAC CATH LAB     OPEN REDUCTION INTERNAL FIXATION HAND       RHINOPLASTY       TONSILLECTOMY       wisdom teeth         FAMILY HISTORY:  Family History   Problem Relation Age of Onset     Dementia Father      Osteoarthritis Father      Cerebrovascular Disease Father      Myocardial Infarction Mother      C.A.D. Mother      Coronary Artery Disease Mother        SOCIAL HISTORY:  Social History     Socioeconomic History     Marital status:      Spouse name: Erin     Number of children: None     Years of education: None     Highest education level: None   Tobacco Use     Smoking status: Never Smoker     Smokeless tobacco: Never Used   Substance and Sexual Activity     Alcohol use: Yes     Alcohol/week: 2.0 - 3.0 standard drinks      "Types: 2 - 3 Cans of beer per week     Comment: 2 beers week     Drug use: No     Sexual activity: Yes     Partners: Female   Other Topics Concern     Special Diet No     Exercise Yes     Comment: 2-3x/week       Review of Systems:  Skin:  Negative       Eyes:  Negative      ENT:  Negative postnasal drainage at night when laying down  Respiratory:  Negative       Cardiovascular:  Negative      Gastroenterology: Negative melena;hematochezia    Genitourinary:  Negative      Musculoskeletal:  Positive for back pain    Neurologic:  Negative headaches    Psychiatric:  Negative      Heme/Lymph/Imm:  Positive for easy bruising    Endocrine:  Negative        Physical Exam:  Vitals: /66   Pulse 58   Ht 1.778 m (5' 10\")   Wt 73.5 kg (162 lb)   SpO2 98%   BMI 23.24 kg/m      Constitutional:  in no acute distress        Skin:  warm and dry to the touch          Head:  normocephalic        Eyes:  sclera white        Lymph:      ENT:  no pallor or cyanosis        Neck:           Respiratory:  clear to auscultation         Cardiac: normal S1 and S2 irregularly irregular rhythm   no presence of murmur systolic ejection murmur;grade 1                                                 GI:  not assessed this visit        Extremities and Muscular Skeletal:  no edema              Neurological:  no gross motor deficits        Psych:  Alert and Oriented x 3;oriented to time, person and place        CC  No referring provider defined for this encounter.                Service Date: 06/10/2022    CLINIC NOTE    HISTORY OF PRESENT ILLNESS:  I saw Mr. Brown for followup of atrial fibrillation ablation.  He is a 77-year-old white male with a history of recurrent symptomatic atrial fibrillation.  He was previously treated with flecainide but failed to maintain sinus rhythm.  He underwent ablation of atrial fibrillation on 04/01.  Before the ablation, he was in persistent atrial fibrillation.  He was found to have a very large left " atrium with scattered left atrial scar.  He received a pulmonary vein isolation plus left atrial linear ablation.  Unfortunately, after the ablation, he has had recurrent atrial flutter.  He had several cardioversions.  He was diagnosed to have non-ST elevation myocardial infarct around  and received a mid-LAD drug-eluting stent.  The patient continued to have recurrent atrial flutter and required the last cardioversion on .  Yesterday, he had another spell that lasted for about 6 hours with spontaneous resolution.  He tolerated the arrhythmia reasonably well.  He is currently taking amiodarone 400 mg once daily.    PHYSICAL EXAMINATION:    VITAL SIGNS:  Blood pressure 116/66, heart rate 58 beats per minute, body weight 162 pounds.  HEENT:  The eyes and ENT were unremarkable.  LUNGS:  Clear.  CARDIAC:  Rhythm was regular and heart sounds were normal with no murmur.  ABDOMINAL EXAMINATION:  Showed no hepatomegaly.  EXTREMITIES:  There was no pedal edema.    IMAGING STUDIES:  EKG today showed normal sinus rhythm.    ASSESSMENT AND RECOMMENDATIONS:  Mr. Dorantes is doing okay today.  I hope he does not have further recurrent atrial flutter.  If he does, I will move ahead to proceed with a repeat ablation.  Otherwise, he will continue amiodarone with loading, followed by the maintenance dose.  He will continue Eliquis and Plavix because of recent AFib ablation and drug-eluting stent.  I will see him for followup in 3 months.    Sharmin Scott MD     cc:  Sabas Staley MD   [facility]  [address] [place] [address]    Sharmin Scott MD        D: 06/10/2022   T: 06/10/2022   MT: kourtney    Name:     RO VELAZQUEZ  MRN:      [id number]        Account:      [id number]   :      1945           Service Date: 06/10/2022       Document: [id number]      Thank you for allowing me to participate in the care of your patient.      Sincerely,     Sharmin Scott MD     [facility]  [facility]  cc:   No referring provider defined for this encounter.

## 2022-06-10 NOTE — PROGRESS NOTES
HPI and Plan:   See dictation      Orders Placed This Encounter   Procedures     Follow-Up with Cardiology EP     EKG 12-lead complete w/read - Clinics (performed today)     EKG 12-lead complete w/read (Future)- to be scheduled       No orders of the defined types were placed in this encounter.      There are no discontinued medications.      Encounter Diagnosis   Name Primary?     Paroxysmal atrial fibrillation (H) Yes       CURRENT MEDICATIONS:  Current Outpatient Medications   Medication Sig Dispense Refill     acetaminophen (TYLENOL) 325 MG tablet Take 325-650 mg by mouth every 6 hours as needed for mild pain       amiodarone (PACERONE) 200 MG tablet Take 2 tablets (400 mg) by mouth 2 times daily  0     [START ON 6/15/2022] amiodarone (PACERONE) 200 MG tablet Take 1 tablet (200 mg) by mouth daily 30 tablet 3     apixaban ANTICOAGULANT (ELIQUIS) 5 MG tablet Take 1 tablet (5 mg) by mouth 2 times daily 180 tablet 3     atorvastatin (LIPITOR) 80 MG tablet Take 1 tablet (80 mg) by mouth daily 90 tablet 3     clopidogrel (PLAVIX) 75 MG tablet Take 1 tablet (75 mg) by mouth daily 90 tablet 3     losartan (COZAAR) 25 MG tablet Take 1 tablet (25 mg) by mouth daily 90 tablet 3     nitroGLYcerin (NITROSTAT) 0.4 MG sublingual tablet For chest pain place 1 tablet under the tongue every 5 minutes for 3 doses. If symptoms persist 5 minutes after 1st dose call 911.Please done take viagra along with nnitroglycerine 20 tablet 0       ALLERGIES     Allergies   Allergen Reactions     Ace Inhibitors Cough       PAST MEDICAL HISTORY:  Past Medical History:   Diagnosis Date     CAD (coronary artery disease)     mid LAD MARYCARMEN 5/23/2022     Hypercholesterolemia      Lumbar disc disease      Migraines      Paroxysmal A-fib (H)        PAST SURGICAL HISTORY:  Past Surgical History:   Procedure Laterality Date     ANESTHESIA CARDIOVERSION N/A 4/22/2022    Procedure: ANESTHESIA, FOR CARDIOVERSION FOLLOWING AGUS;  Surgeon: GENERIC ANESTHESIA  PROVIDER;  Location:  OR     ANESTHESIA CARDIOVERSION N/A 5/12/2022    Procedure: ANESTHESIA, FOR CARDIOVERSION;  Surgeon: GENERIC ANESTHESIA PROVIDER;  Location:  OR     COLONOSCOPY       CV CORONARY ANGIOGRAM N/A 5/23/2022    Procedure: Coronary Angiogram;  Surgeon: Jacqueline Ramirez MD;  Location:  HEART CARDIAC CATH LAB     CV PCI N/A 5/23/2022    Procedure: Percutaneous Coronary Intervention;  Surgeon: Jacqueline Ramirez MD;  Location:  HEART CARDIAC CATH LAB     CYSTOSCOPY, RETROGRADES, COMBINED Left 5/13/2020    Procedure: LEFT RETROGRADE URETERAL PYELOGRAM;  Surgeon: Denilson Angulo MD;  Location:  OR     CYSTOSCOPY, TRANSURETHRAL RESECTION (TUR) PROSTATE, COMBINED N/A 5/13/2020    Procedure: CYSTOSCOPY WITH TRANSURETHRAL RESECTION PROSTATE;  Surgeon: Denilson Angulo MD;  Location:  OR     EP ABLATION FOCAL AFIB N/A 4/1/2022    Procedure: Ablation Focal Atrial Fibrillation [[id number]];  Surgeon: Sharmin Scott MD;  Location:  HEART CARDIAC CATH LAB     OPEN REDUCTION INTERNAL FIXATION HAND       RHINOPLASTY       TONSILLECTOMY       wisdom teeth         FAMILY HISTORY:  Family History   Problem Relation Age of Onset     Dementia Father      Osteoarthritis Father      Cerebrovascular Disease Father      Myocardial Infarction Mother      C.A.D. Mother      Coronary Artery Disease Mother        SOCIAL HISTORY:  Social History     Socioeconomic History     Marital status:      Spouse name: Erin     Number of children: None     Years of education: None     Highest education level: None   Tobacco Use     Smoking status: Never Smoker     Smokeless tobacco: Never Used   Substance and Sexual Activity     Alcohol use: Yes     Alcohol/week: 2.0 - 3.0 standard drinks     Types: 2 - 3 Cans of beer per week     Comment: 2 beers week     Drug use: No     Sexual activity: Yes     Partners: Female   Other Topics Concern     Special Diet No     Exercise Yes     Comment: 2-3x/week       Review of  "Systems:  Skin:  Negative       Eyes:  Negative      ENT:  Negative postnasal drainage at night when laying down  Respiratory:  Negative       Cardiovascular:  Negative      Gastroenterology: Negative melena;hematochezia    Genitourinary:  Negative      Musculoskeletal:  Positive for back pain    Neurologic:  Negative headaches    Psychiatric:  Negative      Heme/Lymph/Imm:  Positive for easy bruising    Endocrine:  Negative        Physical Exam:  Vitals: /66   Pulse 58   Ht 1.778 m (5' 10\")   Wt 73.5 kg (162 lb)   SpO2 98%   BMI 23.24 kg/m      Constitutional:  in no acute distress        Skin:  warm and dry to the touch          Head:  normocephalic        Eyes:  sclera white        Lymph:      ENT:  no pallor or cyanosis        Neck:           Respiratory:  clear to auscultation         Cardiac: normal S1 and S2 irregularly irregular rhythm   no presence of murmur systolic ejection murmur;grade 1                                                 GI:  not assessed this visit        Extremities and Muscular Skeletal:  no edema              Neurological:  no gross motor deficits        Psych:  Alert and Oriented x 3;oriented to time, person and place        CC  No referring provider defined for this encounter.              "

## 2022-06-13 NOTE — PROGRESS NOTES
6/13/22 Pt called asking about clarification about Amiodarone dosing  Reviewed that he should be taking   Amiodarone 400 mg daily from 6/7-6/14  Then decrease on 6/15 to Amiodarone 200 mg daily.  Pt verified he has been doing this.  Also requested refill be sent to [facility]. Informed him a rx was already sent to [facility] in [place] on 6/7. Pt stated he has not heard from them that refill is ready. He will call [facility] and inquire. If for some reason they do not have rx pt will call us back so we can send refill to [facility] instead.  Den 1030 am

## 2022-06-16 ENCOUNTER — TELEPHONE (OUTPATIENT)
Dept: CARDIOLOGY | Facility: CLINIC | Age: 77
End: 2022-06-16
Payer: COMMERCIAL

## 2022-06-16 DIAGNOSIS — I48.0 PAROXYSMAL ATRIAL FIBRILLATION (H): Primary | ICD-10-CM

## 2022-06-16 DIAGNOSIS — I48.92 ATRIAL FLUTTER, UNSPECIFIED TYPE (H): Primary | ICD-10-CM

## 2022-06-16 PROCEDURE — 93000 ELECTROCARDIOGRAM COMPLETE: CPT | Performed by: INTERNAL MEDICINE

## 2022-06-16 NOTE — PROGRESS NOTES
EKG performed per Dr. Scott, printed, and given to HOLLEY Johns. Dr Scott will review and contact patient. Patient was discharged.      Windy Patiño LPN

## 2022-06-16 NOTE — TELEPHONE ENCOUNTER
Spoke to patient regarding Dr Scott recommendations:  Atrial flutter with controlled HR. If pt feels ok, we can delay consideration for repeat ablation for 3-6 months  Patient reports tolerating symptoms at this time.  Asked patient to call on Monday with update on HR and symptoms.  Patient agreed with plan.  HOLLEY Johns

## 2022-06-16 NOTE — TELEPHONE ENCOUNTER
Patient called reports he went into afib last night at around 11pm.  He is not sure what triggered the event.  He was very busy yesterday doing physical work and was tired.  Reports hydrating well.  Took BP this am and it was 145/83 and .  Feeling fatigue.  Has finished the loading dose of amiodarone and currently is on 200mg po every day.  Will have patient come in for EKG today for Dr Scott to review.  HOLLEY Johns

## 2022-06-16 NOTE — TELEPHONE ENCOUNTER
Atrial flutter with controlled HR. If pt feels ok, we can delay consideration for repeat ablation for 3-6 months.

## 2022-06-20 NOTE — TELEPHONE ENCOUNTER
Spoke to pt who states that he had been outside working on his house. Pt checked his pulse via the pulse Ox while we spoke and states that his HR was 88 and then bounced up to 120's. Pt states that he can do his normal everyday activities, but notices that going up steps he gets short of breath.  Have asked that pt continue to monitor his HR  Daily and if the rates are trending upward that he needs to make Dr Scott aware. Licha

## 2022-06-20 NOTE — TELEPHONE ENCOUNTER
Pt LM that he was still in A Fib (flutter) this morning.  LM for pt to call back to see if pt is more symptomatic then when he spoke to Pat last week. Licha

## 2022-06-20 NOTE — TELEPHONE ENCOUNTER
Lets proceed with repeat ablation because his AFL cannot be managed medically.  Just order the ablation. General anesthesia is preferred but doable with conscious sedation. No AGUS or CT.  H/P in care suite is fine.

## 2022-06-21 NOTE — TELEPHONE ENCOUNTER
Pt made aware that Dr Scott would like to proceed with the ablation since we are unable to manage the flutter with medication.  Pt is aware that it will be under general anesthesia, but no AGUS or CT are needed. Pt is ready to proceed. Licha

## 2022-06-22 NOTE — TELEPHONE ENCOUNTER
Patient transferred from scheduling after scheduling afib ablation to report that he feels he converted back to NSR.  /55 HR 57.  Feeling well.  HOLLEY Johns

## 2022-07-05 ENCOUNTER — TELEPHONE (OUTPATIENT)
Dept: CARDIOLOGY | Facility: CLINIC | Age: 77
End: 2022-07-05

## 2022-07-05 NOTE — TELEPHONE ENCOUNTER
Patient returned call and left message on EP nurse line.  Called patient and had to leave another message.  Awaiting return call from patient.  HOLLEY Johns

## 2022-07-06 NOTE — TELEPHONE ENCOUNTER
Spoke to pt who has a few questions. One being when will he get off the Amiodarone and made pt aware that this will be determined by Dr Scott more then likely around 3 months after the ablation.  Pt also wondering about what they are ablating? Pt has a history of flutter and fib and explained that both can possibly be done.  Pt wondered if an ablation could be done when in A fib and pt was told that yes.  Pt made aware that he will be called thursday for his Friday ablation.  Licha

## 2022-07-07 ENCOUNTER — TELEPHONE (OUTPATIENT)
Dept: CARDIOLOGY | Facility: CLINIC | Age: 77
End: 2022-07-07

## 2022-07-07 DIAGNOSIS — I48.92 ATRIAL FLUTTER, UNSPECIFIED TYPE (H): Primary | ICD-10-CM

## 2022-07-07 RX ORDER — SODIUM CHLORIDE, SODIUM LACTATE, POTASSIUM CHLORIDE, CALCIUM CHLORIDE 600; 310; 30; 20 MG/100ML; MG/100ML; MG/100ML; MG/100ML
INJECTION, SOLUTION INTRAVENOUS CONTINUOUS
Status: CANCELLED | OUTPATIENT
Start: 2022-07-07

## 2022-07-07 RX ORDER — LIDOCAINE 40 MG/G
CREAM TOPICAL
Status: CANCELLED | OUTPATIENT
Start: 2022-07-07

## 2022-07-07 NOTE — TELEPHONE ENCOUNTER
Spoke to patient to review instructions for aflutter ablation scheduled for 7/8 .  Patient aware that he is to be NPO after midnight and may take sips of water with am medications. Patient to arrive at 10:00.  Patient aware that he will NOT be staying overnight after procedure unless there are complications.  Patient has arranged for  and someone to be with him for the first 24 hours.  Patient did home COVID test and it was NEGATIVE.  He will bring picture of results.  Patient provided verbal understanding regarding above.  HOLLEY Johns

## 2022-07-08 ENCOUNTER — ANESTHESIA EVENT (OUTPATIENT)
Dept: CARDIOLOGY | Facility: CLINIC | Age: 77
End: 2022-07-08
Payer: COMMERCIAL

## 2022-07-08 ENCOUNTER — HOSPITAL ENCOUNTER (OUTPATIENT)
Facility: CLINIC | Age: 77
Discharge: HOME | End: 2022-07-09
Admitting: INTERNAL MEDICINE
Payer: COMMERCIAL

## 2022-07-08 ENCOUNTER — TELEPHONE (OUTPATIENT)
Dept: CARDIOLOGY | Facility: CLINIC | Age: 77
End: 2022-07-08

## 2022-07-08 ENCOUNTER — ANESTHESIA (OUTPATIENT)
Dept: CARDIOLOGY | Facility: CLINIC | Age: 77
End: 2022-07-08
Payer: COMMERCIAL

## 2022-07-08 DIAGNOSIS — I48.92 ATRIAL FLUTTER, UNSPECIFIED TYPE (H): Primary | ICD-10-CM

## 2022-07-08 LAB
ACT BLD: 233 SECONDS (ref 74–150)
ANION GAP SERPL CALCULATED.3IONS-SCNC: 4 MMOL/L (ref 3–14)
BUN SERPL-MCNC: 20 MG/DL (ref 7–30)
CALCIUM SERPL-MCNC: 8.9 MG/DL (ref 8.5–10.1)
CHLORIDE BLD-SCNC: 109 MMOL/L (ref 94–109)
CO2 SERPL-SCNC: 28 MMOL/L (ref 20–32)
CREAT SERPL-MCNC: 1.03 MG/DL (ref 0.66–1.25)
ERYTHROCYTE [DISTWIDTH] IN BLOOD BY AUTOMATED COUNT: 13.1 % (ref 10–15)
GFR SERPL CREATININE-BSD FRML MDRD: 76 ML/MIN/1.73M2
GLUCOSE BLD-MCNC: 91 MG/DL (ref 70–99)
HCT VFR BLD AUTO: 42.2 % (ref 40–53)
HGB BLD-MCNC: 14.3 G/DL (ref 13.3–17.7)
MCH RBC QN AUTO: 31.2 PG (ref 26.5–33)
MCHC RBC AUTO-ENTMCNC: 33.9 G/DL (ref 31.5–36.5)
MCV RBC AUTO: 92 FL (ref 78–100)
PLATELET # BLD AUTO: 216 10E3/UL (ref 150–450)
POTASSIUM BLD-SCNC: 4.3 MMOL/L (ref 3.4–5.3)
RBC # BLD AUTO: 4.59 10E6/UL (ref 4.4–5.9)
SODIUM SERPL-SCNC: 141 MMOL/L (ref 133–144)
WBC # BLD AUTO: 5.6 10E3/UL (ref 4–11)

## 2022-07-08 PROCEDURE — 80048 BASIC METABOLIC PNL TOTAL CA: CPT | Performed by: INTERNAL MEDICINE

## 2022-07-08 PROCEDURE — 93005 ELECTROCARDIOGRAM TRACING: CPT

## 2022-07-08 PROCEDURE — 258N000003 HC RX IP 258 OP 636: Performed by: NURSE ANESTHETIST, CERTIFIED REGISTERED

## 2022-07-08 PROCEDURE — 250N000013 HC RX MED GY IP 250 OP 250 PS 637

## 2022-07-08 PROCEDURE — 250N000025 HC SEVOFLURANE, PER MIN: Performed by: INTERNAL MEDICINE

## 2022-07-08 PROCEDURE — 85347 COAGULATION TIME ACTIVATED: CPT

## 2022-07-08 PROCEDURE — C1894 INTRO/SHEATH, NON-LASER: HCPCS | Performed by: INTERNAL MEDICINE

## 2022-07-08 PROCEDURE — 93010 ELECTROCARDIOGRAM REPORT: CPT | Mod: 76 | Performed by: INTERNAL MEDICINE

## 2022-07-08 PROCEDURE — C1730 CATH, EP, 19 OR FEW ELECT: HCPCS | Performed by: INTERNAL MEDICINE

## 2022-07-08 PROCEDURE — 999N000184 HC STATISTIC TELEMETRY

## 2022-07-08 PROCEDURE — 250N000013 HC RX MED GY IP 250 OP 250 PS 637: Performed by: INTERNAL MEDICINE

## 2022-07-08 PROCEDURE — 36415 COLL VENOUS BLD VENIPUNCTURE: CPT | Performed by: INTERNAL MEDICINE

## 2022-07-08 PROCEDURE — 999N000071 HC STATISTIC HEART CATH LAB OR EP LAB

## 2022-07-08 PROCEDURE — 258N000003 HC RX IP 258 OP 636: Performed by: INTERNAL MEDICINE

## 2022-07-08 PROCEDURE — 93656 COMPRE EP EVAL ABLTJ ATR FIB: CPT | Performed by: INTERNAL MEDICINE

## 2022-07-08 PROCEDURE — 93653 COMPRE EP EVAL TX SVT: CPT | Performed by: INTERNAL MEDICINE

## 2022-07-08 PROCEDURE — C1733 CATH, EP, OTHR THAN COOL-TIP: HCPCS | Performed by: INTERNAL MEDICINE

## 2022-07-08 PROCEDURE — 250N000011 HC RX IP 250 OP 636: Performed by: ANESTHESIOLOGY

## 2022-07-08 PROCEDURE — C1769 GUIDE WIRE: HCPCS | Performed by: INTERNAL MEDICINE

## 2022-07-08 PROCEDURE — 85027 COMPLETE CBC AUTOMATED: CPT | Performed by: INTERNAL MEDICINE

## 2022-07-08 PROCEDURE — 250N000011 HC RX IP 250 OP 636: Performed by: INTERNAL MEDICINE

## 2022-07-08 PROCEDURE — 250N000009 HC RX 250: Performed by: NURSE ANESTHETIST, CERTIFIED REGISTERED

## 2022-07-08 PROCEDURE — C1732 CATH, EP, DIAG/ABL, 3D/VECT: HCPCS | Performed by: INTERNAL MEDICINE

## 2022-07-08 PROCEDURE — 36591 DRAW BLOOD OFF VENOUS DEVICE: CPT

## 2022-07-08 PROCEDURE — 250N000011 HC RX IP 250 OP 636: Performed by: NURSE ANESTHETIST, CERTIFIED REGISTERED

## 2022-07-08 PROCEDURE — 370N000017 HC ANESTHESIA TECHNICAL FEE, PER MIN: Performed by: INTERNAL MEDICINE

## 2022-07-08 PROCEDURE — 272N000001 HC OR GENERAL SUPPLY STERILE: Performed by: INTERNAL MEDICINE

## 2022-07-08 RX ORDER — LIDOCAINE HYDROCHLORIDE 20 MG/ML
INJECTION, SOLUTION INFILTRATION; PERINEURAL PRN
Status: DISCONTINUED | OUTPATIENT
Start: 2022-07-08 | End: 2022-07-08

## 2022-07-08 RX ORDER — FENTANYL CITRATE 50 UG/ML
25 INJECTION, SOLUTION INTRAMUSCULAR; INTRAVENOUS EVERY 5 MIN PRN
Status: DISCONTINUED | OUTPATIENT
Start: 2022-07-08 | End: 2022-07-08 | Stop reason: HOSPADM

## 2022-07-08 RX ORDER — DEXAMETHASONE SODIUM PHOSPHATE 4 MG/ML
INJECTION, SOLUTION INTRA-ARTICULAR; INTRALESIONAL; INTRAMUSCULAR; INTRAVENOUS; SOFT TISSUE PRN
Status: DISCONTINUED | OUTPATIENT
Start: 2022-07-08 | End: 2022-07-08

## 2022-07-08 RX ORDER — FENTANYL CITRATE 50 UG/ML
INJECTION, SOLUTION INTRAMUSCULAR; INTRAVENOUS PRN
Status: DISCONTINUED | OUTPATIENT
Start: 2022-07-08 | End: 2022-07-08

## 2022-07-08 RX ORDER — NALOXONE HYDROCHLORIDE 0.4 MG/ML
0.4 INJECTION, SOLUTION INTRAMUSCULAR; INTRAVENOUS; SUBCUTANEOUS
Status: DISCONTINUED | OUTPATIENT
Start: 2022-07-08 | End: 2022-07-09 | Stop reason: HOSPADM

## 2022-07-08 RX ORDER — NALOXONE HYDROCHLORIDE 0.4 MG/ML
0.2 INJECTION, SOLUTION INTRAMUSCULAR; INTRAVENOUS; SUBCUTANEOUS
Status: DISCONTINUED | OUTPATIENT
Start: 2022-07-08 | End: 2022-07-09 | Stop reason: HOSPADM

## 2022-07-08 RX ORDER — LABETALOL 20 MG/4 ML (5 MG/ML) INTRAVENOUS SYRINGE
PRN
Status: DISCONTINUED | OUTPATIENT
Start: 2022-07-08 | End: 2022-07-08

## 2022-07-08 RX ORDER — OXYCODONE AND ACETAMINOPHEN 5; 325 MG/1; MG/1
1 TABLET ORAL EVERY 4 HOURS PRN
Status: DISCONTINUED | OUTPATIENT
Start: 2022-07-08 | End: 2022-07-09 | Stop reason: HOSPADM

## 2022-07-08 RX ORDER — HYDRALAZINE HYDROCHLORIDE 20 MG/ML
5 INJECTION INTRAMUSCULAR; INTRAVENOUS ONCE
Status: COMPLETED | OUTPATIENT
Start: 2022-07-08 | End: 2022-07-08

## 2022-07-08 RX ORDER — ONDANSETRON 2 MG/ML
4 INJECTION INTRAMUSCULAR; INTRAVENOUS EVERY 30 MIN PRN
Status: DISCONTINUED | OUTPATIENT
Start: 2022-07-08 | End: 2022-07-08 | Stop reason: HOSPADM

## 2022-07-08 RX ORDER — HEPARIN SODIUM 1000 [USP'U]/ML
INJECTION, SOLUTION INTRAVENOUS; SUBCUTANEOUS
Status: DISCONTINUED | OUTPATIENT
Start: 2022-07-08 | End: 2022-07-08 | Stop reason: HOSPADM

## 2022-07-08 RX ORDER — PROPOFOL 10 MG/ML
INJECTION, EMULSION INTRAVENOUS PRN
Status: DISCONTINUED | OUTPATIENT
Start: 2022-07-08 | End: 2022-07-08

## 2022-07-08 RX ORDER — ONDANSETRON 2 MG/ML
INJECTION INTRAMUSCULAR; INTRAVENOUS PRN
Status: DISCONTINUED | OUTPATIENT
Start: 2022-07-08 | End: 2022-07-08

## 2022-07-08 RX ORDER — HYDROMORPHONE HYDROCHLORIDE 1 MG/ML
0.2 INJECTION, SOLUTION INTRAMUSCULAR; INTRAVENOUS; SUBCUTANEOUS EVERY 5 MIN PRN
Status: DISCONTINUED | OUTPATIENT
Start: 2022-07-08 | End: 2022-07-08 | Stop reason: HOSPADM

## 2022-07-08 RX ORDER — LIDOCAINE 40 MG/G
CREAM TOPICAL
Status: DISCONTINUED | OUTPATIENT
Start: 2022-07-08 | End: 2022-07-08 | Stop reason: HOSPADM

## 2022-07-08 RX ORDER — ONDANSETRON 4 MG/1
4 TABLET, ORALLY DISINTEGRATING ORAL EVERY 30 MIN PRN
Status: DISCONTINUED | OUTPATIENT
Start: 2022-07-08 | End: 2022-07-08 | Stop reason: HOSPADM

## 2022-07-08 RX ORDER — LOSARTAN POTASSIUM 25 MG/1
25 TABLET ORAL DAILY
Status: DISCONTINUED | OUTPATIENT
Start: 2022-07-09 | End: 2022-07-09 | Stop reason: HOSPADM

## 2022-07-08 RX ORDER — GLYCOPYRROLATE 0.2 MG/ML
INJECTION, SOLUTION INTRAMUSCULAR; INTRAVENOUS PRN
Status: DISCONTINUED | OUTPATIENT
Start: 2022-07-08 | End: 2022-07-08

## 2022-07-08 RX ORDER — SODIUM CHLORIDE, SODIUM LACTATE, POTASSIUM CHLORIDE, CALCIUM CHLORIDE 600; 310; 30; 20 MG/100ML; MG/100ML; MG/100ML; MG/100ML
INJECTION, SOLUTION INTRAVENOUS CONTINUOUS
Status: DISCONTINUED | OUTPATIENT
Start: 2022-07-08 | End: 2022-07-08 | Stop reason: HOSPADM

## 2022-07-08 RX ORDER — CLOPIDOGREL BISULFATE 75 MG/1
75 TABLET ORAL DAILY
Status: DISCONTINUED | OUTPATIENT
Start: 2022-07-09 | End: 2022-07-09 | Stop reason: HOSPADM

## 2022-07-08 RX ORDER — EPHEDRINE SULFATE 50 MG/ML
INJECTION, SOLUTION INTRAMUSCULAR; INTRAVENOUS; SUBCUTANEOUS PRN
Status: DISCONTINUED | OUTPATIENT
Start: 2022-07-08 | End: 2022-07-08

## 2022-07-08 RX ORDER — PROTAMINE SULFATE 10 MG/ML
INJECTION, SOLUTION INTRAVENOUS
Status: DISCONTINUED | OUTPATIENT
Start: 2022-07-08 | End: 2022-07-08 | Stop reason: HOSPADM

## 2022-07-08 RX ORDER — OXYCODONE HYDROCHLORIDE 5 MG/1
5 TABLET ORAL EVERY 4 HOURS PRN
Status: DISCONTINUED | OUTPATIENT
Start: 2022-07-08 | End: 2022-07-08 | Stop reason: HOSPADM

## 2022-07-08 RX ORDER — NEOSTIGMINE METHYLSULFATE 1 MG/ML
VIAL (ML) INJECTION PRN
Status: DISCONTINUED | OUTPATIENT
Start: 2022-07-08 | End: 2022-07-08

## 2022-07-08 RX ORDER — AMLODIPINE BESYLATE 10 MG/1
10 TABLET ORAL DAILY
Status: COMPLETED | OUTPATIENT
Start: 2022-07-08 | End: 2022-07-08

## 2022-07-08 RX ORDER — HYDRALAZINE HYDROCHLORIDE 20 MG/ML
5 INJECTION INTRAMUSCULAR; INTRAVENOUS EVERY 4 HOURS PRN
Status: DISCONTINUED | OUTPATIENT
Start: 2022-07-08 | End: 2022-07-09 | Stop reason: HOSPADM

## 2022-07-08 RX ADMIN — GLYCOPYRROLATE 0.2 MG: 0.2 INJECTION, SOLUTION INTRAMUSCULAR; INTRAVENOUS at 12:48

## 2022-07-08 RX ADMIN — PHENYLEPHRINE HYDROCHLORIDE 0.3 MCG/KG/MIN: 10 INJECTION INTRAVENOUS at 12:53

## 2022-07-08 RX ADMIN — OXYCODONE HYDROCHLORIDE AND ACETAMINOPHEN 1 TABLET: 5; 325 TABLET ORAL at 20:10

## 2022-07-08 RX ADMIN — FENTANYL CITRATE 100 MCG: 50 INJECTION, SOLUTION INTRAMUSCULAR; INTRAVENOUS at 12:38

## 2022-07-08 RX ADMIN — Medication 3.5 MG: at 14:31

## 2022-07-08 RX ADMIN — DEXAMETHASONE SODIUM PHOSPHATE 4 MG: 4 INJECTION, SOLUTION INTRA-ARTICULAR; INTRALESIONAL; INTRAMUSCULAR; INTRAVENOUS; SOFT TISSUE at 12:48

## 2022-07-08 RX ADMIN — GLYCOPYRROLATE 0.6 MG: 0.2 INJECTION, SOLUTION INTRAMUSCULAR; INTRAVENOUS at 14:31

## 2022-07-08 RX ADMIN — PROPOFOL 150 MG: 10 INJECTION, EMULSION INTRAVENOUS at 12:38

## 2022-07-08 RX ADMIN — FENTANYL CITRATE 25 MCG: 50 INJECTION, SOLUTION INTRAMUSCULAR; INTRAVENOUS at 16:21

## 2022-07-08 RX ADMIN — SODIUM CHLORIDE, POTASSIUM CHLORIDE, SODIUM LACTATE AND CALCIUM CHLORIDE: 600; 310; 30; 20 INJECTION, SOLUTION INTRAVENOUS at 14:34

## 2022-07-08 RX ADMIN — AMLODIPINE BESYLATE 10 MG: 10 TABLET ORAL at 17:38

## 2022-07-08 RX ADMIN — HYDRALAZINE HYDROCHLORIDE 5 MG: 20 INJECTION INTRAMUSCULAR; INTRAVENOUS at 15:18

## 2022-07-08 RX ADMIN — LIDOCAINE HYDROCHLORIDE 80 MG: 20 INJECTION, SOLUTION INFILTRATION; PERINEURAL at 12:38

## 2022-07-08 RX ADMIN — HYDRALAZINE HYDROCHLORIDE 5 MG: 20 INJECTION INTRAMUSCULAR; INTRAVENOUS at 15:54

## 2022-07-08 RX ADMIN — Medication 5 MG: at 12:50

## 2022-07-08 RX ADMIN — LABETALOL 20 MG/4 ML (5 MG/ML) INTRAVENOUS SYRINGE 5 MG: at 14:52

## 2022-07-08 RX ADMIN — SODIUM CHLORIDE, POTASSIUM CHLORIDE, SODIUM LACTATE AND CALCIUM CHLORIDE: 600; 310; 30; 20 INJECTION, SOLUTION INTRAVENOUS at 10:45

## 2022-07-08 RX ADMIN — APIXABAN 5 MG: 5 TABLET, FILM COATED ORAL at 22:47

## 2022-07-08 RX ADMIN — ONDANSETRON 4 MG: 2 INJECTION INTRAMUSCULAR; INTRAVENOUS at 14:29

## 2022-07-08 RX ADMIN — ROCURONIUM BROMIDE 50 MG: 50 INJECTION, SOLUTION INTRAVENOUS at 12:38

## 2022-07-08 ASSESSMENT — ENCOUNTER SYMPTOMS: DYSRHYTHMIAS: 1

## 2022-07-08 ASSESSMENT — LIFESTYLE VARIABLES: TOBACCO_USE: 0

## 2022-07-08 NOTE — PROGRESS NOTES
REPEAT AF ABLATION  Pt was in sinus rhythm on arrival.  A BRK1 needle was required for transeptal puncture.  Easily induced left atrial flutter from the right PV antrum.  Ablation changed the flutter to fibrillation.  AF stopped during further left atrial ablation.  No complications.  May be discharged around 7 pm.  Stop amiodarone. Resume other home medications.

## 2022-07-08 NOTE — PROVIDER NOTIFICATION
MD Notification    Notified Person: MD    Notified Person Name:     Notification Date/Time: 7-8-2022 9188    Notification Interaction: Spoke to MD    Purpose of Notification: /100. Pt.f unable to void. Bladder scan 498 ml.    Orders Received: Order received for Amlodipine. OK to straight cath pt. Now. OK to discharge pt. This evening if groin site stable.    Comments:

## 2022-07-08 NOTE — ANESTHESIA PREPROCEDURE EVALUATION
Anesthesia Pre-Procedure Evaluation    Patient: Rey Brown   MRN: [id number] : 1945        Procedure : Procedure(s):  Ablation Atrial Flutter          Past Medical History:   Diagnosis Date     CAD (coronary artery disease)     mid LAD MARYCARMEN 2022     Hypercholesterolemia      Lumbar disc disease      Migraines      Paroxysmal A-fib (H)       Past Surgical History:   Procedure Laterality Date     ANESTHESIA CARDIOVERSION N/A 2022    Procedure: ANESTHESIA, FOR CARDIOVERSION FOLLOWING AGUS;  Surgeon: GENERIC ANESTHESIA PROVIDER;  Location:  OR     ANESTHESIA CARDIOVERSION N/A 2022    Procedure: ANESTHESIA, FOR CARDIOVERSION;  Surgeon: GENERIC ANESTHESIA PROVIDER;  Location:  OR     COLONOSCOPY       CV CORONARY ANGIOGRAM N/A 2022    Procedure: Coronary Angiogram;  Surgeon: Jacqueline Ramirez MD;  Location:  [facility]ATH [facility]     CV PCI N/A 2022    Procedure: Percutaneous Coronary Intervention;  Surgeon: Jacqueline Ramirez MD;  Location:  [facility]ATH [facility]     CYSTOSCOPY, RETROGRADES, COMBINED Left 2020    Procedure: LEFT RETROGRADE URETERAL PYELOGRAM;  Surgeon: Denilson Angulo MD;  Location:  OR     CYSTOSCOPY, TRANSURETHRAL RESECTION (TUR) PROSTATE, COMBINED N/A 2020    Procedure: CYSTOSCOPY WITH TRANSURETHRAL RESECTION PROSTATE;  Surgeon: Denilson Angulo MD;  Location:  OR     EP ABLATION FOCAL AFIB N/A 2022    Procedure: Ablation Focal Atrial Fibrillation [[id number]];  Surgeon: Sharmin Scott MD;  Location:  HEART CARD[facility] CATH LAB     OPEN REDUCTION INTERNAL FIXATION HAND       RHINOPLASTY       TONSILLECTOMY       wisdom teeth        Allergies   Allergen Reactions     Ace Inhibitors Cough      Social History     Tobacco Use     Smoking status: Never Smoker     Smokeless tobacco: Never Used   Substance Use Topics     Alcohol use: Yes     Alcohol/week: 2.0 - 3.0 standard drinks     Types: 2 - 3 Cans of beer per week     Comment: 3-4  beers/week      Wt Readings from Last 1 Encounters:   07/08/22 73.5 kg (162 lb)        Anesthesia Evaluation            ROS/MED HX  ENT/Pulmonary:    (-) tobacco use and sleep apnea   Neurologic:     (+) migraines,     Cardiovascular:     (+) Dyslipidemia --CAD --stent-5/2022. dysrhythmias, a-fib and a-flutter,     METS/Exercise Tolerance:     Hematologic:       Musculoskeletal:       GI/Hepatic:    (-) GERD   Renal/Genitourinary:       Endo:       Psychiatric/Substance Use:       Infectious Disease:       Malignancy:       Other:            Physical Exam    Airway        Mallampati: II   TM distance: > 3 FB   Neck ROM: full   Mouth opening: > 3 cm    Respiratory Devices and Support         Dental  no notable dental history         Cardiovascular   cardiovascular exam normal          Pulmonary   pulmonary exam normal                OUTSIDE LABS:  CBC:   Lab Results   Component Value Date    WBC 5.6 07/08/2022    WBC 7.0 05/30/2022    HGB 14.3 07/08/2022    HGB 16.1 05/30/2022    HCT 42.2 07/08/2022    HCT 47.4 05/30/2022     07/08/2022     05/30/2022     BMP:   Lab Results   Component Value Date     07/08/2022     06/05/2022    POTASSIUM 4.3 07/08/2022    POTASSIUM 3.9 06/05/2022    CHLORIDE 109 07/08/2022    CHLORIDE 107 06/05/2022    CO2 28 07/08/2022    CO2 26 06/05/2022    BUN 20 07/08/2022    BUN 27 06/05/2022    CR 1.03 07/08/2022    CR 1.43 (H) 06/05/2022    GLC 91 07/08/2022    GLC 90 06/05/2022     COAGS:   Lab Results   Component Value Date    PTT 81 (H) 05/23/2022    INR 1.25 (H) 05/12/2022     POC: No results found for: BGM, HCG, HCGS  HEPATIC:   Lab Results   Component Value Date    ALBUMIN 3.3 (L) 05/30/2022    PROTTOTAL 6.9 05/30/2022    ALT 21 05/30/2022    AST 20 05/30/2022    ALKPHOS 101 05/30/2022    BILITOTAL 0.5 05/30/2022     OTHER:   Lab Results   Component Value Date    ELIEZER 8.9 07/08/2022    MAG 2.5 (H) 06/05/2022    LIPASE 58 03/10/2020    AMYLASE 59 03/10/2020     TSH 4.71 (H) 05/30/2022    T4 0.98 05/30/2022    CRP <0.3 04/09/2015    SED 1 04/09/2015       Anesthesia Plan    ASA Status:  2   NPO Status:  NPO Appropriate    Anesthesia Type: General.     - Airway: ETT   Induction: Intravenous, Propofol.   Maintenance: Inhalation.        Consents    Anesthesia Plan(s) and associated risks, benefits, and realistic alternatives discussed. Questions answered and patient/representative(s) expressed understanding.    - Discussed:     - Discussed with:  Patient         Postoperative Care    Pain management: IV analgesics, Oral pain medications.   PONV prophylaxis: Ondansetron (or other 5HT-3), Dexamethasone or Solumedrol     Comments:           H&P reviewed: Unable to attach H&P to encounter due to EHR limitations. H&P Update: appropriate H&P reviewed, patient examined. No interval changes since H&P (within 30 days).         Aram Navarro MD

## 2022-07-08 NOTE — ANESTHESIA POSTPROCEDURE EVALUATION
Patient: Rey Brown    Procedure: Procedure(s):  Ablation Atrial Flutter       Anesthesia Type:  General    Note:  Disposition: Outpatient   Postop Pain Control: Uneventful            Sign Out: Well controlled pain   PONV: No   Neuro/Psych: Uneventful            Sign Out: Acceptable/Baseline neuro status   Airway/Respiratory: Uneventful            Sign Out: Acceptable/Baseline resp. status   CV/Hemodynamics: Uneventful            Sign Out: Acceptable CV status   Other NRE: NONE   DID A NON-ROUTINE EVENT OCCUR? No           Last vitals:  Vitals Value Taken Time   /90 07/08/22 1630   Temp 36.1  C (96.9  F) 07/08/22 1610   Pulse 55 07/08/22 1634   Resp 14 07/08/22 1634   SpO2 98 % 07/08/22 1634   Vitals shown include unvalidated device data.    Electronically Signed By: Aram Navarro MD  July 8, 2022  4:40 PM

## 2022-07-08 NOTE — PROGRESS NOTES
Care Suites Admission Nursing Note    Patient Information  Name: Rey Brown  Age: 77 year old  Reason for admission: aflutter ablation  Care Suites arrival time: 1000    Patient Admission/Assessment   Pre-procedure assessment complete: Yes  If abnormal assessment/labs, provider notified: N/A - awaiting lab results  NPO: Yes  Medications held per instructions/orders: N/A - no missed doses of eliquis or plavix  Consent: deferred to MD  Patient oriented to room: Yes  Education/questions answered: Yes  Plan/other: Proceed as planned if labs WDL    Discharge Planning  Discharge name/phone number: Erin spouse [phone]  Overnight post sedation caregiver: Erin  Discharge location: home    PATIENT/VISITOR WELLNESS SCREENING    Step 1 Patient Screening    1. In the last month, have you been in contact with someone who was confirmed or suspected to have Coronavirus/COVID-19? No    2. Do you have the following symptoms?  Fever/Chills? No   Cough? No   Shortness of breath? No   New loss of taste or smell? No  Sore throat? No  Muscle or body aches? No  Headaches? No  Fatigue? No  Vomiting or diarrhea? No    Catina Valenzuela RN

## 2022-07-08 NOTE — ANESTHESIA PROCEDURE NOTES
Airway       Patient location during procedure: OR       Procedure Start/Stop Times: 7/8/2022 12:41 PM  Staff -        Anesthesiologist:  Aram Navarro MD       CRNA: Liz To APRN CRNA       Performed By: CRNAIndications and Patient Condition       Indications for airway management: brittney-procedural       Induction type:intravenous       Mask difficulty assessment: 2 - vent by mask + OA or adjuvant +/- NMBA    Final Airway Details       Final airway type: endotracheal airway       Successful airway: ETT - single  Endotracheal Airway Details        ETT size (mm): 8.0       Cuffed: yes       Successful intubation technique: direct laryngoscopy       DL Blade Type: Rivers 2       Grade View of Cords: 1       Adjucts: stylet       Position: Right       Measured from: gums/teeth       Secured at (cm): 23       Bite block used: None    Post intubation assessment        Placement verified by: capnometry, equal breath sounds and chest rise        Number of attempts at approach: 1       Secured with: pink tape       Ease of procedure: easy       Dentition: Intact and Unchanged    Medication(s) Administered   Medication Administration Time: 7/8/2022 12:41 PM

## 2022-07-08 NOTE — ANESTHESIA CARE TRANSFER NOTE
Patient: Rey Brown    Procedure: Procedure(s):  Ablation Atrial Flutter       Diagnosis: A flutter unspecifed  Diagnosis Additional Information: No value filed.    Anesthesia Type:   General     Note:    Oropharynx: oropharynx clear of all foreign objects  Level of Consciousness: awake  Oxygen Supplementation: face mask    Independent Airway: airway patency satisfactory and stable  Dentition: dentition unchanged  Vital Signs Stable: post-procedure vital signs reviewed and stable  Report to RN Given: handoff report given  Patient transferred to: PACU    Handoff Report: Identifed the Patient, Identified the Reponsible Provider, Reviewed the pertinent medical history, Discussed the surgical course, Reviewed Intra-OP anesthesia mangement and issues during anesthesia, Set expectations for post-procedure period and Allowed opportunity for questions and acknowledgement of understanding      Vitals:  Vitals Value Taken Time   /98    Temp     Pulse 50 07/08/22 1503   Resp 15 07/08/22 1503   SpO2 100 % 07/08/22 1503   Vitals shown include unvalidated device data.    Electronically Signed By: JOSE CRUZ Kuo CRNA  July 8, 2022  3:04 PM

## 2022-07-08 NOTE — DISCHARGE INSTRUCTIONS
A FLUTTER ABLATION DISCHARGE INSTRUCTIONS    After you go home:    Have an adult stay with you until tomorrow.  You may resume your normal diet.       For 24 hours - due to the sedation you received:  Relax and take it easy.  Do NOT make any important or legal decisions.  Do NOT drive or operate machines at home or at work.  Do NOT drink alcohol.    Care of Groin Puncture Site:    For the first 24 hrs - check the puncture site every 1-2 hours while awake.  For 2 days, when you cough, sneeze, laugh or move your bowels, hold your hand over the puncture site and press firmly.  Remove the dressing after 24 hours. If there is minor oozing, apply another bandaid and remove it after 12 hours.  It is normal to have a small bruise or pea size lump at the site.  You may shower tomorrow.  Do NOT take a bath, or use a hot tub or pool for at least 3 days. Do NOT scrub the site. Do not use lotion or powder near the puncture site.    Activity:            For 3 days:  No stooping or squatting  Do NOT do any heavy activity such as exercise, lifting, or straining.   No housework, yard work or any activity that make you sweat  Do NOT lift more than 10 pounds    Bleeding:    If you start bleeding from the site in your groin, lie down flat and press firmly on the site for 10 minutes.   Once bleeding stops, lay flat for 2 hours.  Call [facility] nurse line as soon as you can.       Call 911 right away if you have heavy bleeding or bleeding that does not stop.      Medicines:    Take your medications, including blood thinners, unless your provider tells you not to.  If you have stopped any medicines, check with your provider about when to restart them.  If you have pain or shortness of breath, you may take Advil (ibuprofen) or Tylenol (acetaminophen).    Follow Up Appointments:    8/4/2022 with [facility] at 7:30am in [place]  You will receive a phone call Monday, July 11th from an RN - Germaine Copeland or Brooke to  see how your are doing.    Call the clinic if:    You have increased pain or a large or growing hard lump around the site.  The site is red, swollen, hot or tender.  Blood or fluid is draining from the site.  You have chills or a fever greater than 101 F (38 C).  Your leg feels numb, cool or changes color.  Increased pain in the chest and/or groin.  Increased shortness of breath  Chest pain not relieved by Tylenol or Advil/Ibuprofen  New pain in the back or belly that you cannot control with Tylenol.  Recurrent irregular or fast heart rate (AFlutter) lasting over 2 hours.  Any questions or concerns.    Heart rhythms:    You may have some irregular heartbeats. These feel very strong. They may make you feel that the fast heart rhythm is going to start again.  Give it time. The irregular beats should occur less often.      [facility]:    [phone] ( 8am-4:30pm -)  Brooke Copeland    [phone] Option 2  On Call Cardiologist (7 days a week)

## 2022-07-08 NOTE — PROVIDER NOTIFICATION
MD Notification    Notified Person: MD    Notified Person Name:     Notification Date/Time: 7-8-2022 2921    Notification Interaction: Spoke to MD    Purpose of Notification: Pt. Unable to void. Cathed for 600cc urine. Hematuria noted. Pt. Concerned about discharging this evening due to hematuria.     Orders Received:Keep Faria catheter in place. discontinue discharge order.     Comments:

## 2022-07-09 VITALS
OXYGEN SATURATION: 98 % | WEIGHT: 165 LBS | HEART RATE: 59 BPM | RESPIRATION RATE: 16 BRPM | SYSTOLIC BLOOD PRESSURE: 119 MMHG | HEIGHT: 70 IN | DIASTOLIC BLOOD PRESSURE: 68 MMHG | BODY MASS INDEX: 23.62 KG/M2 | TEMPERATURE: 97.8 F

## 2022-07-09 PROCEDURE — 250N000013 HC RX MED GY IP 250 OP 250 PS 637: Performed by: INTERNAL MEDICINE

## 2022-07-09 PROCEDURE — 99204 OFFICE O/P NEW MOD 45 MIN: CPT | Performed by: INTERNAL MEDICINE

## 2022-07-09 RX ORDER — TAMSULOSIN HYDROCHLORIDE 0.4 MG/1
0.4 CAPSULE ORAL DAILY
Status: DISCONTINUED | OUTPATIENT
Start: 2022-07-09 | End: 2022-07-09 | Stop reason: HOSPADM

## 2022-07-09 RX ORDER — TAMSULOSIN HYDROCHLORIDE 0.4 MG/1
0.4 CAPSULE ORAL DAILY
Qty: 30 CAPSULE | Refills: 0 | Status: SHIPPED | OUTPATIENT
Start: 2022-07-09 | End: 2022-07-12

## 2022-07-09 RX ADMIN — APIXABAN 5 MG: 5 TABLET, FILM COATED ORAL at 09:07

## 2022-07-09 RX ADMIN — LOSARTAN POTASSIUM 25 MG: 25 TABLET, FILM COATED ORAL at 09:07

## 2022-07-09 RX ADMIN — TAMSULOSIN HYDROCHLORIDE 0.4 MG: 0.4 CAPSULE ORAL at 09:50

## 2022-07-09 RX ADMIN — CLOPIDOGREL BISULFATE 75 MG: 75 TABLET ORAL at 09:15

## 2022-07-09 RX ADMIN — OXYCODONE HYDROCHLORIDE AND ACETAMINOPHEN 1 TABLET: 5; 325 TABLET ORAL at 00:12

## 2022-07-09 NOTE — PROVIDER NOTIFICATION
MD Notification    Notified Person: MD    Notified Person Name: Tamra Arenaskwadwocade    Notification Date/Time: 7-8-2022 1928    Notification Interaction: Spoke to MD    Purpose of Notification: Pt. Asking about PTA Eliquis for this evening. Updated regarding hematuria. Code status order needed. Updated regarding hypertension.    Orders Received: MD states he will enter orders.    Comments:

## 2022-07-09 NOTE — PROGRESS NOTES
EP CARDIOLOGY DISCHARGE NOTE  Mykel Caldwell MD (pager [phone])    77-year-old male with symptomatic atrial fibrillation and initial catheter ablation procedure on 4/1/2022.  He later had several symptomatic recurrences of rapid atrial flutter which continued despite amiodarone.  He underwent repeat LA ablation by Dr. Lee Scott yesterday 7/8/2022.    After the procedure he could not void.  Initial straight cath drained 800 cc of urine, per patient's report.  He later could not void again and this time he had a Faria catheter introduced.  Unfortunately, this was traumatic, leading to hematuria.  He was kept overnight.    This morning he feels well without chest pain.  He has a Faria catheter and obvious hematuria seen on the Faria bag.  -----------------------------------------------------------------    ASSESSMENT:  1. Recurrent atypical atrial flutter, status post repeat LA ablation.  2. NSTEMI with LAD stenting in 05/2022.  3. History of BPH and TURP.  Traumatic hematuria.  His urologist is Dr. Angulo. Will send home with Faria in place.     PLAN:  1. He needs to continue both Plavix and Eliquis.  2. As per Dr. Scott, stop amiodarone.  3. Discharge home with Faria catheter in place.  Will provide supplies.  The patient is familiar with management of Faria catheter at home.  4. Start Flomax.  5. The patient will call his urologist office on Monday for follow-up arrangements.  6. See NOLAN in EP clinic in 7 to 10 days.    Total Time: 25 minutes;   15 minutes spent in direct communication with patient / family and care coordination.             Interval History:     no new complaints          Review of Systems:     CONSTITUTIONAL: NEGATIVE for fever, chills, change in weight  ENT/MOUTH: NEGATIVE for ear, mouth and throat problems  RESP: NEGATIVE for significant cough or SOB  CV: NEGATIVE for chest pain, palpitations or peripheral edema          Physical Exam:      Blood pressure 119/68, pulse 59, temperature 97.8  F (36.6  " C), temperature source Oral, resp. rate 16, height 1.778 m (5' 10\"), weight 74.8 kg (165 lb), SpO2 98 %.  Vitals:    07/08/22 1003 07/09/22 0625   Weight: 73.5 kg (162 lb) 74.8 kg (165 lb)     Vital Signs with Ranges  Temp:  [96.9  F (36.1  C)-98.1  F (36.7  C)] 97.8  F (36.6  C)  Pulse:  [47-67] 59  Resp:  [7-20] 16  BP: (119-201)/() 119/68  SpO2:  [96 %-100 %] 98 %  I/O's Last 24 hours  I/O last 3 completed shifts:  In: 2441.84 [P.O.:1440; I.V.:1001.84]  Out: 2975 [Urine:2975]    GENERAL: Alert, oriented no distress  HEENT:  normocephalic, atraumatic.  NECK:  normal JVP.  LUNGS:  clear bilaterally, no wheezes  CARDIOVASCULAR:  RRR, no gallop, murmur or rub  ABDOMEN: soft, NT  EXTREMITIES:  No R groin hematoma  VASCULAR:  2+ carotids, 2+ radial pulses           Medications:          apixaban ANTICOAGULANT  5 mg Oral BID     [Held by provider] clopidogrel  75 mg Oral Daily     losartan  25 mg Oral Daily            Data:      All new lab and imaging data was reviewed.   Recent Labs   Lab Test 07/08/22  1032 05/30/22  1924 05/23/22  1050 05/20/22  2222 05/12/22  0936 04/20/22  0828   WBC 5.6 7.0 7.2   < >  --   --    HGB 14.3 16.1 14.3   < >  --   --    MCV 92 91 93   < >  --   --     277 224   < >  --   --    INR  --   --   --   --  1.25* 1.46*    < > = values in this interval not displayed.      Recent Labs   Lab Test 07/08/22  1032 06/05/22  1317 05/30/22  1924    140 137   POTASSIUM 4.3 3.9 3.8   CHLORIDE 109 107 106   CO2 28 26 24   BUN 20 27 28   CR 1.03 1.43* 1.15   ANIONGAP 4 7 7   ELIEZER 8.9 9.5 8.9   GLC 91 90 196*     No lab results found.    Invalid input(s): TROP, TROPONINIES      EKG results:  Reviewed    Imaging:   Recent Results (from the past 24 hour(s))   EP Ablation    Narrative    EP ABLATION 7/8/2022 1:00 PM    NAME OF THE PROCEDURE:  1. Comprehensive EP study with left atrial recording from the coronary  sinus  2. 3-D intracardiac mapping  3. Reisolation of the PV antrum  4. Left " atrial linear ablation    INDICATIONS FOR PROCEDURE: Mr. Brown is a 77-year-old white male  with a history of symptomatic paroxysmal atrial fibrillation. He had  ablation in April of this year. He presented with recurrent atrial  flutter. After cardioversion he had recurrence of atrial flutter. He  was treated with amiodarone and converted to sinus rhythm in about 1  week. The patient has had a dry cough on amiodarone and repeat  ablation was recommended to stop amiodarone.    PROCEDURE AND RESULTS: After the written informed consent was obtained  the patient was transported to CV lab 4 in the fasting state. The  procedure was performed under general anesthesia and sterile  conditions. By using the Seldinger technique a 5 Slovenian decapolar  catheter was inserted through the left subclavian vein and placed into  the coronary sinus. There was some difficulty to fully insert the  catheter into the coronary sinus. After some effort I was able to  insert the catheter fully into the distal coronary sinus. The  transseptal puncture was performed by using this SafeSept guidewire  from the right femoral vein. Because of the vertical position of the  heart a BRK1 needle was required to engage the atrial septum. There  was significant fibrotic resistance for the transseptal puncture.  After transseptal puncture he was given heparin for anticoagulation.  The ablation catheter was [employer] Porter THERMOCOOL SF SMARTTOUCH  that was inserted through the transseptal sheath and placed into the  left atrium. A Halo catheter was placed into the right atrium for  mapping of the tricuspid annulus.    The patient was seen in sinus rhythm at baseline. He had very poor AV  conduction with AV Wenckebach cycle length greater than 600 ms. The AH  and HV intervals were normal. There was no evidence of accessory  pathways or dual AV micky pathways.    He had easily induced atrial flutter by atrial extra stimulation. The  AV conduction was  a variable. The intracardiac recording showed  activation patterns suggesting left atrial flutter originating from  the right PV antrum region. In addition it was observed that the right  atrial septal had extensive scar with no recordable electrogram.    The mapping and ablation were guided by the CARTO 3-D mapping system.  Initial ablation was performed around the anterior right PV antrum.  During that time the flutter was changed to atrial fibrillation.  Subsequently there were intermittent switches between different types  of flutter and atrial fibrillation. Further ablation was performed to  isolate the PV antrum at the scattered locations at a few places. The  left atrial posterior wall was re-ablated with elimination of the  residual atrial electrograms. During the posteromedial left atrial  ablation the fibrillation stopped. Further ablation was performed in  sinus rhythm to eliminate residual electrograms inside the lesion box.      Post ablation EP study by using double atrial extrastimuli induced a  few beats of repetitive atrial responses but no atrial fibrillation or  flutter.    At the end of the procedure the catheters and sheaths removal. The  left subclavian vein puncture site was compressed and dressed. The  right femoral vein access site was closed by using a suture and a  stopcock.     MEMO KURTZ MD         SYSTEM ID:  [id number]

## 2022-07-09 NOTE — PROGRESS NOTES
Neuro- WDL, A/Ox4  Most Recent Vitals- Temp: 98  F (36.7  C) Temp src: Axillary BP: 134/67 Pulse: 58   Resp: 16 SpO2: 96 % O2 Device: None (Room air)   Tele/Cardiac- SR/SBrady, 1st deg blk, dep st.   Resp- WDL on RA  Activity- Up with SBA  Pain- denies  Drips- none  Drains/Tubes- L FA PIV  Skin- Puncture sites, R groin and L subclavian C/D/I  GI/- Faria for retention and hematuria, Noticed clots in AM while hand irrigating.  Aggression Color- Green  COVID status- Negative  Plan- Plan to discharge in AM pending ability to urinate on own/state of hematuria      Valentin Tena RN

## 2022-07-09 NOTE — PLAN OF CARE
Goal Outcome Evaluation:    Monitor remains Sinus rhythm. Pt. Was hypertensive on arrival from PACU. Amlodipine given and BP now stable. Pt. Unable to void. Faria catheter placed for retention. Hematuria noted. Right groin site stable. Percocet given for back discomfort. Continue to monitor.

## 2022-07-11 ENCOUNTER — TELEPHONE (OUTPATIENT)
Dept: CARDIOLOGY | Facility: CLINIC | Age: 77
End: 2022-07-11

## 2022-07-11 NOTE — TELEPHONE ENCOUNTER
Spoke to pt who is doing well with no chest pain and groin site was tender yesterday, but feeling better today. Dressing removed today, because he forgot his instruction at the hospital.  Pt states that the valderrama became clogged and leaked all over so he removed the catheter, but cut it off and then pulling it out. Pt states that there was a large clot in the end. Pt is voiding just fine now and wondering if he needed to continue to take the Flomax. Will ask this question of Dr Caldwell, who discharged patient on Saturday.  Pt is doing very well otherwise and states that he played guitar and sang for an hour or so today.  Pt may play softball on Thursday, but will see how he feels. Licha

## 2022-07-12 LAB
ATRIAL RATE - MUSE: 50 BPM
ATRIAL RATE - MUSE: 59 BPM
DIASTOLIC BLOOD PRESSURE - MUSE: NORMAL MMHG
DIASTOLIC BLOOD PRESSURE - MUSE: NORMAL MMHG
INTERPRETATION ECG - MUSE: NORMAL
INTERPRETATION ECG - MUSE: NORMAL
P AXIS - MUSE: 86 DEGREES
P AXIS - MUSE: 88 DEGREES
PR INTERVAL - MUSE: 192 MS
PR INTERVAL - MUSE: 200 MS
QRS DURATION - MUSE: 86 MS
QRS DURATION - MUSE: 90 MS
QT - MUSE: 490 MS
QT - MUSE: 500 MS
QTC - MUSE: 455 MS
QTC - MUSE: 485 MS
R AXIS - MUSE: 68 DEGREES
R AXIS - MUSE: 75 DEGREES
SYSTOLIC BLOOD PRESSURE - MUSE: NORMAL MMHG
SYSTOLIC BLOOD PRESSURE - MUSE: NORMAL MMHG
T AXIS - MUSE: 82 DEGREES
T AXIS - MUSE: 91 DEGREES
VENTRICULAR RATE- MUSE: 50 BPM
VENTRICULAR RATE- MUSE: 59 BPM

## 2022-07-12 NOTE — TELEPHONE ENCOUNTER
Pt returned the call and will stop the Tamsulosin.  Pt also said that he had not received his AVS, so this has been sent via [employer]. Licha

## 2022-07-22 ENCOUNTER — ALLIED HEALTH/NURSE VISIT (OUTPATIENT)
Dept: CARDIOLOGY | Facility: CLINIC | Age: 77
End: 2022-07-22

## 2022-07-22 ENCOUNTER — TELEPHONE (OUTPATIENT)
Dept: CARDIOLOGY | Facility: CLINIC | Age: 77
End: 2022-07-22

## 2022-07-22 VITALS — SYSTOLIC BLOOD PRESSURE: 124 MMHG | DIASTOLIC BLOOD PRESSURE: 75 MMHG | HEART RATE: 106 BPM

## 2022-07-22 DIAGNOSIS — I48.0 PAROXYSMAL ATRIAL FIBRILLATION (H): Primary | ICD-10-CM

## 2022-07-22 DIAGNOSIS — I10 PRIMARY HYPERTENSION: Primary | ICD-10-CM

## 2022-07-22 DIAGNOSIS — I48.0 PAROXYSMAL ATRIAL FIBRILLATION (H): ICD-10-CM

## 2022-07-22 PROCEDURE — 93000 ELECTROCARDIOGRAM COMPLETE: CPT

## 2022-07-22 NOTE — TELEPHONE ENCOUNTER
"7/22/22 Spoke w pt who stated  last evening he went out of rhythm. He was outside all day yesterday and feels he may be dehydrated. He is feeling slightly lightheaded. /88 which is higher than usual. He is able to take EKG on [facility] watch- 2 from this am read \" Afib and 2 say \" inconclusive\". Pt unable to send EKG through [employer]. Will have him come in for ambulatory EKG at 1 pm   Den 1010 am  "

## 2022-07-22 NOTE — TELEPHONE ENCOUNTER
7/22/22 Pt LVM on Afib RN line stating he went out of rhythm last evening and pulse is  still 96 this am. Attempted to call pt but reached JIGAR SAUNDERS and requested callback. Den 840 am

## 2022-07-22 NOTE — TELEPHONE ENCOUNTER
7/22/22 Spoke  w pt and explained result and recommendations to take it easy and refrain from anything strenuous. Will have Dr Scott review and call pt later next week after review. Instructed pt to call back HR increases at rest > 110 or if sx worsens.  Pt voiced understanding and agreement with plan.   Den 453 pm

## 2022-07-22 NOTE — TELEPHONE ENCOUNTER
7/22/22 Pt here for ambulatory EKG. Will have Dr Caldwell review in Dr Jiang absence.   Pt verifies he is taking all meds as ordered.   Pt also send MyChart EKG from [facility] this am   Den 132 pm

## 2022-08-03 ENCOUNTER — MYC MEDICAL ADVICE (OUTPATIENT)
Dept: CARDIOLOGY | Facility: CLINIC | Age: 77
End: 2022-08-03

## 2022-08-03 ENCOUNTER — TELEPHONE (OUTPATIENT)
Dept: CARDIOLOGY | Facility: CLINIC | Age: 77
End: 2022-08-03

## 2022-08-03 NOTE — TELEPHONE ENCOUNTER
8/3/22 Spoke w Dr Scott and informed him pt is back in 2:1 Flutter per Dr Caldwell and that pt has follow up appt w Tanya Quintana PA-C on 8/4. For short term plan, Dr Scott would like Tanya to recommend pt go back on Amiodarone and if he does not convert to set him up for DCCV.  Dr Scott will discuss long term plan w pt at [facility] on 9/26.  Update routed to cash Martínez notified via [employer] 1288 pm

## 2022-08-03 NOTE — H&P (VIEW-ONLY)
[facility]    I had the pleasure of seeing Anthony when he came for follow up of recent ablation.  This 77 year old sees Dr. Scott & Dr. Pizano for his history of:    1. Paroxysmal AFib - had done well on flecainide for years, but had recurrent arrhythmia during COVID infection.  Rate control was not effective in controlling symptoms, and s/p AFib ablation with Dr. Scott 4/1/2022 (PV antrum, roof/floor lines and septal line). Required multiple repeat DCCVs following (4/22, 5/12, 5/30, 6/5) despite AAD therapy. Flecainide stopped 5/2022 d/t discovery of CAD/NSTEMI and CM (30-35%). Repeat PV antrum/posterior wall ablation 7/8/2022. Amiodarone stopped. Back in 2:1 AT vs AFlutter 7/22/2022.   2. Dyslipidemia  3. HTN   4. Chronic AC for CHADSVASc 5 (CM, HTN, CAD, age) - Eliquis  5. CAD - sp NSTEMI 5/2022 treated with MARYCARMEN to LAD  6. Ischemic Cardiomyopathy - LVEF had been 60-65%, down to 30-35% at time of MI 5/2022      I saw Anthony 4/2022 at which time we reviewed his recurrent AFib following extensive ablation 4/11/2022. AGUS/DCCV after starting Diltiazem back for rate control recommended. This was attempted 4/20 but he was very difficult to sedate and BPs were very high despite PTA losartan and IV hydralazine/sedation.    Therefore had AGUS/DCCV done under [facility] 4/22 was initially successful but had recurrent atypical AFlutter starting 2 days after, despite amiodarone. Amiodarone was stopped; flecainide restarted with repeat DCCV done 5/12 successfully restoring SR,    He was then admitted 5/20-24/2022 d/t c/o CP and noted to have NSTEMI. LVEF had dropped from 60-65% down to 30-35% aloong with WMA. Angiogram showed LAD lesion treated with MARYCARMEN. Discharged 5/24 on new atorvastatin 80, Plavix 75, losartan 12.5 and sl NTG. Simvastatin and flecainide were stopped.    He was back in ER 5/30 with c/o recurrent palpitations. EKG showed atypical AFlutter and underwent DCCV.     He saw Dr. Pizano in follow-up 6/1/2022.  "Losartan was increased to 25 mg daily and repeat echo 2-3 m recommended.    Unfortunately, had recurrent arrhythmia 6/5 and had DCCV in ER. Amiodarone was restarted with load.    Saw Dr. Scott 6/10/2022 and was doing OK on amiodarone. He later developed a dry cough and rec'd to have repeat ablation.  This was done 7/8/2022 with EPS showing L AFlutter originating from R PV antrum. During ablation of anterior R PV antrum, flutter changed to AFib. Therefore, further ablation to isolate PV antrum and LA posterior wall. No inducible arrhythmias were seen following ablation. He was discharged home on NO amiodarone. Eliquis 5 mg BID, Plavix 75 mg, Losartan 25 and Lipitor 80 all continued.     On 7/22, contacted us again noting recurrent arrhythmia starting 7/21 PM. EKG showed 2:1 AT with V rates ~100 bpm.    Dr. Scott has reviewed and recommended he be restarted on Amiodarone with plans for DCCV for a short-term plan.    Interval History:  Anthony continues to be in AFib. HR remains >100 most of the time. No issues with palpitations per se, but continues to issues with low stamina/lack of energy while in AFib.    No issues with chest pain, pressure or tightness.  He has not had to use any nitroglycerin.  At this past Saturday, 7/30, he woke up with a diaphragmatic \"cramping\" when he took a deep breath, which improved spontaneously after 2 - 3 minutes.  No cough, fever, chills or shortness of breath associated.  This has not recurred.  Nothing he can think of that would have caused it.    Denies any problems with the right groin site.  Denies trouble swallowing/fever/chills.  Not having any issues with medications.    Notes that BP has remained elevated despite increasing losartan to 25 mg daily 6/1 (Dr. Pizano).    VITALS:  Vitals: BP (!) 143/82 (BP Location: Left arm, Cuff Size: Adult Regular)   Pulse 112   Ht 1.778 m (5' 10\")   Wt 76.2 kg (168 lb)   BMI 24.11 kg/m      Diagnostic Testing:  EKG today, which I overread, showed " Atypical Atrial Flutter 89 bpm. Nonspecific TW abnl  EKG 7/28/2022 2:1  bpm  Cath 5/23/2022 pLAD 40%, 20% o/pCx. <10% lesions in RCA and 20% o/pCx lesion  Echo 5/22/2022 LVEF 30-35% with moderate-severe anterior, septal, apical wall HK. Nl RV. 1-2+TR. RVSP 26+RAP. RAP 8mmHg.  Trivial aortic sclerosis.  EKG 4/18/2022 on flecainide 100 mg  BID and Diltiazem 120 mg daily showed AFib 80 bpm. QRS duration 102 ms  EKG 4/8/2022 showed AFib 83 bpm. QRS duration 106 ms on flecainide 100 mg BID  [facility] Heart 3/25/202 - nl PV anatomy with normal aorta. 3 v CAC. Non-cardiac portion showed 5 mm groundglass pulmonary nodule RLL for which no routine follow-up needed. If suspicious, follow-up at 2 and 4 y rec'd  Component      Latest Ref Rng & Units 5/30/2022   TSH      0.40 - 4.00 mU/L 4.71 (H)   T4 Free      0.76 - 1.46 ng/dL 0.98     Component      Latest Ref Rng & Units 5/30/2022   Alkaline Phosphatase      40 - 150 U/L 101   AST      0 - 45 U/L 20   ALT      0 - 70 U/L 21   Protein Total      6.8 - 8.8 g/dL 6.9   Albumin      3.4 - 5.0 g/dL 3.3 (L)   Bilirubin Total      0.2 - 1.3 mg/dL 0.5   GFR Estimate      >60 mL/min/1.73m2 66     Component      Latest Ref Rng & Units 6/5/2022 7/8/2022   Sodium      133 - 144 mmol/L 140 141   Potassium      3.4 - 5.3 mmol/L 3.9 4.3   Chloride      94 - 109 mmol/L 107 109   Carbon Dioxide      20 - 32 mmol/L 26 28   Anion Gap      3 - 14 mmol/L 7 4   Urea Nitrogen      7 - 30 mg/dL 27 20   Creatinine      0.66 - 1.25 mg/dL 1.43 (H) 1.03   Calcium      8.5 - 10.1 mg/dL 9.5 8.9   Glucose      70 - 99 mg/dL 90 91   GFR Estimate      >60 mL/min/1.73m2 51 (L) 76         Plan:  1.  Amiodarone load (400 mg BID x 4 days, then 200 mg BID x 1 week, then 200 mg daily starting 8/15/2022)  2.  DCCV  3. See Dr. Scott as planned 9/2022.    Assessment/Plan:    1. Recurrent atrial arrhythmias    Unfortunately, Anthony remains in what appears to be atypical atrial flutter (vs AT).  Rate is controlled, but he  notes at home it is typically over 100 bpm on no rate controlling medications    PLAN:    Per Dr. Scott, will restart amiodarone with load (400 mg BID x 4 days, then 200 mg BID x 1 week, then 200 mg daily starting 8/15/2022).  Confirms he has this at home    He has not missed any doses of Eliquis.  Reviewed his [id number] of 5 (CM, HTN, CAD, age).   DCCV. We discussed Risk, Benefits and Indication of proceeding with direct current cardioversion (DCCV), including but not limited to use of anesthesia, surface burns, [id number] arrhythmias requiring further treatment, discomfort and stroke.  The patient voiced understanding and understands that a  will be needed given the use of conscious sedation. A consent form will be signed by the procedural physician.   * COVID testing   * Take all meds AM of procedure with small sip of water   * Confirms no missed doses of Eliquis    Note that he did have problems with cardioversion 4/20 (BP would not come down).  This has not been a problem for him on any other DCCVs, so and not anticipating needing general anesthesia for this    Dr. Scott will see him at 9/2022 as planned.  If he continues to have abnormal rhythms, Dr. Scott has considered AV node ablation/PPM implantation.  Very briefly reviewed this with Anthony Khan today    2. HTN    BP too high, and he notes he is getting in the 150s at home    Currently on losartan 25 mg daily (dose increased 6/1).  Follow-up BMP looks good    PLAN:    Increase losartan to 50 mg daily.  He will take 2 of his current 25 mg tabs and contact us when he needs a new prescription    3. CAD, ischemic cardiomyopathy    Had a NSTEMI 5/2022.  He had an LAD stent placed at that time    LVEF has been nl on AGUS 4/22/2022 60-65%.  Down to 30-35% with WMAs 5/2022 in setting of NSTEMI    Now s/p LAD stent implantation. On losartan    No evidence of fluid overload    PLAN:    Increase losartan to 50 mg daily as above    Dr. Pizano had recommended  repeating echocardiogram in about 2 months, but as he still had rhythm issues, I think we should postpone this until we get him back into NSR x 6-8 weeks so we will hold off on scheduling this.    4. On amiodarone therapy    As above, will start today     Notes that he felt some vision changes when on amiodarone, as well as a cough.  He notes the cough is improved dramatically since stopping the amiodarone 7/8 after his repeat ablation.  Vision changes have not improved much, and he wonders if these are just age-related.      Baseline thyroid/hepatic panel 5/2022 as above look okay    PLAN:    We are planning just a short course of this given his age.  Restarted amiodarone load as above    He is to contact us should he get recurrent cough/vision changes and we may need to move up his appointment with Dr. Scott to discuss long-term plan if he is unable to tolerate amiodarone        Tanya Quintana PA-C, MSPAS      Orders Placed This Encounter   Procedures     EKG 12-lead complete w/read - Clinics (performed today)     Cardioversion     Orders Placed This Encounter   Medications     losartan (COZAAR) 25 MG tablet     Sig: Take 2 tablets (50 mg) by mouth daily     amiodarone (PACERONE) 200 MG tablet     Sig: Take 2 tablets (400 mg) by mouth 2 times daily for 4 days, THEN 1 tablet (200 mg) 2 times daily for 7 days, THEN 1 tablet (200 mg) daily for 30 days.     Medications Discontinued During This Encounter   Medication Reason     losartan (COZAAR) 25 MG tablet          Encounter Diagnoses   Name Primary?     Atrial flutter, unspecified type (H) Yes     Ischemic cardiomyopathy      Primary hypertension        CURRENT MEDICATIONS:  Current Outpatient Medications   Medication Sig Dispense Refill     acetaminophen (TYLENOL) 325 MG tablet Take 325-650 mg by mouth every 6 hours as needed for mild pain       amiodarone (PACERONE) 200 MG tablet Take 2 tablets (400 mg) by mouth 2 times daily for 4 days, THEN 1 tablet (200 mg) 2 times  "daily for 7 days, THEN 1 tablet (200 mg) daily for 30 days.       apixaban ANTICOAGULANT (ELIQUIS) 5 MG tablet Take 1 tablet (5 mg) by mouth 2 times daily 180 tablet 3     atorvastatin (LIPITOR) 80 MG tablet Take 1 tablet (80 mg) by mouth daily 90 tablet 3     clopidogrel (PLAVIX) 75 MG tablet Take 1 tablet (75 mg) by mouth daily 90 tablet 3     losartan (COZAAR) 25 MG tablet Take 2 tablets (50 mg) by mouth daily       nitroGLYcerin (NITROSTAT) 0.4 MG sublingual tablet For chest pain place 1 tablet under the tongue every 5 minutes for 3 doses. If symptoms persist 5 minutes after 1st dose call 911.Please done take viagra along with nnitroglycerine 20 tablet 0       ALLERGIES     Allergies   Allergen Reactions     Ace Inhibitors Cough         Review of Systems:  Skin:  Negative     Eyes:  Negative    ENT:  Negative postnasal drainage  Respiratory:  Positive for dyspnea on exertion  Cardiovascular:  Negative for;palpitations;chest pain Positive for;exercise intolerance;fatigue  Gastroenterology: Negative for melena;hematochezia  Genitourinary:  Negative    Musculoskeletal:  Positive for back pain  Neurologic:  Negative headaches  Psychiatric:  Negative    Heme/Lymph/Imm:  Positive for easy bruising  Endocrine:  Negative      Physical Exam:  Vitals: BP (!) 143/82 (BP Location: Left arm, Cuff Size: Adult Regular)   Pulse 112   Ht 1.778 m (5' 10\")   Wt 76.2 kg (168 lb)   BMI 24.11 kg/m      Constitutional:  in no acute distress        Skin:  warm and dry to the touch        Head:  normocephalic        Eyes:  sclera white        ENT:  no pallor or cyanosis        Neck:  JVP normal;no carotid bruit        Chest:  clear to auscultation        Cardiac: normal S1 and S2 irregularly irregular rhythm   no presence of murmur systolic ejection murmur;grade 1          Abdomen:  abdomen soft        Vascular:                                        Extremities and Back:  no edema;no deformities, clubbing, cyanosis, erythema " observed        Neurological:  no gross motor deficits            PAST MEDICAL HISTORY:  Past Medical History:   Diagnosis Date     CAD (coronary artery disease)     mid LAD MARYCARMEN 5/23/2022     Hypercholesterolemia      Lumbar disc disease      Migraines      Paroxysmal A-fib (H)        PAST SURGICAL HISTORY:  Past Surgical History:   Procedure Laterality Date     ANESTHESIA CARDIOVERSION N/A 4/22/2022    Procedure: ANESTHESIA, FOR CARDIOVERSION FOLLOWING AGUS;  Surgeon: GENERIC ANESTHESIA PROVIDER;  Location:  OR     ANESTHESIA CARDIOVERSION N/A 5/12/2022    Procedure: ANESTHESIA, FOR CARDIOVERSION;  Surgeon: GENERIC ANESTHESIA PROVIDER;  Location:  OR     COLONOSCOPY       CV CORONARY ANGIOGRAM N/A 5/23/2022    Procedure: Coronary Angiogram;  Surgeon: Jacqueline Ramirez MD;  Location:  [facility]     CV PCI N/A 5/23/2022    Procedure: Percutaneous Coronary Intervention;  Surgeon: Jacqueline Ramirez MD;  Location:  [facility]     CYSTOSCOPY, RETROGRADES, COMBINED Left 5/13/2020    Procedure: LEFT RETROGRADE URETERAL PYELOGRAM;  Surgeon: Denilson Angulo MD;  Location:  OR     CYSTOSCOPY, TRANSURETHRAL RESECTION (TUR) PROSTATE, COMBINED N/A 5/13/2020    Procedure: CYSTOSCOPY WITH TRANSURETHRAL RESECTION PROSTATE;  Surgeon: Denilson Angulo MD;  Location:  OR     EP ABLATION ATRIAL FLUTTER N/A 7/8/2022    Procedure: Ablation Atrial Flutter;  Surgeon: Sharmin Scott MD;  Location:  [facility]     EP ABLATION FOCAL AFIB N/A 4/1/2022    Procedure: Ablation Focal Atrial Fibrillation [[id number]];  Surgeon: Sharmin Scott MD;  Location:  [facility]     OPEN REDUCTION INTERNAL FIXATION HAND       RHINOPLASTY       TONSILLECTOMY       wisdom teeth         FAMILY HISTORY:  Family History   Problem Relation Age of Onset     Dementia Father      Osteoarthritis Father      Cerebrovascular Disease Father      Myocardial Infarction Mother      C.A.D. Mother      Coronary  Artery Disease Mother        SOCIAL HISTORY:  Social History     Socioeconomic History     Marital status:      Spouse name: Erin     Number of children: None     Years of education: None     Highest education level: None   Tobacco Use     Smoking status: Never Smoker     Smokeless tobacco: Never Used   Substance and Sexual Activity     Alcohol use: Yes     Alcohol/week: 2.0 - 3.0 standard drinks     Types: 2 - 3 Cans of beer per week     Comment: 3-4 beers/week     Drug use: No     Sexual activity: Yes     Partners: Female   Other Topics Concern     Special Diet No     Exercise Yes     Comment: 2-3x/week

## 2022-08-03 NOTE — PROGRESS NOTES
[facility] HEART CLINIC    I had the pleasure of seeing Anthony when he came for follow up of recent ablation.  This 77 year old sees Dr. Scott & Dr. Pizano for his history of:    1. Paroxysmal AFib - had done well on flecainide for years, but had recurrent arrhythmia during COVID infection.  Rate control was not effective in controlling symptoms, and s/p AFib ablation with Dr. Scott 4/1/2022 (PV antrum, roof/floor lines and septal line). Required multiple repeat DCCVs following (4/22, 5/12, 5/30, 6/5) despite AAD therapy. Flecainide stopped 5/2022 d/t discovery of CAD/NSTEMI and CM (30-35%). Repeat PV antrum/posterior wall ablation 7/8/2022. Amiodarone stopped. Back in 2:1 AT vs AFlutter 7/22/2022.   2. Dyslipidemia  3. HTN   4. Chronic AC for CHADSVASc 5 (CM, HTN, CAD, age) - Eliquis  5. CAD - sp NSTEMI 5/2022 treated with MARYCARMEN to LAD  6. Ischemic Cardiomyopathy - LVEF had been 60-65%, down to 30-35% at time of MI 5/2022      I saw Anthony 4/2022 at which time we reviewed his recurrent AFib following extensive ablation 4/11/2022. AGUS/DCCV after starting Diltiazem back for rate control recommended. This was attempted 4/20 but he was very difficult to sedate and BPs were very high despite PTA losartan and IV hydralazine/sedation.    Therefore had AGUS/DCCV done under [place] 4/22 was initially successful but had recurrent atypical AFlutter starting 2 days after, despite amiodarone. Amiodarone was stopped; flecainide restarted with repeat DCCV done 5/12 successfully restoring SR,    He was then admitted 5/20-24/2022 d/t c/o CP and noted to have NSTEMI. LVEF had dropped from 60-65% down to 30-35% aloong with WMA. Angiogram showed LAD lesion treated with MARYCARMEN. Discharged 5/24 on new atorvastatin 80, Plavix 75, losartan 12.5 and sl NTG. Simvastatin and flecainide were stopped.    He was back in ER 5/30 with c/o recurrent palpitations. EKG showed atypical AFlutter and underwent DCCV.     He saw Dr. Pizano in follow-up 6/1/2022.  "Losartan was increased to 25 mg daily and repeat echo 2-3 m recommended.    Unfortunately, had recurrent arrhythmia 6/5 and had DCCV in ER. Amiodarone was restarted with load.    Saw Dr. Scott 6/10/2022 and was doing OK on amiodarone. He later developed a dry cough and rec'd to have repeat ablation.  This was done 7/8/2022 with EPS showing L AFlutter originating from R PV antrum. During ablation of anterior R PV antrum, flutter changed to AFib. Therefore, further ablation to isolate PV antrum and LA posterior wall. No inducible arrhythmias were seen following ablation. He was discharged home on NO amiodarone. Eliquis 5 mg BID, Plavix 75 mg, Losartan 25 and Lipitor 80 all continued.     On 7/22, contacted us again noting recurrent arrhythmia starting 7/21 PM. EKG showed 2:1 AT with V rates ~100 bpm.    Dr. Scott has reviewed and recommended he be restarted on Amiodarone with plans for DCCV for a short-term plan.    Interval History:  Anthony continues to be in AFib. HR remains >100 most of the time. No issues with palpitations per se, but continues to issues with low stamina/lack of energy while in AFib.    No issues with chest pain, pressure or tightness.  He has not had to use any nitroglycerin.  At this past Saturday, 7/30, he woke up with a diaphragmatic \"cramping\" when he took a deep breath, which improved spontaneously after 2 - 3 minutes.  No cough, fever, chills or shortness of breath associated.  This has not recurred.  Nothing he can think of that would have caused it.    Denies any problems with the right groin site.  Denies trouble swallowing/fever/chills.  Not having any issues with medications.    Notes that BP has remained elevated despite increasing losartan to 25 mg daily 6/1 (Dr. Pizano).    VITALS:  Vitals: BP (!) 143/82 (BP Location: Left arm, Cuff Size: Adult Regular)   Pulse 112   Ht 1.778 m (5' 10\")   Wt 76.2 kg (168 lb)   BMI 24.11 kg/m      Diagnostic Testing:  EKG today, which I overread, showed " Atypical Atrial Flutter 89 bpm. Nonspecific TW abnl  EKG 7/28/2022 2:1  bpm  Cath 5/23/2022 pLAD 40%, 20% o/pCx. <10% lesions in RCA and 20% o/pCx lesion  Echo 5/22/2022 LVEF 30-35% with moderate-severe anterior, septal, apical wall HK. Nl RV. 1-2+TR. RVSP 26+RAP. RAP 8mmHg.  Trivial aortic sclerosis.  EKG 4/18/2022 on flecainide 100 mg  BID and Diltiazem 120 mg daily showed AFib 80 bpm. QRS duration 102 ms  EKG 4/8/2022 showed AFib 83 bpm. QRS duration 106 ms on flecainide 100 mg BID  CTA Heart 3/25/202 - nl PV anatomy with normal aorta. 3 v CAC. Non-cardiac portion showed 5 mm groundglass pulmonary nodule RLL for which no routine follow-up needed. If suspicious, follow-up at 2 and 4 y rec'd  Component      Latest Ref Rng & Units 5/30/2022   TSH      0.40 - 4.00 mU/L 4.71 (H)   T4 Free      0.76 - 1.46 ng/dL 0.98     Component      Latest Ref Rng & Units 5/30/2022   Alkaline Phosphatase      40 - 150 U/L 101   AST      0 - 45 U/L 20   ALT      0 - 70 U/L 21   Protein Total      6.8 - 8.8 g/dL 6.9   Albumin      3.4 - 5.0 g/dL 3.3 (L)   Bilirubin Total      0.2 - 1.3 mg/dL 0.5   GFR Estimate      >60 mL/min/1.73m2 66     Component      Latest Ref Rng & Units 6/5/2022 7/8/2022   Sodium      133 - 144 mmol/L 140 141   Potassium      3.4 - 5.3 mmol/L 3.9 4.3   Chloride      94 - 109 mmol/L 107 109   Carbon Dioxide      20 - 32 mmol/L 26 28   Anion Gap      3 - 14 mmol/L 7 4   Urea Nitrogen      7 - 30 mg/dL 27 20   Creatinine      0.66 - 1.25 mg/dL 1.43 (H) 1.03   Calcium      8.5 - 10.1 mg/dL 9.5 8.9   Glucose      70 - 99 mg/dL 90 91   GFR Estimate      >60 mL/min/1.73m2 51 (L) 76         Plan:  1.  Amiodarone load (400 mg BID x 4 days, then 200 mg BID x 1 week, then 200 mg daily starting 8/15/2022)  2.  DCCV  3. See Dr. Scott as planned 9/2022.    Assessment/Plan:    1. Recurrent atrial arrhythmias    Unfortunately, Anthony remains in what appears to be atypical atrial flutter (vs AT).  Rate is controlled, but he  notes at home it is typically over 100 bpm on no rate controlling medications    PLAN:    Per Dr. Scott, will restart amiodarone with load (400 mg BID x 4 days, then 200 mg BID x 1 week, then 200 mg daily starting 8/15/2022).  Confirms he has this at home    He has not missed any doses of Eliquis.  Reviewed his [id number] of 5 (CM, HTN, CAD, age).   DCCV. We discussed Risk, Benefits and Indication of proceeding with direct current cardioversion (DCCV), including but not limited to use of anesthesia, surface burns, [id number] arrhythmias requiring further treatment, discomfort and stroke.  The patient voiced understanding and understands that a  will be needed given the use of conscious sedation. A consent form will be signed by the procedural physician.   * COVID testing   * Take all meds AM of procedure with small sip of water   * Confirms no missed doses of Eliquis    Note that he did have problems with cardioversion 4/20 (BP would not come down).  This has not been a problem for him on any other DCCVs, so and not anticipating needing general anesthesia for this    Dr. Scott will see him at 9/2022 as planned.  If he continues to have abnormal rhythms, Dr. Scott has considered AV node ablation/PPM implantation.  Very briefly reviewed this with Anthony Khan today    2. HTN    BP too high, and he notes he is getting in the 150s at home    Currently on losartan 25 mg daily (dose increased 6/1).  Follow-up BMP looks good    PLAN:    Increase losartan to 50 mg daily.  He will take 2 of his current 25 mg tabs and contact us when he needs a new prescription    3. CAD, ischemic cardiomyopathy    Had a NSTEMI 5/2022.  He had an LAD stent placed at that time    LVEF has been nl on AGUS 4/22/2022 60-65%.  Down to 30-35% with WMAs 5/2022 in setting of NSTEMI    Now s/p LAD stent implantation. On losartan    No evidence of fluid overload    PLAN:    Increase losartan to 50 mg daily as above    Dr. Pizano had recommended  repeating echocardiogram in about 2 months, but as he still had rhythm issues, I think we should postpone this until we get him back into NSR x 6-8 weeks so we will hold off on scheduling this.    4. On amiodarone therapy    As above, will start today     Notes that he felt some vision changes when on amiodarone, as well as a cough.  He notes the cough is improved dramatically since stopping the amiodarone 7/8 after his repeat ablation.  Vision changes have not improved much, and he wonders if these are just age-related.      Baseline thyroid/hepatic panel 5/2022 as above look okay    PLAN:    We are planning just a short course of this given his age.  Restarted amiodarone load as above    He is to contact us should he get recurrent cough/vision changes and we may need to move up his appointment with Dr. Scott to discuss long-term plan if he is unable to tolerate amiodarone        Tanya Quintana PA-C, MSPAS      Orders Placed This Encounter   Procedures     EKG 12-lead complete w/read - Clinics (performed today)     Cardioversion     Orders Placed This Encounter   Medications     losartan (COZAAR) 25 MG tablet     Sig: Take 2 tablets (50 mg) by mouth daily     amiodarone (PACERONE) 200 MG tablet     Sig: Take 2 tablets (400 mg) by mouth 2 times daily for 4 days, THEN 1 tablet (200 mg) 2 times daily for 7 days, THEN 1 tablet (200 mg) daily for 30 days.     Medications Discontinued During This Encounter   Medication Reason     losartan (COZAAR) 25 MG tablet          Encounter Diagnoses   Name Primary?     Atrial flutter, unspecified type (H) Yes     Ischemic cardiomyopathy      Primary hypertension        CURRENT MEDICATIONS:  Current Outpatient Medications   Medication Sig Dispense Refill     acetaminophen (TYLENOL) 325 MG tablet Take 325-650 mg by mouth every 6 hours as needed for mild pain       amiodarone (PACERONE) 200 MG tablet Take 2 tablets (400 mg) by mouth 2 times daily for 4 days, THEN 1 tablet (200 mg) 2 times  "daily for 7 days, THEN 1 tablet (200 mg) daily for 30 days.       apixaban ANTICOAGULANT (ELIQUIS) 5 MG tablet Take 1 tablet (5 mg) by mouth 2 times daily 180 tablet 3     atorvastatin (LIPITOR) 80 MG tablet Take 1 tablet (80 mg) by mouth daily 90 tablet 3     clopidogrel (PLAVIX) 75 MG tablet Take 1 tablet (75 mg) by mouth daily 90 tablet 3     losartan (COZAAR) 25 MG tablet Take 2 tablets (50 mg) by mouth daily       nitroGLYcerin (NITROSTAT) 0.4 MG sublingual tablet For chest pain place 1 tablet under the tongue every 5 minutes for 3 doses. If symptoms persist 5 minutes after 1st dose call 911.Please done take viagra along with nnitroglycerine 20 tablet 0       ALLERGIES     Allergies   Allergen Reactions     Ace Inhibitors Cough         Review of Systems:  Skin:  Negative     Eyes:  Negative    ENT:  Negative postnasal drainage  Respiratory:  Positive for dyspnea on exertion  Cardiovascular:  Negative for;palpitations;chest pain Positive for;exercise intolerance;fatigue  Gastroenterology: Negative for melena;hematochezia  Genitourinary:  Negative    Musculoskeletal:  Positive for back pain  Neurologic:  Negative headaches  Psychiatric:  Negative    Heme/Lymph/Imm:  Positive for easy bruising  Endocrine:  Negative      Physical Exam:  Vitals: BP (!) 143/82 (BP Location: Left arm, Cuff Size: Adult Regular)   Pulse 112   Ht 1.778 m (5' 10\")   Wt 76.2 kg (168 lb)   BMI 24.11 kg/m      Constitutional:  in no acute distress        Skin:  warm and dry to the touch        Head:  normocephalic        Eyes:  sclera white        ENT:  no pallor or cyanosis        Neck:  JVP normal;no carotid bruit        Chest:  clear to auscultation        Cardiac: normal S1 and S2 irregularly irregular rhythm   no presence of murmur systolic ejection murmur;grade 1          Abdomen:  abdomen soft        Vascular:                                        Extremities and Back:  no edema;no deformities, clubbing, cyanosis, erythema " observed        Neurological:  no gross motor deficits            PAST MEDICAL HISTORY:  Past Medical History:   Diagnosis Date     CAD (coronary artery disease)     mid LAD MARYCARMEN 5/23/2022     Hypercholesterolemia      Lumbar disc disease      Migraines      Paroxysmal A-fib (H)        PAST SURGICAL HISTORY:  Past Surgical History:   Procedure Laterality Date     ANESTHESIA CARDIOVERSION N/A 4/22/2022    Procedure: ANESTHESIA, FOR CARDIOVERSION FOLLOWING AGUS;  Surgeon: GENERIC ANESTHESIA PROVIDER;  Location:  OR     ANESTHESIA CARDIOVERSION N/A 5/12/2022    Procedure: ANESTHESIA, FOR CARDIOVERSION;  Surgeon: GENERIC ANESTHESIA PROVIDER;  Location:  OR     COLONOSCOPY       CV CORONARY ANGIOGRAM N/A 5/23/2022    Procedure: Coronary Angiogram;  Surgeon: Jacqueline Ramirez MD;  Location:  HEART CARDIAC CATH LAB     CV PCI N/A 5/23/2022    Procedure: Percutaneous Coronary Intervention;  Surgeon: Jacqueline Ramirez MD;  Location:  HEART CARDIAC CATH LAB     CYSTOSCOPY, RETROGRADES, COMBINED Left 5/13/2020    Procedure: LEFT RETROGRADE URETERAL PYELOGRAM;  Surgeon: Denilson Angulo MD;  Location:  OR     CYSTOSCOPY, TRANSURETHRAL RESECTION (TUR) PROSTATE, COMBINED N/A 5/13/2020    Procedure: CYSTOSCOPY WITH TRANSURETHRAL RESECTION PROSTATE;  Surgeon: Denilson Angulo MD;  Location:  OR     EP ABLATION ATRIAL FLUTTER N/A 7/8/2022    Procedure: Ablation Atrial Flutter;  Surgeon: Sharmin Scott MD;  Location:  HEART CARDIAC CATH LAB     EP ABLATION FOCAL AFIB N/A 4/1/2022    Procedure: Ablation Focal Atrial Fibrillation [[id number]];  Surgeon: Sharmin Scott MD;  Location:  HEART CARDIAC CATH LAB     OPEN REDUCTION INTERNAL FIXATION HAND       RHINOPLASTY       TONSILLECTOMY       wisdom teeth         FAMILY HISTORY:  Family History   Problem Relation Age of Onset     Dementia Father      Osteoarthritis Father      Cerebrovascular Disease Father      Myocardial Infarction Mother      C.A.D. Mother      Coronary  Artery Disease Mother        SOCIAL HISTORY:  Social History     Socioeconomic History     Marital status:      Spouse name: Erin     Number of children: None     Years of education: None     Highest education level: None   Tobacco Use     Smoking status: Never Smoker     Smokeless tobacco: Never Used   Substance and Sexual Activity     Alcohol use: Yes     Alcohol/week: 2.0 - 3.0 standard drinks     Types: 2 - 3 Cans of beer per week     Comment: 3-4 beers/week     Drug use: No     Sexual activity: Yes     Partners: Female   Other Topics Concern     Special Diet No     Exercise Yes     Comment: 2-3x/week

## 2022-08-04 ENCOUNTER — OFFICE VISIT (OUTPATIENT)
Dept: CARDIOLOGY | Facility: CLINIC | Age: 77
End: 2022-08-04
Attending: INTERNAL MEDICINE
Payer: COMMERCIAL

## 2022-08-04 VITALS
HEART RATE: 112 BPM | DIASTOLIC BLOOD PRESSURE: 82 MMHG | WEIGHT: 168 LBS | SYSTOLIC BLOOD PRESSURE: 143 MMHG | HEIGHT: 70 IN | BODY MASS INDEX: 24.05 KG/M2

## 2022-08-04 DIAGNOSIS — I10 PRIMARY HYPERTENSION: ICD-10-CM

## 2022-08-04 DIAGNOSIS — I48.92 ATRIAL FLUTTER, UNSPECIFIED TYPE (H): Primary | ICD-10-CM

## 2022-08-04 DIAGNOSIS — I25.5 ISCHEMIC CARDIOMYOPATHY: ICD-10-CM

## 2022-08-04 PROCEDURE — 93000 ELECTROCARDIOGRAM COMPLETE: CPT | Performed by: PHYSICIAN ASSISTANT

## 2022-08-04 PROCEDURE — 99214 OFFICE O/P EST MOD 30 MIN: CPT | Performed by: PHYSICIAN ASSISTANT

## 2022-08-04 RX ORDER — LOSARTAN POTASSIUM 25 MG/1
50 TABLET ORAL DAILY
Start: 2022-08-04 | End: 2022-08-05

## 2022-08-04 RX ORDER — AMIODARONE HYDROCHLORIDE 200 MG/1
TABLET ORAL
Start: 2022-08-04 | End: 2022-08-05

## 2022-08-04 NOTE — LETTER
8/4/2022    Sabas Staley MD  [address]  [facility] [address]    RE: Rey Brown       Dear Colleague,     I had the pleasure of seeing Rey Brown in the [facility].  [facility]    I had the pleasure of seeing Anthony when he came for follow up of recent ablation.  This 77 year old sees Dr. Scott & Dr. Pizano for his history of:    1. Paroxysmal AFib - had done well on flecainide for years, but had recurrent arrhythmia during COVID infection.  Rate control was not effective in controlling symptoms, and s/p AFib ablation with Dr. Scott 4/1/2022 (PV antrum, roof/floor lines and septal line). Required multiple repeat DCCVs following (4/22, 5/12, 5/30, 6/5) despite AAD therapy. Flecainide stopped 5/2022 d/t discovery of CAD/NSTEMI and CM (30-35%). Repeat PV antrum/posterior wall ablation 7/8/2022. Amiodarone stopped. Back in 2:1 AT vs AFlutter 7/22/2022.   2. Dyslipidemia  3. HTN   4. Chronic AC for CHADSVASc 5 (CM, HTN, CAD, age) - Eliquis  5. CAD - sp NSTEMI 5/2022 treated with MARYCARMEN to LAD  6. Ischemic Cardiomyopathy - LVEF had been 60-65%, down to 30-35% at time of MI 5/2022      I saw Anthony 4/2022 at which time we reviewed his recurrent AFib following extensive ablation 4/11/2022. AGUS/DCCV after starting Diltiazem back for rate control recommended. This was attempted 4/20 but he was very difficult to sedate and BPs were very high despite PTA losartan and IV hydralazine/sedation.    Therefore had AGUS/DCCV done under [place] 4/22 was initially successful but had recurrent atypical AFlutter starting 2 days after, despite amiodarone. Amiodarone was stopped; flecainide restarted with repeat DCCV done 5/12 successfully restoring SR,    He was then admitted 5/20-24/2022 d/t c/o CP and noted to have NSTEMI. LVEF had dropped from 60-65% down to 30-35% aloong with WMA. Angiogram showed LAD lesion treated with MARYCARMEN. Discharged 5/24 on new atorvastatin 80, Plavix 75,  "losartan 12.5 and sl NTG. Simvastatin and flecainide were stopped.    He was back in ER 5/30 with c/o recurrent palpitations. EKG showed atypical AFlutter and underwent DCCV.     He saw Dr. Pizano in follow-up 6/1/2022. Losartan was increased to 25 mg daily and repeat echo 2-3 m recommended.    Unfortunately, had recurrent arrhythmia 6/5 and had DCCV in ER. Amiodarone was restarted with load.    Saw Dr. Scott 6/10/2022 and was doing OK on amiodarone. He later developed a dry cough and rec'd to have repeat ablation.  This was done 7/8/2022 with EPS showing L AFlutter originating from R PV antrum. During ablation of anterior R PV antrum, flutter changed to AFib. Therefore, further ablation to isolate PV antrum and LA posterior wall. No inducible arrhythmias were seen following ablation. He was discharged home on NO amiodarone. Eliquis 5 mg BID, Plavix 75 mg, Losartan 25 and Lipitor 80 all continued.     On 7/22, contacted us again noting recurrent arrhythmia starting 7/21 PM. EKG showed 2:1 AT with V rates ~100 bpm.    Dr. Scott has reviewed and recommended he be restarted on Amiodarone with plans for DCCV for a short-term plan.    Interval History:  Anthony continues to be in AFib. HR remains >100 most of the time. No issues with palpitations per se, but continues to issues with low stamina/lack of energy while in AFib.    No issues with chest pain, pressure or tightness.  He has not had to use any nitroglycerin.  At this past Saturday, 7/30, he woke up with a diaphragmatic \"cramping\" when he took a deep breath, which improved spontaneously after 2 - 3 minutes.  No cough, fever, chills or shortness of breath associated.  This has not recurred.  Nothing he can think of that would have caused it.    Denies any problems with the right groin site.  Denies trouble swallowing/fever/chills.  Not having any issues with medications.    Notes that BP has remained elevated despite increasing losartan to 25 mg daily 6/1 (Dr." "Harinder).    VITALS:  Vitals: BP (!) 143/82 (BP Location: Left arm, Cuff Size: Adult Regular)   Pulse 112   Ht 1.778 m (5' 10\")   Wt 76.2 kg (168 lb)   BMI 24.11 kg/m      Diagnostic Testing:  EKG today, which I overread, showed Atypical Atrial Flutter 89 bpm. Nonspecific TW abnl  EKG 7/28/2022 2:1  bpm  Cath 5/23/2022 pLAD 40%, 20% o/pCx. <10% lesions in RCA and 20% o/pCx lesion  Echo 5/22/2022 LVEF 30-35% with moderate-severe anterior, septal, apical wall HK. Nl RV. 1-2+TR. RVSP 26+RAP. RAP 8mmHg.  Trivial aortic sclerosis.  EKG 4/18/2022 on flecainide 100 mg  BID and Diltiazem 120 mg daily showed AFib 80 bpm. QRS duration 102 ms  EKG 4/8/2022 showed AFib 83 bpm. QRS duration 106 ms on flecainide 100 mg BID  CTA Heart 3/25/202 - nl PV anatomy with normal aorta. 3 v CAC. Non-cardiac portion showed 5 mm groundglass pulmonary nodule RLL for which no routine follow-up needed. If suspicious, follow-up at 2 and 4 y rec'd  Component      Latest Ref Rng & Units 5/30/2022   TSH      0.40 - 4.00 mU/L 4.71 (H)   T4 Free      0.76 - 1.46 ng/dL 0.98     Component      Latest Ref Rng & Units 5/30/2022   Alkaline Phosphatase      40 - 150 U/L 101   AST      0 - 45 U/L 20   ALT      0 - 70 U/L 21   Protein Total      6.8 - 8.8 g/dL 6.9   Albumin      3.4 - 5.0 g/dL 3.3 (L)   Bilirubin Total      0.2 - 1.3 mg/dL 0.5   GFR Estimate      >60 mL/min/1.73m2 66     Component      Latest Ref Rng & Units 6/5/2022 7/8/2022   Sodium      133 - 144 mmol/L 140 141   Potassium      3.4 - 5.3 mmol/L 3.9 4.3   Chloride      94 - 109 mmol/L 107 109   Carbon Dioxide      20 - 32 mmol/L 26 28   Anion Gap      3 - 14 mmol/L 7 4   Urea Nitrogen      7 - 30 mg/dL 27 20   Creatinine      0.66 - 1.25 mg/dL 1.43 (H) 1.03   Calcium      8.5 - 10.1 mg/dL 9.5 8.9   Glucose      70 - 99 mg/dL 90 91   GFR Estimate      >60 mL/min/1.73m2 51 (L) 76         Plan:  1.  Amiodarone load (400 mg BID x 4 days, then 200 mg BID x 1 week, then 200 mg daily " starting 8/15/2022)  2.  DCCV  3. See Dr. Scott as planned 9/2022.    Assessment/Plan:    1. Recurrent atrial arrhythmias    Unfortunately, Anthony remains in what appears to be atypical atrial flutter (vs AT).  Rate is controlled, but he notes at home it is typically over 100 bpm on no rate controlling medications    PLAN:    Per Dr. Scott, will restart amiodarone with load (400 mg BID x 4 days, then 200 mg BID x 1 week, then 200 mg daily starting 8/15/2022).  Confirms he has this at home    He has not missed any doses of Eliquis.  Reviewed his [id number] of 5 (CM, HTN, CAD, age).   DCCV. We discussed Risk, Benefits and Indication of proceeding with direct current cardioversion (DCCV), including but not limited to use of anesthesia, surface burns, [id number] arrhythmias requiring further treatment, discomfort and stroke.  The patient voiced understanding and understands that a  will be needed given the use of conscious sedation. A consent form will be signed by the procedural physician.   * COVID testing   * Take all meds AM of procedure with small sip of water   * Confirms no missed doses of Eliquis    Note that he did have problems with cardioversion 4/20 (BP would not come down).  This has not been a problem for him on any other DCCVs, so and not anticipating needing general anesthesia for this    Dr. Scott will see him at 9/2022 as planned.  If he continues to have abnormal rhythms, Dr. Scott has considered AV node ablation/PPM implantation.  Very briefly reviewed this with Anthony & Erin today    2. HTN    BP too high, and he notes he is getting in the 150s at home    Currently on losartan 25 mg daily (dose increased 6/1).  Follow-up BMP looks good    PLAN:    Increase losartan to 50 mg daily.  He will take 2 of his current 25 mg tabs and contact us when he needs a new prescription    3. CAD, ischemic cardiomyopathy    Had a NSTEMI 5/2022.  He had an LAD stent placed at that time    LVEF has been nl on AGUS  4/22/2022 60-65%.  Down to 30-35% with WMAs 5/2022 in setting of NSTEMI    Now s/p LAD stent implantation. On losartan    No evidence of fluid overload    PLAN:    Increase losartan to 50 mg daily as above    Dr. Pizano had recommended repeating echocardiogram in about 2 months, but as he still had rhythm issues, I think we should postpone this until we get him back into NSR x 6-8 weeks so we will hold off on scheduling this.    4. On amiodarone therapy    As above, will start today     Notes that he felt some vision changes when on amiodarone, as well as a cough.  He notes the cough is improved dramatically since stopping the amiodarone 7/8 after his repeat ablation.  Vision changes have not improved much, and he wonders if these are just age-related.      Baseline thyroid/hepatic panel 5/2022 as above look okay    PLAN:    We are planning just a short course of this given his age.  Restarted amiodarone load as above    He is to contact us should he get recurrent cough/vision changes and we may need to move up his appointment with Dr. Scott to discuss long-term plan if he is unable to tolerate amiodarone        Tanya Quintana PA-C, MSPAS      Orders Placed This Encounter   Procedures     EKG 12-lead complete w/read - Clinics (performed today)     Cardioversion     Orders Placed This Encounter   Medications     losartan (COZAAR) 25 MG tablet     Sig: Take 2 tablets (50 mg) by mouth daily     amiodarone (PACERONE) 200 MG tablet     Sig: Take 2 tablets (400 mg) by mouth 2 times daily for 4 days, THEN 1 tablet (200 mg) 2 times daily for 7 days, THEN 1 tablet (200 mg) daily for 30 days.     Medications Discontinued During This Encounter   Medication Reason     losartan (COZAAR) 25 MG tablet          Encounter Diagnoses   Name Primary?     Atrial flutter, unspecified type (H) Yes     Ischemic cardiomyopathy      Primary hypertension        CURRENT MEDICATIONS:  Current Outpatient Medications   Medication Sig Dispense Refill      "acetaminophen (TYLENOL) 325 MG tablet Take 325-650 mg by mouth every 6 hours as needed for mild pain       amiodarone (PACERONE) 200 MG tablet Take 2 tablets (400 mg) by mouth 2 times daily for 4 days, THEN 1 tablet (200 mg) 2 times daily for 7 days, THEN 1 tablet (200 mg) daily for 30 days.       apixaban ANTICOAGULANT (ELIQUIS) 5 MG tablet Take 1 tablet (5 mg) by mouth 2 times daily 180 tablet 3     atorvastatin (LIPITOR) 80 MG tablet Take 1 tablet (80 mg) by mouth daily 90 tablet 3     clopidogrel (PLAVIX) 75 MG tablet Take 1 tablet (75 mg) by mouth daily 90 tablet 3     losartan (COZAAR) 25 MG tablet Take 2 tablets (50 mg) by mouth daily       nitroGLYcerin (NITROSTAT) 0.4 MG sublingual tablet For chest pain place 1 tablet under the tongue every 5 minutes for 3 doses. If symptoms persist 5 minutes after 1st dose call 911.Please done take viagra along with nnitroglycerine 20 tablet 0       ALLERGIES     Allergies   Allergen Reactions     Ace Inhibitors Cough         Review of Systems:  Skin:  Negative     Eyes:  Negative    ENT:  Negative postnasal drainage  Respiratory:  Positive for dyspnea on exertion  Cardiovascular:  Negative for;palpitations;chest pain Positive for;exercise intolerance;fatigue  Gastroenterology: Negative for melena;hematochezia  Genitourinary:  Negative    Musculoskeletal:  Positive for back pain  Neurologic:  Negative headaches  Psychiatric:  Negative    Heme/Lymph/Imm:  Positive for easy bruising  Endocrine:  Negative      Physical Exam:  Vitals: BP (!) 143/82 (BP Location: Left arm, Cuff Size: Adult Regular)   Pulse 112   Ht 1.778 m (5' 10\")   Wt 76.2 kg (168 lb)   BMI 24.11 kg/m      Constitutional:  in no acute distress        Skin:  warm and dry to the touch        Head:  normocephalic        Eyes:  sclera white        ENT:  no pallor or cyanosis        Neck:  JVP normal;no carotid bruit        Chest:  clear to auscultation        Cardiac: normal S1 and S2 irregularly irregular " rhythm   no presence of murmur systolic ejection murmur;grade 1          Abdomen:  abdomen soft        Vascular:                                        Extremities and Back:  no edema;no deformities, clubbing, cyanosis, erythema observed        Neurological:  no gross motor deficits            PAST MEDICAL HISTORY:  Past Medical History:   Diagnosis Date     CAD (coronary artery disease)     mid LAD MARYCARMEN 5/23/2022     Hypercholesterolemia      Lumbar disc disease      Migraines      Paroxysmal A-fib (H)        PAST SURGICAL HISTORY:  Past Surgical History:   Procedure Laterality Date     ANESTHESIA CARDIOVERSION N/A 4/22/2022    Procedure: ANESTHESIA, FOR CARDIOVERSION FOLLOWING AGUS;  Surgeon: GENERIC ANESTHESIA PROVIDER;  Location:  OR     ANESTHESIA CARDIOVERSION N/A 5/12/2022    Procedure: ANESTHESIA, FOR CARDIOVERSION;  Surgeon: GENERIC ANESTHESIA PROVIDER;  Location:  OR     COLONOSCOPY       CV CORONARY ANGIOGRAM N/A 5/23/2022    Procedure: Coronary Angiogram;  Surgeon: Jacqueline Ramirez MD;  Location: [facility] CARDIAC CATH LAB     CV PCI N/A 5/23/2022    Procedure: Percutaneous Coronary Intervention;  Surgeon: Jacqueline Ramirez MD;  Location: [facility] CARDIAC CATH LAB     CYSTOSCOPY, RETROGRADES, COMBINED Left 5/13/2020    Procedure: LEFT RETROGRADE URETERAL PYELOGRAM;  Surgeon: Denilson Angulo MD;  Location:  OR     CYSTOSCOPY, TRANSURETHRAL RESECTION (TUR) PROSTATE, COMBINED N/A 5/13/2020    Procedure: CYSTOSCOPY WITH TRANSURETHRAL RESECTION PROSTATE;  Surgeon: Denilson Angulo MD;  Location:  OR     EP ABLATION ATRIAL FLUTTER N/A 7/8/2022    Procedure: Ablation Atrial Flutter;  Surgeon: Sharmin Scott MD;  Location:  HEART CARDIAC CATH LAB     EP ABLATION FOCAL AFIB N/A 4/1/2022    Procedure: Ablation Focal Atrial Fibrillation [[id number]];  Surgeon: Sharmin Scott MD;  Location:  HEART CARDIAC CATH LAB     OPEN REDUCTION INTERNAL FIXATION HAND       RHINOPLASTY       TONSILLECTOMY       [place]  teeth         FAMILY HISTORY:  Family History   Problem Relation Age of Onset     Dementia Father      Osteoarthritis Father      Cerebrovascular Disease Father      Myocardial Infarction Mother      C.A.D. Mother      Coronary Artery Disease Mother        SOCIAL HISTORY:  Social History     Socioeconomic History     Marital status:      Spouse name: Erin     Number of children: None     Years of education: None     Highest education level: None   Tobacco Use     Smoking status: Never Smoker     Smokeless tobacco: Never Used   Substance and Sexual Activity     Alcohol use: Yes     Alcohol/week: 2.0 - 3.0 standard drinks     Types: 2 - 3 Cans of beer per week     Comment: 3-4 beers/week     Drug use: No     Sexual activity: Yes     Partners: Female   Other Topics Concern     Special Diet No     Exercise Yes     Comment: 2-3x/week       Thank you for allowing me to participate in the care of your patient.      Sincerely,     Mona Quintana PA-C     [facility]  cc:   Sharmin Scott MD  [address]  [address]  [place] [address]

## 2022-08-04 NOTE — PATIENT INSTRUCTIONS
Your blood pressure was elevated at your appointment today.  Elevated blood pressure can increase your risk of a heart attack, stroke and heart failure.  For this reason, we feel it is important to monitor your blood pressure closely.  If you have access to a home blood pressure monitor or are able to check your blood pressure at a local pharmacy in the next week we would like you to do so and call our clinic with those readings. Please call [phone] (Rosalia) and leave a message with your name, date of birth, blood pressure reading, date and location it was completed. If your blood pressure remains elevated your care team will be notified.  We appreciate being a part of your healthcare team and look forward to hearing from you soon.      Anthony - it was good to see you and meet Erin today!    Reviewed that you're back in Atypical Aflutter following repeat ablation  BPs are a bit high  Overall, still feeling not well in AFib    PLAN:  Restart amiodarone. Tablets at home -   Take 400 mg twice a day (800 mg total for the day) x 4 days (Thurs/Fri/Sat/Sun)  On Monday 8/8, decrease to 200 mg twice a day (400 mg total for the day) x 1 week   On Monday 8/15 decrease to 200 mg daily    2. Pay attention to eye/vision changes and cough and CALL!  3. Arlene will call you to set up cardioversion next week  4. Continue Eliquis 5 mg twice a day NO MISSES  5. For BP, increase losartan to 50 mg daily    6. CALL if issues/concerns! [phone]

## 2022-08-05 DIAGNOSIS — I48.92 ATRIAL FLUTTER, UNSPECIFIED TYPE (H): ICD-10-CM

## 2022-08-05 DIAGNOSIS — I10 PRIMARY HYPERTENSION: ICD-10-CM

## 2022-08-05 DIAGNOSIS — I25.5 ISCHEMIC CARDIOMYOPATHY: ICD-10-CM

## 2022-08-05 RX ORDER — LOSARTAN POTASSIUM 50 MG/1
50 TABLET ORAL DAILY
Qty: 90 TABLET | Refills: 3 | Status: SHIPPED | OUTPATIENT
Start: 2022-08-05 | End: 2023-04-24

## 2022-08-05 RX ORDER — AMIODARONE HYDROCHLORIDE 200 MG/1
TABLET ORAL
Qty: 60 TABLET | Refills: 0 | Status: SHIPPED | OUTPATIENT
Start: 2022-08-05 | End: 2022-09-12

## 2022-08-05 NOTE — PROGRESS NOTES
[facility] Refill Guideline reviewed.  Medication meets criteria for refill. FLACA Rao RN

## 2022-08-08 ENCOUNTER — TELEPHONE (OUTPATIENT)
Dept: CARDIOLOGY | Facility: CLINIC | Age: 77
End: 2022-08-08

## 2022-08-08 NOTE — TELEPHONE ENCOUNTER
Patient returned call and left voicemail message with update blood pressure reading.      Last Blood Pressure: 143/82  Last Heart Rate: 112  Date: 8/4/22  Location: [facility]    Today's Blood Pressure:   128/75- P-100  148/89- P- 96  143/103- P- 98  119/85- P-100  143/85- P-107  143/82, P- 94    Location: Home BP    Patient reported blood pressure updated in Epic. Blood pressure continues to fall outside of the [place] Community Measures guidelines.  Message sent to primary cardiology team for further review.

## 2022-08-10 ENCOUNTER — ANESTHESIA EVENT (OUTPATIENT)
Dept: SURGERY | Facility: CLINIC | Age: 77
End: 2022-08-10
Payer: COMMERCIAL

## 2022-08-10 ENCOUNTER — TELEPHONE (OUTPATIENT)
Dept: CARDIOLOGY | Facility: CLINIC | Age: 77
End: 2022-08-10

## 2022-08-10 ASSESSMENT — ENCOUNTER SYMPTOMS: DYSRHYTHMIAS: 1

## 2022-08-10 ASSESSMENT — LIFESTYLE VARIABLES: TOBACCO_USE: 0

## 2022-08-10 NOTE — ANESTHESIA PREPROCEDURE EVALUATION
Anesthesia Pre-Procedure Evaluation    Patient: eRy Brown   MRN: [id number] : 1945        Procedure : Procedure(s):  CARDIOVERSION          Past Medical History:   Diagnosis Date     CAD (coronary artery disease)     mid LAD MARYCARMEN 2022     Hypercholesterolemia      Lumbar disc disease      Migraines      Paroxysmal A-fib (H)       Past Surgical History:   Procedure Laterality Date     ANESTHESIA CARDIOVERSION N/A 2022    Procedure: ANESTHESIA, FOR CARDIOVERSION FOLLOWING AGUS;  Surgeon: GENERIC ANESTHESIA PROVIDER;  Location:  OR     ANESTHESIA CARDIOVERSION N/A 2022    Procedure: ANESTHESIA, FOR CARDIOVERSION;  Surgeon: GENERIC ANESTHESIA PROVIDER;  Location:  OR     COLONOSCOPY       CV CORONARY ANGIOGRAM N/A 2022    Procedure: Coronary Angiogram;  Surgeon: Jacqueline Ramirez MD;  Location: [facility] CARDIAC CATH LAB     CV PCI N/A 2022    Procedure: Percutaneous Coronary Intervention;  Surgeon: Jacqueline Ramirez MD;  Location: [facility] CARDIAC CATH LAB     CYSTOSCOPY, RETROGRADES, COMBINED Left 2020    Procedure: LEFT RETROGRADE URETERAL PYELOGRAM;  Surgeon: Denilson Angulo MD;  Location:  OR     CYSTOSCOPY, TRANSURETHRAL RESECTION (TUR) PROSTATE, COMBINED N/A 2020    Procedure: CYSTOSCOPY WITH TRANSURETHRAL RESECTION PROSTATE;  Surgeon: Denilson Angulo MD;  Location:  OR     EP ABLATION ATRIAL FLUTTER N/A 2022    Procedure: Ablation Atrial Flutter;  Surgeon: Sharmin Scott MD;  Location:  HEART CARDIAC CATH LAB     EP ABLATION FOCAL AFIB N/A 2022    Procedure: Ablation Focal Atrial Fibrillation [[id number]];  Surgeon: Sharmin Scott MD;  Location:  HEART CARDIAC CATH LAB     OPEN REDUCTION INTERNAL FIXATION HAND       RHINOPLASTY       TONSILLECTOMY       wisdom teeth        Allergies   Allergen Reactions     Ace Inhibitors Cough      Social History     Tobacco Use     Smoking status: Never Smoker     Smokeless tobacco: Never Used   Substance  Use Topics     Alcohol use: Yes     Alcohol/week: 2.0 - 3.0 standard drinks     Types: 2 - 3 Cans of beer per week     Comment: 3-4 beers/week      Wt Readings from Last 1 Encounters:   22 76.2 kg (168 lb)        Anesthesia Evaluation   Pt has had prior anesthetic.     No history of anesthetic complications       ROS/MED HX  ENT/Pulmonary:    (-) tobacco use and sleep apnea   Neurologic:     (+) migraines,     Cardiovascular:     (+) Dyslipidemia hypertension--CAD -past MI -stent-2022. Drug Eluting Stent. CHF etiology: ICM dysrhythmias, a-fib and a-flutter, Previous cardiac testing   Echo: Date:  Results:  Narrative & Impression  [id number]  [id number]  [id number]^[place]TH^[place]     [facility]  Echocardiography Laboratory  [address] [place] [address]     Name: RO VELAZQUEZ  MRN: [id number]  : 1945  Study Date: 2022 09:20 AM  Age: 77 yrs  Gender: Male  Patient Location: [facility]  Reason For Study: Persistent atrial fibrillation (H)  Ordering Physician: BAYRON KAT  Referring Physician: BAYRON KAT  Performed By: Dann Purcell     BSA: 2.0 m2  Height: 70 in  Weight: 171 lb  HR: 102  BP: 129/98 mmHg  ______________________________________________________________________________  Procedure  Complete AGUS Adult. AGUS Probe serial #[id number] was used during the procedure.  The heart rate, respiratory rate and response to care were monitored  throughout the procedure with the assistance of the nurse.  ______________________________________________________________________________  Interpretation Summary     1. Left ventricular systolic function is normal. The visual ejection fraction  is 60-65%.  2. The right ventricle is normal in structure, function and size.  3. There is mild (1+) mitral regurgitation.  4. No thrombus is detected in the left atrial appendage.  5. There is no color Doppler evidence of an atrial shunt. A  contrast injection  (Bubble Study) was performed that was negative for flow across the interatrial  septum.  ______________________________________________________________________________  AGUS  The transesophageal probe was passed without difficulty.     Left Ventricle  The left ventricle is normal in size. Left ventricular systolic function is  normal. The visual ejection fraction is 60-65%.     Right Ventricle  The right ventricle is normal in structure, function and size.     Atria  The left atrium is moderately dilated. The right atrium is moderately dilated.  There is no color Doppler evidence of an atrial shunt. A contrast injection  (Bubble Study) was performed that was negative for flow across the interatrial  septum. No thrombus is detected in the left atrial appendage.     Mitral Valve  The mitral valve is normal in structure and function. There is mild (1+)  mitral regurgitation.     Tricuspid Valve  The tricuspid valve is normal in structure and function. There is mild (1+)  tricuspid regurgitation.     Aortic Valve  The aortic valve is normal in structure and function. The aortic valve is  trileaflet. There is mild (1+) aortic regurgitation.     Pulmonic Valve  The pulmonic valve is not well seen, but is grossly normal.     Vessels  Mild atherosclerotic plaque(s) in the descending aorta.     Pericardial/Pleural  There is no pericardial effusion.     Rhythm  The rhythm was atrial flutter.  ______________________________________________________________________________  Report approved by: Benson Clark 04/22/2022 04:33 PM     ______________________________________________________________________________            important suggestion  Newer results are available. Click to view them now.    Component 3 mo ago   LVEF  60-65%   Resulting Agency [facility]          Specimen Collected: 04/22/22 09:20 Last Resulted: 04/22/22 09:20       Lab Flowsheet     Order Details     View Encounter     Lab and  Collection Details     Routing     Result History          Linked Documents      View Image      Result Care Coordination        Patient Communication       Released  Seen Back to Top          Stress Tracings - Encounter Level:    Stress Tracings: None found at the encounter level.    Stress Tracings - Order Level:    Stress Tracings: None found at the order level.    Echo EF Measurements    Echo EF  LVEF    60-65%           ECG Link    No scanned documents    All Reviewers List    Mona Quintana PA-C on 4/25/2022 07:06    Encounter    View Encounter         Lab Information    CARDIOLOGY RESULTS          Signed    Electronically signed by Tari Fairbanks MD on 4/22/22 at 1633 CDT  Additional Information    Specimen ID Bill Type Client ID  [id number]           Specimen Date Taken Specimen Time Taken Specimen Received Date Specimen Received Time Result Date Result Time  Apr 22, 2022  9:20 AM   Apr 22, 2022  9:20 AM    Recipient List for Orders        Stress Test: Date: Results:    ECG Reviewed: Date: Results:    Cath: Date: Results:      METS/Exercise Tolerance:     Hematologic:       Musculoskeletal:       GI/Hepatic:    (-) GERD and liver disease   Renal/Genitourinary:     (+) renal disease, type: CRI,     Endo:    (-) Type I DM and Type II DM   Psychiatric/Substance Use:       Infectious Disease:       Malignancy:       Other:            Physical Exam    Airway        Mallampati: II   TM distance: > 3 FB   Neck ROM: full   Mouth opening: > 3 cm    Respiratory Devices and Support         Dental  no notable dental history         Cardiovascular          Rhythm and rate: irregular     Pulmonary           breath sounds clear to auscultation           OUTSIDE LABS:  CBC:   Lab Results   Component Value Date    WBC 5.6 07/08/2022    WBC 7.0 05/30/2022    HGB 14.3 07/08/2022    HGB 16.1 05/30/2022    HCT 42.2 07/08/2022    HCT 47.4 05/30/2022     07/08/2022     05/30/2022     BMP:   Lab Results    Component Value Date     07/08/2022     06/05/2022    POTASSIUM 4.3 07/08/2022    POTASSIUM 3.9 06/05/2022    CHLORIDE 109 07/08/2022    CHLORIDE 107 06/05/2022    CO2 28 07/08/2022    CO2 26 06/05/2022    BUN 20 07/08/2022    BUN 27 06/05/2022    CR 1.03 07/08/2022    CR 1.43 (H) 06/05/2022    GLC 91 07/08/2022    GLC 90 06/05/2022     COAGS:   Lab Results   Component Value Date    PTT 81 (H) 05/23/2022    INR 1.25 (H) 05/12/2022     POC: No results found for: BGM, HCG, HCGS  HEPATIC:   Lab Results   Component Value Date    ALBUMIN 3.3 (L) 05/30/2022    PROTTOTAL 6.9 05/30/2022    ALT 21 05/30/2022    AST 20 05/30/2022    ALKPHOS 101 05/30/2022    BILITOTAL 0.5 05/30/2022     OTHER:   Lab Results   Component Value Date    ELIEZER 8.9 07/08/2022    MAG 2.5 (H) 06/05/2022    LIPASE 58 03/10/2020    AMYLASE 59 03/10/2020    TSH 4.71 (H) 05/30/2022    T4 0.98 05/30/2022    CRP <0.3 04/09/2015    SED 1 04/09/2015       Anesthesia Plan    ASA Status:  3      Anesthesia Type: MAC.     - Reason for MAC: chronic cardiopulmonary disease              Consents    Anesthesia Plan(s) and associated risks, benefits, and realistic alternatives discussed. Questions answered and patient/representative(s) expressed understanding.    - Discussed:     - Discussed with:  Patient         Postoperative Care            Comments:                Roque Zimmerman MD

## 2022-08-10 NOTE — TELEPHONE ENCOUNTER
8/10/22 Called patient to review Cardioversion procedure prep instructions below.     Patient is scheduled for a Cardioversion (DCCV) at [facility] on 8/11/22, check-in time 830 am,  procedure time tentatively 2 hours later, pending emergent cases.    Patient should have no food or drinks after midnight on 8/10/22  Patient is not taking a diuretic and is not taking digoxin.     Patient is not taking insulin.   Patient is not  taking oral diabetic medications  Patient is taking Eliquis/and has been taking daily uninterrupted for at least 21days.      Patient can take their other daily medications the morning of the procedure with small sips of water.     Patient has a responsible adult to drive them home and stay with them for 24 hours after the procedure.     Reviewed to check temperature in AM prior to arrival, if >100.0, call clinic. Pt will take home COVID test today and take picture of negative result and bring with him tomorrow. If positive, will call Afib RN back today    Patient had no questions about the instructions.     Den 1040 am

## 2022-08-11 ENCOUNTER — ANESTHESIA (OUTPATIENT)
Dept: SURGERY | Facility: CLINIC | Age: 77
End: 2022-08-11
Payer: COMMERCIAL

## 2022-08-11 ENCOUNTER — HOSPITAL ENCOUNTER (OUTPATIENT)
Dept: MEDSURG UNIT | Facility: CLINIC | Age: 77
Discharge: HOME | End: 2022-08-11
Attending: PHYSICIAN ASSISTANT
Payer: COMMERCIAL

## 2022-08-11 ENCOUNTER — HOSPITAL ENCOUNTER (OUTPATIENT)
Facility: CLINIC | Age: 77
Discharge: HOME | End: 2022-08-11
Payer: COMMERCIAL

## 2022-08-11 VITALS
WEIGHT: 168 LBS | HEART RATE: 57 BPM | DIASTOLIC BLOOD PRESSURE: 79 MMHG | RESPIRATION RATE: 10 BRPM | HEIGHT: 70 IN | TEMPERATURE: 96.9 F | BODY MASS INDEX: 24.05 KG/M2 | SYSTOLIC BLOOD PRESSURE: 123 MMHG | OXYGEN SATURATION: 98 %

## 2022-08-11 DIAGNOSIS — I48.92 ATRIAL FLUTTER, UNSPECIFIED TYPE (H): ICD-10-CM

## 2022-08-11 LAB
MAGNESIUM SERPL-MCNC: 2.3 MG/DL (ref 1.6–2.3)
POTASSIUM BLD-SCNC: 4 MMOL/L (ref 3.4–5.3)

## 2022-08-11 PROCEDURE — 93005 ELECTROCARDIOGRAM TRACING: CPT

## 2022-08-11 PROCEDURE — 84132 ASSAY OF SERUM POTASSIUM: CPT | Performed by: INTERNAL MEDICINE

## 2022-08-11 PROCEDURE — 36415 COLL VENOUS BLD VENIPUNCTURE: CPT | Performed by: INTERNAL MEDICINE

## 2022-08-11 PROCEDURE — 258N000003 HC RX IP 258 OP 636: Performed by: INTERNAL MEDICINE

## 2022-08-11 PROCEDURE — 999N000010 HC STATISTIC ANES STAT CODE-CRNA PER MINUTE

## 2022-08-11 PROCEDURE — 83735 ASSAY OF MAGNESIUM: CPT | Performed by: INTERNAL MEDICINE

## 2022-08-11 PROCEDURE — 250N000011 HC RX IP 250 OP 636: Performed by: REGISTERED NURSE

## 2022-08-11 PROCEDURE — 93010 ELECTROCARDIOGRAM REPORT: CPT | Mod: 76 | Performed by: INTERNAL MEDICINE

## 2022-08-11 PROCEDURE — 36591 DRAW BLOOD OFF VENOUS DEVICE: CPT

## 2022-08-11 PROCEDURE — 92960 CARDIOVERSION ELECTRIC EXT: CPT

## 2022-08-11 PROCEDURE — 370N000017 HC ANESTHESIA TECHNICAL FEE, PER MIN

## 2022-08-11 PROCEDURE — 999N000184 HC STATISTIC TELEMETRY

## 2022-08-11 PROCEDURE — 92960 CARDIOVERSION ELECTRIC EXT: CPT | Performed by: INTERNAL MEDICINE

## 2022-08-11 RX ORDER — NALOXONE HYDROCHLORIDE 0.4 MG/ML
0.2 INJECTION, SOLUTION INTRAMUSCULAR; INTRAVENOUS; SUBCUTANEOUS
Status: DISCONTINUED | OUTPATIENT
Start: 2022-08-11 | End: 2022-08-11 | Stop reason: HOSPADM

## 2022-08-11 RX ORDER — POTASSIUM CHLORIDE 1500 MG/1
40 TABLET, EXTENDED RELEASE ORAL
Status: DISCONTINUED | OUTPATIENT
Start: 2022-08-11 | End: 2022-08-11 | Stop reason: HOSPADM

## 2022-08-11 RX ORDER — SODIUM CHLORIDE 9 MG/ML
INJECTION, SOLUTION INTRAVENOUS CONTINUOUS
Status: DISCONTINUED | OUTPATIENT
Start: 2022-08-11 | End: 2022-08-11 | Stop reason: HOSPADM

## 2022-08-11 RX ORDER — ATROPINE SULFATE 0.1 MG/ML
.5-1 INJECTION INTRAVENOUS
Status: DISCONTINUED | OUTPATIENT
Start: 2022-08-11 | End: 2022-08-11 | Stop reason: HOSPADM

## 2022-08-11 RX ORDER — PROPOFOL 10 MG/ML
INJECTION, EMULSION INTRAVENOUS PRN
Status: DISCONTINUED | OUTPATIENT
Start: 2022-08-11 | End: 2022-08-11

## 2022-08-11 RX ORDER — POTASSIUM CHLORIDE 1500 MG/1
20 TABLET, EXTENDED RELEASE ORAL
Status: DISCONTINUED | OUTPATIENT
Start: 2022-08-11 | End: 2022-08-11 | Stop reason: HOSPADM

## 2022-08-11 RX ORDER — MAGNESIUM SULFATE HEPTAHYDRATE 40 MG/ML
2 INJECTION, SOLUTION INTRAVENOUS
Status: DISCONTINUED | OUTPATIENT
Start: 2022-08-11 | End: 2022-08-11 | Stop reason: HOSPADM

## 2022-08-11 RX ORDER — NALOXONE HYDROCHLORIDE 0.4 MG/ML
0.4 INJECTION, SOLUTION INTRAMUSCULAR; INTRAVENOUS; SUBCUTANEOUS
Status: DISCONTINUED | OUTPATIENT
Start: 2022-08-11 | End: 2022-08-11 | Stop reason: HOSPADM

## 2022-08-11 RX ORDER — FLUMAZENIL 0.1 MG/ML
0.2 INJECTION, SOLUTION INTRAVENOUS
Status: DISCONTINUED | OUTPATIENT
Start: 2022-08-11 | End: 2022-08-11 | Stop reason: HOSPADM

## 2022-08-11 RX ADMIN — PROPOFOL 10 MG: 10 INJECTION, EMULSION INTRAVENOUS at 11:09

## 2022-08-11 RX ADMIN — PROPOFOL 40 MG: 10 INJECTION, EMULSION INTRAVENOUS at 11:08

## 2022-08-11 RX ADMIN — SODIUM CHLORIDE: 9 INJECTION, SOLUTION INTRAVENOUS at 09:32

## 2022-08-11 RX ADMIN — PROPOFOL 10 MG: 10 INJECTION, EMULSION INTRAVENOUS at 11:10

## 2022-08-11 ASSESSMENT — ACTIVITIES OF DAILY LIVING (ADL)
ADLS_ACUITY_SCORE: 35
ADLS_ACUITY_SCORE: 35

## 2022-08-11 NOTE — ANESTHESIA POSTPROCEDURE EVALUATION
Patient: Rey Brown    Procedure: Procedure(s):  CARDIOVERSION       Anesthesia Type:  MAC    Note:     Postop Pain Control: Uneventful            Sign Out: Well controlled pain   PONV: No   Neuro/Psych: Uneventful            Sign Out: Acceptable/Baseline neuro status   Airway/Respiratory: Uneventful            Sign Out: Acceptable/Baseline resp. status   CV/Hemodynamics: Uneventful            Sign Out: Acceptable CV status; No obvious hypovolemia; No obvious fluid overload   Other NRE: NONE   DID A NON-ROUTINE EVENT OCCUR? No           Last vitals:  Vitals:    08/11/22 1115 08/11/22 1130 08/11/22 1145   BP: (!) 122/101 113/74 123/79   Pulse: 52 (!) 47 57   Resp: 14 12 10   Temp:      SpO2: 100% 97% 98%       Electronically Signed By: Harlan Sosa MD  August 11, 2022  4:27 PM

## 2022-08-11 NOTE — PROCEDURES
[facility]    Procedure: Cardioversion    Date/Time: 8/11/2022 11:40 AM  Performed by: Lane Barragan MD  Authorized by: oMna Quintana PA-C       UNIVERSAL PROTOCOL   Site Marked: Yes  Prior Images Obtained and Reviewed:  Yes  Required items: Required blood products, implants, devices and special equipment available    Patient identity confirmed:  Arm band  Patient was reevaluated immediately before administering moderate or deep sedation or anesthesia  Confirmation Checklist:  Patient's identity using two indicators  Time out: Immediately prior to the procedure a time out was called    Universal Protocol: the Joint Commission Universal Protocol was followed    Preparation: Patient was prepped and draped in usual sterile fashion       ANESTHESIA  Anesthesia was administered and monitored by anesthesiology.  See anesthesia documentation for details.    SEDATION  Patient Sedated: Yes    Vital signs: Vital signs monitored during sedation      PROCEDURE DETAILS  Cardioversion basis: elective  Pre-procedure rhythm: atrial fibrillation  Patient position: patient was placed in a supine position  Chest area: chest area exposed  Electrodes placed: anterior-posterior  Number of attempts: 1    Details of Attempts:  200 j x 1  Post-procedure rhythm: normal sinus rhythm  Complications: no complications

## 2022-08-11 NOTE — ANESTHESIA CARE TRANSFER NOTE
Patient: Rey Brown    Procedure: Procedure(s):  CARDIOVERSION       Diagnosis: A-fib (H) [I48.91]  Diagnosis Additional Information: No value filed.    Anesthesia Type:   MAC     Note:    Oropharynx: oropharynx clear of all foreign objects and spontaneously breathing  Level of Consciousness: awake  Oxygen Supplementation: face mask  Level of Supplemental Oxygen (L/min / FiO2): 6  Independent Airway: airway patency satisfactory and stable  Dentition: dentition unchanged  Vital Signs Stable: post-procedure vital signs reviewed and stable  Report to RN Given: handoff report given  Patient transferred to: Phase II  Comments: Diagnosis: Atrial fibrillation.  Procedure: Cardioversion.  Cardiologist: Dr. Barragan.  Location: [facility].  Handoff Report: Identifed the Patient, Identified the Reponsible Provider, Reviewed the pertinent medical history, Discussed the surgical course, Reviewed Intra-OP anesthesia mangement and issues during anesthesia, Set expectations for post-procedure period and Allowed opportunity for questions and acknowledgement of understanding      Vitals:  Vitals Value Taken Time   /101 08/11/22 1115   Temp 98    Pulse 47 08/11/22 1125   Resp 11 08/11/22 1125   SpO2 97 % 08/11/22 1125   Vitals shown include unvalidated device data.    Electronically Signed By: JOSE CRUZ Cook CRNA  August 11, 2022  11:25 AM

## 2022-08-11 NOTE — DISCHARGE INSTRUCTIONS
Cardioversion Discharge Instructions    After you go home:       For 24 hours - due to the sedation you received:    Have an adult stay with you for 24 hours.   Relax and take it easy.  Do NOT make any important or legal decisions.  Do NOT drive or operate machines at home or at work.  Do NOT drink alcohol.    Diet:    Start with clear liquids and progress to your normal diet as you feel able.    Medicines:    Take your medications, including blood thinners, unless your provider tells you not to.  If you have stopped any medications, check with your provider about when to restart them.    Follow Up Appointments:    Follow up with your cardiologist at [facility] of patient preference as instructed.  Follow up with your primary care provider as needed.    Post cardioversion:    The skin on your chest or back may feel tender for 48 hours.  If your skin is tender, you may:    Use a cold pack on the site. Never use ice directly on your skin. Use the cold pack for 20 minutes. Remove it for at least 30 minutes before re-using.  Apply 1% hydrocortisone cream to the skin (sold at drug stores)  Take Advil (Ibuprofen) or Tylenol (Acetaminophen) per your provider's recommendations.      Call your provider if you have:    Weakness, dizziness, lightheadedness, or fainting.  Shortness of breath.  Irregular heartbeat, feelings of your heart fluttering or beating fast, hard or palpitations.   More than minor skin discomfort or redness where the cardioversion pads were placed.  Questions or concerns.      Call 911 if you have:    Pain in your chest, arm, shoulder, neck, or upper back.  You have problems speaking or seeing.  Weakness in your arm or leg.  You are unable to move your arm or leg.  You have uncontrolled bleeding.         [facility]:    [phone] [facility] (7 days a week)

## 2022-08-11 NOTE — PROGRESS NOTES
Care Suites Admission Nursing Note    Patient Information  Name: Rey Brown  Age: 77 year old  Reason for admission: cardioversion  Care Suites arrival time: 0900    Patient Admission/Assessment   Pre-procedure assessment complete: Yes  If abnormal assessment/labs, provider notified: N/A  NPO: Yes  Medications held per instructions/orders: N/A  Consent: obtained  If applicable, pregnancy test status: declined  Patient oriented to room: Yes  Education/questions answered: Yes  Plan/other: Proceed    Discharge Planning  Discharge name/phone number: Erin  Overnight post sedation caregiver: Erin  Discharge location: home    Lynne Hagan RN

## 2022-08-11 NOTE — PRE-PROCEDURE
GENERAL PRE-PROCEDURE:   Procedure:  Cardiovesion  Date/Time:  8/11/2022 10:39 AM    Written consent obtained?: Yes    Risks and benefits: Risks, benefits and alternatives were discussed    DC Plan: Appropriate discharge home plan in place for patients who are going home after procedure   Consent given by:  Patient  Patient states understanding of procedure being performed: Yes    Patient's understanding of procedure matches consent: Yes    Procedure consent matches procedure scheduled: Yes    Expected level of sedation:  Deep  Appropriately NPO:  Yes  ASA Class:  2  Mallampati  :  Grade 2- soft palate, base of uvula, tonsillar pillars, and portion of posterior pharyngeal wall visible  Heart:  A-fib  History & Physical reviewed:  History and physical reviewed and updates made (see comment)

## 2022-08-11 NOTE — SEDATION DOCUMENTATION
Patient was able to be cardioverted from afib to SR x 1 attempt at 200J.  EKG ordered and patients VSS and denies pain.  Plan to discharge home

## 2022-08-11 NOTE — PROGRESS NOTES
Care Suites Discharge Nursing Note    Patient Information  Name: Rey Brown  Age: 77 year old    Discharge Education:  Discharge instructions reviewed: Yes  Additional education/resources provided: n/a  Patient/patient representative verbalizes understanding: Yes  Patient discharging on new medications: No  Medication education completed: N/A    Discharge Plans:   Discharge location: home  Discharge ride contacted: Yes  Approximate discharge time: 1200    Discharge Criteria:  Discharge criteria met and vital signs stable: Yes    Patient Belongs:  Patient belongings returned to patient: Yes    Lynne Hagan RN

## 2022-08-13 LAB
ATRIAL RATE - MUSE: 47 BPM
ATRIAL RATE - MUSE: 51 BPM
DIASTOLIC BLOOD PRESSURE - MUSE: NORMAL MMHG
DIASTOLIC BLOOD PRESSURE - MUSE: NORMAL MMHG
INTERPRETATION ECG - MUSE: NORMAL
INTERPRETATION ECG - MUSE: NORMAL
P AXIS - MUSE: 90 DEGREES
P AXIS - MUSE: NORMAL DEGREES
PR INTERVAL - MUSE: 220 MS
PR INTERVAL - MUSE: NORMAL MS
QRS DURATION - MUSE: 88 MS
QRS DURATION - MUSE: 90 MS
QT - MUSE: 414 MS
QT - MUSE: 496 MS
QTC - MUSE: 438 MS
QTC - MUSE: 544 MS
R AXIS - MUSE: 70 DEGREES
R AXIS - MUSE: 76 DEGREES
SYSTOLIC BLOOD PRESSURE - MUSE: NORMAL MMHG
SYSTOLIC BLOOD PRESSURE - MUSE: NORMAL MMHG
T AXIS - MUSE: 61 DEGREES
T AXIS - MUSE: 68 DEGREES
VENTRICULAR RATE- MUSE: 104 BPM
VENTRICULAR RATE- MUSE: 47 BPM

## 2022-09-01 ENCOUNTER — HOSPITAL ENCOUNTER (OUTPATIENT)
Dept: CARDIOLOGY | Facility: CLINIC | Age: 77
Discharge: HOME | End: 2022-09-01
Attending: INTERNAL MEDICINE | Admitting: INTERNAL MEDICINE
Payer: COMMERCIAL

## 2022-09-01 DIAGNOSIS — I25.5 ISCHEMIC CARDIOMYOPATHY: ICD-10-CM

## 2022-09-01 LAB — LVEF ECHO: NORMAL

## 2022-09-01 PROCEDURE — 93306 TTE W/DOPPLER COMPLETE: CPT | Mod: 26 | Performed by: INTERNAL MEDICINE

## 2022-09-01 PROCEDURE — 93306 TTE W/DOPPLER COMPLETE: CPT

## 2022-09-08 ENCOUNTER — TELEPHONE (OUTPATIENT)
Dept: CARDIOLOGY | Facility: CLINIC | Age: 77
End: 2022-09-08

## 2022-09-08 NOTE — TELEPHONE ENCOUNTER
Received refill for amiodarone from [facility]. Will forward to EP to address how long Pt will be on and if has adequate supply for that period of time.   FLACA Rao RN

## 2022-09-09 NOTE — TELEPHONE ENCOUNTER
Spoke to patient who reports he is tolerating medication (amiodarone) well.   His prescription will run out before he sees Dr Scott.  Will reach out to SUMIT Woods to review if she wants patient to continue with amiodarone until seen on 9/26.  HOLLEY Johns

## 2022-09-12 ENCOUNTER — OFFICE VISIT (OUTPATIENT)
Dept: CARDIOLOGY | Facility: CLINIC | Age: 77
End: 2022-09-12
Attending: INTERNAL MEDICINE
Payer: COMMERCIAL

## 2022-09-12 VITALS
HEART RATE: 53 BPM | WEIGHT: 169.2 LBS | SYSTOLIC BLOOD PRESSURE: 138 MMHG | OXYGEN SATURATION: 98 % | BODY MASS INDEX: 24.22 KG/M2 | DIASTOLIC BLOOD PRESSURE: 72 MMHG | HEIGHT: 70 IN

## 2022-09-12 DIAGNOSIS — I25.5 ISCHEMIC CARDIOMYOPATHY: ICD-10-CM

## 2022-09-12 DIAGNOSIS — I10 PRIMARY HYPERTENSION: Primary | ICD-10-CM

## 2022-09-12 PROCEDURE — 99214 OFFICE O/P EST MOD 30 MIN: CPT | Performed by: NURSE PRACTITIONER

## 2022-09-12 NOTE — TELEPHONE ENCOUNTER
Made pt aware that his Amiodarone can be stopped. Pt will keep the rest of his Amiodarone, just in case.  Med list updated.  Pt will see Dr Scott as scheduled on 9/26 and per Tanya if pt goes back on Amiodarone, labs need to be completed. Pt also needs to discuss with Dr Scott, what to do if he goes back into A Fib/flutter when he is in [place]. Will be gone for 3 weeks in October.  Pt had Echo done on 9/1 with an EF of 55-60%.  No other questions at this time.  Pt will call [facility] to cancel the medication. Licha

## 2022-09-12 NOTE — PATIENT INSTRUCTIONS
Today's Recommendations    Your heart pump function has normalized  Your blood pressure has improved  Continue all medications without changes.  Please follow up with Dr. Pizano in 6 months.    Please send a [employer] message or call [phone] to the RN team with questions or concerns.     Scheduling number [phone]  JOSE CRUZ Palafox, CNP

## 2022-09-12 NOTE — TELEPHONE ENCOUNTER
He can stop Amiodarone as has been on x >30 days. Want to avoid long-term use given young age. Pls update Epic med list.       Sees Dr. Scott in ~2-3 weeks. If amio restarted, will need labs ~11/2022.    Sees Janina Ceballos NP today for Dr. Pizano. Will be setting up echo to check LVEF following restoration of SR (Oct/Nov) and assessing BP after increasing losartan to 50 mg daily 8/4 for HTN.    Berahne Martínez

## 2022-09-12 NOTE — PROGRESS NOTES
Cardiology Clinic Progress Note  Rey Brown MRN# [id number]   YOB: 1945 Age: 77 year old   Primary Cardiologist: Dr. Pizano, Dr Scott (EP) Reason for visit: Hypertension            Assessment and Plan:       1.  Paroxysmal atrial fibrillation, follows closely with EP, anticoagulated with Eliquis ([id number])  -Reports 1 missed dose of Eliquis over the last month  -Per notes from EP today patient can stop his amiodarone as he has been on this greater than 1 month, cough has resolved  -Patient should keep his follow-up with Dr. Scott September 26    2.  Dyslipidemia, goal of LDL less than 70  -Lipid panel done May 2022 shows good cholesterol control with total cholesterol 135, HDL 52, LDL 67, triglycerides 78    3.  Hypertension  -8/2022 losartan increased to 50 mg daily, blood pressure control improved today    4.  CAD, s/p NSTEMI 5/2022 treated with MARYCARMEN to LAD  -No current anginal symptoms    5.  Ischemic cardiomyopathy, LVEF 30 to 35% at time of MI (5/2022)  We reviewed the results of his September 2022 echocardiogram showed normalization of his ejection fraction to 55 to 60%.  He is not have any current heart failure symptoms    Changes today: No medication changes were made today    Follow up plan: Patient to follow up with Dr. Pizano in 6 months with a BMP prior         History of Presenting Illness:    Rey Brown is a very pleasant 77 year old male with a history of NSTEMI s/p MARYCARMEN to LAD, recurrent paroxysmal atrial fibrillation, hypertension, dyslipidemia, ischemic cardiomyopathy.     Patient underwent extensive ablation in April 2022.  Following that, patient has undergone several cardioversions including April 2022 and May 2022.  In May 2022 he was admitted for a non-STEMI and his EF was down to 30 to 35% with associated wall motion abnormalities.  He underwent an angiogram that showed an LAD lesion that was treated with MARYCARMEN x1.  His flecainide was stopped at that time.  He  returned to the ER on 5/30 with recurrent palpitations and he underwent another cardioversion.    He saw my colleague Dr. Pizano in follow-up on June 1, 2022 and his losartan was increased from 25 mg daily and repeat echo in 2 to 3 months was recommended.  Unfortunately he had recurrent arrhythmia on June 5 and had a cardioversion in the ER.  His amiodarone was restarted with a loading dose.    On Shaina 10 he was seen by Dr. Scott, and was doing well.  Shortly after he developed a dry cough and the recommendation was that he undergo a repeat ablation.  He was discharged with no antiarrhythmics.  On July 22 he noted recurrent arrhythmia and amiodarone was restarted.  He underwent a elective cardioversion on August 11.    Patient is here today for follow up of his hypertension and atrial fibrillation. Patient reports feeling good. He continues to play softball and notes his stamina and energy continue to improve daily.     Patient denies chest pain or chest tightness. Denies dizziness, lightheadedness or other presyncopal symptoms. Denies tachycardia or palpitations.  He tells me today he know when he is in atrial fibrillation and has not felt any of these episodes since his most recent cardioversion.  His cough he was having in June 2022 has now resolved, however he continues to have issues with vision changes which she questions could be age-related.    Blood pressure 138/72 and HR 53 in clinic today.    He had a repeat echocardiogram on 9/1/2022 which showed normalization of his ejection fraction of 55 to 60%, Mild to moderate tricuspid regurgitation, mild pulmonary hypertension, and trivial anterior pericardial effusion.        Recent Hospitalizations   May 2022 NSTEMI, stent to LAD          Social History      Social History     Socioeconomic History     Marital status:      Spouse name: Erin     Number of children: Not on file     Years of education: Not on file     Highest education level: Not on file  "  Occupational History     Not on file   Tobacco Use     Smoking status: Never Smoker     Smokeless tobacco: Never Used   Substance and Sexual Activity     Alcohol use: Yes     Alcohol/week: 2.0 - 3.0 standard drinks     Types: 2 - 3 Cans of beer per week     Comment: 3-4 beers/week     Drug use: No     Sexual activity: Yes     Partners: Female   Other Topics Concern      Service Not Asked     Blood Transfusions Not Asked     Caffeine Concern Not Asked     Occupational Exposure Not Asked     Hobby Hazards Not Asked     Sleep Concern Not Asked     Stress Concern Not Asked     Weight Concern Not Asked     Special Diet No     Back Care Not Asked     Exercise Yes     Comment: 2-3x/week     Bike Helmet Not Asked     Seat Belt Not Asked     Self-Exams Not Asked     Parent/sibling w/ CABG, MI or angioplasty before 65F 55M? Not Asked   Social History Narrative     Not on file     Social Determinants of Health     Financial Resource Strain: Not on file   Food Insecurity: Not on file   Transportation Needs: Not on file   Physical Activity: Not on file   Stress: Not on file   Social Connections: Not on file   Intimate Partner Violence: Not on file   Housing Stability: Not on file            Review of Systems:   Skin:  not assessed     Eyes:  not assessed    ENT:  not assessed    Respiratory:  Negative    Cardiovascular:    dizziness  Gastroenterology: not assessed    Genitourinary:  not assessed    Musculoskeletal:       Neurologic:  not assessed    Psychiatric:  not assessed    Heme/Lymph/Imm:  not assessed    Endocrine:  Negative           Physical Exam:   Vitals: /72   Pulse 53   Ht 1.778 m (5' 10\")   Wt 76.7 kg (169 lb 3.2 oz)   SpO2 98%   BMI 24.28 kg/m     Wt Readings from Last 4 Encounters:   09/12/22 76.7 kg (169 lb 3.2 oz)   08/11/22 76.2 kg (168 lb)   08/04/22 76.2 kg (168 lb)   07/09/22 74.8 kg (165 lb)     GEN: well nourished, in no acute distress.  HEENT:  Pupils equal, round. Sclerae " nonicteric.   NECK: Supple, no masses appreciated. No carotid bruit  C/V:  Regular rate and rhythm, no murmur, rub or gallop.    RESP: Respirations are unlabored. Clear to auscultation bilaterally without wheezing, rales, or rhonchi.  EXTREM: no LE edema.  NEURO: Alert and oriented, cooperative.  SKIN: Warm and dry.        Data:   LIPID RESULTS:  Lab Results   Component Value Date    CHOL 135 05/23/2022    CHOL 158 03/23/2021    HDL 52 05/23/2022    HDL 54 03/23/2021    LDL 67 05/23/2022    LDL 92 03/23/2021    TRIG 78 05/23/2022    TRIG 58 03/23/2021     LIVER ENZYME RESULTS:  Lab Results   Component Value Date    AST 20 05/30/2022    AST 18 03/23/2021    ALT 21 05/30/2022    ALT 10 03/23/2021     CBC RESULTS:  Lab Results   Component Value Date    WBC 5.6 07/08/2022    WBC 5.8 03/23/2021    WBC 18.8 (H) 03/13/2020    RBC 4.59 07/08/2022    RBC 5.07 03/23/2021    RBC 4.26 (L) 03/13/2020    HGB 14.3 07/08/2022    HGB 15.4 03/23/2021    HCT 42.2 07/08/2022    HCT 45.5 03/23/2021    MCV 92 07/08/2022    MCV 89.7 03/23/2021    MCH 31.2 07/08/2022    MCH 30.3 03/23/2021    MCHC 33.9 07/08/2022    MCHC 33.8 03/23/2021    RDW 13.1 07/08/2022    RDW 14.1 03/13/2020     07/08/2022     03/23/2021     BMP RESULTS:  Lab Results   Component Value Date     07/08/2022     03/23/2021    POTASSIUM 4.0 08/11/2022    POTASSIUM 4.5 03/23/2021    CHLORIDE 109 07/08/2022    CHLORIDE 103 03/23/2021    CO2 28 07/08/2022    CO2 30 05/14/2020    ANIONGAP 4 07/08/2022    ANIONGAP 4 05/14/2020    GLC 91 07/08/2022    GLC 79 03/23/2021    BUN 20 07/08/2022    BUN 24 03/23/2021    BUN 18 03/23/2021    CR 1.03 07/08/2022    CR 1.31 (H) 03/23/2021    GFRESTIMATED 76 07/08/2022    GFRESTIMATED >60 03/25/2022    GFRESTIMATED 46 (L) 05/14/2020    GFRESTBLACK 54 (L) 05/14/2020    ELIEZER 8.9 07/08/2022    ELIEZER 9.3 03/23/2021      A1C RESULTS:  No results found for: A1C  INR RESULTS:  Lab Results   Component Value Date    INR 1.25  (H) 05/12/2022    INR 1.46 (H) 04/20/2022    INR 0.99 07/27/2007            Medications     Current Outpatient Medications   Medication Sig Dispense Refill     acetaminophen (TYLENOL) 325 MG tablet Take 325-650 mg by mouth every 6 hours as needed for mild pain       apixaban ANTICOAGULANT (ELIQUIS) 5 MG tablet Take 1 tablet (5 mg) by mouth 2 times daily 180 tablet 3     atorvastatin (LIPITOR) 80 MG tablet Take 1 tablet (80 mg) by mouth daily 90 tablet 3     clopidogrel (PLAVIX) 75 MG tablet Take 1 tablet (75 mg) by mouth daily 90 tablet 3     losartan (COZAAR) 50 MG tablet Take 1 tablet (50 mg) by mouth daily (Patient taking differently: Take 25 mg by mouth 2 times daily) 90 tablet 3     nitroGLYcerin (NITROSTAT) 0.4 MG sublingual tablet For chest pain place 1 tablet under the tongue every 5 minutes for 3 doses. If symptoms persist 5 minutes after 1st dose call 911.Please done take viagra along with nnitroglycerine 20 tablet 0          Past Medical History     Past Medical History:   Diagnosis Date     CAD (coronary artery disease)     mid LAD MARYCARMEN 5/23/2022     Hypercholesterolemia      Lumbar disc disease      Migraines      Paroxysmal A-fib (H)      Past Surgical History:   Procedure Laterality Date     ANESTHESIA CARDIOVERSION N/A 4/22/2022    Procedure: ANESTHESIA, FOR CARDIOVERSION FOLLOWING AGUS;  Surgeon: GENERIC ANESTHESIA PROVIDER;  Location:  OR     ANESTHESIA CARDIOVERSION N/A 5/12/2022    Procedure: ANESTHESIA, FOR CARDIOVERSION;  Surgeon: GENERIC ANESTHESIA PROVIDER;  Location:  OR     ANESTHESIA CARDIOVERSION N/A 8/11/2022    Procedure: CARDIOVERSION;  Surgeon: GENERIC ANESTHESIA PROVIDER;  Location:  OR     COLONOSCOPY       CV CORONARY ANGIOGRAM N/A 5/23/2022    Procedure: Coronary Angiogram;  Surgeon: Jacqueline Ramirez MD;  Location: [facility] CARDIAC CATH LAB     CV PCI N/A 5/23/2022    Procedure: Percutaneous Coronary Intervention;  Surgeon: Jacqueline Ramirez MD;  Location: [facility]  CARDIAC CATH LAB     CYSTOSCOPY, RETROGRADES, COMBINED Left 5/13/2020    Procedure: LEFT RETROGRADE URETERAL PYELOGRAM;  Surgeon: Denilson Angulo MD;  Location:  OR     CYSTOSCOPY, TRANSURETHRAL RESECTION (TUR) PROSTATE, COMBINED N/A 5/13/2020    Procedure: CYSTOSCOPY WITH TRANSURETHRAL RESECTION PROSTATE;  Surgeon: Denilson Angulo MD;  Location:  OR     EP ABLATION ATRIAL FLUTTER N/A 7/8/2022    Procedure: Ablation Atrial Flutter;  Surgeon: Sharmin Scott MD;  Location: [facility] CARDIAC CATH LAB     EP ABLATION FOCAL AFIB N/A 4/1/2022    Procedure: Ablation Focal Atrial Fibrillation [[id number]];  Surgeon: Sharmin Scott MD;  Location: [facility] CARDIAC CATH LAB     OPEN REDUCTION INTERNAL FIXATION HAND       RHINOPLASTY       TONSILLECTOMY       wisdom teeth       Family History   Problem Relation Age of Onset     Dementia Father      Osteoarthritis Father      Cerebrovascular Disease Father      Myocardial Infarction Mother      C.A.D. Mother      Coronary Artery Disease Mother             Allergies   Ace inhibitors        Irene Ceballos NP  [facility]  Pager: [phone]

## 2022-09-12 NOTE — LETTER
9/12/2022    Sabas Staley MD  [address]  [facility] [address]    RE: Rey Brown       Dear Colleague,     I had the pleasure of seeing Rey Brown in the [facility].  Cardiology Clinic Progress Note  Rey Brown MRN# [id number]   YOB: 1945 Age: 77 year old   Primary Cardiologist: Dr. Pizano, Dr Scott (EP) Reason for visit: Hypertension            Assessment and Plan:       1.  Paroxysmal atrial fibrillation, follows closely with EP, anticoagulated with Eliquis ([id number])  -Reports 1 missed dose of Eliquis over the last month  -Per notes from EP today patient can stop his amiodarone as he has been on this greater than 1 month, cough has resolved  -Patient should keep his follow-up with Dr. Scott September 26    2.  Dyslipidemia, goal of LDL less than 70  -Lipid panel done May 2022 shows good cholesterol control with total cholesterol 135, HDL 52, LDL 67, triglycerides 78    3.  Hypertension  -8/2022 losartan increased to 50 mg daily, blood pressure control improved today    4.  CAD, s/p NSTEMI 5/2022 treated with MARYCARMEN to LAD  -No current anginal symptoms    5.  Ischemic cardiomyopathy, LVEF 30 to 35% at time of MI (5/2022)  We reviewed the results of his September 2022 echocardiogram showed normalization of his ejection fraction to 55 to 60%.  He is not have any current heart failure symptoms    Changes today: No medication changes were made today    Follow up plan: Patient to follow up with Dr. Pizano in 6 months with a BMP prior         History of Presenting Illness:    Rey Brown is a very pleasant 77 year old male with a history of NSTEMI s/p MARYCARMEN to LAD, recurrent paroxysmal atrial fibrillation, hypertension, dyslipidemia, ischemic cardiomyopathy.     Patient underwent extensive ablation in April 2022.  Following that, patient has undergone several cardioversions including April 2022 and May 2022.  In May 2022 he was admitted for  a non-STEMI and his EF was down to 30 to 35% with associated wall motion abnormalities.  He underwent an angiogram that showed an LAD lesion that was treated with MARYCARMEN x1.  His flecainide was stopped at that time.  He returned to the ER on 5/30 with recurrent palpitations and he underwent another cardioversion.    He saw my colleague Dr. Pizano in follow-up on June 1, 2022 and his losartan was increased from 25 mg daily and repeat echo in 2 to 3 months was recommended.  Unfortunately he had recurrent arrhythmia on June 5 and had a cardioversion in the ER.  His amiodarone was restarted with a loading dose.    On Shaina 10 he was seen by Dr. Scott, and was doing well.  Shortly after he developed a dry cough and the recommendation was that he undergo a repeat ablation.  He was discharged with no antiarrhythmics.  On July 22 he noted recurrent arrhythmia and amiodarone was restarted.  He underwent a elective cardioversion on August 11.    Patient is here today for follow up of his hypertension and atrial fibrillation. Patient reports feeling good. He continues to play softball and notes his stamina and energy continue to improve daily.     Patient denies chest pain or chest tightness. Denies dizziness, lightheadedness or other presyncopal symptoms. Denies tachycardia or palpitations.  He tells me today he know when he is in atrial fibrillation and has not felt any of these episodes since his most recent cardioversion.  His cough he was having in June 2022 has now resolved, however he continues to have issues with vision changes which she questions could be age-related.    Blood pressure 138/72 and HR 53 in clinic today.    He had a repeat echocardiogram on 9/1/2022 which showed normalization of his ejection fraction of 55 to 60%, Mild to moderate tricuspid regurgitation, mild pulmonary hypertension, and trivial anterior pericardial effusion.        Recent Hospitalizations   May 2022 NSTEMI, stent to LAD          Social History   "    Social History     Socioeconomic History     Marital status:      Spouse name: Erin     Number of children: Not on file     Years of education: Not on file     Highest education level: Not on file   Occupational History     Not on file   Tobacco Use     Smoking status: Never Smoker     Smokeless tobacco: Never Used   Substance and Sexual Activity     Alcohol use: Yes     Alcohol/week: 2.0 - 3.0 standard drinks     Types: 2 - 3 Cans of beer per week     Comment: 3-4 beers/week     Drug use: No     Sexual activity: Yes     Partners: Female   Other Topics Concern      Service Not Asked     Blood Transfusions Not Asked     Caffeine Concern Not Asked     Occupational Exposure Not Asked     Hobby Hazards Not Asked     Sleep Concern Not Asked     Stress Concern Not Asked     Weight Concern Not Asked     Special Diet No     Back Care Not Asked     Exercise Yes     Comment: 2-3x/week     Bike Helmet Not Asked     Seat Belt Not Asked     Self-Exams Not Asked     Parent/sibling w/ CABG, MI or angioplasty before 65F 55M? Not Asked   Social History Narrative     Not on file     Social Determinants of Health     Financial Resource Strain: Not on file   Food Insecurity: Not on file   Transportation Needs: Not on file   Physical Activity: Not on file   Stress: Not on file   Social Connections: Not on file   Intimate Partner Violence: Not on file   Housing Stability: Not on file            Review of Systems:   Skin:  not assessed     Eyes:  not assessed    ENT:  not assessed    Respiratory:  Negative    Cardiovascular:    dizziness  Gastroenterology: not assessed    Genitourinary:  not assessed    Musculoskeletal:       Neurologic:  not assessed    Psychiatric:  not assessed    Heme/Lymph/Imm:  not assessed    Endocrine:  Negative           Physical Exam:   Vitals: /72   Pulse 53   Ht 1.778 m (5' 10\")   Wt 76.7 kg (169 lb 3.2 oz)   SpO2 98%   BMI 24.28 kg/m     Wt Readings from Last 4 Encounters: "   09/12/22 76.7 kg (169 lb 3.2 oz)   08/11/22 76.2 kg (168 lb)   08/04/22 76.2 kg (168 lb)   07/09/22 74.8 kg (165 lb)     GEN: well nourished, in no acute distress.  HEENT:  Pupils equal, round. Sclerae nonicteric.   NECK: Supple, no masses appreciated. No carotid bruit  C/V:  Regular rate and rhythm, no murmur, rub or gallop.    RESP: Respirations are unlabored. Clear to auscultation bilaterally without wheezing, rales, or rhonchi.  EXTREM: no LE edema.  NEURO: Alert and oriented, cooperative.  SKIN: Warm and dry.        Data:   LIPID RESULTS:  Lab Results   Component Value Date    CHOL 135 05/23/2022    CHOL 158 03/23/2021    HDL 52 05/23/2022    HDL 54 03/23/2021    LDL 67 05/23/2022    LDL 92 03/23/2021    TRIG 78 05/23/2022    TRIG 58 03/23/2021     LIVER ENZYME RESULTS:  Lab Results   Component Value Date    AST 20 05/30/2022    AST 18 03/23/2021    ALT 21 05/30/2022    ALT 10 03/23/2021     CBC RESULTS:  Lab Results   Component Value Date    WBC 5.6 07/08/2022    WBC 5.8 03/23/2021    WBC 18.8 (H) 03/13/2020    RBC 4.59 07/08/2022    RBC 5.07 03/23/2021    RBC 4.26 (L) 03/13/2020    HGB 14.3 07/08/2022    HGB 15.4 03/23/2021    HCT 42.2 07/08/2022    HCT 45.5 03/23/2021    MCV 92 07/08/2022    MCV 89.7 03/23/2021    MCH 31.2 07/08/2022    MCH 30.3 03/23/2021    MCHC 33.9 07/08/2022    MCHC 33.8 03/23/2021    RDW 13.1 07/08/2022    RDW 14.1 03/13/2020     07/08/2022     03/23/2021     BMP RESULTS:  Lab Results   Component Value Date     07/08/2022     03/23/2021    POTASSIUM 4.0 08/11/2022    POTASSIUM 4.5 03/23/2021    CHLORIDE 109 07/08/2022    CHLORIDE 103 03/23/2021    CO2 28 07/08/2022    CO2 30 05/14/2020    ANIONGAP 4 07/08/2022    ANIONGAP 4 05/14/2020    GLC 91 07/08/2022    GLC 79 03/23/2021    BUN 20 07/08/2022    BUN 24 03/23/2021    BUN 18 03/23/2021    CR 1.03 07/08/2022    CR 1.31 (H) 03/23/2021    GFRESTIMATED 76 07/08/2022    GFRESTIMATED >60 03/25/2022     GFRESTIMATED 46 (L) 05/14/2020    GFRESTBLACK 54 (L) 05/14/2020    ELIEZER 8.9 07/08/2022    ELIEZER 9.3 03/23/2021      A1C RESULTS:  No results found for: A1C  INR RESULTS:  Lab Results   Component Value Date    INR 1.25 (H) 05/12/2022    INR 1.46 (H) 04/20/2022    INR 0.99 07/27/2007            Medications     Current Outpatient Medications   Medication Sig Dispense Refill     acetaminophen (TYLENOL) 325 MG tablet Take 325-650 mg by mouth every 6 hours as needed for mild pain       apixaban ANTICOAGULANT (ELIQUIS) 5 MG tablet Take 1 tablet (5 mg) by mouth 2 times daily 180 tablet 3     atorvastatin (LIPITOR) 80 MG tablet Take 1 tablet (80 mg) by mouth daily 90 tablet 3     clopidogrel (PLAVIX) 75 MG tablet Take 1 tablet (75 mg) by mouth daily 90 tablet 3     losartan (COZAAR) 50 MG tablet Take 1 tablet (50 mg) by mouth daily (Patient taking differently: Take 25 mg by mouth 2 times daily) 90 tablet 3     nitroGLYcerin (NITROSTAT) 0.4 MG sublingual tablet For chest pain place 1 tablet under the tongue every 5 minutes for 3 doses. If symptoms persist 5 minutes after 1st dose call 911.Please done take viagra along with nnitroglycerine 20 tablet 0          Past Medical History     Past Medical History:   Diagnosis Date     CAD (coronary artery disease)     mid LAD MARYCARMEN 5/23/2022     Hypercholesterolemia      Lumbar disc disease      Migraines      Paroxysmal A-fib (H)      Past Surgical History:   Procedure Laterality Date     ANESTHESIA CARDIOVERSION N/A 4/22/2022    Procedure: ANESTHESIA, FOR CARDIOVERSION FOLLOWING AGUS;  Surgeon: GENERIC ANESTHESIA PROVIDER;  Location:  OR     ANESTHESIA CARDIOVERSION N/A 5/12/2022    Procedure: ANESTHESIA, FOR CARDIOVERSION;  Surgeon: GENERIC ANESTHESIA PROVIDER;  Location:  OR     ANESTHESIA CARDIOVERSION N/A 8/11/2022    Procedure: CARDIOVERSION;  Surgeon: GENERIC ANESTHESIA PROVIDER;  Location:  OR     COLONOSCOPY       CV CORONARY ANGIOGRAM N/A 5/23/2022    Procedure: Coronary  Angiogram;  Surgeon: Jacqueline Ramirez MD;  Location: [facility] CARDIAC CATH LAB     CV PCI N/A 5/23/2022    Procedure: Percutaneous Coronary Intervention;  Surgeon: Jacqueline Ramirez MD;  Location: [facility] CARDIAC CATH LAB     CYSTOSCOPY, RETROGRADES, COMBINED Left 5/13/2020    Procedure: LEFT RETROGRADE URETERAL PYELOGRAM;  Surgeon: Denilson Angulo MD;  Location:  OR     CYSTOSCOPY, TRANSURETHRAL RESECTION (TUR) PROSTATE, COMBINED N/A 5/13/2020    Procedure: CYSTOSCOPY WITH TRANSURETHRAL RESECTION PROSTATE;  Surgeon: Denilson Angulo MD;  Location:  OR     EP ABLATION ATRIAL FLUTTER N/A 7/8/2022    Procedure: Ablation Atrial Flutter;  Surgeon: Sharmin Scott MD;  Location:  HEART CARDIAC CATH LAB     EP ABLATION FOCAL AFIB N/A 4/1/2022    Procedure: Ablation Focal Atrial Fibrillation [[id number]];  Surgeon: Sharmin Scott MD;  Location: [facility] CARDIAC CATH LAB     OPEN REDUCTION INTERNAL FIXATION HAND       RHINOPLASTY       TONSILLECTOMY       wisdom teeth       Family History   Problem Relation Age of Onset     Dementia Father      Osteoarthritis Father      Cerebrovascular Disease Father      Myocardial Infarction Mother      C.A.D. Mother      Coronary Artery Disease Mother             Allergies   Ace inhibitors        Irene Ceballos NP  [facility]  Pager: [phone]    Thank you for allowing me to participate in the care of your patient.      Sincerely,     Irene Ceballos NP     [facility]  cc:   Geraldo Pizano MD  [address]  [address]  [place] [id number]

## 2022-09-26 ENCOUNTER — OFFICE VISIT (OUTPATIENT)
Dept: CARDIOLOGY | Facility: CLINIC | Age: 77
End: 2022-09-26
Payer: COMMERCIAL

## 2022-09-26 VITALS
HEART RATE: 99 BPM | WEIGHT: 167 LBS | BODY MASS INDEX: 23.91 KG/M2 | HEIGHT: 70 IN | DIASTOLIC BLOOD PRESSURE: 85 MMHG | SYSTOLIC BLOOD PRESSURE: 129 MMHG

## 2022-09-26 DIAGNOSIS — I48.0 PAROXYSMAL ATRIAL FIBRILLATION (H): Primary | ICD-10-CM

## 2022-09-26 DIAGNOSIS — I48.4 ATYPICAL ATRIAL FLUTTER (H): ICD-10-CM

## 2022-09-26 PROCEDURE — 99214 OFFICE O/P EST MOD 30 MIN: CPT | Performed by: INTERNAL MEDICINE

## 2022-09-26 PROCEDURE — 93000 ELECTROCARDIOGRAM COMPLETE: CPT | Performed by: INTERNAL MEDICINE

## 2022-09-26 RX ORDER — DILTIAZEM HYDROCHLORIDE 180 MG/1
180 CAPSULE, COATED, EXTENDED RELEASE ORAL DAILY
Qty: 80 CAPSULE | Refills: 3 | Status: SHIPPED | OUTPATIENT
Start: 2022-09-26 | End: 2023-01-27

## 2022-09-26 NOTE — PROGRESS NOTES
Service Date: 09/26/2022    HISTORY OF PRESENT ILLNESS:  I saw Mr. Brown for followup of atrial fibrillation ablation.  He is a 77-year-old white male who has history of symptomatic recurrent atrial fibrillation.  After he failed medical therapy with flecainide, he underwent the first ablation on 04/01/2022.  After that, he developed persistent atrial flutter.  He underwent a second ablation on 07/08/2022.  During the interim, he was found to have coronary artery disease and received LAD stent on 05/23/2022.  The patient developed recurrent atrial flutter and was cardioverted electrically.  When he was treated with amiodarone, he developed severe symptomatic sinus bradycardia.  He is now off amiodarone.  A few weeks ago he reverted back to atrial flutter.  He feels rapid heartbeat at rest and the heart rate appears to be faster with exertion.  Symptomatically, he is doing reasonably well with no apparent shortness of breath, chest pain or dizziness.    PHYSICAL EXAMINATION:  Blood pressure was 129/85, heart rate 99 beats per minute, body weight 167 pounds.  Cardiovascular exam showed no remarkable abnormalities.    EKG today showed slow atrial flutter.    ASSESSMENT AND RECOMMENDATIONS:  Mr. Brown is back into atypical flutter, which is likely from the left atrium.  He feels palpitations and the heart rate appears to be fast with exertion.  For short-term treatment, I have added Cardizem  mg p.o. daily.  The patient is planned to have a 3-week river cruise in [place] starting next week.  For long term management, I have recommended a redo ablation.  The redo ablation will be performed under general anesthesia but we would not need to repeat the CT scan or AGUS.  He will continue the current medications.  If he has bradycardia after the ablation, we may have to stop diltiazem.    Sharmin Scott MD    cc:  Sabas Staley MD  [facility]  [address] [place]  [address]    Sharmin Scott,  MD        D: 2022   T: 2022   MT: jose raul    Name:     RO VELAZQUEZ  MRN:      [id number]        Account:      [id number]   :      1945           Service Date: 2022       Document: [id number]

## 2022-09-26 NOTE — PROGRESS NOTES
HPI and Plan:   See dictation        Orders Placed This Encounter   Procedures     EKG 12-lead complete w/read - Clinics (performed today)     Case Request EP: Ablation Atrial Fibrilation       Orders Placed This Encounter   Medications     diltiazem ER COATED BEADS (CARDIZEM CD/CARTIA XT) 180 MG 24 hr capsule     Sig: Take 1 capsule (180 mg) by mouth daily     Dispense:  80 capsule     Refill:  3       There are no discontinued medications.      Encounter Diagnoses   Name Primary?     Paroxysmal atrial fibrillation (H) Yes     Atypical atrial flutter (H)        CURRENT MEDICATIONS:  Current Outpatient Medications   Medication Sig Dispense Refill     acetaminophen (TYLENOL) 325 MG tablet Take 325-650 mg by mouth every 6 hours as needed for mild pain       apixaban ANTICOAGULANT (ELIQUIS) 5 MG tablet Take 1 tablet (5 mg) by mouth 2 times daily 180 tablet 3     atorvastatin (LIPITOR) 80 MG tablet Take 1 tablet (80 mg) by mouth daily 90 tablet 3     clopidogrel (PLAVIX) 75 MG tablet Take 1 tablet (75 mg) by mouth daily 90 tablet 3     diltiazem ER COATED BEADS (CARDIZEM CD/CARTIA XT) 180 MG 24 hr capsule Take 1 capsule (180 mg) by mouth daily 80 capsule 3     losartan (COZAAR) 50 MG tablet Take 1 tablet (50 mg) by mouth daily (Patient taking differently: Take 25 mg by mouth 2 times daily) 90 tablet 3     nitroGLYcerin (NITROSTAT) 0.4 MG sublingual tablet For chest pain place 1 tablet under the tongue every 5 minutes for 3 doses. If symptoms persist 5 minutes after 1st dose call 911.Please done take viagra along with nnitroglycerine 20 tablet 0       ALLERGIES     Allergies   Allergen Reactions     Ace Inhibitors Cough       PAST MEDICAL HISTORY:  Past Medical History:   Diagnosis Date     CAD (coronary artery disease)     mid LAD MARYCARMEN 5/23/2022     Hypercholesterolemia      Lumbar disc disease      Migraines      Paroxysmal A-fib (H)        PAST SURGICAL HISTORY:  Past Surgical History:   Procedure Laterality Date      ANESTHESIA CARDIOVERSION N/A 4/22/2022    Procedure: ANESTHESIA, FOR CARDIOVERSION FOLLOWING AGUS;  Surgeon: GENERIC ANESTHESIA PROVIDER;  Location:  OR     ANESTHESIA CARDIOVERSION N/A 5/12/2022    Procedure: ANESTHESIA, FOR CARDIOVERSION;  Surgeon: GENERIC ANESTHESIA PROVIDER;  Location:  OR     ANESTHESIA CARDIOVERSION N/A 8/11/2022    Procedure: CARDIOVERSION;  Surgeon: GENERIC ANESTHESIA PROVIDER;  Location:  OR     COLONOSCOPY       CV CORONARY ANGIOGRAM N/A 5/23/2022    Procedure: Coronary Angiogram;  Surgeon: Jacqueline Ramirez MD;  Location:  HEART CARDIAC CATH LAB     CV PCI N/A 5/23/2022    Procedure: Percutaneous Coronary Intervention;  Surgeon: Jacqueline Ramirez MD;  Location:  HEART [facility]ARDI[facility] CATH LAB     CYSTOSCOPY, RETROGRADES, COMBINED Left 5/13/2020    Procedure: LEFT RETROGRADE URETERAL PYELOGRAM;  Surgeon: Denilson Angulo MD;  Location:  OR     CYSTOSCOPY, TRANSURETHRAL RESECTION (TUR) PROSTATE, COMBINED N/A 5/13/2020    Procedure: CYSTOSCOPY WITH TRANSURETHRAL RESECTION PROSTATE;  Surgeon: Denilson Angulo MD;  Location:  OR     EP ABLATION ATRIAL FLUTTER N/A 7/8/2022    Procedure: Ablation Atrial Flutter;  Surgeon: Sharmin Scott MD;  Location:  HEART CARDIAC CATH LAB     EP ABLATION FOCAL AFIB N/A 4/1/2022    Procedure: Ablation Focal Atrial Fibrillation [[id number]];  Surgeon: Sharmin Scott MD;  Location:  HEART CARDIAC CATH LAB     OPEN REDUCTION INTERNAL FIXATION HAND       RHINOPLASTY       TONSILLECTOMY       wisdom teeth         FAMILY HISTORY:  Family History   Problem Relation Age of Onset     Dementia Father      Osteoarthritis Father      Cerebrovascular Disease Father      Myocardial Infarction Mother      C.A.D. Mother      Coronary Artery Disease Mother        SOCIAL HISTORY:  Social History     Socioeconomic History     Marital status:      Spouse name: Erin     Number of children: None     Years of education: None     Highest education level: None  "  Tobacco Use     Smoking status: Never Smoker     Smokeless tobacco: Never Used   Substance and Sexual Activity     Alcohol use: Yes     Alcohol/week: 2.0 - 3.0 standard drinks     Types: 2 - 3 Cans of beer per week     Comment: 3-4 beers/week     Drug use: No     Sexual activity: Yes     Partners: Female   Other Topics Concern     Special Diet No     Exercise Yes     Comment: 2-3x/week       Review of Systems:  Skin:          Eyes:         ENT:         Respiratory:          Cardiovascular:         Gastroenterology:        Genitourinary:         Musculoskeletal:         Neurologic:         Psychiatric:         Heme/Lymph/Imm:         Endocrine:           Physical Exam:  Vitals: /85 (BP Location: Left arm, Cuff Size: Adult Large)   Pulse 99   Ht 1.778 m (5' 10\")   Wt 75.8 kg (167 lb)   BMI 23.96 kg/m      Constitutional:  cooperative, alert and oriented, well developed, well nourished, in no acute distress        Skin:  warm and dry to the touch, no apparent skin lesions or masses noted          Head:  normocephalic, no masses or lesions        Eyes:  pupils equal and round, conjunctivae and lids unremarkable, sclera white, no xanthalasma, EOMS intact, no nystagmus        Lymph:No Cervical lymphadenopathy present     ENT:  no pallor or cyanosis, dentition good        Neck:  carotid pulses are full and equal bilaterally, JVP normal, no carotid bruit        Respiratory:  normal breath sounds, clear to auscultation, normal A-P diameter, normal symmetry, normal respiratory excursion, no use of accessory muscles         Cardiac: regular rhythm, normal S1/S2, no S3 or S4, apical impulse not displaced, no murmurs, gallops or rubs                pulses full and equal, no bruits auscultated                                        GI:  abdomen soft, non-tender, BS normoactive, no mass, no HSM, no bruits        Extremities and Muscular Skeletal:  no deformities, clubbing, cyanosis, erythema observed          "     Neurological:  no gross motor deficits        Psych:  Alert and Oriented x 3        CC  Sharmin Scott MD  [address]  [address]  [place] [id number]

## 2022-09-26 NOTE — LETTER
9/26/2022    Sabas Staley MD  [address] [facility] [address]    RE: Rey Brown       Dear Colleague,     I had the pleasure of seeing Rey Brown in the [facility].  HPI and Plan:   See dictation        Orders Placed This Encounter   Procedures     EKG 12-lead complete w/read - Clinics (performed today)     Case Request EP: Ablation Atrial Fibrilation       Orders Placed This Encounter   Medications     diltiazem ER COATED BEADS (CARDIZEM CD/CARTIA XT) 180 MG 24 hr capsule     Sig: Take 1 capsule (180 mg) by mouth daily     Dispense:  80 capsule     Refill:  3       There are no discontinued medications.      Encounter Diagnoses   Name Primary?     Paroxysmal atrial fibrillation (H) Yes     Atypical atrial flutter (H)        CURRENT MEDICATIONS:  Current Outpatient Medications   Medication Sig Dispense Refill     acetaminophen (TYLENOL) 325 MG tablet Take 325-650 mg by mouth every 6 hours as needed for mild pain       apixaban ANTICOAGULANT (ELIQUIS) 5 MG tablet Take 1 tablet (5 mg) by mouth 2 times daily 180 tablet 3     atorvastatin (LIPITOR) 80 MG tablet Take 1 tablet (80 mg) by mouth daily 90 tablet 3     clopidogrel (PLAVIX) 75 MG tablet Take 1 tablet (75 mg) by mouth daily 90 tablet 3     diltiazem ER COATED BEADS (CARDIZEM CD/CARTIA XT) 180 MG 24 hr capsule Take 1 capsule (180 mg) by mouth daily 80 capsule 3     losartan (COZAAR) 50 MG tablet Take 1 tablet (50 mg) by mouth daily (Patient taking differently: Take 25 mg by mouth 2 times daily) 90 tablet 3     nitroGLYcerin (NITROSTAT) 0.4 MG sublingual tablet For chest pain place 1 tablet under the tongue every 5 minutes for 3 doses. If symptoms persist 5 minutes after 1st dose call 911.Please done take viagra along with nnitroglycerine 20 tablet 0       ALLERGIES     Allergies   Allergen Reactions     Ace Inhibitors Cough       PAST MEDICAL HISTORY:  Past Medical History:   Diagnosis Date     CAD  (coronary artery disease)     mid LAD MARYCARMEN 5/23/2022     Hypercholesterolemia      Lumbar disc disease      Migraines      Paroxysmal A-fib (H)        PAST SURGICAL HISTORY:  Past Surgical History:   Procedure Laterality Date     ANESTHESIA CARDIOVERSION N/A 4/22/2022    Procedure: ANESTHESIA, FOR CARDIOVERSION FOLLOWING AGUS;  Surgeon: GENERIC ANESTHESIA PROVIDER;  Location:  OR     ANESTHESIA CARDIOVERSION N/A 5/12/2022    Procedure: ANESTHESIA, FOR CARDIOVERSION;  Surgeon: GENERIC ANESTHESIA PROVIDER;  Location:  OR     ANESTHESIA CARDIOVERSION N/A 8/11/2022    Procedure: CARDIOVERSION;  Surgeon: GENERIC ANESTHESIA PROVIDER;  Location:  OR     COLONOSCOPY       CV CORONARY ANGIOGRAM N/A 5/23/2022    Procedure: Coronary Angiogram;  Surgeon: Jacqueline Ramirez MD;  Location:  HEART CARDIAC CATH LAB     CV PCI N/A 5/23/2022    Procedure: Percutaneous Coronary Intervention;  Surgeon: Jacqueline Ramirez MD;  Location:  HEART CARDIAC CATH LAB     CYSTOSCOPY, RETROGRADES, COMBINED Left 5/13/2020    Procedure: LEFT RETROGRADE URETERAL PYELOGRAM;  Surgeon: Denilson Angulo MD;  Location:  OR     CYSTOSCOPY, TRANSURETHRAL RESECTION (TUR) PROSTATE, COMBINED N/A 5/13/2020    Procedure: CYSTOSCOPY WITH TRANSURETHRAL RESECTION PROSTATE;  Surgeon: Denilson Angulo MD;  Location:  OR     EP ABLATION ATRIAL FLUTTER N/A 7/8/2022    Procedure: Ablation Atrial Flutter;  Surgeon: Sharmin Scott MD;  Location:  HEART CARDIAC CATH LAB     EP ABLATION FOCAL AFIB N/A 4/1/2022    Procedure: Ablation Focal Atrial Fibrillation [[id number]];  Surgeon: Sharmin Scott MD;  Location:  HEART CARDIAC CATH LAB     OPEN REDUCTION INTERNAL FIXATION HAND       RHINOPLASTY       TONSILLECTOMY       wisdom teeth         FAMILY HISTORY:  Family History   Problem Relation Age of Onset     Dementia Father      Osteoarthritis Father      Cerebrovascular Disease Father      Myocardial Infarction Mother      C.A.D. Mother      Coronary Artery  "Disease Mother        SOCIAL HISTORY:  Social History     Socioeconomic History     Marital status:      Spouse name: Erin     Number of children: None     Years of education: None     Highest education level: None   Tobacco Use     Smoking status: Never Smoker     Smokeless tobacco: Never Used   Substance and Sexual Activity     Alcohol use: Yes     Alcohol/week: 2.0 - 3.0 standard drinks     Types: 2 - 3 Cans of beer per week     Comment: 3-4 beers/week     Drug use: No     Sexual activity: Yes     Partners: Female   Other Topics Concern     Special Diet No     Exercise Yes     Comment: 2-3x/week       Review of Systems:  Skin:          Eyes:         ENT:         Respiratory:          Cardiovascular:         Gastroenterology:        Genitourinary:         Musculoskeletal:         Neurologic:         Psychiatric:         Heme/Lymph/Imm:         Endocrine:           Physical Exam:  Vitals: /85 (BP Location: Left arm, Cuff Size: Adult Large)   Pulse 99   Ht 1.778 m (5' 10\")   Wt 75.8 kg (167 lb)   BMI 23.96 kg/m      Constitutional:  cooperative, alert and oriented, well developed, well nourished, in no acute distress        Skin:  warm and dry to the touch, no apparent skin lesions or masses noted          Head:  normocephalic, no masses or lesions        Eyes:  pupils equal and round, conjunctivae and lids unremarkable, sclera white, no xanthalasma, EOMS intact, no nystagmus        Lymph:No Cervical lymphadenopathy present     ENT:  no pallor or cyanosis, dentition good        Neck:  carotid pulses are full and equal bilaterally, JVP normal, no carotid bruit        Respiratory:  normal breath sounds, clear to auscultation, normal A-P diameter, normal symmetry, normal respiratory excursion, no use of accessory muscles         Cardiac: regular rhythm, normal S1/S2, no S3 or S4, apical impulse not displaced, no murmurs, gallops or rubs                pulses full and equal, no bruits auscultated       "                                  GI:  abdomen soft, non-tender, BS normoactive, no mass, no HSM, no bruits        Extremities and Muscular Skeletal:  no deformities, clubbing, cyanosis, erythema observed              Neurological:  no gross motor deficits        Psych:  Alert and Oriented x 3        CC  Sharmin Scott MD  [address]  [address]  [place] [address]      Thank you for allowing me to participate in the care of your patient.      Sincerely,     Sharmin Scott MD     [facility]

## 2022-09-28 DIAGNOSIS — I48.0 PAROXYSMAL ATRIAL FIBRILLATION (H): ICD-10-CM

## 2022-10-12 NOTE — TELEPHONE ENCOUNTER
PLEASE READ THE PATIENT INSTRUCTIONS BELOW FOR IMPORTANT INFORMATION ABOUT YOUR VISIT TODAY AND NEXT STEPS:    Schedule your follow up appointment today (before you leave) in 2-3 months as an in-person visit.  Keep in mind that parking may be a challenge so plan accordingly.  .    Plan to arrive to the office (or check in online) 15 min prior to your appointment time as the check in process is lengthier than in prior years  Call the office at least 1 day prior to your appointment if you need to cancel.  This allows us to accommodate other patients who need to be seen.  Will check blood work for inflammation (ESR, CRP)  Start gabapentin 100mg nightly watch for drowsiness/dizziness, can increase to twice a day  CTA head/neck ordered to assess previous aneurysm clip   Patient left message on EP nurse line with questions regarding upcoming ablation and medications.  Called patient back and had to leave a message. Awaiting return call from patient.  HOLLEY Johns

## 2022-10-29 NOTE — H&P (VIEW-ONLY)
"[facility] HEART CLINIC    I had the pleasure of seeing Anthony when he came for follow up of preoperative evaluation prior to planned repeat AFib ablation.  This 77 year old sees Dr. Scott and Dr. Pizano for his history of:    1. Paroxysmal AFib - had done well on flecainide for years, but had recurrent arrhythmia during COVID infection.  Rate control was not effective in controlling symptoms, and s/p AFib ablation with Dr. Scott 4/1/2022 (PV antrum, roof/floor lines and septal line). Required multiple repeat DCCVs following (4/22, 5/12, 5/30, 6/5) despite AAD therapy. Flecainide stopped 5/2022 d/t discovery of CAD/NSTEMI and CM (30-35%). Repeat PV antrum/posterior wall ablation 7/8/2022. Amiodarone stopped but reloaded for recurrence.  DCCV 8/11/2022 successful, but again with recurrence.  Now with plans for 3rd AFib ablation 11/4/2022  2. Dyslipidemia  3. HTN   4. Chronic AC for CHADSVASc 5 (CM, HTN, CAD, age) - Eliquis  5. CAD - sp NSTEMI 5/2022 treated with MARYCARMEN to LAD  6. Ischemic Cardiomyopathy - LVEF had been 60-65%, down to 30-35% at time of MI 5/2022, back up to 55-60% 9/2022       I last saw Anthony 8/2022 at which time we reviewed recurrent arrhythmia following repeat ablation 7/8/2022.  We had put him back on amiodarone with a load, with plans for DCCV, which was done 8/11/2022.  He saw Janina Ceballos NP and she noted that he remained in SR by exam and stamina was continuing to improve.  He was playing softball again.  6-month follow-up with Dr. Pizano recommended.    He saw Dr. Scott 9/26 but unfortunately, was back in an atrial arrhythmia, likely from the JONAS.  Cardizem 180 mg daily recommended to start, with plans for repeat ablation under GA on 11/4/2022.    Interval History:  Overall, Anthony thinks he is done well.  He is not terribly symptomatic with his atrial arrhythmias, without palpitations, dizziness or lightheadedness.  He feels his stamina is \"fine.\"  He was just on a  cruise ended well without " "exercise limitations.  He does note that his heart rate continues to \"race\" despite addition of Diltiazem 180 mg daily 9/26.    No edema, orthopnea, PND.  Confirms he has missed no doses of Eliquis.  No bleeding issues.      VITALS:  Vitals: /80 (Cuff Size: Adult Regular)   Pulse 111   Ht 1.778 m (5' 10\")   Wt 77.4 kg (170 lb 9.6 oz)   SpO2 97%   BMI 24.48 kg/m      Diagnostic Testing:  EKG 9/26/2022 atypical AFlutter 86 bpm  Echo 9/2022 with normalization of LVEF 55-60%.  Normal RV. Nl LA size.  Mild MAC with 1+ MR.  1-2+ TR with RVSP 41+ RAP, c/w mild PH.  1+ AI.  Trivial anterior pericardial effusion  Cath 5/23/2022 pLAD 40%, 20% o/pCx. <10% lesions in RCA and 20% o/pCx lesion  Echo 5/22/2022 LVEF 30-35% with moderate-severe anterior, septal, apical wall HK. Nl RV. 1-2+TR. RVSP 26+RAP. RAP 8mmHg.  Trivial aortic sclerosis.  EKG 4/18/2022 on flecainide 100 mg  BID and Diltiazem 120 mg daily showed AFib 80 bpm. QRS duration 102 ms  EKG 4/8/2022 showed AFib 83 bpm. QRS duration 106 ms on flecainide 100 mg BID  CTA Heart 3/25/202 - nl PV anatomy with normal aorta. 3 v CAC. Non-cardiac portion showed 5 mm groundglass pulmonary nodule RLL for which no routine follow-up needed. If suspicious, follow-up at 2 and 4 y rec'd      Plan:  1. L-sided atrial flutter ablation    Assessment/Plan:    1. Atrial arrhythmias    As above, has undergone multiple DCCV's.    This year, underwent ablation 4/2022 (PV antrum, roof/floor lines and septal line), and again ablation (PV antrum/posterior wall ablation) 7/8/2022.  Underwent DCCV on amiodarone 8/2022, back in atypical AFlutter 9/2022    Dr. Scott saw 9/26 and recommended third ablation after he returns from his trip. Diltiazem was added for HR control    Echo 9/2022 showed normalization of LVEF 55-60    PLAN:    Third AFib ablation (L-sided atrial flutter). We discussed the risks, benefits and indications of proceeding with an electrophysiology study and pulmonary vein " isolation/atrial fibrillation ablation, including but not limited to use of anesthesia (including intubation and bladder catheter), peripheral vessel injury, discomfort, bruising, bleeding, esophageal injury, diaphragmatic injury, cardiac puncture and/or tamponade requiring emergency treatment, pulmonary vein stenosis requiring intervention, and stroke/TIA. We reviewed that additional procedures may be required.  We also briefly discussed post-procedural restrictions and post-procedural discomfort.  The patient voiced understanding and is willing to proceed.  A consent form will be signed by the procedural physician.  o COVID test at home prior.  Take picture of negative result and bring to procedure  o Arrive 630 on Friday, 11/4/2022  o Nothing to eat/drink after midnight  o No medication holds necessary, okay to take all meds with a small sip of water    o Likely discharged home the same day; will need a  as will have gotten anesthesia      Understands that Dr. Scott will likely stop his Diltiazem after procedure.  He will remain on AC.    Follow-up is been arranged.  He understands that Dr. Scott will be retiring, and will see Dr. Washington 1/2022    2. HTN    Remains on Diltiazem 180 mg daily , losartan 50 mg daily      PLAN:    Continue current medications as BP today looks great    3. CAD, ischemic cardiomyopathy    Saw Janina Ceballos NP 9/2022 who reviewed normalization of LVEF on echo 9/2022    Remains on losartan, atorvastatin, clopidogrel      PLAN:    6-month follow-up with Dr. Pizano planned, along with GEOFFREY Quintana PA-C, MSPAS      No orders of the defined types were placed in this encounter.    No orders of the defined types were placed in this encounter.    There are no discontinued medications.      No diagnosis found.    CURRENT MEDICATIONS:  Current Outpatient Medications   Medication Sig Dispense Refill     acetaminophen (TYLENOL) 325 MG tablet Take 325-650 mg by mouth every 6 hours as needed for  "mild pain       apixaban ANTICOAGULANT (ELIQUIS) 5 MG tablet Take 1 tablet (5 mg) by mouth 2 times daily 180 tablet 1     atorvastatin (LIPITOR) 80 MG tablet Take 1 tablet (80 mg) by mouth daily 90 tablet 3     clopidogrel (PLAVIX) 75 MG tablet Take 1 tablet (75 mg) by mouth daily 90 tablet 3     diltiazem ER COATED BEADS (CARDIZEM CD/CARTIA XT) 180 MG 24 hr capsule Take 1 capsule (180 mg) by mouth daily 80 capsule 3     losartan (COZAAR) 50 MG tablet Take 1 tablet (50 mg) by mouth daily 90 tablet 3     nitroGLYcerin (NITROSTAT) 0.4 MG sublingual tablet For chest pain place 1 tablet under the tongue every 5 minutes for 3 doses. If symptoms persist 5 minutes after 1st dose call 911.Please done take viagra along with nnitroglycerine 20 tablet 0       ALLERGIES     Allergies   Allergen Reactions     Ace Inhibitors Cough         Review of Systems:  Skin:  Negative     Eyes:  not assessed    ENT:  Negative    Respiratory:  Negative for shortness of breath;dyspnea on exertion;cough  Cardiovascular:  Negative for;palpitations;chest pain;edema;dizziness;lightheadedness    Gastroenterology: Negative for melena;hematochezia  Genitourinary:  not assessed    Musculoskeletal:  Negative    Neurologic:  Negative    Psychiatric:  not assessed    Heme/Lymph/Imm:  Negative    Endocrine:  Negative      Physical Exam:  Vitals: /80 (Cuff Size: Adult Regular)   Pulse 111   Ht 1.778 m (5' 10\")   Wt 77.4 kg (170 lb 9.6 oz)   SpO2 97%   BMI 24.48 kg/m      Constitutional:  cooperative, alert and oriented, well developed, well nourished, in no acute distress        Skin:  not assessed this visit        Head:  not assessed this visit        Eyes:  not assessed this visit        ENT:  not assessed this visit        Neck:  not assessed this visit        Chest:  normal breath sounds, clear to auscultation, normal A-P diameter, normal symmetry, normal respiratory excursion, no use of accessory muscles        Cardiac: regular rhythm " tachycardic                Abdomen:  not assessed this visit        Vascular: not assessed this visit                                      Extremities and Back:  not assessed this visit        Neurological:  no gross motor deficits            PAST MEDICAL HISTORY:  Past Medical History:   Diagnosis Date     CAD (coronary artery disease)     mid LAD MARYCARMEN 5/23/2022     Hypercholesterolemia      Lumbar disc disease      Migraines      Paroxysmal A-fib (H)        PAST SURGICAL HISTORY:  Past Surgical History:   Procedure Laterality Date     ANESTHESIA CARDIOVERSION N/A 4/22/2022    Procedure: ANESTHESIA, FOR CARDIOVERSION FOLLOWING AGUS;  Surgeon: GENERIC ANESTHESIA PROVIDER;  Location:  OR     ANESTHESIA CARDIOVERSION N/A 5/12/2022    Procedure: ANESTHESIA, FOR CARDIOVERSION;  Surgeon: GENERIC ANESTHESIA PROVIDER;  Location:  OR     ANESTHESIA CARDIOVERSION N/A 8/11/2022    Procedure: CARDIOVERSION;  Surgeon: GENERIC ANESTHESIA PROVIDER;  Location:  OR     COLONOSCOPY       CV CORONARY ANGIOGRAM N/A 5/23/2022    Procedure: Coronary Angiogram;  Surgeon: Jacqueline Ramirez MD;  Location:  HEART CARDIAC CATH LAB     CV PCI N/A 5/23/2022    Procedure: Percutaneous Coronary Intervention;  Surgeon: Jacqueline Ramirez MD;  Location:  HEART CARDIAC CATH LAB     CYSTOSCOPY, RETROGRADES, COMBINED Left 5/13/2020    Procedure: LEFT RETROGRADE URETERAL PYELOGRAM;  Surgeon: Denilson Angulo MD;  Location:  OR     CYSTOSCOPY, TRANSURETHRAL RESECTION (TUR) PROSTATE, COMBINED N/A 5/13/2020    Procedure: CYSTOSCOPY WITH TRANSURETHRAL RESECTION PROSTATE;  Surgeon: Denilson Angulo MD;  Location:  OR     EP ABLATION ATRIAL FLUTTER N/A 7/8/2022    Procedure: Ablation Atrial Flutter;  Surgeon: Sharmin Scott MD;  Location:  HEART CARDIAC CATH LAB     EP ABLATION FOCAL AFIB N/A 4/1/2022    Procedure: Ablation Focal Atrial Fibrillation [[id number]];  Surgeon: Sharmin Scott MD;  Location:  HEART CARDIAC CATH LAB     OPEN  REDUCTION INTERNAL FIXATION HAND       RHINOPLASTY       TONSILLECTOMY       wisdom teeth         FAMILY HISTORY:  Family History   Problem Relation Age of Onset     Dementia Father      Osteoarthritis Father      Cerebrovascular Disease Father      Myocardial Infarction Mother      C.A.D. Mother      Coronary Artery Disease Mother        SOCIAL HISTORY:  Social History     Socioeconomic History     Marital status:      Spouse name: Erin     Number of children: None     Years of education: None     Highest education level: None   Tobacco Use     Smoking status: Never     Smokeless tobacco: Never   Substance and Sexual Activity     Alcohol use: Yes     Alcohol/week: 2.0 - 3.0 standard drinks     Types: 2 - 3 Cans of beer per week     Comment: 3-4 beers/week     Drug use: No     Sexual activity: Yes     Partners: Female   Other Topics Concern     Special Diet No     Exercise Yes     Comment: 2-3x/week

## 2022-10-29 NOTE — PROGRESS NOTES
"[facility] HE[facility] CLINIC    I had the pleasure of seeing Anthony when he came for follow up of preoperative evaluation prior to planned repeat AFib ablation.  This 77 year old sees Dr. Scott and Dr. Pizano for his history of:    1. Paroxysmal AFib - had done well on flecainide for years, but had recurrent arrhythmia during COVID infection.  Rate control was not effective in controlling symptoms, and s/p AFib ablation with Dr. Scott 4/1/2022 (PV antrum, roof/floor lines and septal line). Required multiple repeat DCCVs following (4/22, 5/12, 5/30, 6/5) despite AAD therapy. Flecainide stopped 5/2022 d/t discovery of CAD/NSTEMI and CM (30-35%). Repeat PV antrum/posterior wall ablation 7/8/2022. Amiodarone stopped but reloaded for recurrence.  DCCV 8/11/2022 successful, but again with recurrence.  Now with plans for 3rd AFib ablation 11/4/2022  2. Dyslipidemia  3. HTN   4. Chronic AC for CHADSVASc 5 (CM, HTN, CAD, age) - Eliquis  5. CAD - sp NSTEMI 5/2022 treated with MARYCARMEN to LAD  6. Ischemic Cardiomyopathy - LVEF had been 60-65%, down to 30-35% at time of MI 5/2022, back up to 55-60% 9/2022       I last saw Anthony 8/2022 at which time we reviewed recurrent arrhythmia following repeat ablation 7/8/2022.  We had put him back on amiodarone with a load, with plans for DCCV, which was done 8/11/2022.  He saw Janina Ceballos NP and she noted that he remained in SR by exam and stamina was continuing to improve.  He was playing softball again.  6-month follow-up with Dr. Pizano recommended.    He saw Dr. Scott 9/26 but unfortunately, was back in an atrial arrhythmia, likely from the JONAS.  Cardizem 180 mg daily recommended to start, with plans for repeat ablation under GA on 11/4/2022.    Interval History:  Overall, Anthony thinks he is done well.  He is not terribly symptomatic with his atrial arrhythmias, without palpitations, dizziness or lightheadedness.  He feels his stamina is \"fine.\"  He was just on a  cruise ended well without " "exercise limitations.  He does note that his heart rate continues to \"race\" despite addition of Diltiazem 180 mg daily 9/26.    No edema, orthopnea, PND.  Confirms he has missed no doses of Eliquis.  No bleeding issues.      VITALS:  Vitals: /80 (Cuff Size: Adult Regular)   Pulse 111   Ht 1.778 m (5' 10\")   Wt 77.4 kg (170 lb 9.6 oz)   SpO2 97%   BMI 24.48 kg/m      Diagnostic Testing:  EKG 9/26/2022 atypical AFlutter 86 bpm  Echo 9/2022 with normalization of LVEF 55-60%.  Normal RV. Nl LA size.  Mild MAC with 1+ MR.  1-2+ TR with RVSP 41+ RAP, c/w mild PH.  1+ AI.  Trivial anterior pericardial effusion  Cath 5/23/2022 pLAD 40%, 20% o/pCx. <10% lesions in RCA and 20% o/pCx lesion  Echo 5/22/2022 LVEF 30-35% with moderate-severe anterior, septal, apical wall HK. Nl RV. 1-2+TR. RVSP 26+RAP. RAP 8mmHg.  Trivial aortic sclerosis.  EKG 4/18/2022 on flecainide 100 mg  BID and Diltiazem 120 mg daily showed AFib 80 bpm. QRS duration 102 ms  EKG 4/8/2022 showed AFib 83 bpm. QRS duration 106 ms on flecainide 100 mg BID  CTA Heart 3/25/202 - nl PV anatomy with normal aorta. 3 v CAC. Non-cardiac portion showed 5 mm groundglass pulmonary nodule RLL for which no routine follow-up needed. If suspicious, follow-up at 2 and 4 y rec'd      Plan:  1. L-sided atrial flutter ablation    Assessment/Plan:    1. Atrial arrhythmias    As above, has undergone multiple DCCV's.    This year, underwent ablation 4/2022 (PV antrum, roof/floor lines and septal line), and again ablation (PV antrum/posterior wall ablation) 7/8/2022.  Underwent DCCV on amiodarone 8/2022, back in atypical AFlutter 9/2022    Dr. Scott saw 9/26 and recommended third ablation after he returns from his trip. Diltiazem was added for HR control    Echo 9/2022 showed normalization of LVEF 55-60    PLAN:    Third AFib ablation (L-sided atrial flutter). We discussed the risks, benefits and indications of proceeding with an electrophysiology study and pulmonary vein " isolation/atrial fibrillation ablation, including but not limited to use of anesthesia (including intubation and bladder catheter), peripheral vessel injury, discomfort, bruising, bleeding, esophageal injury, diaphragmatic injury, cardiac puncture and/or tamponade requiring emergency treatment, pulmonary vein stenosis requiring intervention, and stroke/TIA. We reviewed that additional procedures may be required.  We also briefly discussed post-procedural restrictions and post-procedural discomfort.  The patient voiced understanding and is willing to proceed.  A consent form will be signed by the procedural physician.  o COVID test at home prior.  Take picture of negative result and bring to procedure  o Arrive 630 on Friday, 11/4/2022  o Nothing to eat/drink after midnight  o No medication holds necessary, okay to take all meds with a small sip of water    o Likely discharged home the same day; will need a  as will have gotten anesthesia      Understands that Dr. Scott will likely stop his Diltiazem after procedure.  He will remain on AC.    Follow-up is been arranged.  He understands that Dr. Scott will be retiring, and will see Dr. Washington 1/2022    2. HTN    Remains on Diltiazem 180 mg daily , losartan 50 mg daily      PLAN:    Continue current medications as BP today looks great    3. CAD, ischemic cardiomyopathy    Saw Janina Ceballos NP 9/2022 who reviewed normalization of LVEF on echo 9/2022    Remains on losartan, atorvastatin, clopidogrel      PLAN:    6-month follow-up with Dr. Pizano planned, along with GEOFFREY Quintana PA-C, MSPAS      No orders of the defined types were placed in this encounter.    No orders of the defined types were placed in this encounter.    There are no discontinued medications.      No diagnosis found.    CURRENT MEDICATIONS:  Current Outpatient Medications   Medication Sig Dispense Refill     acetaminophen (TYLENOL) 325 MG tablet Take 325-650 mg by mouth every 6 hours as needed for  "mild pain       apixaban ANTICOAGULANT (ELIQUIS) 5 MG tablet Take 1 tablet (5 mg) by mouth 2 times daily 180 tablet 1     atorvastatin (LIPITOR) 80 MG tablet Take 1 tablet (80 mg) by mouth daily 90 tablet 3     clopidogrel (PLAVIX) 75 MG tablet Take 1 tablet (75 mg) by mouth daily 90 tablet 3     diltiazem ER COATED BEADS (CARDIZEM CD/CARTIA XT) 180 MG 24 hr capsule Take 1 capsule (180 mg) by mouth daily 80 capsule 3     losartan (COZAAR) 50 MG tablet Take 1 tablet (50 mg) by mouth daily 90 tablet 3     nitroGLYcerin (NITROSTAT) 0.4 MG sublingual tablet For chest pain place 1 tablet under the tongue every 5 minutes for 3 doses. If symptoms persist 5 minutes after 1st dose call 911.Please done take viagra along with nnitroglycerine 20 tablet 0       ALLERGIES     Allergies   Allergen Reactions     Ace Inhibitors Cough         Review of Systems:  Skin:  Negative     Eyes:  not assessed    ENT:  Negative    Respiratory:  Negative for shortness of breath;dyspnea on exertion;cough  Cardiovascular:  Negative for;palpitations;chest pain;edema;dizziness;lightheadedness    Gastroenterology: Negative for melena;hematochezia  Genitourinary:  not assessed    Musculoskeletal:  Negative    Neurologic:  Negative    Psychiatric:  not assessed    Heme/Lymph/Imm:  Negative    Endocrine:  Negative      Physical Exam:  Vitals: /80 (Cuff Size: Adult Regular)   Pulse 111   Ht 1.778 m (5' 10\")   Wt 77.4 kg (170 lb 9.6 oz)   SpO2 97%   BMI 24.48 kg/m      Constitutional:  cooperative, alert and oriented, well developed, well nourished, in no acute distress        Skin:  not assessed this visit        Head:  not assessed this visit        Eyes:  not assessed this visit        ENT:  not assessed this visit        Neck:  not assessed this visit        Chest:  normal breath sounds, clear to auscultation, normal A-P diameter, normal symmetry, normal respiratory excursion, no use of accessory muscles        Cardiac: regular rhythm " tachycardic                Abdomen:  not assessed this visit        Vascular: not assessed this visit                                      Extremities and Back:  not assessed this visit        Neurological:  no gross motor deficits            PAST MEDICAL HISTORY:  Past Medical History:   Diagnosis Date     CAD (coronary artery disease)     mid LAD MARYCARMEN 5/23/2022     Hypercholesterolemia      Lumbar disc disease      Migraines      Paroxysmal A-fib (H)        PAST SURGICAL HISTORY:  Past Surgical History:   Procedure Laterality Date     ANESTHESIA CARDIOVERSION N/A 4/22/2022    Procedure: ANESTHESIA, FOR CARDIOVERSION FOLLOWING AGUS;  Surgeon: GENERIC ANESTHESIA PROVIDER;  Location:  OR     ANESTHESIA CARDIOVERSION N/A 5/12/2022    Procedure: ANESTHESIA, FOR CARDIOVERSION;  Surgeon: GENERIC ANESTHESIA PROVIDER;  Location:  OR     ANESTHESIA CARDIOVERSION N/A 8/11/2022    Procedure: CARDIOVERSION;  Surgeon: GENERIC ANESTHESIA PROVIDER;  Location:  OR     COLONOSCOPY       CV CORONARY ANGIOGRAM N/A 5/23/2022    Procedure: Coronary Angiogram;  Surgeon: Jacqueline Ramirez MD;  Location:  HEART CARDIAC CATH LAB     CV PCI N/A 5/23/2022    Procedure: Percutaneous Coronary Intervention;  Surgeon: Jacqueline Ramirez MD;  Location:  HEART CARDIAC CATH LAB     CYSTOSCOPY, RETROGRADES, COMBINED Left 5/13/2020    Procedure: LEFT RETROGRADE URETERAL PYELOGRAM;  Surgeon: Denilson Angulo MD;  Location:  OR     CYSTOSCOPY, TRANSURETHRAL RESECTION (TUR) PROSTATE, COMBINED N/A 5/13/2020    Procedure: CYSTOSCOPY WITH TRANSURETHRAL RESECTION PROSTATE;  Surgeon: Denilson Angulo MD;  Location:  OR     EP ABLATION ATRIAL FLUTTER N/A 7/8/2022    Procedure: Ablation Atrial Flutter;  Surgeon: Sharmin Scott MD;  Location:  HEART CARDIAC CATH LAB     EP ABLATION FOCAL AFIB N/A 4/1/2022    Procedure: Ablation Focal Atrial Fibrillation [[id number]];  Surgeon: Sharmin Scott MD;  Location:  HEART CARDIAC CATH LAB     OPEN  REDUCTION INTERNAL FIXATION HAND       RHINOPLASTY       TONSILLECTOMY       wisdom teeth         FAMILY HISTORY:  Family History   Problem Relation Age of Onset     Dementia Father      Osteoarthritis Father      Cerebrovascular Disease Father      Myocardial Infarction Mother      C.A.D. Mother      Coronary Artery Disease Mother        SOCIAL HISTORY:  Social History     Socioeconomic History     Marital status:      Spouse name: Erin     Number of children: None     Years of education: None     Highest education level: None   Tobacco Use     Smoking status: Never     Smokeless tobacco: Never   Substance and Sexual Activity     Alcohol use: Yes     Alcohol/week: 2.0 - 3.0 standard drinks     Types: 2 - 3 Cans of beer per week     Comment: 3-4 beers/week     Drug use: No     Sexual activity: Yes     Partners: Female   Other Topics Concern     Special Diet No     Exercise Yes     Comment: 2-3x/week

## 2022-10-31 ENCOUNTER — OFFICE VISIT (OUTPATIENT)
Dept: CARDIOLOGY | Facility: CLINIC | Age: 77
End: 2022-10-31
Payer: COMMERCIAL

## 2022-10-31 VITALS
OXYGEN SATURATION: 97 % | HEART RATE: 111 BPM | WEIGHT: 170.6 LBS | DIASTOLIC BLOOD PRESSURE: 80 MMHG | HEIGHT: 70 IN | SYSTOLIC BLOOD PRESSURE: 121 MMHG | BODY MASS INDEX: 24.42 KG/M2

## 2022-10-31 DIAGNOSIS — I48.4 ATYPICAL ATRIAL FLUTTER (H): Primary | ICD-10-CM

## 2022-10-31 PROCEDURE — 99214 OFFICE O/P EST MOD 30 MIN: CPT | Performed by: PHYSICIAN ASSISTANT

## 2022-10-31 NOTE — LETTER
10/31/2022    Sabas Staley MD  [address]  [facility]– [place] [address]    RE: Rey L Stephanie       Dear Colleague,     I had the pleasure of seeing Rey THOMAS Brown in the [facility].  [facility]    I had the pleasure of seeing Anthony when he came for follow up of preoperative evaluation prior to planned repeat AFib ablation.  This 77 year old sees Dr. Scott and Dr. Pizano for his history of:    1. Paroxysmal AFib - had done well on flecainide for years, but had recurrent arrhythmia during COVID infection.  Rate control was not effective in controlling symptoms, and s/p AFib ablation with Dr. Scott 4/1/2022 (PV antrum, roof/floor lines and septal line). Required multiple repeat DCCVs following (4/22, 5/12, 5/30, 6/5) despite AAD therapy. Flecainide stopped 5/2022 d/t discovery of CAD/NSTEMI and CM (30-35%). Repeat PV antrum/posterior wall ablation 7/8/2022. Amiodarone stopped but reloaded for recurrence.  DCCV 8/11/2022 successful, but again with recurrence.  Now with plans for 3rd AFib ablation 11/4/2022  2. Dyslipidemia  3. HTN   4. Chronic AC for CHADSVASc 5 (CM, HTN, CAD, age) - Eliquis  5. CAD - sp NSTEMI 5/2022 treated with MARYCARMEN to LAD  6. Ischemic Cardiomyopathy - LVEF had been 60-65%, down to 30-35% at time of MI 5/2022, back up to 55-60% 9/2022       I last saw Anthony 8/2022 at which time we reviewed recurrent arrhythmia following repeat ablation 7/8/2022.  We had put him back on amiodarone with a load, with plans for DCCV, which was done 8/11/2022.  He saw Janina Ceballos NP and she noted that he remained in SR by exam and stamina was continuing to improve.  He was playing softball again.  6-month follow-up with Dr. Pizano recommended.    He saw Dr. Scott 9/26 but unfortunately, was back in an atrial arrhythmia, likely from the JONAS.  Cardizem 180 mg daily recommended to start, with plans for repeat ablation under [facility] on 11/4/2022.    Interval History:  Overall, Anthony  "thinks he is done well.  He is not terribly symptomatic with his atrial arrhythmias, without palpitations, dizziness or lightheadedness.  He feels his stamina is \"fine.\"  He was just on a  cruise ended well without exercise limitations.  He does note that his heart rate continues to \"race\" despite addition of Diltiazem 180 mg daily 9/26.    No edema, orthopnea, PND.  Confirms he has missed no doses of Eliquis.  No bleeding issues.      VITALS:  Vitals: /80 (Cuff Size: Adult Regular)   Pulse 111   Ht 1.778 m (5' 10\")   Wt 77.4 kg (170 lb 9.6 oz)   SpO2 97%   BMI 24.48 kg/m      Diagnostic Testing:  EKG 9/26/2022 atypical AFlutter 86 bpm  Echo 9/2022 with normalization of LVEF 55-60%.  Normal RV. Nl LA size.  Mild MAC with 1+ MR.  1-2+ TR with RVSP 41+ RAP, c/w mild PH.  1+ AI.  Trivial anterior pericardial effusion  Cath 5/23/2022 pLAD 40%, 20% o/pCx. <10% lesions in RCA and 20% o/pCx lesion  Echo 5/22/2022 LVEF 30-35% with moderate-severe anterior, septal, apical wall HK. Nl RV. 1-2+TR. RVSP 26+RAP. RAP 8mmHg.  Trivial aortic sclerosis.  EKG 4/18/2022 on flecainide 100 mg  BID and Diltiazem 120 mg daily showed AFib 80 bpm. QRS duration 102 ms  EKG 4/8/2022 showed AFib 83 bpm. QRS duration 106 ms on flecainide 100 mg BID  CTA Heart 3/25/202 - nl PV anatomy with normal aorta. 3 v CAC. Non-cardiac portion showed 5 mm groundglass pulmonary nodule RLL for which no routine follow-up needed. If suspicious, follow-up at 2 and 6 y rec'd      Plan:  1. L-sided atrial flutter ablation    Assessment/Plan:    1. Atrial arrhythmias    As above, has undergone multiple DCCV's.    This year, underwent ablation 4/2022 (PV antrum, roof/floor lines and septal line), and again ablation (PV antrum/posterior wall ablation) 7/8/2022.  Underwent DCCV on amiodarone 8/2022, back in atypical AFlutter 9/2022    Dr. Scott saw 9/26 and recommended third ablation after he returns from his trip. Diltiazem was added for HR " control    Echo 9/2022 showed normalization of LVEF 55-60    PLAN:    Third AFib ablation (L-sided atrial flutter). We discussed the risks, benefits and indications of proceeding with an electrophysiology study and pulmonary vein isolation/atrial fibrillation ablation, including but not limited to use of anesthesia (including intubation and bladder catheter), peripheral vessel injury, discomfort, bruising, bleeding, esophageal injury, diaphragmatic injury, cardiac puncture and/or tamponade requiring emergency treatment, pulmonary vein stenosis requiring intervention, and stroke/TIA. We reviewed that additional procedures may be required.  We also briefly discussed post-procedural restrictions and post-procedural discomfort.  The patient voiced understanding and is willing to proceed.  A consent form will be signed by the procedural physician.  o COVID test at home prior.  Take picture of negative result and bring to procedure  o Arrive 630 on Friday, 11/4/2022  o Nothing to eat/drink after midnight  o No medication holds necessary, okay to take all meds with a small sip of water    o Likely discharged home the same day; will need a  as will have gotten anesthesia      Understands that Dr. Scott will likely stop his Diltiazem after procedure.  He will remain on AC.    Follow-up is been arranged.  He understands that Dr. Scott will be retiring, and will see Dr. Washington 1/2022    2. HTN    Remains on Diltiazem 180 mg daily , losartan 50 mg daily      PLAN:    Continue current medications as BP today looks great    3. CAD, ischemic cardiomyopathy    Saw Janina Ceballos NP 9/2022 who reviewed normalization of LVEF on echo 9/2022    Remains on losartan, atorvastatin, clopidogrel      PLAN:    6-month follow-up with Dr. Pizano planned, along with GEOFFREY Quintana PA-C, MSPAS      No orders of the defined types were placed in this encounter.    No orders of the defined types were placed in this encounter.    There are no  "discontinued medications.      No diagnosis found.    CURRENT MEDICATIONS:  Current Outpatient Medications   Medication Sig Dispense Refill     acetaminophen (TYLENOL) 325 MG tablet Take 325-650 mg by mouth every 6 hours as needed for mild pain       apixaban ANTICOAGULANT (ELIQUIS) 5 MG tablet Take 1 tablet (5 mg) by mouth 2 times daily 180 tablet 1     atorvastatin (LIPITOR) 80 MG tablet Take 1 tablet (80 mg) by mouth daily 90 tablet 3     clopidogrel (PLAVIX) 75 MG tablet Take 1 tablet (75 mg) by mouth daily 90 tablet 3     diltiazem ER COATED BEADS (CARDIZEM CD/CARTIA XT) 180 MG 24 hr capsule Take 1 capsule (180 mg) by mouth daily 80 capsule 3     losartan (COZAAR) 50 MG tablet Take 1 tablet (50 mg) by mouth daily 90 tablet 3     nitroGLYcerin (NITROSTAT) 0.4 MG sublingual tablet For chest pain place 1 tablet under the tongue every 5 minutes for 3 doses. If symptoms persist 5 minutes after 1st dose call 911.Please done take viagra along with nnitroglycerine 20 tablet 0       ALLERGIES     Allergies   Allergen Reactions     Ace Inhibitors Cough         Review of Systems:  Skin:  Negative     Eyes:  not assessed    ENT:  Negative    Respiratory:  Negative for shortness of breath;dyspnea on exertion;cough  Cardiovascular:  Negative for;palpitations;chest pain;edema;dizziness;lightheadedness    Gastroenterology: Negative for melena;hematochezia  Genitourinary:  not assessed    Musculoskeletal:  Negative    Neurologic:  Negative    Psychiatric:  not assessed    Heme/Lymph/Imm:  Negative    Endocrine:  Negative      Physical Exam:  Vitals: /80 (Cuff Size: Adult Regular)   Pulse 111   Ht 1.778 m (5' 10\")   Wt 77.4 kg (170 lb 9.6 oz)   SpO2 97%   BMI 24.48 kg/m      Constitutional:  cooperative, alert and oriented, well developed, well nourished, in no acute distress        Skin:  not assessed this visit        Head:  not assessed this visit        Eyes:  not assessed this visit        ENT:  not assessed this " visit        Neck:  not assessed this visit        Chest:  normal breath sounds, clear to auscultation, normal A-P diameter, normal symmetry, normal respiratory excursion, no use of accessory muscles        Cardiac: regular rhythm tachycardic                Abdomen:  not assessed this visit        Vascular: not assessed this visit                                      Extremities and Back:  not assessed this visit        Neurological:  no gross motor deficits           PAST MEDICAL HISTORY:  Past Medical History:   Diagnosis Date     CAD (coronary artery disease)     mid LAD MARYCARMEN 5/23/2022     Hypercholesterolemia      Lumbar disc disease      Migraines      Paroxysmal A-fib (H)        PAST SURGICAL HISTORY:  Past Surgical History:   Procedure Laterality Date     ANESTHESIA CARDIOVERSION N/A 4/22/2022    Procedure: ANESTHESIA, FOR CARDIOVERSION FOLLOWING AGUS;  Surgeon: GENERIC ANESTHESIA PROVIDER;  Location:  OR     ANESTHESIA CARDIOVERSION N/A 5/12/2022    Procedure: ANESTHESIA, FOR CARDIOVERSION;  Surgeon: GENERIC ANESTHESIA PROVIDER;  Location:  OR     ANESTHESIA CARDIOVERSION N/A 8/11/2022    Procedure: CARDIOVERSION;  Surgeon: GENERIC ANESTHESIA PROVIDER;  Location:  OR     COLONOSCOPY       CV CORONARY ANGIOGRAM N/A 5/23/2022    Procedure: Coronary Angiogram;  Surgeon: Jacqueline Ramirez MD;  Location:  HEART CARDIAC CATH LAB     CV PCI N/A 5/23/2022    Procedure: Percutaneous Coronary Intervention;  Surgeon: Jacqueline Ramirez MD;  Location:  HEART CARDIAC CATH LAB     CYSTOSCOPY, RETROGRADES, COMBINED Left 5/13/2020    Procedure: LEFT RETROGRADE URETERAL PYELOGRAM;  Surgeon: Denilson Angulo MD;  Location:  OR     CYSTOSCOPY, TRANSURETHRAL RESECTION (TUR) PROSTATE, COMBINED N/A 5/13/2020    Procedure: CYSTOSCOPY WITH TRANSURETHRAL RESECTION PROSTATE;  Surgeon: Denilson Angulo MD;  Location:  OR     EP ABLATION ATRIAL FLUTTER N/A 7/8/2022    Procedure: Ablation Atrial Flutter;  Surgeon: Sonia  MD Sharmin;  Location:  HEART CARDIAC CATH LAB     EP ABLATION FOCAL AFIB N/A 4/1/2022    Procedure: Ablation Focal Atrial Fibrillation [[id number]];  Surgeon: Sharmin Scott MD;  Location:  HEART CARDIAC CATH LAB     OPEN REDUCTION INTERNAL FIXATION HAND       RHINOPLASTY       TONSILLECTOMY       wisdom teeth         FAMILY HISTORY:  Family History   Problem Relation Age of Onset     Dementia Father      Osteoarthritis Father      Cerebrovascular Disease Father      Myocardial Infarction Mother      C.A.D. Mother      Coronary Artery Disease Mother        SOCIAL HISTORY:  Social History     Socioeconomic History     Marital status:      Spouse name: Erin     Number of children: None     Years of education: None     Highest education level: None   Tobacco Use     Smoking status: Never     Smokeless tobacco: Never   Substance and Sexual Activity     Alcohol use: Yes     Alcohol/week: 2.0 - 3.0 standard drinks     Types: 2 - 3 Cans of beer per week     Comment: 3-4 beers/week     Drug use: No     Sexual activity: Yes     Partners: Female   Other Topics Concern     Special Diet No     Exercise Yes     Comment: 2-3x/week        Thank you for allowing me to participate in the care of your patient.      Sincerely,     Mona Quintana PA-C     [facility]  cc:   No referring provider defined for this encounter.

## 2022-10-31 NOTE — PATIENT INSTRUCTIONS
Anthony - it was good to see you again today! And say hi to Erin!    Reviewed your VERY stubborn heart rhythm. Dr. Scott saw you 9/26 and noted you were back in what appears to be a L-sided atrial flutter    PLAN:  Repeat ablation 11/4/2022  COVID test at home prior.  Take picture of negative result and bring to procedure  Arrive 630 on Friday, 11/4/2022  Nothing to eat/drink after midnight  No medication holds necessary, okay to take all meds with a small sip of water    2. See us back in follow-up as arranged    3. AFib RNs Dinorah, Brooke & Germaine: [phone]

## 2022-11-03 ENCOUNTER — TELEPHONE (OUTPATIENT)
Dept: CARDIOLOGY | Facility: CLINIC | Age: 77
End: 2022-11-03

## 2022-11-03 DIAGNOSIS — I48.4 ATYPICAL ATRIAL FLUTTER (H): Primary | ICD-10-CM

## 2022-11-03 RX ORDER — SODIUM CHLORIDE, SODIUM LACTATE, POTASSIUM CHLORIDE, CALCIUM CHLORIDE 600; 310; 30; 20 MG/100ML; MG/100ML; MG/100ML; MG/100ML
INJECTION, SOLUTION INTRAVENOUS CONTINUOUS
Status: CANCELLED | OUTPATIENT
Start: 2022-11-03

## 2022-11-03 RX ORDER — LIDOCAINE 40 MG/G
CREAM TOPICAL
Status: CANCELLED | OUTPATIENT
Start: 2022-11-03

## 2022-11-03 NOTE — TELEPHONE ENCOUNTER
Spoke to pt who is aware of arrival time of 0630 and procedure at 0830. Pt aware nothing to eat after midnight with sips of water in the AM with his medications. Pt has no medications to hold. Pt wife will be  to and from procedure. Pt COVID test is completed and he will take a picture of it to have when admitted. Pt reminded that on discharge he will have instructions of what he should not do post procedure and this also includes the follow up OV's and on call cardiology phone number for any issues over the weekend. Pt will get a call on Monday to see how he is doing.  No questions at this time. Licha

## 2022-11-04 ENCOUNTER — APPOINTMENT (OUTPATIENT)
Dept: CARDIOLOGY | Facility: CLINIC | Age: 77
End: 2022-11-04
Attending: INTERNAL MEDICINE
Payer: COMMERCIAL

## 2022-11-04 ENCOUNTER — ANESTHESIA (OUTPATIENT)
Dept: CARDIOLOGY | Facility: CLINIC | Age: 77
End: 2022-11-04
Payer: COMMERCIAL

## 2022-11-04 ENCOUNTER — ANESTHESIA EVENT (OUTPATIENT)
Dept: CARDIOLOGY | Facility: CLINIC | Age: 77
End: 2022-11-04
Payer: COMMERCIAL

## 2022-11-04 ENCOUNTER — HOSPITAL ENCOUNTER (OUTPATIENT)
Facility: CLINIC | Age: 77
Discharge: HOME | End: 2022-11-04
Admitting: INTERNAL MEDICINE
Payer: COMMERCIAL

## 2022-11-04 VITALS
TEMPERATURE: 97.6 F | OXYGEN SATURATION: 96 % | BODY MASS INDEX: 24.67 KG/M2 | RESPIRATION RATE: 16 BRPM | HEIGHT: 70 IN | HEART RATE: 67 BPM | SYSTOLIC BLOOD PRESSURE: 138 MMHG | WEIGHT: 172.3 LBS | DIASTOLIC BLOOD PRESSURE: 79 MMHG

## 2022-11-04 DIAGNOSIS — I48.4 ATYPICAL ATRIAL FLUTTER (H): ICD-10-CM

## 2022-11-04 LAB
ACT BLD: 177 SECONDS (ref 74–150)
ANION GAP SERPL CALCULATED.3IONS-SCNC: 5 MMOL/L (ref 3–14)
BUN SERPL-MCNC: 25 MG/DL (ref 7–30)
CALCIUM SERPL-MCNC: 8.9 MG/DL (ref 8.5–10.1)
CHLORIDE BLD-SCNC: 106 MMOL/L (ref 94–109)
CO2 SERPL-SCNC: 26 MMOL/L (ref 20–32)
CREAT SERPL-MCNC: 1.21 MG/DL (ref 0.66–1.25)
ERYTHROCYTE [DISTWIDTH] IN BLOOD BY AUTOMATED COUNT: 12.8 % (ref 10–15)
GFR SERPL CREATININE-BSD FRML MDRD: 62 ML/MIN/1.73M2
GLUCOSE BLD-MCNC: 95 MG/DL (ref 70–99)
HCT VFR BLD AUTO: 44.1 % (ref 40–53)
HGB BLD-MCNC: 15 G/DL (ref 13.3–17.7)
MCH RBC QN AUTO: 30.8 PG (ref 26.5–33)
MCHC RBC AUTO-ENTMCNC: 34 G/DL (ref 31.5–36.5)
MCV RBC AUTO: 91 FL (ref 78–100)
PLATELET # BLD AUTO: 299 10E3/UL (ref 150–450)
POTASSIUM BLD-SCNC: 3.9 MMOL/L (ref 3.4–5.3)
RBC # BLD AUTO: 4.87 10E6/UL (ref 4.4–5.9)
SODIUM SERPL-SCNC: 137 MMOL/L (ref 133–144)
WBC # BLD AUTO: 6.5 10E3/UL (ref 4–11)

## 2022-11-04 PROCEDURE — 250N000011 HC RX IP 250 OP 636: Performed by: INTERNAL MEDICINE

## 2022-11-04 PROCEDURE — 250N000009 HC RX 250: Performed by: NURSE ANESTHETIST, CERTIFIED REGISTERED

## 2022-11-04 PROCEDURE — 93462 L HRT CATH TRNSPTL PUNCTURE: CPT | Performed by: INTERNAL MEDICINE

## 2022-11-04 PROCEDURE — 272N000001 HC OR GENERAL SUPPLY STERILE: Performed by: INTERNAL MEDICINE

## 2022-11-04 PROCEDURE — 93325 DOPPLER ECHO COLOR FLOW MAPG: CPT

## 2022-11-04 PROCEDURE — C1894 INTRO/SHEATH, NON-LASER: HCPCS | Performed by: INTERNAL MEDICINE

## 2022-11-04 PROCEDURE — 999N000054 HC STATISTIC EKG NON-CHARGEABLE

## 2022-11-04 PROCEDURE — 93462 L HRT CATH TRNSPTL PUNCTURE: CPT

## 2022-11-04 PROCEDURE — 258N000003 HC RX IP 258 OP 636: Performed by: INTERNAL MEDICINE

## 2022-11-04 PROCEDURE — 250N000011 HC RX IP 250 OP 636: Performed by: NURSE ANESTHETIST, CERTIFIED REGISTERED

## 2022-11-04 PROCEDURE — 250N000025 HC SEVOFLURANE, PER MIN: Performed by: INTERNAL MEDICINE

## 2022-11-04 PROCEDURE — 93653 COMPRE EP EVAL TX SVT: CPT | Performed by: INTERNAL MEDICINE

## 2022-11-04 PROCEDURE — 93010 ELECTROCARDIOGRAM REPORT: CPT | Mod: 76 | Performed by: INTERNAL MEDICINE

## 2022-11-04 PROCEDURE — 93325 DOPPLER ECHO COLOR FLOW MAPG: CPT | Mod: 26 | Performed by: INTERNAL MEDICINE

## 2022-11-04 PROCEDURE — C1759 CATH, INTRA ECHOCARDIOGRAPHY: HCPCS | Performed by: INTERNAL MEDICINE

## 2022-11-04 PROCEDURE — 93662 INTRACARDIAC ECG (ICE): CPT | Mod: 26 | Performed by: INTERNAL MEDICINE

## 2022-11-04 PROCEDURE — 258N000003 HC RX IP 258 OP 636: Performed by: NURSE ANESTHETIST, CERTIFIED REGISTERED

## 2022-11-04 PROCEDURE — 93321 DOPPLER ECHO F-UP/LMTD STD: CPT

## 2022-11-04 PROCEDURE — 85027 COMPLETE CBC AUTOMATED: CPT | Performed by: INTERNAL MEDICINE

## 2022-11-04 PROCEDURE — 93005 ELECTROCARDIOGRAM TRACING: CPT

## 2022-11-04 PROCEDURE — 93656 COMPRE EP EVAL ABLTJ ATR FIB: CPT | Performed by: INTERNAL MEDICINE

## 2022-11-04 PROCEDURE — C1730 CATH, EP, 19 OR FEW ELECT: HCPCS | Performed by: INTERNAL MEDICINE

## 2022-11-04 PROCEDURE — 93308 TTE F-UP OR LMTD: CPT | Mod: 26 | Performed by: INTERNAL MEDICINE

## 2022-11-04 PROCEDURE — 370N000017 HC ANESTHESIA TECHNICAL FEE, PER MIN: Performed by: INTERNAL MEDICINE

## 2022-11-04 PROCEDURE — 255N000002 HC RX 255 OP 636

## 2022-11-04 PROCEDURE — 999N000208 ECHOCARDIOGRAM LIMITED

## 2022-11-04 PROCEDURE — 82310 ASSAY OF CALCIUM: CPT | Performed by: INTERNAL MEDICINE

## 2022-11-04 PROCEDURE — C1733 CATH, EP, OTHR THAN COOL-TIP: HCPCS | Performed by: INTERNAL MEDICINE

## 2022-11-04 PROCEDURE — 85347 COAGULATION TIME ACTIVATED: CPT

## 2022-11-04 PROCEDURE — 93662 INTRACARDIAC ECG (ICE): CPT

## 2022-11-04 PROCEDURE — 93321 DOPPLER ECHO F-UP/LMTD STD: CPT | Mod: 26 | Performed by: INTERNAL MEDICINE

## 2022-11-04 PROCEDURE — 36415 COLL VENOUS BLD VENIPUNCTURE: CPT | Performed by: INTERNAL MEDICINE

## 2022-11-04 PROCEDURE — C1769 GUIDE WIRE: HCPCS | Performed by: INTERNAL MEDICINE

## 2022-11-04 RX ORDER — PROPOFOL 10 MG/ML
INJECTION, EMULSION INTRAVENOUS CONTINUOUS PRN
Status: DISCONTINUED | OUTPATIENT
Start: 2022-11-04 | End: 2022-11-04

## 2022-11-04 RX ORDER — NEOSTIGMINE METHYLSULFATE 1 MG/ML
VIAL (ML) INJECTION PRN
Status: DISCONTINUED | OUTPATIENT
Start: 2022-11-04 | End: 2022-11-04

## 2022-11-04 RX ORDER — LIDOCAINE 40 MG/G
CREAM TOPICAL
Status: DISCONTINUED | OUTPATIENT
Start: 2022-11-04 | End: 2022-11-04 | Stop reason: HOSPADM

## 2022-11-04 RX ORDER — FENTANYL CITRATE 50 UG/ML
50 INJECTION, SOLUTION INTRAMUSCULAR; INTRAVENOUS EVERY 5 MIN PRN
Status: DISCONTINUED | OUTPATIENT
Start: 2022-11-04 | End: 2022-11-04 | Stop reason: HOSPADM

## 2022-11-04 RX ORDER — PROPOFOL 10 MG/ML
INJECTION, EMULSION INTRAVENOUS PRN
Status: DISCONTINUED | OUTPATIENT
Start: 2022-11-04 | End: 2022-11-04

## 2022-11-04 RX ORDER — HEPARIN SODIUM 1000 [USP'U]/ML
INJECTION, SOLUTION INTRAVENOUS; SUBCUTANEOUS
Status: DISCONTINUED | OUTPATIENT
Start: 2022-11-04 | End: 2022-11-04 | Stop reason: HOSPADM

## 2022-11-04 RX ORDER — OXYCODONE AND ACETAMINOPHEN 5; 325 MG/1; MG/1
1 TABLET ORAL EVERY 4 HOURS PRN
Status: DISCONTINUED | OUTPATIENT
Start: 2022-11-04 | End: 2022-11-04 | Stop reason: HOSPADM

## 2022-11-04 RX ORDER — NALOXONE HYDROCHLORIDE 0.4 MG/ML
0.4 INJECTION, SOLUTION INTRAMUSCULAR; INTRAVENOUS; SUBCUTANEOUS
Status: DISCONTINUED | OUTPATIENT
Start: 2022-11-04 | End: 2022-11-04 | Stop reason: HOSPADM

## 2022-11-04 RX ORDER — NALOXONE HYDROCHLORIDE 0.4 MG/ML
0.2 INJECTION, SOLUTION INTRAMUSCULAR; INTRAVENOUS; SUBCUTANEOUS
Status: DISCONTINUED | OUTPATIENT
Start: 2022-11-04 | End: 2022-11-04 | Stop reason: HOSPADM

## 2022-11-04 RX ORDER — ONDANSETRON 2 MG/ML
4 INJECTION INTRAMUSCULAR; INTRAVENOUS EVERY 30 MIN PRN
Status: DISCONTINUED | OUTPATIENT
Start: 2022-11-04 | End: 2022-11-04 | Stop reason: HOSPADM

## 2022-11-04 RX ORDER — ONDANSETRON 2 MG/ML
INJECTION INTRAMUSCULAR; INTRAVENOUS PRN
Status: DISCONTINUED | OUTPATIENT
Start: 2022-11-04 | End: 2022-11-04

## 2022-11-04 RX ORDER — OXYCODONE HYDROCHLORIDE 5 MG/1
10 TABLET ORAL EVERY 4 HOURS PRN
Status: DISCONTINUED | OUTPATIENT
Start: 2022-11-04 | End: 2022-11-04 | Stop reason: HOSPADM

## 2022-11-04 RX ORDER — PROTAMINE SULFATE 10 MG/ML
INJECTION, SOLUTION INTRAVENOUS
Status: DISCONTINUED | OUTPATIENT
Start: 2022-11-04 | End: 2022-11-04 | Stop reason: HOSPADM

## 2022-11-04 RX ORDER — LABETALOL HYDROCHLORIDE 5 MG/ML
10 INJECTION, SOLUTION INTRAVENOUS
Status: DISCONTINUED | OUTPATIENT
Start: 2022-11-04 | End: 2022-11-04 | Stop reason: HOSPADM

## 2022-11-04 RX ORDER — SODIUM CHLORIDE, SODIUM LACTATE, POTASSIUM CHLORIDE, CALCIUM CHLORIDE 600; 310; 30; 20 MG/100ML; MG/100ML; MG/100ML; MG/100ML
INJECTION, SOLUTION INTRAVENOUS CONTINUOUS
Status: DISCONTINUED | OUTPATIENT
Start: 2022-11-04 | End: 2022-11-04 | Stop reason: HOSPADM

## 2022-11-04 RX ORDER — GLYCOPYRROLATE 0.2 MG/ML
INJECTION, SOLUTION INTRAMUSCULAR; INTRAVENOUS PRN
Status: DISCONTINUED | OUTPATIENT
Start: 2022-11-04 | End: 2022-11-04

## 2022-11-04 RX ORDER — DEXAMETHASONE SODIUM PHOSPHATE 4 MG/ML
INJECTION, SOLUTION INTRA-ARTICULAR; INTRALESIONAL; INTRAMUSCULAR; INTRAVENOUS; SOFT TISSUE PRN
Status: DISCONTINUED | OUTPATIENT
Start: 2022-11-04 | End: 2022-11-04

## 2022-11-04 RX ORDER — LIDOCAINE HYDROCHLORIDE 20 MG/ML
INJECTION, SOLUTION INFILTRATION; PERINEURAL PRN
Status: DISCONTINUED | OUTPATIENT
Start: 2022-11-04 | End: 2022-11-04

## 2022-11-04 RX ORDER — ONDANSETRON 4 MG/1
4 TABLET, ORALLY DISINTEGRATING ORAL EVERY 30 MIN PRN
Status: DISCONTINUED | OUTPATIENT
Start: 2022-11-04 | End: 2022-11-04 | Stop reason: HOSPADM

## 2022-11-04 RX ORDER — FENTANYL CITRATE 50 UG/ML
INJECTION, SOLUTION INTRAMUSCULAR; INTRAVENOUS PRN
Status: DISCONTINUED | OUTPATIENT
Start: 2022-11-04 | End: 2022-11-04

## 2022-11-04 RX ORDER — HYDROMORPHONE HCL IN WATER/PF 6 MG/30 ML
0.4 PATIENT CONTROLLED ANALGESIA SYRINGE INTRAVENOUS EVERY 5 MIN PRN
Status: DISCONTINUED | OUTPATIENT
Start: 2022-11-04 | End: 2022-11-04 | Stop reason: HOSPADM

## 2022-11-04 RX ADMIN — LIDOCAINE HYDROCHLORIDE 60 MG: 20 INJECTION, SOLUTION INFILTRATION; PERINEURAL at 08:49

## 2022-11-04 RX ADMIN — PHENYLEPHRINE HYDROCHLORIDE 100 MCG: 10 INJECTION INTRAVENOUS at 09:17

## 2022-11-04 RX ADMIN — SODIUM CHLORIDE, POTASSIUM CHLORIDE, SODIUM LACTATE AND CALCIUM CHLORIDE: 600; 310; 30; 20 INJECTION, SOLUTION INTRAVENOUS at 07:05

## 2022-11-04 RX ADMIN — HUMAN ALBUMIN MICROSPHERES AND PERFLUTREN 3 ML: 10; .22 INJECTION, SOLUTION INTRAVENOUS at 14:54

## 2022-11-04 RX ADMIN — PROPOFOL 30 MCG/KG/MIN: 10 INJECTION, EMULSION INTRAVENOUS at 08:52

## 2022-11-04 RX ADMIN — PHENYLEPHRINE HYDROCHLORIDE 100 MCG: 10 INJECTION INTRAVENOUS at 09:20

## 2022-11-04 RX ADMIN — PHENYLEPHRINE HYDROCHLORIDE 100 MCG: 10 INJECTION INTRAVENOUS at 09:35

## 2022-11-04 RX ADMIN — ONDANSETRON 4 MG: 2 INJECTION INTRAMUSCULAR; INTRAVENOUS at 11:04

## 2022-11-04 RX ADMIN — ROCURONIUM BROMIDE 50 MG: 50 INJECTION, SOLUTION INTRAVENOUS at 08:49

## 2022-11-04 RX ADMIN — FENTANYL CITRATE 50 MCG: 50 INJECTION, SOLUTION INTRAMUSCULAR; INTRAVENOUS at 08:41

## 2022-11-04 RX ADMIN — GLYCOPYRROLATE 0.7 MG: 0.2 INJECTION, SOLUTION INTRAMUSCULAR; INTRAVENOUS at 11:03

## 2022-11-04 RX ADMIN — PHENYLEPHRINE HYDROCHLORIDE 100 MCG: 10 INJECTION INTRAVENOUS at 10:35

## 2022-11-04 RX ADMIN — PHENYLEPHRINE HYDROCHLORIDE 0.2 MCG/KG/MIN: 10 INJECTION INTRAVENOUS at 08:52

## 2022-11-04 RX ADMIN — DEXAMETHASONE SODIUM PHOSPHATE 4 MG: 4 INJECTION, SOLUTION INTRA-ARTICULAR; INTRALESIONAL; INTRAMUSCULAR; INTRAVENOUS; SOFT TISSUE at 09:52

## 2022-11-04 RX ADMIN — PHENYLEPHRINE HYDROCHLORIDE 100 MCG: 10 INJECTION INTRAVENOUS at 09:52

## 2022-11-04 RX ADMIN — Medication 4 MG: at 11:03

## 2022-11-04 RX ADMIN — SODIUM CHLORIDE, POTASSIUM CHLORIDE, SODIUM LACTATE AND CALCIUM CHLORIDE: 600; 310; 30; 20 INJECTION, SOLUTION INTRAVENOUS at 10:17

## 2022-11-04 RX ADMIN — PROPOFOL 150 MG: 10 INJECTION, EMULSION INTRAVENOUS at 08:49

## 2022-11-04 ASSESSMENT — ACTIVITIES OF DAILY LIVING (ADL)
ADLS_ACUITY_SCORE: 35

## 2022-11-04 ASSESSMENT — ENCOUNTER SYMPTOMS: DYSRHYTHMIAS: 1

## 2022-11-04 ASSESSMENT — LIFESTYLE VARIABLES: TOBACCO_USE: 0

## 2022-11-04 NOTE — INTERVAL H&P NOTE
I have reviewed the surgical (or preoperative) H&P that is linked to this encounter, and examined the patient. There are no significant changes    Clinical Conditions Present on Arrival:  Clinically Significant Risk Factors Present on Admission                  # Drug Induced Coagulation Defect: home medication list includes an anticoagulant medication  # Drug Induced Platelet Defect: home medication list includes an antiplatelet medication

## 2022-11-04 NOTE — DISCHARGE SUMMARY
Care Suites Discharge Nursing Note    Patient Information  Name: Rey Brown  Age: 77 year old    Discharge Education:  Discharge instructions reviewed: Yes  Additional education/resources provided: no  Patient/patient representative verbalizes understanding: Yes  Patient discharging on new medications: No  Medication education completed: N/A    Discharge Plans:   Discharge location: home  Discharge ride contacted: Yes  Approximate discharge time: 1600    Discharge Criteria:  Discharge criteria met and vital signs stable: Yes    Patient Belongs:  Patient belongings returned to patient: Yes    Denton Franco RN

## 2022-11-04 NOTE — PROGRESS NOTES
Care Suites Admission Nursing Note    Patient Information  Name: Rey Brown  Age: 77 year old  Reason for admission: AFib ablation.  Explained pre procedure and answered questions.  Resting on cart with call light in reach.  Care Suites arrival time: 0635        Patient Admission/Assessment   Pre-procedure assessment complete: Yes  If abnormal assessment/labs, provider notified: N/A  NPO: Yes  Medications held per instructions/orders: N/A  Consent: deferred  If applicable, pregnancy test status: n/a  Patient oriented to room: Yes  Education/questions answered: Yes  Plan/other: Will be ready for 0830    Discharge Planning  Discharge name/phone number: trent Khan [phone]  Overnight post sedation caregiver: wife Erin  Discharge location: home    Arlene Guzman RN

## 2022-11-04 NOTE — ANESTHESIA POSTPROCEDURE EVALUATION
Patient: Rey Brown    Procedure: Procedure(s):  Ablation Atrial Fibrilation       Anesthesia Type:  General    Note:  Disposition: Inpatient   Postop Pain Control: Uneventful            Sign Out: Well controlled pain   PONV: No   Neuro/Psych: Uneventful            Sign Out: Acceptable/Baseline neuro status   Airway/Respiratory: Uneventful            Sign Out: Acceptable/Baseline resp. status   CV/Hemodynamics: Uneventful            Sign Out: Acceptable CV status; No obvious hypovolemia; No obvious fluid overload   Other NRE: NONE   DID A NON-ROUTINE EVENT OCCUR? No           Last vitals:  Vitals Value Taken Time   /75 11/04/22 1250   Temp 36.4  C (97.6  F) 11/04/22 1200   Pulse 63 11/04/22 1250   Resp 16 11/04/22 1250   SpO2 98 % 11/04/22 1246   Vitals shown include unvalidated device data.    Electronically Signed By: Yossi Henriquez DO  November 4, 2022  6:20 PM

## 2022-11-04 NOTE — PROGRESS NOTES
AF ablation:    Persistent left atrial flutter.  Small left atrium.  Very difficult transeptal puncture because of septal fibrosis from previous punctures. Cardiac perforation was suspected at one point but echo showed very tiny and limited pericardial fluid. Repeat transeptal puncture guided by ICE.  Left atrial flutter stopped by ablation near the left atrial roof.    2000 units of heparin was used. 10 mg of protamine given at the end of the procedure.  Pt needs a limited repeat echo before discharge to home.    Stop amiodarone. Continue the other home medications.

## 2022-11-04 NOTE — ANESTHESIA CARE TRANSFER NOTE
Patient: Rey Brown    Procedure: Procedure(s):  Ablation Atrial Fibrilation       Diagnosis: atypical left atrial flutter  Diagnosis Additional Information: No value filed.    Anesthesia Type:   General     Note:    Oropharynx: oropharynx clear of all foreign objects  Level of Consciousness: awake  Oxygen Supplementation: face mask    Independent Airway: airway patency satisfactory and stable  Dentition: dentition unchanged  Vital Signs Stable: post-procedure vital signs reviewed and stable  Report to RN Given: handoff report given  Patient transferred to: PACU    Handoff Report: Identifed the Patient, Identified the Reponsible Provider, Reviewed the pertinent medical history, Discussed the surgical course, Reviewed Intra-OP anesthesia mangement and issues during anesthesia, Set expectations for post-procedure period and Allowed opportunity for questions and acknowledgement of understanding      Vitals:  Vitals Value Taken Time   /99 11/04/22 1122   Temp     Pulse 80 11/04/22 1123   Resp 22 11/04/22 1123   SpO2 100 % 11/04/22 1123   Vitals shown include unvalidated device data.    Electronically Signed By: JOSE CRUZ Sevilla CRNA  November 4, 2022  11:24 AM

## 2022-11-04 NOTE — ANESTHESIA PROCEDURE NOTES
Airway       Patient location: [facility] - Operating Room or Procedural Area.       Procedure Start/Stop Times: 11/4/2022 8:51 AM and 11/4/2022 8:51 AM  Staff -        CRNA: Reyes Frost APRN CRNA       Performed By: CRNAIndications and Patient Condition       Indications for airway management: brittney-procedural and airway protection       Induction type:intravenous       Mask difficulty assessment: 2 - vent by mask + OA or adjuvant +/- NMBA    Final Airway Details       Final airway type: endotracheal airway       Successful airway: ETT - single and Oral  Endotracheal Airway Details        ETT size (mm): 8.0       Cuffed: yes       Successful intubation technique: video laryngoscopy       VL Blade Size: Ricks 4       Grade View of Cords: 1       Adjucts: stylet       Position: Center       Measured from: gums/teeth       Bite block used: None    Post intubation assessment        Placement verified by: capnometry, equal breath sounds and chest rise        Number of attempts at approach: 1       Number of other approaches attempted: 0       Secured with: pink tape       Ease of procedure: easy       Dentition: Intact and Unchanged    Medication(s) Administered   Medication Administration Time: 11/4/2022 8:51 AM    Additional Comments         Routine brittney-procedural airway protection.     Ricks 4.    8.0 mm ID endotracheal tube.

## 2022-11-04 NOTE — PROGRESS NOTES
Care Suites Post Procedure Note    Patient Information  Name: Rey Brown  Age: 77 year old    Post Procedure  Time patient returned to Care Suites: 1250.  Denies c/o.  See flow sheet.  Wife here. eating  Concerns/abnormal assessment: none  If abnormal assessment, provider notified: N/A  Plan/Other: Bedrest til 1512    Arlene Guzman RN

## 2022-11-04 NOTE — DISCHARGE INSTRUCTIONS
A Fib/Flutter Ablation Discharge Instructions    After you go home:  Have an adult stay with you until tomorrow.  You may eat your normal diet, unless your doctor tells you otherwise.  RELAX and take it easy for 3 days.        For 24-48 hours (due to the sedation you received):  DO NOT DRIVE FOR 2 DAYS!   Do NOT make any important or legal decisions.  Do NOT drive or operate machines at home or at work.  Do NOT drink alcohol.    Care of Puncture Site:  Check the puncture site severy 1-2 hours while awake.  For 2-3 days, when you cough, sneeze, laugh or move your bowels, hold your hand over the puncture sites and press firmly.  Please remove Dressing after 24 hours.  Then apply a band aid daily for at least 3 days (if needed). If there is minor oozing, apply another band aid and remove it after 12 hours.   It is normal to have a small bruise or a small lump that is present under the skin . This will go away on its own after 3-4 weeks.   You may shower. Do NOT take a bath, or use a hot tub or pool until groin site heals, which may take up to a week.  Do NOT scrub the site. Do NOT use lotion or powder near the puncture site.    Activity:  Do NOT lift, push or pull more than 10 pounds (equal to a gallon of milk) for 3 days.  NO repetitive motions such as loading , vacuuming, raking, shoveling.     Bleeding:  If you start bleeding from the groin site, lie down flat and press firmly on the site for 10 minutes or until bleeding stops.   Once bleeding stops, lay flat for 1-2 hours.  Call your A Fib nurse if bleeding does not stop or after hours will need to go to ER.       Go to ER or Call 911 right away if you have heavy bleeding or bleeding that does not stop.    Medications:  Take your medications, including blood thinners, unless your doctor tells you not to.  If you have stopped any other medicines, check with your nurse or provider about when to restart them.  If you have PAIN or a TIGHTNESS in your chest,  you MAY takeTylenol (acetaminophen) and if this does not help, you MAY take Advil (ibuprofen-400 mg with food).  If you have constipation or prone to constipation,  take a fiber supplement, ie metamucil or stool softners.    Call the A Fib RN if:  Chest pain not relieved by acetaminophen or ibuprofen  Difficulty swallowing and/or coughing up blood  Shortness of breath  Increased groin pain or a large or growing hard lump around the site.  Groin site is red, swollen, hot or tender.  Blood or fluid is draining from the groin site.  You have chills or a fever greater than 101 F (38 C).  Your leg feels numb, cool or changes color.  If groin pain is not relieved by Tylenol or Ibuprofen.  Recurrent irregular or fast heart rate lasting over 2-3 hours.  Any questions or concerns.    Heart rhythms:  You may have some irregular heartbeats. These feel very strong. They may make you feel that the A Fib is going to start again.  Give it time. The irregular beats should occur less often.    Follow Up Appointments:  Tanya Quintana on 11/11 at 3:10 with an EKG  Dr Washington on 1/27/23 at 12:45 with an EKG    REMINDER THAT YOU WILL GET A CALL ON Monday TO SEE HOW THE WEEKEND WENT FOR YOU, BUT IF YOU HAVE ANY ISSUES/CONCERNS OVER THE WEEKEND YOU MAY CALL THE ON CALL CARDIOLOGIST. PHONE NUMBER IS LISTED BELOW.      [facility]   A Fib clinic RN's Brooke Copeland [phone] (Mon-Fri, 8:00-4:30)   [phone] Option 2 (7 days a week) after hours for on call Cardiologist.

## 2022-11-04 NOTE — ANESTHESIA PREPROCEDURE EVALUATION
Anesthesia Pre-Procedure Evaluation    Patient: Rey Brown   MRN: [id number] : 1945        Procedure : Procedure(s):  Ablation Atrial Fibrilation          Past Medical History:   Diagnosis Date     CAD (coronary artery disease)     mid LAD MARYCARMEN 2022     Hypercholesterolemia      Lumbar disc disease      Migraines      Paroxysmal A-fib (H)       Past Surgical History:   Procedure Laterality Date     ANESTHESIA CARDIOVERSION N/A 2022    Procedure: ANESTHESIA, FOR CARDIOVERSION FOLLOWING AGUS;  Surgeon: GENERIC ANESTHESIA PROVIDER;  Location:  OR     ANESTHESIA CARDIOVERSION N/A 2022    Procedure: ANESTHESIA, FOR CARDIOVERSION;  Surgeon: GENERIC ANESTHESIA PROVIDER;  Location:  OR     ANESTHESIA CARDIOVERSION N/A 2022    Procedure: CARDIOVERSION;  Surgeon: GENERIC ANESTHESIA PROVIDER;  Location:  OR     COLONOSCOPY       CV CORONARY ANGIOGRAM N/A 2022    Procedure: Coronary Angiogram;  Surgeon: Jacqueline Ramirez MD;  Location:  HEART CARDIAC CATH LAB     CV PCI N/A 2022    Procedure: Percutaneous Coronary Intervention;  Surgeon: Jacqueline Ramirez MD;  Location: [facility] CARDIAC CATH LAB     CYSTOSCOPY, RETROGRADES, COMBINED Left 2020    Procedure: LEFT RETROGRADE URETERAL PYELOGRAM;  Surgeon: Denilson Angulo MD;  Location:  OR     CYSTOSCOPY, TRANSURETHRAL RESECTION (TUR) PROSTATE, COMBINED N/A 2020    Procedure: CYSTOSCOPY WITH TRANSURETHRAL RESECTION PROSTATE;  Surgeon: Denilson Angulo MD;  Location:  OR     EP ABLATION ATRIAL FLUTTER N/A 2022    Procedure: Ablation Atrial Flutter;  Surgeon: Sharmin Scott MD;  Location:  HEART CARDIAC CATH LAB     EP ABLATION FOCAL AFIB N/A 2022    Procedure: Ablation Focal Atrial Fibrillation [[id number]];  Surgeon: Sharmin Scott MD;  Location:  HEART CARDIAC CATH LAB     OPEN REDUCTION INTERNAL FIXATION HAND       RHINOPLASTY       TONSILLECTOMY       wisdom teeth        Allergies   Allergen  Reactions     Ace Inhibitors Cough      Social History     Tobacco Use     Smoking status: Never     Smokeless tobacco: Never   Substance Use Topics     Alcohol use: Yes     Alcohol/week: 2.0 - 3.0 standard drinks     Types: 2 - 3 Cans of beer per week     Comment: 3-4 beers/week      Wt Readings from Last 1 Encounters:   10/31/22 77.4 kg (170 lb 9.6 oz)        Anesthesia Evaluation   Pt has had prior anesthetic.     No history of anesthetic complications       ROS/MED HX  ENT/Pulmonary:    (-) tobacco use and sleep apnea   Neurologic:     (+) migraines,     Cardiovascular: Comment: Paroxysmal AFib - had done well on flecainide for years, but had recurrent arrhythmia during COVID infection.  Rate control was not effective in controlling symptoms, and s/p AFib ablation with Dr. Scott 4/1/2022 (PV antrum, roof/floor lines and septal line). Required multiple repeat DCCVs following (4/22, 5/12, 5/30, 6/5) despite AAD therapy. Flecainide stopped 5/2022 d/t discovery of CAD/NSTEMI and CM (30-35%). Repeat PV antrum/posterior wall ablation 7/8/2022. Amiodarone stopped but reloaded for recurrence.  DCCV 8/11/2022 successful, but again with recurrence.  Now with plans for 3rd AFib ablation 11/4/2022  2. Dyslipidemia  3. HTN   4. Chronic AC for CHADSVASc 5 (CM, HTN, CAD, age) - Eliquis  5. CAD - sp NSTEMI 5/2022 treated with MARYCARMEN to LAD  6. Ischemic Cardiomyopathy - LVEF had been 60-65%, down to 30-35% at time of MI 5/2022, back up to 55-60% 9/2022    (+) Dyslipidemia hypertension--CAD -past MI -stent-5/2022. Drug Eluting Stent. Taking blood thinners CHF etiology: ICM Last EF: 55-60 dysrhythmias, a-fib and a-flutter, Previous cardiac testing   Echo: Date: 5/22 Results:  Interpretation Summary     1. Left ventricular systolic function is normal. The visual ejection fraction  is 60-65%.  2. The right ventricle is normal in structure, function and size.  3. There is mild (1+) mitral regurgitation.  4. No thrombus is detected in the  left atrial appendage.  5. There is no color Doppler evidence of an atrial shunt. A contrast injection  (Bubble Study) was performed that was negative for flow across the interatrial  septum.  Stress Test: Date: Results:    ECG Reviewed: Date: Results:    Cath: Date: Results:      METS/Exercise Tolerance: >4 METS    Hematologic:       Musculoskeletal:       GI/Hepatic:    (-) GERD and liver disease   Renal/Genitourinary:     (+) renal disease, type: CRI,     Endo:    (-) Type I DM and Type II DM   Psychiatric/Substance Use:       Infectious Disease:       Malignancy:       Other:            Physical Exam    Airway        Mallampati: II   TM distance: > 3 FB   Neck ROM: full   Mouth opening: > 3 cm    Respiratory Devices and Support         Dental  no notable dental history         Cardiovascular   cardiovascular exam normal          Pulmonary   pulmonary exam normal        breath sounds clear to auscultation           OUTSIDE LABS:  CBC:   Lab Results   Component Value Date    WBC 5.6 07/08/2022    WBC 7.0 05/30/2022    HGB 14.3 07/08/2022    HGB 16.1 05/30/2022    HCT 42.2 07/08/2022    HCT 47.4 05/30/2022     07/08/2022     05/30/2022     BMP:   Lab Results   Component Value Date     07/08/2022     06/05/2022    POTASSIUM 4.0 08/11/2022    POTASSIUM 4.3 07/08/2022    CHLORIDE 109 07/08/2022    CHLORIDE 107 06/05/2022    CO2 28 07/08/2022    CO2 26 06/05/2022    BUN 20 07/08/2022    BUN 27 06/05/2022    CR 1.03 07/08/2022    CR 1.43 (H) 06/05/2022    GLC 91 07/08/2022    GLC 90 06/05/2022     COAGS:   Lab Results   Component Value Date    PTT 81 (H) 05/23/2022    INR 1.25 (H) 05/12/2022     POC: No results found for: BGM, HCG, HCGS  HEPATIC:   Lab Results   Component Value Date    ALBUMIN 3.3 (L) 05/30/2022    PROTTOTAL 6.9 05/30/2022    ALT 21 05/30/2022    AST 20 05/30/2022    ALKPHOS 101 05/30/2022    BILITOTAL 0.5 05/30/2022     OTHER:   Lab Results   Component Value Date    ELIEZER 8.9  07/08/2022    MAG 2.3 08/11/2022    LIPASE 58 03/10/2020    AMYLASE 59 03/10/2020    TSH 4.71 (H) 05/30/2022    T4 0.98 05/30/2022    CRP <0.3 04/09/2015    SED 1 04/09/2015       Anesthesia Plan    ASA Status:  3   NPO Status:  NPO Appropriate    Anesthesia Type: General.     - Airway: ETT   Induction: Intravenous.   Maintenance: Balanced.        Consents    Anesthesia Plan(s) and associated risks, benefits, and realistic alternatives discussed. Questions answered and patient/representative(s) expressed understanding.    - Discussed:     - Discussed with:  Patient      - Extended Intubation/Ventilatory Support Discussed: No.      - Patient is DNR/DNI Status: No    Use of blood products discussed: Yes.     - Discussed with: Patient.     - Consented: consented to blood products            Reason for refusal: other.     Postoperative Care    Pain management: IV analgesics, Oral pain medications, Multi-modal analgesia.   PONV prophylaxis: Ondansetron (or other 5HT-3), Dexamethasone or Solumedrol, Background Propofol Infusion     Comments:                Yossi Henriquez DO

## 2022-11-07 ENCOUNTER — TELEPHONE (OUTPATIENT)
Dept: CARDIOLOGY | Facility: CLINIC | Age: 77
End: 2022-11-07

## 2022-11-07 NOTE — TELEPHONE ENCOUNTER
Spoke to pt who is doing well and was actually out mulching leaves on his riding . Pt has no pain in chest or in Groin. States that groin looks good, with no redness or swelling.  Pt is voiding with no issues.  Pt had ready his ablation report and was aware that Dr Scott had issue with the transseptal puncture. Pt had echo post procedure that shows trace pericardial effusion. States that he is not short of breath. Pt will call with any issues. Pt will see Tanya on Friday. Pt did not notice the he has to OV back to back on 1/26 and 1/27. Will double check with Dr Pizano about the OV, but also pt states that he will be leaving the end of January until the end or March or first of April. Licha

## 2022-11-08 LAB
ATRIAL RATE - MUSE: 59 BPM
ATRIAL RATE - MUSE: 68 BPM
DIASTOLIC BLOOD PRESSURE - MUSE: NORMAL MMHG
DIASTOLIC BLOOD PRESSURE - MUSE: NORMAL MMHG
INTERPRETATION ECG - MUSE: NORMAL
INTERPRETATION ECG - MUSE: NORMAL
P AXIS - MUSE: 83 DEGREES
P AXIS - MUSE: NORMAL DEGREES
PR INTERVAL - MUSE: 210 MS
PR INTERVAL - MUSE: NORMAL MS
QRS DURATION - MUSE: 90 MS
QRS DURATION - MUSE: 98 MS
QT - MUSE: 398 MS
QT - MUSE: 404 MS
QTC - MUSE: 429 MS
QTC - MUSE: 533 MS
R AXIS - MUSE: 76 DEGREES
R AXIS - MUSE: 83 DEGREES
SYSTOLIC BLOOD PRESSURE - MUSE: NORMAL MMHG
SYSTOLIC BLOOD PRESSURE - MUSE: NORMAL MMHG
T AXIS - MUSE: -64 DEGREES
T AXIS - MUSE: 31 DEGREES
VENTRICULAR RATE- MUSE: 108 BPM
VENTRICULAR RATE- MUSE: 68 BPM

## 2022-11-09 NOTE — PROGRESS NOTES
"[facility] HEART CLINIC    I had the pleasure of seeing Anthony when he came for follow up of recent repeat AFib ablation.  This 77 year old sees Dr. Scott and Dr. Pizano for his history of:    1. Paroxysmal AFib - had done well on flecainide for years, but had recurrent arrhythmia during COVID infection.  Rate control was not effective in controlling symptoms, and s/p AFib ablation with Dr. Scott 4/1/2022 (PV antrum, roof/floor lines and septal line). Required multiple repeat DCCVs following (4/22, 5/12, 5/30, 6/5) despite AAD therapy. Flecainide stopped 5/2022 d/t discovery of CAD/NSTEMI and CM (30-35%). Repeat PV antrum/posterior wall ablation 7/8/2022. Amiodarone stopped but reloaded for recurrence.  DCCV 8/11/2022 successful, but again with recurrence.  Now s/p 3rd AFib ablation 11/4/2022 with reisolation of PV antrum (LSPV) and LA roof  2. Dyslipidemia  3. HTN   4. Chronic AC for CHADSVASc 4 (resolved CM, HTN, CAD, age) - Eliquis  5. CAD - sp NSTEMI 5/2022 treated with MARYCARMEN to LAD  6. Ischemic Cardiomyopathy - LVEF had been 60-65%, down to 30-35% at time of MI 5/2022, back up to 55-60% 9/2022      I saw Anthony 10/31 at which time we reviewed his upcoming repeat (3rd) AFib ablation. He had just gotten back from a  cruise and had no limitations, though noted HR continued to \"race\" despite adding Diltiazem 180 9/26.     Therefore on 11/4, Dr. Scott performed EPS.  D/t fibrosis from previous transseptal punctures, transseptal puncture was quite difficult.  Echocardiogram done showed trivial pericardial effusion and no evidence of cardiac puncture.  Transseptal puncture was then performed successfully under ICE.  Reinsertion of CS catheter (as it was disengaged from the os of the CS during ICE) was also difficult and after Sonia was unable to insert the full length of the CS catheter to the distal mitral annulus.  Mapping showed atypical LA flutter and underwent reisolation of the LSPV, as all other PVs were well " "isolated from previous ablation.  He then had linear ablation over the LA roof, which successfully terminated the flutter.  No inducible AFib or AFlutter were noted.  Post-procedure, repeat echo which continued to show just a trivial pericardial effusion (also present 9/2022).  EKG postprocedure showed SR 68 bpm.  He was discharged home the same day.  Amiodarone was stopped.    Interval History:  Anthony hasn't had any AFib since ablation on 11/4 - no chest pain, palpitations pre-syncope, lightheadedness or fatigue. Groin site incision has minimal bruising and healing well.     Anthony has had a lingering cough since being on his  river cruise the week before his recent ablation. Mostly associated with a tickle in throat when he lays down at night.  No orthopnea, PND. No fever.    Looking forward to traveling to [place], [place] for sixth time in February-April, returning in May with this wife.    VITALS:  Vitals: /75 (BP Location: Left arm, Patient Position: Sitting)   Pulse 52   Ht 1.778 m (5' 10\")   Wt 79.6 kg (175 lb 6.4 oz)   SpO2 99%   BMI 25.17 kg/m      Diagnostic Testing:  EKG today, which I overread, showed normal sinus bradycardia 56 bpm on Diltiazem 180 mg daily  Echo 9/2022 with normalization of LVEF 55-60%.  Normal RV. Nl LA size.  Mild MAC with 1+ MR.  1-2+ TR with RVSP 41+ RAP, c/w mild PH.  1+ AI.  Trivial anterior pericardial effusion  Cath 5/23/2022 pLAD 40%, 20% o/pCx. <10% lesions in RCA and 20% o/pCx lesion  Echo 5/22/2022 LVEF 30-35% with moderate-severe anterior, septal, apical wall HK. Nl RV. 1-2+TR. RVSP 26+RAP. RAP 8mmHg.  Trivial aortic sclerosis.  CTA Heart 3/25/202 - nl PV anatomy with normal aorta. 3 v CAC. Non-cardiac portion showed 5 mm groundglass pulmonary nodule RLL for which no routine follow-up needed. If suspicious, follow-up at 2 and 4 y rec'd      Plan:  -Remain AC on Eliquis  -Continue Diltiazem 180mg daily. Consider stopping this at next follow up with Dr." Felix as he'd like to be on fewer pills  -Follow up with Dr. Washington in January d/t Dr. Scott's skilled nursing  -Continue losartan, clopidogrel, atorvastatin. Will move Dr. Pizano's appt to [place] with labs given he'll be seeing Dr. Washington in 1/2023.     Assessment/Plan:    1. Atrial arrhythmias    As above, s/p multiple DCCV's most recently 8/2022.  Recurrent atypical AFlutter noted 9/2022 and now s/p third AFib ablation (LSPV and roof line)    Amiodarone stopped at time of procedure 11/4    Echo 9/2022 with normalization of LVEF 55-60%    Remains on AC with Eliquis for [id number] 4 (now resolved CM) (HTN, CAD, age)    No return of Afib Sx since ablation.      EKG SB today on Diltiazem 180.    Patient interested in discontinuing Diltiazem. Not having side effects, just would prefer not to be on extra pills. Advised to stay on this until follow up appointment with Dr. Washington so as to suppress the risk of Afib returning.  Pt agreeable with this plan.      Discussed avoiding previous triggers for Afib (including use of hot tub/sauna) for a few weeks.    Discussed that he may experience a few hours of Afib when he gets his COVID booster. Advised him to wait a few weeks before getting booster to keep the atrium calm in the first month post ablation.    Suggested patient ask a local clinic in [place] for a referral to cardiologist in the event that Afib returns while traveling. Advised patient to bring all medications he has taken in the past to [place] for arrhythmias (including amiodarone), just in case he has new Afib and provider restarts a Rx.    Note cough is not terribly bothersome or frequent. No fever. No SOB.  Likely d/t dry air with furnace in combination with recent intubation/AGUS x 2/recent travel    PLAN:    Continue AC with Eliquis    Meet with Dr. Washington 1/2023 after Dr. Scott's skilled nursing    Can consider discontinuing diltiazem at follow up appointment with Dr. Washington.    2. CAD, H/o CM    Saw Janina Ceballos NP 9/2022 who  reviewed normalization of LVEF on echo 9/2022    Remains on losartan, atorvastatin, clopidogrel    Initially, 6-month follow-up with Dr. Pizano and BMP planned -seeing Dr. Washington post ablation    PLAN:    Will move Dr. Pizano's appointment to late April (after patient returns from [place]) given Dr. Washington seeing 1/2022    Remain on losartan, atorvastatin, clopidogrel      TIFFANI Rome, MOISES Crowley was present with the NOLAN student who participated in the service and in the documentation of the note.  I have verified the history and personally performed the physical exam and medical decision making.  I agree with the assessment and plan of care as documented in the note.       Items personally reviewed: vitals, labs, and EKG and agree with the interpretation documented in the note.  I agree with the interpretation documented in the note and the assessment and plan as above.      Orders Placed This Encounter   Procedures     EKG 12-lead complete w/read - Clinics (performed today)     No orders of the defined types were placed in this encounter.    There are no discontinued medications.      Encounter Diagnosis   Name Primary?     Paroxysmal atrial fibrillation (H) Yes       CURRENT MEDICATIONS:  Current Outpatient Medications   Medication Sig Dispense Refill     acetaminophen (TYLENOL) 325 MG tablet Take 325-650 mg by mouth every 6 hours as needed for mild pain       apixaban ANTICOAGULANT (ELIQUIS) 5 MG tablet Take 1 tablet (5 mg) by mouth 2 times daily 180 tablet 1     atorvastatin (LIPITOR) 80 MG tablet Take 1 tablet (80 mg) by mouth daily 90 tablet 3     clopidogrel (PLAVIX) 75 MG tablet Take 1 tablet (75 mg) by mouth daily 90 tablet 3     diltiazem ER COATED BEADS (CARDIZEM CD/CARTIA XT) 180 MG 24 hr capsule Take 1 capsule (180 mg) by mouth daily 80 capsule 3     losartan (COZAAR) 50 MG tablet Take 1 tablet (50 mg) by mouth daily 90 tablet 3     nitroGLYcerin (NITROSTAT) 0.4 MG  "sublingual tablet For chest pain place 1 tablet under the tongue every 5 minutes for 3 doses. If symptoms persist 5 minutes after 1st dose call 911.Please done take viagra along with nnitroglycerine 20 tablet 0       ALLERGIES     Allergies   Allergen Reactions     Ace Inhibitors Cough         Review of Systems:  Skin:  Negative     Eyes:  not assessed    ENT:  Negative    Respiratory:  Positive for cough (x2 weeks since URI)  Cardiovascular:  Negative for;palpitations;chest pain;edema;dizziness;lightheadedness    Gastroenterology: not assessed    Genitourinary:  Negative    Musculoskeletal:  Negative    Neurologic:  Negative    Psychiatric:  not assessed    Heme/Lymph/Imm:  Negative    Endocrine:  not assessed      Physical Exam:  Vitals: /75 (BP Location: Left arm, Patient Position: Sitting)   Pulse 52   Ht 1.778 m (5' 10\")   Wt 79.6 kg (175 lb 6.4 oz)   SpO2 99%   BMI 25.17 kg/m      Constitutional:  cooperative, alert and oriented, well developed, well nourished, in no acute distress        Skin:  warm and dry to the touch, no apparent skin lesions or masses noted        Head:  normocephalic, no masses or lesions        Eyes:  pupils equal and round, conjunctivae and lids unremarkable, sclera white, no xanthalasma, EOMS intact, no nystagmus        ENT:  no pallor or cyanosis, dentition good        Neck:  JVP normal        Chest:  normal breath sounds, clear to auscultation, normal A-P diameter, normal symmetry, normal respiratory excursion, no use of accessory muscles        Cardiac: regular rhythm;normal S1 and S2       grade 2;systolic murmur (Most prominent at apex)          Abdomen:  not assessed this visit        Vascular: pulses full and equal                                      Extremities and Back:  no deformities, clubbing, cyanosis, erythema observed        Neurological:  no gross motor deficits            PAST MEDICAL HISTORY:  Past Medical History:   Diagnosis Date     CAD (coronary " artery disease)     mid LAD MARYCARMEN 5/23/2022     Hypercholesterolemia      Lumbar disc disease      Migraines      Paroxysmal A-fib (H)        PAST SURGICAL HISTORY:  Past Surgical History:   Procedure Laterality Date     ANESTHESIA CARDIOVERSION N/A 4/22/2022    Procedure: ANESTHESIA, FOR CARDIOVERSION FOLLOWING AGUS;  Surgeon: GENERIC ANESTHESIA PROVIDER;  Location:  OR     ANESTHESIA CARDIOVERSION N/A 5/12/2022    Procedure: ANESTHESIA, FOR CARDIOVERSION;  Surgeon: GENERIC ANESTHESIA PROVIDER;  Location:  OR     ANESTHESIA CARDIOVERSION N/A 8/11/2022    Procedure: CARDIOVERSION;  Surgeon: GENERIC ANESTHESIA PROVIDER;  Location:  OR     COLONOSCOPY       CV CORONARY ANGIOGRAM N/A 5/23/2022    Procedure: Coronary Angiogram;  Surgeon: Jacqueline Ramirez MD;  Location:  HEART CARDIAC CATH LAB     CV PCI N/A 5/23/2022    Procedure: Percutaneous Coronary Intervention;  Surgeon: Jacqueline Ramirez MD;  Location:  HEART CARDIAC CATH LAB     CYSTOSCOPY, RETROGRADES, COMBINED Left 5/13/2020    Procedure: LEFT RETROGRADE URETERAL PYELOGRAM;  Surgeon: Denilson Angulo MD;  Location:  OR     CYSTOSCOPY, TRANSURETHRAL RESECTION (TUR) PROSTATE, COMBINED N/A 5/13/2020    Procedure: CYSTOSCOPY WITH TRANSURETHRAL RESECTION PROSTATE;  Surgeon: Denilson Angulo MD;  Location:  OR     EP ABLATION ATRIAL FLUTTER N/A 7/8/2022    Procedure: Ablation Atrial Flutter;  Surgeon: Sharmin Scott MD;  Location:  HEART CARDIAC CATH LAB     EP ABLATION FOCAL AFIB N/A 4/1/2022    Procedure: Ablation Focal Atrial Fibrillation [[id number]];  Surgeon: Sharmin Scott MD;  Location:  HEART CARDIAC CATH LAB     EP ABLATION FOCAL AFIB N/A 11/4/2022    Procedure: Ablation Atrial Fibrilation;  Surgeon: Sharmin Scott MD;  Location:  HEART CARDIAC CATH LAB     OPEN REDUCTION INTERNAL FIXATION HAND       RHINOPLASTY       TONSILLECTOMY       wisdom teeth         FAMILY HISTORY:  Family History   Problem Relation Age of Onset     Dementia Father       Osteoarthritis Father      Cerebrovascular Disease Father      Myocardial Infarction Mother      C.A.D. Mother      Coronary Artery Disease Mother        SOCIAL HISTORY:  Social History     Socioeconomic History     Marital status:      Spouse name: Erin   Tobacco Use     Smoking status: Never     Smokeless tobacco: Never   Substance and Sexual Activity     Alcohol use: Yes     Alcohol/week: 2.0 - 3.0 standard drinks     Types: 2 - 3 Cans of beer per week     Comment: 3-4 beers/week     Drug use: No     Sexual activity: Yes     Partners: Female   Other Topics Concern     Special Diet No     Exercise Yes     Comment: 2-3x/week

## 2022-11-11 ENCOUNTER — OFFICE VISIT (OUTPATIENT)
Dept: CARDIOLOGY | Facility: CLINIC | Age: 77
End: 2022-11-11
Payer: COMMERCIAL

## 2022-11-11 VITALS
WEIGHT: 175.4 LBS | DIASTOLIC BLOOD PRESSURE: 75 MMHG | BODY MASS INDEX: 25.11 KG/M2 | OXYGEN SATURATION: 99 % | HEART RATE: 52 BPM | SYSTOLIC BLOOD PRESSURE: 137 MMHG | HEIGHT: 70 IN

## 2022-11-11 DIAGNOSIS — I48.0 PAROXYSMAL ATRIAL FIBRILLATION (H): Primary | ICD-10-CM

## 2022-11-11 PROCEDURE — 93000 ELECTROCARDIOGRAM COMPLETE: CPT | Performed by: PHYSICIAN ASSISTANT

## 2022-11-11 PROCEDURE — 99214 OFFICE O/P EST MOD 30 MIN: CPT | Performed by: PHYSICIAN ASSISTANT

## 2022-11-11 NOTE — LETTER
"11/11/2022    Sabas Staley MD  [address]  [facility] [address]    RE: Rey Brown       Dear Colleague,     I had the pleasure of seeing Rey THOMAS Brown in the [facility].  [facility]    I had the pleasure of seeing Anthony when he came for follow up of recent repeat AFib ablation.  This 77 year old sees Dr. Scott and Dr. Pizano for his history of:    1. Paroxysmal AFib - had done well on flecainide for years, but had recurrent arrhythmia during COVID infection.  Rate control was not effective in controlling symptoms, and s/p AFib ablation with Dr. Scott 4/1/2022 (PV antrum, roof/floor lines and septal line). Required multiple repeat DCCVs following (4/22, 5/12, 5/30, 6/5) despite AAD therapy. Flecainide stopped 5/2022 d/t discovery of CAD/NSTEMI and CM (30-35%). Repeat PV antrum/posterior wall ablation 7/8/2022. Amiodarone stopped but reloaded for recurrence.  DCCV 8/11/2022 successful, but again with recurrence.  Now s/p 3rd AFib ablation 11/4/2022 with reisolation of PV antrum (LSPV) and LA roof  2. Dyslipidemia  3. HTN   4. Chronic AC for CHADSVASc 4 (resolved CM, HTN, CAD, age) - Eliquis  5. CAD - sp NSTEMI 5/2022 treated with MARYCARMEN to LAD  6. Ischemic Cardiomyopathy - LVEF had been 60-65%, down to 30-35% at time of MI 5/2022, back up to 55-60% 9/2022      I saw Anthony 10/31 at which time we reviewed his upcoming repeat (3rd) AFib ablation. He had just gotten back from a  cruise and had no limitations, though noted HR continued to \"race\" despite adding Diltiazem 180 9/26.     Therefore on 11/4, Dr. Scott performed EPS.  D/t fibrosis from previous transseptal punctures, transseptal puncture was quite difficult.  Echocardiogram done showed trivial pericardial effusion and no evidence of cardiac puncture.  Transseptal puncture was then performed successfully under ICE.  Reinsertion of CS catheter (as it was disengaged from the os of the CS during ICE) " "was also difficult and after Li was unable to insert the full length of the CS catheter to the distal mitral annulus.  Mapping showed atypical LA flutter and underwent reisolation of the LSPV, as all other PVs were well isolated from previous ablation.  He then had linear ablation over the LA roof, which successfully terminated the flutter.  No inducible AFib or AFlutter were noted.  Post-procedure, repeat echo which continued to show just a trivial pericardial effusion (also present 9/2022).  EKG postprocedure showed SR 68 bpm.  He was discharged home the same day.  Amiodarone was stopped.    Interval History:  Anthony hasn't had any AFib since ablation on 11/4 - no chest pain, palpitations pre-syncope, lightheadedness or fatigue. Groin site incision has minimal bruising and healing well.     Anthony has had a lingering cough since being on his  [place] cruise the week before his recent ablation. Mostly associated with a tickle in throat when he lays down at night.  No orthopnea, PND. No fever.    Looking forward to traveling to [place], [place] for sixth time in February-April, returning in May with this wife.    VITALS:  Vitals: /75 (BP Location: Left arm, Patient Position: Sitting)   Pulse 52   Ht 1.778 m (5' 10\")   Wt 79.6 kg (175 lb 6.4 oz)   SpO2 99%   BMI 25.17 kg/m      Diagnostic Testing:  EKG today, which I overread, showed normal sinus bradycardia 56 bpm on Diltiazem 180 mg daily  Echo 9/2022 with normalization of LVEF 55-60%.  Normal RV. Nl LA size.  Mild MAC with 1+ MR.  1-2+ TR with RVSP 41+ RAP, c/w mild PH.  1+ AI.  Trivial anterior pericardial effusion  Cath 5/23/2022 pLAD 40%, 20% o/pCx. <10% lesions in RCA and 20% o/pCx lesion  Echo 5/22/2022 LVEF 30-35% with moderate-severe anterior, septal, apical wall HK. Nl RV. 1-2+TR. RVSP 26+RAP. RAP 8mmHg.  Trivial aortic sclerosis.  CTA Heart 3/25/202 - nl PV anatomy with normal aorta. 3 v CAC. Non-cardiac portion showed 5 mm groundglass " pulmonary nodule RLL for which no routine follow-up needed. If suspicious, follow-up at 2 and 4 y rec'd      Plan:  -Remain AC on Eliquis  -Continue Diltiazem 180mg daily. Consider stopping this at next follow up with Dr. Washington as he'd like to be on fewer pills  -Follow up with Dr. Washington in January d/t Dr. Scott's jail  -Continue losartan, clopidogrel, atorvastatin. Will move Dr. Pizano's appt to Spring with labs given he'll be seeing Dr. Washington in 1/2023.     Assessment/Plan:    1. Atrial arrhythmias    As above, s/p multiple DCCV's most recently 8/2022.  Recurrent atypical AFlutter noted 9/2022 and now s/p third AFib ablation (LSPV and roof line)    Amiodarone stopped at time of procedure 11/4    Echo 9/2022 with normalization of LVEF 55-60%    Remains on AC with Eliquis for NXO1CO0-MDQu 4 (now resolved CM) (HTN, CAD, age)    No return of Afib Sx since ablation.      EKG SB today on Diltiazem 180.    Patient interested in discontinuing Diltiazem. Not having side effects, just would prefer not to be on extra pills. Advised to stay on this until follow up appointment with Dr. Washington so as to suppress the risk of Afib returning.  Pt agreeable with this plan.      Discussed avoiding previous triggers for Afib (including use of hot tub/sauna) for a few weeks.    Discussed that he may experience a few hours of Afib when he gets his COVID booster. Advised him to wait a few weeks before getting booster to keep the atrium calm in the first month post ablation.    Suggested patient ask a local clinic in [place] for a referral to cardiologist in the event that Afib returns while traveling. Advised patient to bring all medications he has taken in the past to [place] for arrhythmias (including amiodarone), just in case he has new Afib and provider restarts a Rx.    Note cough is not terribly bothersome or frequent. No fever. No SOB.  Likely d/t dry air with furnace in combination with recent intubation/AGUS x 2/recent  travel    PLAN:    Continue AC with Eliquis    Meet with Dr. Washington 1/2023 after Dr. Scott's long-term    Can consider discontinuing diltiazem at follow up appointment with Dr. Washington.    2. CAD, H/o CM    Saw Janina Ceballos NP 9/2022 who reviewed normalization of LVEF on echo 9/2022    Remains on losartan, atorvastatin, clopidogrel    Initially, 6-month follow-up with Dr. Pizano and BMP planned -seeing Dr. Washington post ablation    PLAN:    Will move Dr. Pizano's appointment to late April (after patient returns from [place]) given Dr. Washington seeing 1/2022    Remain on losartan, atorvastatin, clopidogrel      TIFFANI Rome, MOISES Crowley was present with the NOLAN student who participated in the service and in the documentation of the note.  I have verified the history and personally performed the physical exam and medical decision making.  I agree with the assessment and plan of care as documented in the note.       Items personally reviewed: vitals, labs, and EKG and agree with the interpretation documented in the note.  I agree with the interpretation documented in the note and the assessment and plan as above.      Orders Placed This Encounter   Procedures     EKG 12-lead complete w/read - Clinics (performed today)     No orders of the defined types were placed in this encounter.    There are no discontinued medications.      Encounter Diagnosis   Name Primary?     Paroxysmal atrial fibrillation (H) Yes       CURRENT MEDICATIONS:  Current Outpatient Medications   Medication Sig Dispense Refill     acetaminophen (TYLENOL) 325 MG tablet Take 325-650 mg by mouth every 6 hours as needed for mild pain       apixaban ANTICOAGULANT (ELIQUIS) 5 MG tablet Take 1 tablet (5 mg) by mouth 2 times daily 180 tablet 1     atorvastatin (LIPITOR) 80 MG tablet Take 1 tablet (80 mg) by mouth daily 90 tablet 3     clopidogrel (PLAVIX) 75 MG tablet Take 1 tablet (75 mg) by mouth daily 90 tablet 3     diltiazem ER COATED  "BEADS (CARDIZEM CD/CARTIA XT) 180 MG 24 hr capsule Take 1 capsule (180 mg) by mouth daily 80 capsule 3     losartan (COZAAR) 50 MG tablet Take 1 tablet (50 mg) by mouth daily 90 tablet 3     nitroGLYcerin (NITROSTAT) 0.4 MG sublingual tablet For chest pain place 1 tablet under the tongue every 5 minutes for 3 doses. If symptoms persist 5 minutes after 1st dose call 911.Please done take viagra along with nnitroglycerine 20 tablet 0       ALLERGIES     Allergies   Allergen Reactions     Ace Inhibitors Cough         Review of Systems:  Skin:  Negative     Eyes:  not assessed    ENT:  Negative    Respiratory:  Positive for cough (x2 weeks since URI)  Cardiovascular:  Negative for;palpitations;chest pain;edema;dizziness;lightheadedness    Gastroenterology: not assessed    Genitourinary:  Negative    Musculoskeletal:  Negative    Neurologic:  Negative    Psychiatric:  not assessed    Heme/Lymph/Imm:  Negative    Endocrine:  not assessed      Physical Exam:  Vitals: /75 (BP Location: Left arm, Patient Position: Sitting)   Pulse 52   Ht 1.778 m (5' 10\")   Wt 79.6 kg (175 lb 6.4 oz)   SpO2 99%   BMI 25.17 kg/m      Constitutional:  cooperative, alert and oriented, well developed, well nourished, in no acute distress        Skin:  warm and dry to the touch, no apparent skin lesions or masses noted        Head:  normocephalic, no masses or lesions        Eyes:  pupils equal and round, conjunctivae and lids unremarkable, sclera white, no xanthalasma, EOMS intact, no nystagmus        ENT:  no pallor or cyanosis, dentition good        Neck:  JVP normal        Chest:  normal breath sounds, clear to auscultation, normal A-P diameter, normal symmetry, normal respiratory excursion, no use of accessory muscles        Cardiac: regular rhythm;normal S1 and S2       grade 2;systolic murmur (Most prominent at apex)          Abdomen:  not assessed this visit        Vascular: pulses full and equal                                  "     Extremities and Back:  no deformities, clubbing, cyanosis, erythema observed        Neurological:  no gross motor deficits           PAST MEDICAL HISTORY:  Past Medical History:   Diagnosis Date     CAD (coronary artery disease)     mid LAD MARYCARMEN 5/23/2022     Hypercholesterolemia      Lumbar disc disease      Migraines      Paroxysmal A-fib (H)        PAST SURGICAL HISTORY:  Past Surgical History:   Procedure Laterality Date     ANESTHESIA CARDIOVERSION N/A 4/22/2022    Procedure: ANESTHESIA, FOR CARDIOVERSION FOLLOWING AGUS;  Surgeon: GENERIC ANESTHESIA PROVIDER;  Location:  OR     ANESTHESIA CARDIOVERSION N/A 5/12/2022    Procedure: ANESTHESIA, FOR CARDIOVERSION;  Surgeon: GENERIC ANESTHESIA PROVIDER;  Location:  OR     ANESTHESIA CARDIOVERSION N/A 8/11/2022    Procedure: CARDIOVERSION;  Surgeon: GENERIC ANESTHESIA PROVIDER;  Location:  OR     COLONOSCOPY       CV CORONARY ANGIOGRAM N/A 5/23/2022    Procedure: Coronary Angiogram;  Surgeon: Jacqueline Ramirez MD;  Location:  HEART CARDIAC CATH LAB     CV PCI N/A 5/23/2022    Procedure: Percutaneous Coronary Intervention;  Surgeon: Jacqueline Ramirez MD;  Location:  HEART CARDIAC CATH LAB     CYSTOSCOPY, RETROGRADES, COMBINED Left 5/13/2020    Procedure: LEFT RETROGRADE URETERAL PYELOGRAM;  Surgeon: Denilson Angulo MD;  Location:  OR     CYSTOSCOPY, TRANSURETHRAL RESECTION (TUR) PROSTATE, COMBINED N/A 5/13/2020    Procedure: CYSTOSCOPY WITH TRANSURETHRAL RESECTION PROSTATE;  Surgeon: Denilson Angulo MD;  Location:  OR     EP ABLATION ATRIAL FLUTTER N/A 7/8/2022    Procedure: Ablation Atrial Flutter;  Surgeon: Sharmin Scott MD;  Location:  HEART CARDIAC CATH LAB     EP ABLATION FOCAL AFIB N/A 4/1/2022    Procedure: Ablation Focal Atrial Fibrillation [[id number]];  Surgeon: Sharmin Scott MD;  Location:  HEART CARDIAC CATH LAB     EP ABLATION FOCAL AFIB N/A 11/4/2022    Procedure: Ablation Atrial Fibrilation;  Surgeon: Sharmin Scott MD;  Location:   HEART CARDIAC CATH LAB     OPEN REDUCTION INTERNAL FIXATION HAND       RHINOPLASTY       TONSILLECTOMY       wisdom teeth         FAMILY HISTORY:  Family History   Problem Relation Age of Onset     Dementia Father      Osteoarthritis Father      Cerebrovascular Disease Father      Myocardial Infarction Mother      C.A.D. Mother      Coronary Artery Disease Mother        SOCIAL HISTORY:  Social History     Socioeconomic History     Marital status:      Spouse name: Erin   Tobacco Use     Smoking status: Never     Smokeless tobacco: Never   Substance and Sexual Activity     Alcohol use: Yes     Alcohol/week: 2.0 - 3.0 standard drinks     Types: 2 - 3 Cans of beer per week     Comment: 3-4 beers/week     Drug use: No     Sexual activity: Yes     Partners: Female   Other Topics Concern     Special Diet No     Exercise Yes     Comment: 2-3x/week           Thank you for allowing me to participate in the care of your patient.      Sincerely,     Mona Quintana PA-C     [facility]  cc:   No referring provider defined for this encounter.

## 2022-11-11 NOTE — PATIENT INSTRUCTIONS
Anthony - it was good to see you today!    Reviewed your repeat (3rd!) ablation done 11/4. Dr. Scott was pleased with how it went  EKG today looked good! No recurrent AFib    PLAN:  Call for recurrent AFib - Dr. Scott may have you restart amiodarone  See Dr. Washington (as Dr. Scott is retiring) in 1/2023 as planned - he may stop Diltiazem at that time.   Hold off on the sauna/hot tub for a few weeks still to avoid recurrent arrhythmias  Continue Eliquis without interruption  We'll move labs/Harinder appt to Spring 2023.    AFib RNs: [phone]

## 2022-12-13 ENCOUNTER — TELEPHONE (OUTPATIENT)
Dept: CARDIOLOGY | Facility: CLINIC | Age: 77
End: 2022-12-13

## 2022-12-13 DIAGNOSIS — I48.0 PAROXYSMAL ATRIAL FIBRILLATION (H): ICD-10-CM

## 2023-01-27 ENCOUNTER — OFFICE VISIT (OUTPATIENT)
Dept: CARDIOLOGY | Facility: CLINIC | Age: 78
End: 2023-01-27
Payer: COMMERCIAL

## 2023-01-27 ENCOUNTER — MYC MEDICAL ADVICE (OUTPATIENT)
Dept: CARDIOLOGY | Facility: CLINIC | Age: 78
End: 2023-01-27

## 2023-01-27 VITALS
HEIGHT: 70 IN | WEIGHT: 177 LBS | BODY MASS INDEX: 25.34 KG/M2 | SYSTOLIC BLOOD PRESSURE: 128 MMHG | HEART RATE: 55 BPM | DIASTOLIC BLOOD PRESSURE: 73 MMHG

## 2023-01-27 DIAGNOSIS — I48.4 ATYPICAL ATRIAL FLUTTER (H): ICD-10-CM

## 2023-01-27 DIAGNOSIS — I48.0 PAROXYSMAL ATRIAL FIBRILLATION (H): Primary | ICD-10-CM

## 2023-01-27 PROCEDURE — 93000 ELECTROCARDIOGRAM COMPLETE: CPT | Performed by: INTERNAL MEDICINE

## 2023-01-27 PROCEDURE — 99214 OFFICE O/P EST MOD 30 MIN: CPT | Performed by: INTERNAL MEDICINE

## 2023-01-27 RX ORDER — DILTIAZEM HYDROCHLORIDE 180 MG/1
180 CAPSULE, COATED, EXTENDED RELEASE ORAL DAILY
Qty: 80 EACH | Refills: 3 | Status: SHIPPED | OUTPATIENT
Start: 2023-01-27 | End: 2023-01-27

## 2023-01-27 RX ORDER — DILTIAZEM HYDROCHLORIDE 180 MG/1
180 CAPSULE, COATED, EXTENDED RELEASE ORAL DAILY
Qty: 90 CAPSULE | Refills: 3 | Status: SHIPPED | OUTPATIENT
Start: 2023-01-27 | End: 2024-01-04

## 2023-01-27 NOTE — LETTER
"1/27/2023    Sabas Staley MD  [address]  [facility] [address]    RE: Rey L Stephanie       Dear Colleague,     I had the pleasure of seeing Rey GUZMAN Stephanie in the [facility].  Electrophysiology/ Clinic Note         H&P and Plan:     REASON FOR VISIT: Electrophysiology evaluation.      HISTORY OF PRESENT ILLNESS: Patient is a pleasant 77-year-old gentleman with a history of hypertension, hyperlipidemia, coronary artery disease (non-STEMI in 5/2022; PCI to LAD) and paroxysmal defibrillation (several A. fib ablations), who is here for evaluation.    Patient was followed by Dr. Scott who recently retired.  He had several ablation procedures.  He underwent A. fib ablation on 4/1/2022 (PVI and linear left atrial ablation).  However, he exhibited recurrence of A. fib and atypical flutter, and underwent redo ablations on 7/8/2022 and 11/4/2022 with extensive left atrial posterior wall and roof ablation.     Today, he informs he is doing well.  He denies any recurrence of A. fib/flutter since last ablation.  He has no complaints during this visit and is planning to leave soon for [place] for vacation.    He denies chest pain, shortness of breath, lightheadedness, near-syncope or syncope.    PREVIOUS STUDIES (personally reviewed):  -Echo (11/4/2022): Normal LV function.      ASSESSMENT AND PLAN:   1.  Paroxysmal atrial fibrillation/atypical flutter.  He continues to do well after the last ablation.  Continue therapy with diltiazem.   2.  Embolic prevention.  CHADS-VASc score of 4.  Continue anticoagulation with Eliquis indefinitely.   3.  Coronary artery disease.  No acute issues.  Continue therapy with diltiazem, losartan, Plavix and Eliquis.      Chacorta Washington MD    Physical Exam:  Vitals: /73   Pulse 55   Ht 1.778 m (5' 10\")   Wt 80.3 kg (177 lb)   BMI 25.40 kg/m      Constitutional:  AAO x3.  Pt is in NAD.  HEAD: normocephalic.  SKIN: Skin normal color, texture and " turgor with no lesions or eruptions.  Eyes: PERRL, EOMI.  ENT:  Supple, normal JVP. No lymphadenopathy or thyroid enlargement.  Chest:  CTAB.  Cardiac:  RRR, normal  S1 and S2.  No murmurs rubs or gallop.  Abdomen:  Normal BS.  Soft, non-tender and non-distended.  No rebound or guarding.    Extremities:  Pedious pulses palpable B/L.  No LE edema noticed.   Neurological: Strength and sensation grossly symmetric and intact throughout.       CURRENT MEDICATIONS:  Current Outpatient Medications   Medication Sig Dispense Refill     acetaminophen (TYLENOL) 325 MG tablet Take 325-650 mg by mouth every 6 hours as needed for mild pain       apixaban ANTICOAGULANT (ELIQUIS) 5 MG tablet Take 1 tablet (5 mg) by mouth 2 times daily 180 tablet 3     atorvastatin (LIPITOR) 80 MG tablet Take 1 tablet (80 mg) by mouth daily 90 tablet 3     clopidogrel (PLAVIX) 75 MG tablet Take 1 tablet (75 mg) by mouth daily 90 tablet 3     diltiazem ER COATED BEADS (CARDIZEM CD/CARTIA XT) 180 MG 24 hr capsule Take 1 capsule (180 mg) by mouth daily 80 capsule 3     losartan (COZAAR) 50 MG tablet Take 1 tablet (50 mg) by mouth daily 90 tablet 3     nitroGLYcerin (NITROSTAT) 0.4 MG sublingual tablet For chest pain place 1 tablet under the tongue every 5 minutes for 3 doses. If symptoms persist 5 minutes after 1st dose call 911.Please done take viagra along with nnitroglycerine 20 tablet 0       ALLERGIES     Allergies   Allergen Reactions     Ace Inhibitors Cough       PAST MEDICAL HISTORY:  Past Medical History:   Diagnosis Date     CAD (coronary artery disease)     mid LAD MARYCARMEN 5/23/2022     Hypercholesterolemia      Lumbar disc disease      Migraines      Paroxysmal A-fib (H)        PAST SURGICAL HISTORY:  Past Surgical History:   Procedure Laterality Date     ANESTHESIA CARDIOVERSION N/A 4/22/2022    Procedure: ANESTHESIA, FOR CARDIOVERSION FOLLOWING AGUS;  Surgeon: GENERIC ANESTHESIA PROVIDER;  Location: [facility] OR     ANESTHESIA CARDIOVERSION N/A  5/12/2022    Procedure: ANESTHESIA, FOR CARDIOVERSION;  Surgeon: GENERIC ANESTHESIA PROVIDER;  Location:  OR     ANESTHESIA CARDIOVERSION N/A 8/11/2022    Procedure: CARDIOVERSION;  Surgeon: GENERIC ANESTHESIA PROVIDER;  Location:  OR     COLONOSCOPY       CV CORONARY ANGIOGRAM N/A 5/23/2022    Procedure: Coronary Angiogram;  Surgeon: Jacqueline Ramirez MD;  Location:  HEART CARDIAC CATH LAB     CV PCI N/A 5/23/2022    Procedure: Percutaneous Coronary Intervention;  Surgeon: Jacqueline Ramirez MD;  Location:  HEART CARDIAC CATH LAB     CYSTOSCOPY, RETROGRADES, COMBINED Left 5/13/2020    Procedure: LEFT RETROGRADE URETERAL PYELOGRAM;  Surgeon: Denilson Angulo MD;  Location:  OR     CYSTOSCOPY, TRANSURETHRAL RESECTION (TUR) PROSTATE, COMBINED N/A 5/13/2020    Procedure: CYSTOSCOPY WITH TRANSURETHRAL RESECTION PROSTATE;  Surgeon: Denilson Angulo MD;  Location:  OR     EP ABLATION ATRIAL FLUTTER N/A 7/8/2022    Procedure: Ablation Atrial Flutter;  Surgeon: Sharmin Scott MD;  Location:  HEART CARDIAC CATH LAB     EP ABLATION FOCAL AFIB N/A 4/1/2022    Procedure: Ablation Focal Atrial Fibrillation [[id number]];  Surgeon: Sharmin Scott MD;  Location:  HEART CARDIAC CATH LAB     EP ABLATION FOCAL AFIB N/A 11/4/2022    Procedure: Ablation Atrial Fibrilation;  Surgeon: Sharmin Scott MD;  Location:  HEART CARDIAC CATH LAB     OPEN REDUCTION INTERNAL FIXATION HAND       RHINOPLASTY       TONSILLECTOMY       wisdom teeth         FAMILY HISTORY:  The patient's family history was reviewed and is non-contributory for heart disease.    SOCIAL HISTORY:  Social History     Socioeconomic History     Marital status:      Spouse name: Erin   Tobacco Use     Smoking status: Never     Smokeless tobacco: Never   Substance and Sexual Activity     Alcohol use: Yes     Alcohol/week: 2.0 - 3.0 standard drinks     Types: 2 - 3 Cans of beer per week     Comment: 3-4 beers/week     Drug use: No     Sexual activity: Yes      Partners: Female   Other Topics Concern     Special Diet No     Exercise Yes     Comment: 2-3x/week       Review of Systems:  Skin:        Eyes:       ENT:       Respiratory:       Cardiovascular:       Gastroenterology:      Genitourinary:       Musculoskeletal:       Neurologic:       Psychiatric:       Heme/Lymph/Imm:       Endocrine:           Recent Lab Results:  LIPID RESULTS:  Lab Results   Component Value Date    CHOL 135 05/23/2022    CHOL 158 03/23/2021    HDL 52 05/23/2022    HDL 54 03/23/2021    LDL 67 05/23/2022    LDL 92 03/23/2021    TRIG 78 05/23/2022    TRIG 58 03/23/2021       LIVER ENZYME RESULTS:  Lab Results   Component Value Date    AST 20 05/30/2022    AST 18 03/23/2021    ALT 21 05/30/2022    ALT 10 03/23/2021       CBC RESULTS:  Lab Results   Component Value Date    WBC 6.5 11/04/2022    WBC 5.8 03/23/2021    WBC 18.8 (H) 03/13/2020    RBC 4.87 11/04/2022    RBC 5.07 03/23/2021    RBC 4.26 (L) 03/13/2020    HGB 15.0 11/04/2022    HGB 15.4 03/23/2021    HCT 44.1 11/04/2022    HCT 45.5 03/23/2021    MCV 91 11/04/2022    MCV 89.7 03/23/2021    MCH 30.8 11/04/2022    MCH 30.3 03/23/2021    MCHC 34.0 11/04/2022    MCHC 33.8 03/23/2021    RDW 12.8 11/04/2022    RDW 14.1 03/13/2020     11/04/2022     03/23/2021       BMP RESULTS:  Lab Results   Component Value Date     11/04/2022     03/23/2021    POTASSIUM 3.9 11/04/2022    POTASSIUM 4.5 03/23/2021    CHLORIDE 106 11/04/2022    CHLORIDE 103 03/23/2021    CO2 26 11/04/2022    CO2 30 05/14/2020    ANIONGAP 5 11/04/2022    ANIONGAP 4 05/14/2020    GLC 95 11/04/2022    GLC 79 03/23/2021    BUN 25 11/04/2022    BUN 24 03/23/2021    BUN 18 03/23/2021    CR 1.21 11/04/2022    CR 1.31 (H) 03/23/2021    GFRESTIMATED 62 11/04/2022    GFRESTIMATED >60 03/25/2022    GFRESTIMATED 46 (L) 05/14/2020    GFRESTBLACK 54 (L) 05/14/2020    ELIEZER 8.9 11/04/2022    ELIEZER 9.3 03/23/2021        A1C RESULTS:  No results found for: A1C    INR  RESULTS:  Lab Results   Component Value Date    INR 1.25 (H) 05/12/2022    INR 1.46 (H) 04/20/2022    INR 0.99 07/27/2007         ECHOCARDIOGRAM  No results found for this or any previous visit (from the past 8760 hour(s)).    No orders of the defined types were placed in this encounter.    No orders of the defined types were placed in this encounter.    There are no discontinued medications.    No diagnosis found.    CC  No referring provider defined for this encounter.    Thank you for allowing me to participate in the care of your patient.      Sincerely,     Chacorta Washington MD     [facility]

## 2023-01-27 NOTE — PROGRESS NOTES
"Electrophysiology/ Clinic Note         H&P and Plan:     REASON FOR VISIT: Electrophysiology evaluation.      HISTORY OF PRESENT ILLNESS: Patient is a pleasant 77-year-old gentleman with a history of hypertension, hyperlipidemia, coronary artery disease (non-STEMI in 5/2022; PCI to LAD) and paroxysmal defibrillation (several A. fib ablations), who is here for evaluation.    Patient was followed by Dr. Scott who recently retired.  He had several ablation procedures.  He underwent A. fib ablation on 4/1/2022 (PVI and linear left atrial ablation).  However, he exhibited recurrence of A. fib and atypical flutter, and underwent redo ablations on 7/8/2022 and 11/4/2022 with extensive left atrial posterior wall and roof ablation.     Today, he informs he is doing well.  He denies any recurrence of A. fib/flutter since last ablation.  He has no complaints during this visit and is planning to leave soon for [place] for vacation.    He denies chest pain, shortness of breath, lightheadedness, near-syncope or syncope.    PREVIOUS STUDIES (personally reviewed):  -Echo (11/4/2022): Normal LV function.      ASSESSMENT AND PLAN:   1.  Paroxysmal atrial fibrillation/atypical flutter.  He continues to do well after the last ablation.  Continue therapy with diltiazem.   2.  Embolic prevention.  CHADS-VASc score of 4.  Continue anticoagulation with Eliquis indefinitely.   3.  Coronary artery disease.  No acute issues.  Continue therapy with diltiazem, losartan, Plavix and Eliquis.      Chacorta Washington MD    Physical Exam:  Vitals: /73   Pulse 55   Ht 1.778 m (5' 10\")   Wt 80.3 kg (177 lb)   BMI 25.40 kg/m      Constitutional:  AAO x3.  Pt is in NAD.  HEAD: normocephalic.  SKIN: Skin normal color, texture and turgor with no lesions or eruptions.  Eyes: PERRL, EOMI.  ENT:  Supple, normal JVP. No lymphadenopathy or thyroid enlargement.  Chest:  CTAB.  Cardiac:  RRR, normal  S1 and S2.  No murmurs rubs or gallop.  Abdomen:  Normal " BS.  Soft, non-tender and non-distended.  No rebound or guarding.    Extremities:  Pedious pulses palpable B/L.  No LE edema noticed.   Neurological: Strength and sensation grossly symmetric and intact throughout.       CURRENT MEDICATIONS:  Current Outpatient Medications   Medication Sig Dispense Refill     acetaminophen (TYLENOL) 325 MG tablet Take 325-650 mg by mouth every 6 hours as needed for mild pain       apixaban ANTICOAGULANT (ELIQUIS) 5 MG tablet Take 1 tablet (5 mg) by mouth 2 times daily 180 tablet 3     atorvastatin (LIPITOR) 80 MG tablet Take 1 tablet (80 mg) by mouth daily 90 tablet 3     clopidogrel (PLAVIX) 75 MG tablet Take 1 tablet (75 mg) by mouth daily 90 tablet 3     diltiazem ER COATED BEADS (CARDIZEM CD/CARTIA XT) 180 MG 24 hr capsule Take 1 capsule (180 mg) by mouth daily 80 capsule 3     losartan (COZAAR) 50 MG tablet Take 1 tablet (50 mg) by mouth daily 90 tablet 3     nitroGLYcerin (NITROSTAT) 0.4 MG sublingual tablet For chest pain place 1 tablet under the tongue every 5 minutes for 3 doses. If symptoms persist 5 minutes after 1st dose call 911.Please done take viagra along with nnitroglycerine 20 tablet 0       ALLERGIES     Allergies   Allergen Reactions     Ace Inhibitors Cough       PAST MEDICAL HISTORY:  Past Medical History:   Diagnosis Date     CAD (coronary artery disease)     mid LAD MARYCARMEN 5/23/2022     Hypercholesterolemia      Lumbar disc disease      Migraines      Paroxysmal A-fib (H)        PAST SURGICAL HISTORY:  Past Surgical History:   Procedure Laterality Date     ANESTHESIA CARDIOVERSION N/A 4/22/2022    Procedure: ANESTHESIA, FOR CARDIOVERSION FOLLOWING AGUS;  Surgeon: GENERIC ANESTHESIA PROVIDER;  Location:  OR     ANESTHESIA CARDIOVERSION N/A 5/12/2022    Procedure: ANESTHESIA, FOR CARDIOVERSION;  Surgeon: GENERIC ANESTHESIA PROVIDER;  Location:  OR     ANESTHESIA CARDIOVERSION N/A 8/11/2022    Procedure: CARDIOVERSION;  Surgeon: GENERIC ANESTHESIA PROVIDER;   Location:  OR     COLONOSCOPY       CV CORONARY ANGIOGRAM N/A 5/23/2022    Procedure: Coronary Angiogram;  Surgeon: Jacqueline Ramirez MD;  Location:  [facility]     CV PCI N/A 5/23/2022    Procedure: Percutaneous Coronary Intervention;  Surgeon: Jacqueline Ramirez MD;  Location:  [facility]     CYSTOSCOPY, RETROGRADES, COMBINED Left 5/13/2020    Procedure: LEFT RETROGRADE URETERAL PYELOGRAM;  Surgeon: Denilson Angulo MD;  Location:  OR     CYSTOSCOPY, TRANSURETHRAL RESECTION (TUR) PROSTATE, COMBINED N/A 5/13/2020    Procedure: CYSTOSCOPY WITH TRANSURETHRAL RESECTION PROSTATE;  Surgeon: Denilson Angulo MD;  Location:  OR     EP ABLATION ATRIAL FLUTTER N/A 7/8/2022    Procedure: Ablation Atrial Flutter;  Surgeon: Sharmin Scott MD;  Location:  [facility]     EP ABLATION FOCAL AFIB N/A 4/1/2022    Procedure: Ablation Focal Atrial Fibrillation [[id number]];  Surgeon: Sharmin Scott MD;  Location:  [facility]     EP ABLATION FOCAL AFIB N/A 11/4/2022    Procedure: Ablation Atrial Fibrilation;  Surgeon: Sharmin Scott MD;  Location:  [facility]     OPEN REDUCTION INTERNAL FIXATION HAND       RHINOPLASTY       TONSILLECTOMY       wisdom teeth         FAMILY HISTORY:  The patient's family history was reviewed and is non-contributory for heart disease.    SOCIAL HISTORY:  Social History     Socioeconomic History     Marital status:      Spouse name: Erin   Tobacco Use     Smoking status: Never     Smokeless tobacco: Never   Substance and Sexual Activity     Alcohol use: Yes     Alcohol/week: 2.0 - 3.0 standard drinks     Types: 2 - 3 Cans of beer per week     Comment: 3-4 beers/week     Drug use: No     Sexual activity: Yes     Partners: Female   Other Topics Concern     Special Diet No     Exercise Yes     Comment: 2-3x/week       Review of Systems:  Skin:        Eyes:       ENT:       Respiratory:       Cardiovascular:       Gastroenterology:       Genitourinary:       Musculoskeletal:       Neurologic:       Psychiatric:       Heme/Lymph/Imm:       Endocrine:           Recent Lab Results:  LIPID RESULTS:  Lab Results   Component Value Date    CHOL 135 05/23/2022    CHOL 158 03/23/2021    HDL 52 05/23/2022    HDL 54 03/23/2021    LDL 67 05/23/2022    LDL 92 03/23/2021    TRIG 78 05/23/2022    TRIG 58 03/23/2021       LIVER ENZYME RESULTS:  Lab Results   Component Value Date    AST 20 05/30/2022    AST 18 03/23/2021    ALT 21 05/30/2022    ALT 10 03/23/2021       CBC RESULTS:  Lab Results   Component Value Date    WBC 6.5 11/04/2022    WBC 5.8 03/23/2021    WBC 18.8 (H) 03/13/2020    RBC 4.87 11/04/2022    RBC 5.07 03/23/2021    RBC 4.26 (L) 03/13/2020    HGB 15.0 11/04/2022    HGB 15.4 03/23/2021    HCT 44.1 11/04/2022    HCT 45.5 03/23/2021    MCV 91 11/04/2022    MCV 89.7 03/23/2021    MCH 30.8 11/04/2022    MCH 30.3 03/23/2021    MCHC 34.0 11/04/2022    MCHC 33.8 03/23/2021    RDW 12.8 11/04/2022    RDW 14.1 03/13/2020     11/04/2022     03/23/2021       BMP RESULTS:  Lab Results   Component Value Date     11/04/2022     03/23/2021    POTASSIUM 3.9 11/04/2022    POTASSIUM 4.5 03/23/2021    CHLORIDE 106 11/04/2022    CHLORIDE 103 03/23/2021    CO2 26 11/04/2022    CO2 30 05/14/2020    ANIONGAP 5 11/04/2022    ANIONGAP 4 05/14/2020    GLC 95 11/04/2022    GLC 79 03/23/2021    BUN 25 11/04/2022    BUN 24 03/23/2021    BUN 18 03/23/2021    CR 1.21 11/04/2022    CR 1.31 (H) 03/23/2021    GFRESTIMATED 62 11/04/2022    GFRESTIMATED >60 03/25/2022    GFRESTIMATED 46 (L) 05/14/2020    GFRESTBLACK 54 (L) 05/14/2020    ELIEZER 8.9 11/04/2022    ELIEZER 9.3 03/23/2021        A1C RESULTS:  No results found for: A1C    INR RESULTS:  Lab Results   Component Value Date    INR 1.25 (H) 05/12/2022    INR 1.46 (H) 04/20/2022    INR 0.99 07/27/2007         ECHOCARDIOGRAM  No results found for this or any previous visit (from the past 8760 hour(s)).      No  orders of the defined types were placed in this encounter.    No orders of the defined types were placed in this encounter.    There are no discontinued medications.      No diagnosis found.      CC  No referring provider defined for this encounter.

## 2023-01-27 NOTE — TELEPHONE ENCOUNTER
01/27/23 Spoke w pt who had OV with Dr Washington today. He was instructed to continue Diltiazem  mg . The rx was accidentally sent to [facility] but he needs it to go to [facility] in [place] instead as he is leaving for [place] on Monday 1/30.  Rx sent  Pt has no further questions  Den 230 pm

## 2023-03-31 DIAGNOSIS — I48.3 TYPICAL ATRIAL FLUTTER (H): ICD-10-CM

## 2023-03-31 DIAGNOSIS — I48.0 PAROXYSMAL ATRIAL FIBRILLATION (H): ICD-10-CM

## 2023-03-31 DIAGNOSIS — I25.10 CORONARY ARTERY DISEASE INVOLVING NATIVE CORONARY ARTERY OF NATIVE HEART WITHOUT ANGINA PECTORIS: ICD-10-CM

## 2023-03-31 RX ORDER — ATORVASTATIN CALCIUM 80 MG/1
80 TABLET, FILM COATED ORAL DAILY
Qty: 90 TABLET | Refills: 4 | Status: SHIPPED | OUTPATIENT
Start: 2023-03-31 | End: 2024-04-16

## 2023-04-10 NOTE — PATIENT INSTRUCTIONS
Wound Care Instructions for Liquid Nitrogen Treatment   The treatment area will appear white at first. Over the next several hours a fluid-filled blister may form. The blister can be very dark. If blister appears, it will remain blistered for about a week. Then a scab will form over the area.   Leave the blistered area uncovered as long as it remains closed. If the area breaks open, you may cover it with a clean band aid. Do not pull off the skin covering the blister.   If the blistered area becomes uncomfortably filled with fluid, you may release some of the fluid by puncturing the blister with a needle that has been cleansed with alcohol.   If the skin covering the blister comes off, clean the area daily with soap and water. Apply a small amount of Vaseline and then cover with a clean band aid. Change the band aid twice daily until the skin is completely healed.   Call us if.....   1. You have signs of infection such as thick yellow or pus-like drainage from the wound site or a fever over 100 degrees Fahrenheit.   2. You have any questions or are not sure how to take care of the wound.      Patient Education   Personalized Prevention Plan  You are due for the preventive services outlined below.  Your care team is available to assist you in scheduling these services.  If you have already completed any of these items, please share that information with your care team to update in your medical record.  Health Maintenance Due   Topic Date Due    Kidney Microalbumin Urine Test  Never done    Diptheria Tetanus Pertussis (DTAP/TDAP/TD) Vaccine (2 - Td or Tdap) 04/30/2022    PHQ-2 (once per calendar year)  01/01/2023

## 2023-04-10 NOTE — PROGRESS NOTES
"Answers for HPI/ROS submitted by the patient on 4/4/2023  What is the reason for your visit today? : Plantar warts on feet, fingers  How many servings of fruits and vegetables do you eat daily?: 2-3  On average, how many sweetened beverages do you drink each day (Examples: soda, juice, sweet tea, etc.  Do NOT count diet or artificially sweetened beverages)?: 2  How many minutes a day do you exercise enough to make your heart beat faster?: 20 to 29  How many days a week do you exercise enough to make your heart beat faster?: 4  How many days per week do you miss taking your medication?: 0    SUBJECTIVE:   Rey Brown is a 78 year old male who presents for Preventive Visit.      Patient has been advised of split billing requirements and indicates understanding: Yes  Are you in the first 12 months of your Medicare Part B coverage?  No    Physical Health:    In general, how would you rate your overall physical health? good    Outside of work, how many days during the week do you exercise? 2-3 days/week    Outside of work, approximately how many minutes a day do you exercise?less than 15 minutes    If you drink alcohol do you typically have >3 drinks per day or >7 drinks per week? No    Do you usually eat at least 4 servings of fruit and vegetables a day, include whole grains & fiber and avoid regularly eating high fat or \"junk\" foods? Yes    Do you have any problems taking medications regularly?  No    Do you have any side effects from medications? none    Needs assistance for the following daily activities: no assistance needed    Which of the following safety concerns are present in your home?  throw rugs in the hallway and lack of grab bars in the bathroom     Hearing impairment: No    In the past 6 months, have you been bothered by leaking of urine? no    Mental Health:    In general, how would you rate your overall mental or emotional health? excellent  PHQ-2 Score:      Do you feel safe in your " environment? Yes    Have you ever done Advance Care Planning? (For example, a Health Directive, POLST, or a discussion with a medical provider or your loved ones about your wishes): Yes, advance care planning is on file.    Additional concerns to address?  No    Fall risk:  Fallen 2 or more times in the past year?: No  Any fall with injury in the past year?: No    Cognitive Screenin) Repeat 3 items (Leader, Season, Table)    2) Clock draw: NORMAL  3) 3 item recall: Recalls 3 objects  Results: 3 items recalled: COGNITIVE IMPAIRMENT LESS LIKELY    Mini-CogTM Copyright S Gerry. Licensed by the author for use in [facility]; reprinted with permission (soob@[facility]). All rights reserved.      Do you have sleep apnea, excessive snoring or daytime drowsiness?: no        PROBLEMS TO ADD ON...  Heart issues is much improved   Sees cards regularly     Reviewed and updated as needed this visit by clinical staff    Allergies  Meds              Reviewed and updated as needed this visit by Provider                 Social History     Tobacco Use     Smoking status: Never     Smokeless tobacco: Never   Vaping Use     Vaping status: Not on file   Substance Use Topics     Alcohol use: Yes     Alcohol/week: 2.0 - 3.0 standard drinks of alcohol     Types: 2 - 3 Cans of beer per week     Comment: 3-4 beers/week              View : No data to display.                   View : No data to display.              Do you have a current opioid prescription? No  Do you use any other controlled substances or medications that are not prescribed by a provider? Alcohol                      Current providers sharing in care for this patient include:   Patient Care Team:  Sabas Staley MD as PCP - General (Family Medicine)  Sabas Staley MD as Assigned PCP  Mona Quintana PA-C as Assigned Heart and Vascular Provider    The following health maintenance items are reviewed in Epic and correct as of  "today:  Health Maintenance   Topic Date Due     MICROALBUMIN  Never done     DTAP/TDAP/TD IMMUNIZATION (2 - Td or Tdap) 04/30/2022     PHQ-2 (once per calendar year)  01/01/2023     LIPID  05/23/2023     BMP  11/04/2023     HEMOGLOBIN  11/04/2023     MEDICARE ANNUAL WELLNESS VISIT  04/11/2024     FALL RISK ASSESSMENT  04/11/2024     ADVANCE CARE PLANNING  04/11/2028     HEPATITIS C SCREENING  Completed     INFLUENZA VACCINE  Completed     Pneumococcal Vaccine: 65+ Years  Completed     URINALYSIS  Completed     ZOSTER IMMUNIZATION  Completed     COVID-19 Vaccine  Completed     IPV IMMUNIZATION  Aged Out     MENINGITIS IMMUNIZATION  Aged Out     COLORECTAL CANCER SCREENING  Discontinued     Lab work is in process  Labs reviewed in EPIC  Due for DT    ROS:  Constitutional, HEENT, cardiovascular, pulmonary, GI, , musculoskeletal, neuro, skin, endocrine and psych systems are negative, except as otherwise noted.    OBJECTIVE:   /65   Pulse 65   Resp 16   Ht 1.778 m (5' 10\")   Wt 77.8 kg (171 lb 9.6 oz)   SpO2 97%   BMI 24.62 kg/m   Estimated body mass index is 24.62 kg/m  as calculated from the following:    Height as of this encounter: 1.778 m (5' 10\").    Weight as of this encounter: 77.8 kg (171 lb 9.6 oz).  EXAM:   GENERAL: healthy, alert and no distress  EYES: Eyes grossly normal to inspection, PERRL and conjunctivae and sclerae normal  HENT: ear canals and TM's normal, nose and mouth without ulcers or lesions  NECK: no adenopathy, no asymmetry, masses, or scars and thyroid normal to palpation  RESP: lungs clear to auscultation - no rales, rhonchi or wheezes  CV: regular rate and rhythm, normal S1 S2, no S3 or S4, no murmur, click or rub, no peripheral edema and peripheral pulses strong  ABDOMEN: soft, nontender, no hepatosplenomegaly, no masses and bowel sounds normal  MS: no gross musculoskeletal defects noted, no edema  SKIN: warts on the feet hands and large forehead verrucous keratosis  NEURO: " Normal strength and tone, mentation intact and speech normal  PSYCH: mentation appears normal, affect normal/bright  LYMPH: no cervical, supraclavicular, axillary, or inguinal adenopathy    Diagnostic Test Results:  Labs reviewed in Epic    ASSESSMENT / PLAN:   Rey was seen today for plantar wart, derm problem and medicare wellness.    Diagnoses and all orders for this visit:    Encounter for Medicare annual wellness exam    Stage 3a chronic kidney disease (H)  Chronic kidney disease due to HTN and DM.  Check urine for protein.   Patient is on ACE/ARB.  Patient is on a statin.  Told the patient to avoid NSAIDs if at all possible.   Will monitor closely and send to Nephrologist if renal function continues to decline.      -     Microalbumin ([facility])    Secondary hypercoagulable state (H)  With chads vasc of 4, on eliquis for Typical atrial flutter (H)  And had  History of non-ST elevation myocardial infarction (NSTEMI) 5/2022    Primary hypertension  Reviewed his current HTN management. Discussed our goal for him is a systolic pressure at or below 128 and diastolic pressure at or below 83.  We today managed his prescriptions with refills ensured to ensure availabilty of current medications.  Discussed the importance for aggressive management of HTN to prevent vascular complications later.  Recommended lower fat, lower carbohydrate, and lower sodium (<2000 mg)diet. Required intervals for follow up on HTN, lab studies reviewed.    Strongly recommened he follow his blood pressures outside the clinic to ensure that BPs are remaining within guidelines,.  Instructed to contact me if the readings are not within guidelines on a regular basis so we can adjust treatment as needed.    Hyperlipidemia  Discussed current lipid results, previous results (if available) current guidelines (NCEP) for treatment and goals for lipids.    Discussed ongoing lifestyle modification, dietary changes (low fat, low simple carb) and regular  "aerobic exercise.    Discussed the link between dysmetabolic syndrome and impaired glucose tolerance seen in certain patterns of lipids.     Reviewed medication use for lipid lowering, including the statins are their possible side effects of myalgias, rhabdomyolysis, and liver toxicity.  We today managed his prescriptions with refills ensured to ensure availabilty of current medications.  Discussed the importance for aggressive management of dyslipidemia to prevent vascular complications later.     Instructed to contact me if he develop any intolerance to the treatment.    Hypertensive kidney disease  Treating bp and lipids and ACE.    Plantar wart  8 lesions cryod.      Patient has been advised of split billing requirements and indicates understanding: Yes    COUNSELING:  Reviewed preventive health counseling, as reflected in patient instructions       Healthy diet/nutrition       Vision screening    Estimated body mass index is 24.62 kg/m  as calculated from the following:    Height as of this encounter: 1.778 m (5' 10\").    Weight as of this encounter: 77.8 kg (171 lb 9.6 oz).        He reports that he has never smoked. He has never used smokeless tobacco.      I have reviewed Opioid Use Disorder and Substance Use Disorder risk factors and made any needed referrals.     Appropriate preventive services were discussed with this patient, including applicable screening as appropriate for cardiovascular disease, diabetes, osteopenia/osteoporosis, and glaucoma.  As appropriate for age/gender, discussed screening for colorectal cancer, prostate cancer, breast cancer, and cervical cancer. Checklist reviewing preventive services available has been given to the patient.    Reviewed patients plan of care and provided an AVS. The Basic Care Plan (routine screening as documented in Health Maintenance) for Rey meets the Care Plan requirement. This Care Plan has been established and reviewed with the Patient.    Counseling " Resources:  ATP IV Guidelines  Pooled Cohorts Equation Calculator  Breast Cancer Risk Calculator  BRCA-Related Cancer Risk Assessment: FHS-7 Tool  FRAX Risk Assessment  ICSI Preventive Guidelines  Dietary Guidelines for Americans, 2010  USDA's MyPlate  ASA Prophylaxis  Lung CA Screening    Sabas Staley MD  [facility]

## 2023-04-11 ENCOUNTER — OFFICE VISIT (OUTPATIENT)
Dept: FAMILY MEDICINE | Facility: CLINIC | Age: 78
End: 2023-04-11

## 2023-04-11 VITALS
HEIGHT: 70 IN | BODY MASS INDEX: 24.57 KG/M2 | OXYGEN SATURATION: 97 % | HEART RATE: 65 BPM | DIASTOLIC BLOOD PRESSURE: 65 MMHG | WEIGHT: 171.6 LBS | RESPIRATION RATE: 16 BRPM | SYSTOLIC BLOOD PRESSURE: 125 MMHG

## 2023-04-11 DIAGNOSIS — I10 PRIMARY HYPERTENSION: ICD-10-CM

## 2023-04-11 DIAGNOSIS — Z00.00 ENCOUNTER FOR MEDICARE ANNUAL WELLNESS EXAM: Primary | ICD-10-CM

## 2023-04-11 DIAGNOSIS — I25.2 HISTORY OF NON-ST ELEVATION MYOCARDIAL INFARCTION (NSTEMI): ICD-10-CM

## 2023-04-11 DIAGNOSIS — I48.3 TYPICAL ATRIAL FLUTTER (H): ICD-10-CM

## 2023-04-11 DIAGNOSIS — D68.69 SECONDARY HYPERCOAGULABLE STATE (H): ICD-10-CM

## 2023-04-11 DIAGNOSIS — N18.31 STAGE 3A CHRONIC KIDNEY DISEASE (H): ICD-10-CM

## 2023-04-11 DIAGNOSIS — B07.0 PLANTAR WART: ICD-10-CM

## 2023-04-11 DIAGNOSIS — I12.9 HYPERTENSIVE KIDNEY DISEASE: ICD-10-CM

## 2023-04-11 PROBLEM — I48.92 ATRIAL FLUTTER, UNSPECIFIED TYPE (H): Status: RESOLVED | Noted: 2022-07-08 | Resolved: 2023-04-11

## 2023-04-11 PROBLEM — I21.4 NSTEMI (NON-ST ELEVATED MYOCARDIAL INFARCTION) (H): Status: ACTIVE | Noted: 2022-05-20

## 2023-04-11 PROBLEM — I25.5 ISCHEMIC CARDIOMYOPATHY: Status: RESOLVED | Noted: 2022-06-01 | Resolved: 2023-04-11

## 2023-04-11 PROBLEM — I48.91 ATRIAL FIBRILLATION (H): Status: RESOLVED | Noted: 2020-05-18 | Resolved: 2023-04-11

## 2023-04-11 PROCEDURE — 82044 UR ALBUMIN SEMIQUANTITATIVE: CPT | Performed by: FAMILY MEDICINE

## 2023-04-11 PROCEDURE — 99214 OFFICE O/P EST MOD 30 MIN: CPT | Mod: 25 | Performed by: FAMILY MEDICINE

## 2023-04-11 PROCEDURE — G0439 PPPS, SUBSEQ VISIT: HCPCS | Performed by: FAMILY MEDICINE

## 2023-04-12 LAB
A/C RATIO (RMG): <30
ALBUMIN- RMG: 80 (ref 0–10)
INTERPRETATION: NORMAL
URINE CREATININE - RMG: 200 (ref 0–300)

## 2023-04-21 ENCOUNTER — LAB (OUTPATIENT)
Dept: LAB | Facility: CLINIC | Age: 78
End: 2023-04-21
Payer: COMMERCIAL

## 2023-04-21 DIAGNOSIS — I10 PRIMARY HYPERTENSION: ICD-10-CM

## 2023-04-21 LAB
ANION GAP SERPL CALCULATED.3IONS-SCNC: 9 MMOL/L (ref 7–15)
BUN SERPL-MCNC: 19.9 MG/DL (ref 8–23)
CALCIUM SERPL-MCNC: 9.4 MG/DL (ref 8.8–10.2)
CHLORIDE SERPL-SCNC: 105 MMOL/L (ref 98–107)
CREAT SERPL-MCNC: 1.3 MG/DL (ref 0.67–1.17)
DEPRECATED HCO3 PLAS-SCNC: 28 MMOL/L (ref 22–29)
GFR SERPL CREATININE-BSD FRML MDRD: 57 ML/MIN/1.73M2
GLUCOSE SERPL-MCNC: 95 MG/DL (ref 70–99)
POTASSIUM SERPL-SCNC: 4 MMOL/L (ref 3.4–5.3)
SODIUM SERPL-SCNC: 142 MMOL/L (ref 136–145)

## 2023-04-21 PROCEDURE — 80048 BASIC METABOLIC PNL TOTAL CA: CPT | Performed by: NURSE PRACTITIONER

## 2023-04-21 PROCEDURE — 36415 COLL VENOUS BLD VENIPUNCTURE: CPT | Performed by: NURSE PRACTITIONER

## 2023-04-24 ENCOUNTER — OFFICE VISIT (OUTPATIENT)
Dept: CARDIOLOGY | Facility: CLINIC | Age: 78
End: 2023-04-24
Attending: NURSE PRACTITIONER
Payer: COMMERCIAL

## 2023-04-24 VITALS
BODY MASS INDEX: 24.81 KG/M2 | HEIGHT: 70 IN | DIASTOLIC BLOOD PRESSURE: 69 MMHG | SYSTOLIC BLOOD PRESSURE: 129 MMHG | HEART RATE: 54 BPM | WEIGHT: 173.3 LBS

## 2023-04-24 DIAGNOSIS — N18.31 STAGE 3A CHRONIC KIDNEY DISEASE (H): ICD-10-CM

## 2023-04-24 DIAGNOSIS — I10 PRIMARY HYPERTENSION: ICD-10-CM

## 2023-04-24 DIAGNOSIS — R91.8 PULMONARY NODULES: ICD-10-CM

## 2023-04-24 DIAGNOSIS — I25.5 ISCHEMIC CARDIOMYOPATHY: ICD-10-CM

## 2023-04-24 DIAGNOSIS — E78.00 HYPERCHOLESTEROLEMIA: ICD-10-CM

## 2023-04-24 DIAGNOSIS — I25.10 CORONARY ARTERY DISEASE INVOLVING NATIVE CORONARY ARTERY OF NATIVE HEART WITHOUT ANGINA PECTORIS: Primary | ICD-10-CM

## 2023-04-24 PROCEDURE — 99214 OFFICE O/P EST MOD 30 MIN: CPT | Performed by: INTERNAL MEDICINE

## 2023-04-24 RX ORDER — LOSARTAN POTASSIUM 25 MG/1
25 TABLET ORAL DAILY
Qty: 90 TABLET | Refills: 3 | Status: SHIPPED | OUTPATIENT
Start: 2023-04-24 | End: 2024-04-16

## 2023-04-24 NOTE — LETTER
4/24/2023    Sabas Staley MD  [address]  [facility] [address]    RE: Rey Brown       Dear Colleague,     I had the pleasure of seeing Rey Brown in the [facility].  HPI and Plan:   I had the pleasure of seeing Rey Dorantes in cardiology clinic in follow-up after his recent hospitalization for non-ST elevation myocardial infarction.  He has history of hypertension, paroxysmal atrial fibrillation as well as his atrial flutter and has had undergone several cardioversions last year as well as 3 ablations.  Initial ablation was April 2022 followed by another ablation in the summer and again in fall in November 2022 due to recurrences.  Since then he has remained in sinus rhythm and is on Eliquis for stroke prophylaxis.     His repeat echocardiogram in November revealed normal ejection fraction that has improved compared to previous echocardiogram when he had the non-ST elevation myocardial infarction.  At that time he had LAD stent.  The proximal LAD was 40% stenosis.  20% stenosis was in the circumflex.    He returns for a follow-up.  He is doing well.  He has had no chest pain or shortness of breath.  He remains active.  He denies any palpitations dizziness or syncope.  He is good to complete a year of Plavix therapy next month and then stop Plavix and start baby aspirin along with Eliquis.    He had a recent basic metabolic profile which revealed slight increase in creatinine 1.3.  His creatinine has fluctuated between normal range and 1.4 in the past.  He is on losartan 50 mg/day.  We talked about improving hydration and decreasing the losartan to 25 mg/day due to slight increase in creatinine.  His blood pressures are within normal limits.  I also asked him to follow with his primary care physician this summer for repeat BMP as well as lipid profile which has not been done since May of last year.  At that time, his LDL was 67.         Exam  See below      Impression     1.   Coronary artery disease with non-ST elevation myocardial infarction, s/p mid distal LAD stent on 23 May, 2022.   We will complete 1 year of Plavix therapy next month and then switch to baby aspirin.  He is also on Eliquis for atrial fibrillation and flutter.  He remains chest pain-free.     2.  Ischemic cardiomyopathy, EF 30 to 35% initially.    EF is now improved on echocardiogram in November of last year.     3.  Recurrent atrial arrhythmias, paroxysmal atrial fibrillation flutter with several cardioversions and ablations with the last one in November 2022.  Since then remains in sinus rhythm and on Eliquis for stroke prophylaxis    4.  Hypertension, well controlled on losartan and metoprolol XL.  Will reduce losartan to 25 mg/day due to chronic renal insufficiency.  Continue monitor blood pressure and BMP and lipid profile with his primary care physician this summer.  He voices understanding and will make the appointment     5.  Hyperlipidemia, on atorvastatin.  Last LDL 67 HDL 52 on 23 May, 2020.     6.  Chronic renal insufficiency, creatinine fluctuates between normal range to about 1.4, last one 1.3     Thank you for allowing us to participate in the care of this nice patient.      He will return to see my midlevel provider in a year and with me in 2 years.  In 2 years, recommend a follow-up echocardiogram to reassess his wall motion ejection fraction.       Sincerely,     Geraldo Pizano MD       Today's clinic visit entailed:    32 minutes spent by me on the date of the encounter doing chart review, review of test results, patient visit and documentation   Provider  Link to [facility] Help Grid     The level of medical decision making during this visit was of moderate complexity.      No orders of the defined types were placed in this encounter.      No orders of the defined types were placed in this encounter.      There are no discontinued medications.      Encounter Diagnoses   Name Primary?     Ischemic cardiomyopathy     Coronary artery disease involving native coronary artery of native heart without angina pectoris Yes       CURRENT MEDICATIONS:  Current Outpatient Medications   Medication Sig Dispense Refill    acetaminophen (TYLENOL) 325 MG tablet Take 325-650 mg by mouth every 6 hours as needed for mild pain      apixaban ANTICOAGULANT (ELIQUIS) 5 MG tablet Take 1 tablet (5 mg) by mouth 2 times daily 180 tablet 3    atorvastatin (LIPITOR) 80 MG tablet Take 1 tablet (80 mg) by mouth daily 90 tablet 4    clopidogrel (PLAVIX) 75 MG tablet Take 1 tablet (75 mg) by mouth daily 90 tablet 3    diltiazem ER COATED BEADS (CARDIZEM CD/CARTIA XT) 180 MG 24 hr capsule Take 1 capsule (180 mg) by mouth daily 90 capsule 3    losartan (COZAAR) 50 MG tablet Take 1 tablet (50 mg) by mouth daily 90 tablet 3       ALLERGIES     Allergies   Allergen Reactions    Ace Inhibitors Cough       PAST MEDICAL HISTORY:  Past Medical History:   Diagnosis Date    Hypercholesterolemia     Lumbar disc disease     Migraines     Paroxysmal A-fib (H)        PAST SURGICAL HISTORY:  Past Surgical History:   Procedure Laterality Date    ANESTHESIA CARDIOVERSION N/A 4/22/2022    Procedure: ANESTHESIA, FOR CARDIOVERSION FOLLOWING AGUS;  Surgeon: GENERIC ANESTHESIA PROVIDER;  Location:  OR    ANESTHESIA CARDIOVERSION N/A 5/12/2022    Procedure: ANESTHESIA, FOR CARDIOVERSION;  Surgeon: GENERIC ANESTHESIA PROVIDER;  Location:  OR    ANESTHESIA CARDIOVERSION N/A 8/11/2022    Procedure: CARDIOVERSION;  Surgeon: GENERIC ANESTHESIA PROVIDER;  Location:  OR    COLONOSCOPY      CV CORONARY ANGIOGRAM N/A 5/23/2022    Procedure: Coronary Angiogram;  Surgeon: Jacqueline Ramirez MD;  Location:  HEART CARDIAC CATH LAB    CV PCI N/A 5/23/2022    Procedure: Percutaneous Coronary Intervention;  Surgeon: Jacqueline Ramirez MD;  Location:  HEART CARDIAC CATH LAB    CYSTOSCOPY, RETROGRADES, COMBINED Left 5/13/2020    Procedure: LEFT  RETROGRADE URETERAL PYELOGRAM;  Surgeon: Denilson Angulo MD;  Location:  OR    CYSTOSCOPY, TRANSURETHRAL RESECTION (TUR) PROSTATE, COMBINED N/A 5/13/2020    Procedure: CYSTOSCOPY WITH TRANSURETHRAL RESECTION PROSTATE;  Surgeon: Denilson Angulo MD;  Location:  OR    EP ABLATION ATRIAL FLUTTER N/A 7/8/2022    Procedure: Ablation Atrial Flutter;  Surgeon: Sharmin Scott MD;  Location:  HEART CARDIAC CATH LAB    EP ABLATION FOCAL AFIB N/A 4/1/2022    Procedure: Ablation Focal Atrial Fibrillation [[id number]];  Surgeon: Sharmin Scott MD;  Location:  HEART CARDIAC CATH LAB    EP ABLATION FOCAL AFIB N/A 11/4/2022    Procedure: Ablation Atrial Fibrilation;  Surgeon: Sharmin Scott MD;  Location:  HEART CARDIAC CATH LAB    OPEN REDUCTION INTERNAL FIXATION HAND      RHINOPLASTY      TONSILLECTOMY      wisdom teeth         FAMILY HISTORY:  Family History   Problem Relation Age of Onset    Dementia Father     Osteoarthritis Father     Cerebrovascular Disease Father     Myocardial Infarction Mother     C.A.D. Mother     Coronary Artery Disease Mother        SOCIAL HISTORY:  Social History     Socioeconomic History    Marital status:      Spouse name: Erin    Number of children: None    Years of education: None    Highest education level: None   Tobacco Use    Smoking status: Never    Smokeless tobacco: Never   Substance and Sexual Activity    Alcohol use: Yes     Alcohol/week: 2.0 - 3.0 standard drinks of alcohol     Types: 2 - 3 Cans of beer per week     Comment: 3-4 beers/week    Drug use: No    Sexual activity: Yes     Partners: Female   Other Topics Concern    Special Diet No    Exercise Yes     Comment: 2-3x/week       Review of Systems:  Skin:          Eyes:         ENT:         Respiratory:  Positive for cough occ.   Cardiovascular:  Negative      Gastroenterology:        Genitourinary:         Musculoskeletal:         Neurologic:         Psychiatric:         Heme/Lymph/Imm:  Positive for allergies    Endocrine:   "Negative        Physical Exam:  Vitals: /69   Pulse 54   Ht 1.778 m (5' 10\")   Wt 78.6 kg (173 lb 4.8 oz)   BMI 24.87 kg/m      Regular rate and rhythm with 2 x 6 early and midsystolic apical murmur.  Chest was clear auscultation.  No carotid bruits.  No focal deficits.    CC  Irene Ceballos, NP  [address] [place] [id number]        Thank you for allowing me to participate in the care of your patient.      Sincerely,     Geraldo Pizano MD     [facility]  "

## 2023-04-24 NOTE — PROGRESS NOTES
HPI and Plan:   I had the pleasure of seeing Rey Dorantes in cardiology clinic in follow-up after his recent hospitalization for non-ST elevation myocardial infarction.  He has history of hypertension, paroxysmal atrial fibrillation as well as his atrial flutter and has had undergone several cardioversions last year as well as 3 ablations.  Initial ablation was April 2022 followed by another ablation in the summer and again in fall in November 2022 due to recurrences.  Since then he has remained in sinus rhythm and is on Eliquis for stroke prophylaxis.     His repeat echocardiogram in November revealed normal ejection fraction that has improved compared to previous echocardiogram when he had the non-ST elevation myocardial infarction.  At that time he had LAD stent.  The proximal LAD was 40% stenosis.  20% stenosis was in the circumflex.    He returns for a follow-up.  He is doing well.  He has had no chest pain or shortness of breath.  He remains active.  He denies any palpitations dizziness or syncope.  He is good to complete a year of Plavix therapy next month and then stop Plavix and start baby aspirin along with Eliquis.    He had a recent basic metabolic profile which revealed slight increase in creatinine 1.3.  His creatinine has fluctuated between normal range and 1.4 in the past.  He is on losartan 50 mg/day.  We talked about improving hydration and decreasing the losartan to 25 mg/day due to slight increase in creatinine.  His blood pressures are within normal limits.  I also asked him to follow with his primary care physician this summer for repeat BMP as well as lipid profile which has not been done since May of last year.  At that time, his LDL was 67.         Exam  See below     Impression     1.   Coronary artery disease with non-ST elevation myocardial infarction, s/p mid distal LAD stent on 23 May, 2022.   We will complete 1 year of Plavix therapy next month and then switch to baby aspirin.   He is also on Eliquis for atrial fibrillation and flutter.  He remains chest pain-free.     2.  Ischemic cardiomyopathy, EF 30 to 35% initially.    EF is now improved on echocardiogram in November of last year.     3.  Recurrent atrial arrhythmias, paroxysmal atrial fibrillation flutter with several cardioversions and ablations with the last one in November 2022.  Since then remains in sinus rhythm and on Eliquis for stroke prophylaxis    4.  Hypertension, well controlled on losartan and metoprolol XL.  Will reduce losartan to 25 mg/day due to chronic renal insufficiency.  Continue monitor blood pressure and BMP and lipid profile with his primary care physician this summer.  He voices understanding and will make the appointment     5.  Hyperlipidemia, on atorvastatin.  Last LDL 67 HDL 52 on 23 May, 2020.     6.  Chronic renal insufficiency, creatinine fluctuates between normal range to about 1.4, last one 1.3     Thank you for allowing us to participate in the care of this nice patient.      He will return to see my midlevel provider in a year and with me in 2 years.  In 2 years, recommend a follow-up echocardiogram to reassess his wall motion ejection fraction.       Sincerely,     Geraldo Pizano MD       Today's clinic visit entailed:    32 minutes spent by me on the date of the encounter doing chart review, review of test results, patient visit and documentation   Provider  Link to [facility] Help Grid     The level of medical decision making during this visit was of moderate complexity.      No orders of the defined types were placed in this encounter.      No orders of the defined types were placed in this encounter.      There are no discontinued medications.      Encounter Diagnoses   Name Primary?     Ischemic cardiomyopathy      Coronary artery disease involving native coronary artery of native heart without angina pectoris Yes       CURRENT MEDICATIONS:  Current Outpatient Medications   Medication Sig Dispense  Refill     acetaminophen (TYLENOL) 325 MG tablet Take 325-650 mg by mouth every 6 hours as needed for mild pain       apixaban ANTICOAGULANT (ELIQUIS) 5 MG tablet Take 1 tablet (5 mg) by mouth 2 times daily 180 tablet 3     atorvastatin (LIPITOR) 80 MG tablet Take 1 tablet (80 mg) by mouth daily 90 tablet 4     clopidogrel (PLAVIX) 75 MG tablet Take 1 tablet (75 mg) by mouth daily 90 tablet 3     diltiazem ER COATED BEADS (CARDIZEM CD/CARTIA XT) 180 MG 24 hr capsule Take 1 capsule (180 mg) by mouth daily 90 capsule 3     losartan (COZAAR) 50 MG tablet Take 1 tablet (50 mg) by mouth daily 90 tablet 3       ALLERGIES     Allergies   Allergen Reactions     Ace Inhibitors Cough       PAST MEDICAL HISTORY:  Past Medical History:   Diagnosis Date     Hypercholesterolemia      Lumbar disc disease      Migraines      Paroxysmal A-fib (H)        PAST SURGICAL HISTORY:  Past Surgical History:   Procedure Laterality Date     ANESTHESIA CARDIOVERSION N/A 4/22/2022    Procedure: ANESTHESIA, FOR CARDIOVERSION FOLLOWING AGUS;  Surgeon: GENERIC ANESTHESIA PROVIDER;  Location:  OR     ANESTHESIA CARDIOVERSION N/A 5/12/2022    Procedure: ANESTHESIA, FOR CARDIOVERSION;  Surgeon: GENERIC ANESTHESIA PROVIDER;  Location:  OR     ANESTHESIA CARDIOVERSION N/A 8/11/2022    Procedure: CARDIOVERSION;  Surgeon: GENERIC ANESTHESIA PROVIDER;  Location:  OR     COLONOSCOPY       CV CORONARY ANGIOGRAM N/A 5/23/2022    Procedure: Coronary Angiogram;  Surgeon: Jacqueline Ramirez MD;  Location:  HEART CARDIAC CATH LAB     CV PCI N/A 5/23/2022    Procedure: Percutaneous Coronary Intervention;  Surgeon: Jacqueline Ramirez MD;  Location:  HEART CARDIAC CATH LAB     CYSTOSCOPY, RETROGRADES, COMBINED Left 5/13/2020    Procedure: LEFT RETROGRADE URETERAL PYELOGRAM;  Surgeon: Denilson Angulo MD;  Location:  OR     CYSTOSCOPY, TRANSURETHRAL RESECTION (TUR) PROSTATE, COMBINED N/A 5/13/2020    Procedure: CYSTOSCOPY WITH TRANSURETHRAL  "RESECTION PROSTATE;  Surgeon: Denilson Angulo MD;  Location:  OR     EP ABLATION ATRIAL FLUTTER N/A 7/8/2022    Procedure: Ablation Atrial Flutter;  Surgeon: Sharmin Scott MD;  Location:  HEART CARDIAC CATH LAB     EP ABLATION FOCAL AFIB N/A 4/1/2022    Procedure: Ablation Focal Atrial Fibrillation [[id number]];  Surgeon: Sharmin Scott MD;  Location:  HEART CARDIAC CATH LAB     EP ABLATION FOCAL AFIB N/A 11/4/2022    Procedure: Ablation Atrial Fibrilation;  Surgeon: Sharmin Scott MD;  Location:  HEART CARDIAC CATH LAB     OPEN REDUCTION INTERNAL FIXATION HAND       RHINOPLASTY       TONSILLECTOMY       wisdom teeth         FAMILY HISTORY:  Family History   Problem Relation Age of Onset     Dementia Father      Osteoarthritis Father      Cerebrovascular Disease Father      Myocardial Infarction Mother      C.A.D. Mother      Coronary Artery Disease Mother        SOCIAL HISTORY:  Social History     Socioeconomic History     Marital status:      Spouse name: Erin     Number of children: None     Years of education: None     Highest education level: None   Tobacco Use     Smoking status: Never     Smokeless tobacco: Never   Substance and Sexual Activity     Alcohol use: Yes     Alcohol/week: 2.0 - 3.0 standard drinks of alcohol     Types: 2 - 3 Cans of beer per week     Comment: 3-4 beers/week     Drug use: No     Sexual activity: Yes     Partners: Female   Other Topics Concern     Special Diet No     Exercise Yes     Comment: 2-3x/week       Review of Systems:  Skin:          Eyes:         ENT:         Respiratory:  Positive for cough occ.   Cardiovascular:  Negative      Gastroenterology:        Genitourinary:         Musculoskeletal:         Neurologic:         Psychiatric:         Heme/Lymph/Imm:  Positive for allergies    Endocrine:  Negative        Physical Exam:  Vitals: /69   Pulse 54   Ht 1.778 m (5' 10\")   Wt 78.6 kg (173 lb 4.8 oz)   BMI 24.87 kg/m      Regular rate and rhythm with 2 x 6 " early and midsystolic apical murmur.  Chest was clear auscultation.  No carotid bruits.  No focal deficits.    CC  Irene Ceballos, NP  [address] [place] [id number]

## 2023-05-08 ENCOUNTER — OFFICE VISIT (OUTPATIENT)
Dept: CARDIOLOGY | Facility: CLINIC | Age: 78
End: 2023-05-08
Payer: COMMERCIAL

## 2023-05-08 VITALS
HEART RATE: 50 BPM | DIASTOLIC BLOOD PRESSURE: 76 MMHG | WEIGHT: 172 LBS | BODY MASS INDEX: 24.62 KG/M2 | SYSTOLIC BLOOD PRESSURE: 138 MMHG | HEIGHT: 70 IN

## 2023-05-08 DIAGNOSIS — E78.00 HYPERCHOLESTEROLEMIA: ICD-10-CM

## 2023-05-08 DIAGNOSIS — I25.10 CORONARY ARTERY DISEASE INVOLVING NATIVE CORONARY ARTERY OF NATIVE HEART WITHOUT ANGINA PECTORIS: Primary | ICD-10-CM

## 2023-05-08 DIAGNOSIS — Z00.6 EXAMINATION OF PARTICIPANT IN CLINICAL TRIAL: ICD-10-CM

## 2023-05-08 PROCEDURE — 99207 PR NO CHARGE-RESEARCH SERVICE: CPT

## 2023-05-08 RX ORDER — CLOPIDOGREL BISULFATE 75 MG/1
75 TABLET ORAL DAILY
COMMUNITY
End: 2023-09-11

## 2023-05-08 NOTE — PROGRESS NOTES
A Double-blind, Randomized, Placebo-controlled, Multicenter Study Assessing the impact of Olpasiran on Major Cardiovascular Events in Patients with Atherosclerotic cardiovascular Disease and Elevated Lipoprotein (a)    Rey Brown was seen in clinic today for the screening visit.    Research nurse met with subject to discuss consent and participation in the above noted study.    The study discussion included the following:     Study purpose    Qualifications for participation    Length of study participation    Study procedures    Risks and side effects    Benefits (if any)    Voluntary nature of participation    Alternatives to participation    Confidentiality of records    Financial considerations     Subject asked questions and agreed that he received answers that satisfied him.    Consent form [Version 12/08/2022 - [employer] version 01/18/2023] signed on 05/08/2023 . Patient verbalized understanding. A signed copy was given to the subject & forwarded to medical records. No study procedures were done prior to obtaining informed consent.        Did Subject meet all inclusion criteria? Pending central labs  Did Subject meet any Exclusion criteria? No    Labs obtained and sent to central lab per protocol.  Serum Pregnancy for WOCBP NA    Past Medical History:   Diagnosis Date     Hypercholesterolemia      Lumbar disc disease      Migraines      Paroxysmal A-fib (H)        Current Outpatient Medications:      acetaminophen (TYLENOL) 325 MG tablet, Take 325-650 mg by mouth every 6 hours as needed for mild pain, Disp: , Rfl:      apixaban ANTICOAGULANT (ELIQUIS) 5 MG tablet, Take 1 tablet (5 mg) by mouth 2 times daily, Disp: 180 tablet, Rfl: 3     atorvastatin (LIPITOR) 80 MG tablet, Take 1 tablet (80 mg) by mouth daily, Disp: 90 tablet, Rfl: 4     clopidogrel (PLAVIX) 75 MG tablet, Take 75 mg by mouth daily, Disp: , Rfl:      diltiazem ER COATED BEADS (CARDIZEM CD/CARTIA XT) 180 MG 24 hr capsule, Take 1  capsule (180 mg) by mouth daily, Disp: 90 capsule, Rfl: 3     losartan (COZAAR) 25 MG tablet, Take 1 tablet (25 mg) by mouth daily, Disp: 90 tablet, Rfl: 3     aspirin (ASA) 81 MG EC tablet, Take 1 tablet (81 mg) by mouth daily (Patient not taking: Reported on 5/8/2023), Disp: , Rfl:        Hips Circumference  102.5 cm  Waist Circumference  96 cm       Demographics     [x]Not  or   [] or    []Not Reported     [] Unknown         Race:   [] or    []   []Black or    [] or Other   [x]White   []Other, specify        Chelsea Aleman RN

## 2023-05-18 ENCOUNTER — DOCUMENTATION ONLY (OUTPATIENT)
Dept: CARDIOLOGY | Facility: CLINIC | Age: 78
End: 2023-05-18
Payer: COMMERCIAL

## 2023-05-18 DIAGNOSIS — I25.10 CORONARY ARTERY DISEASE INVOLVING NATIVE CORONARY ARTERY OF NATIVE HEART WITHOUT ANGINA PECTORIS: Primary | ICD-10-CM

## 2023-05-18 DIAGNOSIS — Z00.6 EXAMINATION OF PARTICIPANT IN CLINICAL TRIAL: ICD-10-CM

## 2023-05-18 DIAGNOSIS — E78.00 HYPERCHOLESTEROLEMIA: ICD-10-CM

## 2023-05-18 PROCEDURE — 99207 PR NO CHARGE-RESEARCH SERVICE: CPT | Performed by: INTERNAL MEDICINE

## 2023-05-18 NOTE — PROGRESS NOTES
A Double-blind, Randomized, Placebo-controlled, Multicenter Study Assessing the impact of Olpasiran on Major Cardiovascular Events in Patients with Atherosclerotic cardiovascular Disease and Elevated Lipoprotein (a)    Dr Vogel to review      Olpasiran-CVOT INCLUSION/EXCLUSION CRITERIA     Criteria #   Inclusion Criteria: All must be answered  yes  to be eligible for study   Yes/No   101   Subject has provided informed consent prior to initiation of any study specific activities/procedures    YES   102 Age 18 to 85 years (or locally applicable legal adult age, whichever is older) at signing of informed consent    YES   103 Lp (a) ? 200 nmol/L during screening by central laboratory       Lp (a) to be assessed after > 2 weeks of stable, optimized lipid-lowering therapy    NO   104 History of ASCVD as evidenced by history of either:    Myocardial infarction (MI) (presumed type 1 event due to plaque rupture)     and/or   Coronary revascularization by percutaneous coronary intervention (PCI) with stenting AND any of the following:    Age ? 65 years    Residual coronary artery stenosis >50% in any major vessel (including major branches)   Multivessel PCI (ie, ? 2 distinct vessels including branch arteries)   Symptomatic peripheral arterial disease with either (a) intermittent claudication with ankle-brachial index (GIDEON) < 0.85, or (b) peripheral arterial revascularization or amputation due to atherosclerotic disease   Ischemic stroke (presumed atherosclerotic in origin)   Diabetes mellitus (type 1 or type 2)      YES             EXCLUSION CRITERIA  Disease Related  201 Severe renal dysfunction, defined as an estimated glomerular filtration rate (eGFR) < 30 mL/min/1.73 m2 by central laboratory during screening    NO   202 Active liver disease, known hepatitis, or any hepatic dysfunction, defined as aspartate aminotransferase (AST) or alanine aminotransferase (ALT) > 3 x upper limit of normal (ULN), or total bilirubin  (TBL) > 2 x ULN during screening    NO   203 History of hemorrhagic stroke    NO   204 History of major bleeding disorder (for example: hemophilia, von Willebrand disease, coagulation disorders, blood clotting disorders, clotting factor deficiencies, etc.)    NO   205 Major cardiovascular event (eg, myocardial infarction, unstable angina, PCI, coronary artery bypass graft, or stroke) within 4 weeks prior to Lp(a) screening or during screening    NO   206  Planned cardiac surgery or arterial revascularization    NO     Other Medical Condition  207 Malignancy (except non-melanoma skin cancers, cervical in-situ carcinoma, breast ductal carcinoma in situ, or stage 1 prostate carcinoma) within the last 5 years prior to study day 1    NO   208 Severe heart failure (New York Heart Association Functional Classification IV) or last known left ventricular ejection fraction < 30%    NO   209 Uncontrolled or recurrent ventricular tachycardia in the past 3 months prior to study day 1    NO   210 Atrial fibrillation or flutter not on therapeutic anticoagulation or having had placement of left atrial appendage closure device    NO   211 Uncontrolled hypertension at screening, defined as systolic blood pressure of > 180 mmHg or diastolic blood pressure of >110 mmHg at rest despite antihypertensive therapy    NO   212 Fasting triglycerides > 400 mg/dL (4.5 mmol/L) during screening    NO   213 Poorly controlled diabetes mellitus (type 1 or type 2), defined as HbA1c > 10% by central laboratory at screening    NO   214 Known major active infection, or a chronic disease or infection (eg, human immunodeficiency virus) that is not currently stable and appropriately managed in the judgment of the investigator    NO     Prior/Concomitant Therapy  215 Current or planned lipoprotein apheresis or < 3 months since last apheresis treatment prior to study day 1    NO   216 Subject has taken lomitapide in the last 12 months prior to study day 1     NO   217 Changes in lipid-lowering therapy within 4 weeks prior to day 1 or between Lp(a) screening and day 1 (see Sections 6.1.6 and 6.7.2 for concomitant therapy requirements during study participation)    NO   218 Previously received olpasiran or pelacarsen    NO     Prior/Concurrent Clinical Study Experience  219 Currently receiving treatment in another investigational device or drug study, or less than 30 days since ending treatment on another investigational device or drug study(ies).  Other investigational procedures while participating in this study are excluded.   NO         Other Exclusions  220 Female subjects of childbearing potential unwilling to use protocol specified method of contraception see Appendix 5 (Section 11.5) during treatment and for an additional 90 days after the last dose of investigational product.    NA   221 Female subjects who are breastfeeding or who plan to breastfeed while on study through 90 days after the last dose of investigational product.    NA   222 Female subjects planning to become pregnant while on study through 90 days after the last dose of investigational product.    NA   223 Female subjects of childbearing potential with a positive pregnancy test assessed at day 1 by a highly sensitive urine or serum pregnancy test.    NA   224 Subject has known sensitivity to any of the products to be administered during dosing.    NO   225 Subject likely to not be available to complete all protocol-required study visits or procedures, and/or to comply with all required study procedures (eg, Clinical Outcome Assessments) to the best of the subject and investigator's knowledge.    NO   226 History or evidence of any other clinically significant disorder, condition or disease (with the exception of those outlined above) that, in the opinion of the investigator or [facility] physician, if consulted, would pose a risk to subject safety or interfere with the study evaluation, procedures, or  completion.      NO     Patient did not meet inclusion criteria for the study as LPa returned with a result <7.00 nmol/L.  Patient was informed and thanked for participating in the study.    I have reviewed Rey Brown's inclusions and exclusion criteria.   Tian Vogel MD

## 2023-05-22 ENCOUNTER — TRANSFERRED RECORDS (OUTPATIENT)
Dept: CARDIOLOGY | Facility: CLINIC | Age: 78
End: 2023-05-22
Payer: COMMERCIAL

## 2023-05-22 NOTE — PROGRESS NOTES
May 8 research blood work not clinically significant including low LP(a) and low total cholesterol.  LF

## 2023-05-30 ENCOUNTER — OFFICE VISIT (OUTPATIENT)
Dept: FAMILY MEDICINE | Facility: CLINIC | Age: 78
End: 2023-05-30

## 2023-05-30 VITALS
SYSTOLIC BLOOD PRESSURE: 128 MMHG | WEIGHT: 173 LBS | HEIGHT: 70 IN | HEART RATE: 59 BPM | DIASTOLIC BLOOD PRESSURE: 63 MMHG | OXYGEN SATURATION: 95 % | BODY MASS INDEX: 24.77 KG/M2

## 2023-05-30 DIAGNOSIS — B07.8 OTHER VIRAL WARTS: Primary | ICD-10-CM

## 2023-05-30 PROCEDURE — 99213 OFFICE O/P EST LOW 20 MIN: CPT | Performed by: FAMILY MEDICINE

## 2023-05-30 NOTE — PROGRESS NOTES
Answers for HPI/ROS submitted by the patient on 5/23/2023  What is the reason for your visit today? : Plantar warts  How many servings of fruits and vegetables do you eat daily?: 4 or more  On average, how many sweetened beverages do you drink each day (Examples: soda, juice, sweet tea, etc.  Do NOT count diet or artificially sweetened beverages)?: 2  How many minutes a day do you exercise enough to make your heart beat faster?: 30 to 60  How many days a week do you exercise enough to make your heart beat faster?: 6  How many days per week do you miss taking your medication?: 0    SUBJECTIVE: 78 year old male needs retreatment of wart(s).    OBJECTIVE: 6 wart(s) noted on the hands and feet. Size range is 0.4 mm.    ASSESSMENT: Warts     PLAN: Liquid nitrogen X 3 was applied to 5 wart(s);  the patient will   return at 2-4 week intervals for retreatments as needed.

## 2023-07-26 ENCOUNTER — TRANSFERRED RECORDS (OUTPATIENT)
Dept: FAMILY MEDICINE | Facility: CLINIC | Age: 78
End: 2023-07-26

## 2023-09-11 ENCOUNTER — OFFICE VISIT (OUTPATIENT)
Dept: FAMILY MEDICINE | Facility: CLINIC | Age: 78
End: 2023-09-11

## 2023-09-11 VITALS
DIASTOLIC BLOOD PRESSURE: 75 MMHG | BODY MASS INDEX: 24.54 KG/M2 | SYSTOLIC BLOOD PRESSURE: 153 MMHG | WEIGHT: 171 LBS | OXYGEN SATURATION: 100 % | HEART RATE: 58 BPM

## 2023-09-11 DIAGNOSIS — M54.41 ACUTE RIGHT-SIDED LOW BACK PAIN WITH RIGHT-SIDED SCIATICA: Primary | ICD-10-CM

## 2023-09-11 PROBLEM — R31.0 GROSS HEMATURIA: Status: RESOLVED | Noted: 2020-05-13 | Resolved: 2023-09-11

## 2023-09-11 PROBLEM — N20.0 KIDNEY STONE: Status: ACTIVE | Noted: 2023-03-27

## 2023-09-11 PROCEDURE — 99213 OFFICE O/P EST LOW 20 MIN: CPT

## 2023-09-11 RX ORDER — CYCLOBENZAPRINE HCL 5 MG
5 TABLET ORAL 3 TIMES DAILY PRN
Qty: 21 TABLET | Refills: 0 | Status: SHIPPED | OUTPATIENT
Start: 2023-09-11 | End: 2023-09-18

## 2023-09-11 NOTE — PROGRESS NOTES
Assessment & Plan     Acute right-sided low back pain with right-sided sciatica  -flexeril provided for muscle spasm relief, if not improving should consider medrol dose pack  -Red flags that warrant emergent evaluation discussed  -Patient verbalized understanding and is agreeable to the discussed plan of care.  -Education about adverse effects of prescription medication discussed - including to not operate heavy machinery and not to drink alcohol   - cyclobenzaprine (FLEXERIL) 5 MG tablet  Dispense: 21 tablet; Refill: 0      Ordering of each unique test  Prescription drug management         See Patient Instructions    Return in about 3 days (around 9/14/2023), or if symptoms worsen or fail to improve, for Follow up.    JOSE CRUZ Moore CNP  [facility]    Alexandra Cruz is a 78 year old, presenting for the following health issues:  Back Pain (Bent over to lift wheel [place] this morning - felt pain in right lower back, into right leg. /Took 2 extra strength Excedrin but didn't seem to help much - hard time finding comfortable position, very painful)    HPI     Concern - acute lower back pain  Onset: today  Description: sharp, radiating down right lateral leg and into right groin  Intensity: moderate, 8/10  Progression of Symptoms:  same  Accompanying Signs & Symptoms: decreased ROM, pain  Previous history of similar problem: has lumbar disc disease, bulging disc history at L4-L5  Precipitating factors:        Worsened by: movement, supine position  Alleviating factors:        Improved by: sitting still and not moving  Therapies tried and outcome: excedrin with mild improvement    No urinary or stool incontinence episodes. No saddle paresthesia.          Review of Systems   CONSTITUTIONAL: NEGATIVE for fever, chills, change in weight  ENT/MOUTH: NEGATIVE for ear, mouth and throat problems  RESP: NEGATIVE for significant cough or SOB  CV: NEGATIVE for chest pain, palpitations or peripheral  edema  MUSCULOSKELETAL: NEGATIVE for significant arthralgias or myalgia and POSITIVE for back pain      Objective    BP (!) 153/75   Pulse 58   Wt 77.6 kg (171 lb)   SpO2 100%   BMI 24.54 kg/m    Body mass index is 24.54 kg/m .  Physical Exam   GENERAL: healthy, alert and no distress  NECK: no adenopathy, no asymmetry, masses, or scars and thyroid normal to palpation  RESP: lungs clear to auscultation - no rales, rhonchi or wheezes  CV: regular rate and rhythm, normal S1 S2, no S3 or S4, no murmur, click or rub, no peripheral edema and peripheral pulses strong  ABDOMEN: no inguinal hernia, soft, nontender, no hepatosplenomegaly, no masses and bowel sounds normal  MS: spine exam shows no scoliosis and decreased flexion and extension of back, pain with lying supine, negative straight leg test, pain with palpation to right side of lumbar spine, muscles very tight bilateral lumbar spine  SKIN: no suspicious lesions or rashes  NEURO: Normal strength and tone, mentation intact and speech normal  BACK: no CVA tenderness, no paralumbar tenderness  LYMPH: no cervical, supraclavicular, axillary, or inguinal adenopathy

## 2023-09-15 ENCOUNTER — OFFICE VISIT (OUTPATIENT)
Dept: FAMILY MEDICINE | Facility: CLINIC | Age: 78
End: 2023-09-15

## 2023-09-15 VITALS
HEART RATE: 51 BPM | BODY MASS INDEX: 25.14 KG/M2 | SYSTOLIC BLOOD PRESSURE: 149 MMHG | OXYGEN SATURATION: 99 % | DIASTOLIC BLOOD PRESSURE: 78 MMHG | WEIGHT: 175.2 LBS

## 2023-09-15 DIAGNOSIS — M54.31 RIGHT SCIATIC NERVE PAIN: Primary | ICD-10-CM

## 2023-09-15 PROCEDURE — 99213 OFFICE O/P EST LOW 20 MIN: CPT

## 2023-09-15 RX ORDER — METHYLPREDNISOLONE 4 MG
TABLET, DOSE PACK ORAL
Qty: 21 TABLET | Refills: 0 | Status: SHIPPED | OUTPATIENT
Start: 2023-09-15 | End: 2023-10-06

## 2023-09-15 RX ORDER — NAPROXEN 500 MG/1
500 TABLET ORAL 2 TIMES DAILY WITH MEALS
Qty: 40 TABLET | Refills: 1 | Status: SHIPPED | OUTPATIENT
Start: 2023-09-15 | End: 2023-10-06

## 2023-09-15 NOTE — PRE-PROCEDURE
GENERAL PRE-PROCEDURE:   Procedure:  Electrocardioversion  Date/Time:  5/12/2022 10:47 AM    Verbal consent obtained?: Yes    Written consent obtained?: Yes    Risks and benefits: Risks, benefits and alternatives were discussed    Consent given by:  Patient  Patient states understanding of procedure being performed: Yes    Patient's understanding of procedure matches consent: Yes    Procedure consent matches procedure scheduled: Yes    Expected level of sedation:  Deep  Appropriately NPO:  Yes  ASA Class:  3  Mallampati  :  Grade 3- soft palate visible, posterior pharyngeal wall not visible  Lungs:  Lungs clear with good breath sounds bilaterally  Heart:  Normal heart sounds and rate  History & Physical reviewed:  History and physical reviewed and no updates needed  Statement of review:  I have reviewed the lab findings, diagnostic data, medications, and the plan for sedation     Yes

## 2023-09-15 NOTE — PROGRESS NOTES
Talked with patient who was expecting Naprosyn and a Medrol Dose Pack called into [facility] as if the clinic may have had some computer issues this afternoon,  I am talking with Anthony after hours on call but his story seems reasonable and I will call in as below.  Assessment/Plan:  Rey was seen today for recheck.    Diagnoses and all orders for this visit:    Right sciatic nerve pain  -     methylPREDNISolone (MEDROL DOSEPAK) 4 MG tablet therapy pack; Follow Package Directions  -     naproxen (NAPROSYN) 500 MG tablet; Take 1 tablet (500 mg) by mouth 2 times daily (with meals)      Sabas Staley MD   [facility]  [phone]          Answers submitted by the patient for this visit:  General Questionnaire (Submitted on 9/15/2023)  Chief Complaint: Chronic problems general questions HPI Form  What is the reason for your visit today? : Pain on inside of rt. leg, thigh, knee and calf. Comes and goes.  How many servings of fruits and vegetables do you eat daily?: 4 or more  On average, how many sweetened beverages do you drink each day (Examples: soda, juice, sweet tea, etc.  Do NOT count diet or artificially sweetened beverages)?: 1  How many minutes a day do you exercise enough to make your heart beat faster?: 30 to 60  How many days a week do you exercise enough to make your heart beat faster?: 7  How many days per week do you miss taking your medication?: 0

## 2023-09-16 NOTE — PROGRESS NOTES
Assessment & Plan     Right sciatic nerve pain  -discussed most likely r/t inflammation and will do steroid and naproxen for relief, if not improving we may need imaging and possible spine referral   -Red flags that warrant emergent evaluation discussed  -Patient verbalized understanding and is agreeable to the discussed plan of care.  -Education about adverse effects of prescription medication discussed  - methylPREDNISolone (MEDROL DOSEPAK) 4 MG tablet therapy pack  Dispense: 21 tablet; Refill: 0  - naproxen (NAPROSYN) 500 MG tablet  Dispense: 40 tablet; Refill: 1               See Patient Instructions    No follow-ups on file.    JOSE CRUZ Moore CNP  [facility]    Subjective   Anthony is a 78 year old, presenting for the following health issues:  RECHECK (Back pain , LOV 9/11/23 was given exercises and Cyclobenzaprine , back pain is now radiating down right leg )    History of Present Illness       Reason for visit:  Pain on inside of rt. leg, thigh, knee and calf. Comes and goes.    He eats 4 or more servings of fruits and vegetables daily.He consumes 1 sweetened beverage(s) daily.He exercises with enough effort to increase his heart rate 30 to 60 minutes per day.  He exercises with enough effort to increase his heart rate 7 days per week.   He is taking medications regularly.    I am re-evaluating Anthony today for right leg pain after a back injury he sustained this past weekend. I saw patient on Monday and prescribed flexeril which patient reports does not help much, he has been taking ibuprofen and tapering off of this but still having intermittent pain down medial aspect of leg. Denies saddle paresthesia and urinary or stool incontinence. He is urinating and have regular bowel movements. Patient does have some tingling/numb sensations more towards foot and lower leg, pain can come when stationanary or when active, seems to be random. No weakness/numbness/tingling any where else.               Review of Systems   CONSTITUTIONAL: NEGATIVE for fever, chills, change in weight  ENT/MOUTH: NEGATIVE for ear, mouth and throat problems  RESP: NEGATIVE for significant cough or SOB  CV: NEGATIVE for chest pain, palpitations or peripheral edema      Objective    BP (!) 149/78   Pulse 51   Wt 79.5 kg (175 lb 3.2 oz)   SpO2 99%   BMI 25.14 kg/m    Body mass index is 25.14 kg/m .  Physical Exam   GENERAL: healthy, alert and no distress  NECK: no adenopathy, no asymmetry, masses, or scars and thyroid normal to palpation  MS: spine exam shows no scoliosis, ROM is normal, and when patient bends forward he has pain to right lower extremity, tight muscles to lumbar spine but no spinal tenderness - ROM has improved since Monday  SKIN: no suspicious lesions or rashes  NEURO: Normal strength and tone, mentation intact and speech normal

## 2023-10-05 DIAGNOSIS — I48.0 PAROXYSMAL ATRIAL FIBRILLATION (H): ICD-10-CM

## 2023-10-06 ENCOUNTER — OFFICE VISIT (OUTPATIENT)
Dept: FAMILY MEDICINE | Facility: CLINIC | Age: 78
End: 2023-10-06

## 2023-10-06 VITALS
WEIGHT: 170.2 LBS | DIASTOLIC BLOOD PRESSURE: 84 MMHG | OXYGEN SATURATION: 97 % | SYSTOLIC BLOOD PRESSURE: 108 MMHG | HEART RATE: 59 BPM | BODY MASS INDEX: 24.42 KG/M2

## 2023-10-06 DIAGNOSIS — Z13.29 SCREENING FOR ENDOCRINE, METABOLIC AND IMMUNITY DISORDER: ICD-10-CM

## 2023-10-06 DIAGNOSIS — E78.00 HYPERCHOLESTEROLEMIA: ICD-10-CM

## 2023-10-06 DIAGNOSIS — M54.41 ACUTE RIGHT-SIDED LOW BACK PAIN WITH RIGHT-SIDED SCIATICA: ICD-10-CM

## 2023-10-06 DIAGNOSIS — I10 PRIMARY HYPERTENSION: ICD-10-CM

## 2023-10-06 DIAGNOSIS — Z13.0 SCREENING FOR ENDOCRINE, METABOLIC AND IMMUNITY DISORDER: ICD-10-CM

## 2023-10-06 DIAGNOSIS — R29.898 RIGHT LEG WEAKNESS: Primary | ICD-10-CM

## 2023-10-06 DIAGNOSIS — Z13.228 SCREENING FOR ENDOCRINE, METABOLIC AND IMMUNITY DISORDER: ICD-10-CM

## 2023-10-06 LAB
% GRANULOCYTES: 76.8 % (ref 42.2–75.2)
ALBUMIN SERPL BCG-MCNC: 3.9 G/DL (ref 3.5–5.2)
ALP SERPL-CCNC: 149 U/L (ref 40–129)
ALT SERPL W P-5'-P-CCNC: 19 U/L (ref 0–70)
ANION GAP SERPL CALCULATED.3IONS-SCNC: 10 MMOL/L (ref 7–15)
AST SERPL W P-5'-P-CCNC: 35 U/L (ref 0–45)
BILIRUB SERPL-MCNC: 0.6 MG/DL
BUN SERPL-MCNC: 21.3 MG/DL (ref 8–23)
CALCIUM SERPL-MCNC: 9.2 MG/DL (ref 8.8–10.2)
CHLORIDE SERPL-SCNC: 105 MMOL/L (ref 98–107)
CHOLEST SERPL-MCNC: 152 MG/DL
CREAT SERPL-MCNC: 1.14 MG/DL (ref 0.67–1.17)
DEPRECATED HCO3 PLAS-SCNC: 26 MMOL/L (ref 22–29)
EGFRCR SERPLBLD CKD-EPI 2021: 67 ML/MIN/1.73M2
GLUCOSE SERPL-MCNC: 76 MG/DL (ref 70–99)
HCT VFR BLD AUTO: 43.1 % (ref 39–51)
HDLC SERPL-MCNC: 45 MG/DL
HEMOGLOBIN: 14.6 G/DL (ref 13.4–17.5)
LDLC SERPL CALC-MCNC: 97 MG/DL
LYMPHOCYTES NFR BLD AUTO: 15.7 % (ref 20.5–51.1)
MCH RBC QN AUTO: 30.5 PG (ref 27–31)
MCHC RBC AUTO-ENTMCNC: 34 G/DL (ref 33–37)
MCV RBC AUTO: 89.8 FL (ref 80–100)
MONOCYTES NFR BLD AUTO: 7.5 % (ref 1.7–9.3)
NONHDLC SERPL-MCNC: 107 MG/DL
PLATELET # BLD AUTO: 279 K/UL (ref 140–450)
POTASSIUM SERPL-SCNC: 4.8 MMOL/L (ref 3.4–5.3)
PROT SERPL-MCNC: 6.6 G/DL (ref 6.4–8.3)
RBC # BLD AUTO: 4.8 X10/CMM (ref 4.2–5.9)
SODIUM SERPL-SCNC: 141 MMOL/L (ref 135–145)
TRIGL SERPL-MCNC: 50 MG/DL
WBC # BLD AUTO: 7.6 X10/CMM (ref 3.8–11)

## 2023-10-06 PROCEDURE — 80053 COMPREHEN METABOLIC PANEL: CPT | Mod: ORL

## 2023-10-06 PROCEDURE — 85025 COMPLETE CBC W/AUTO DIFF WBC: CPT

## 2023-10-06 PROCEDURE — 36415 COLL VENOUS BLD VENIPUNCTURE: CPT

## 2023-10-06 PROCEDURE — 99214 OFFICE O/P EST MOD 30 MIN: CPT

## 2023-10-06 PROCEDURE — 80061 LIPID PANEL: CPT | Mod: ORL

## 2023-10-06 NOTE — PROGRESS NOTES
Answers submitted by the patient for this visit:  General Questionnaire (Submitted on 10/5/2023)  Chief Complaint: Chronic problems general questions HPI Form  What is the reason for your visit today? : Right leg weakness  How many servings of fruits and vegetables do you eat daily?: 4 or more  On average, how many sweetened beverages do you drink each day (Examples: soda, juice, sweet tea, etc.  Do NOT count diet or artificially sweetened beverages)?: 2  How many minutes a day do you exercise enough to make your heart beat faster?: 30 to 60  How many days a week do you exercise enough to make your heart beat faster?: 7  How many days per week do you miss taking your medication?: 0  Answers submitted by the patient for this visit:  General Questionnaire (Submitted on 10/5/2023)  Chief Complaint: Chronic problems general questions HPI Form  What is the reason for your visit today? : Right leg weakness  How many servings of fruits and vegetables do you eat daily?: 4 or more  On average, how many sweetened beverages do you drink each day (Examples: soda, juice, sweet tea, etc.  Do NOT count diet or artificially sweetened beverages)?: 2  How many minutes a day do you exercise enough to make your heart beat faster?: 30 to 60  How many days a week do you exercise enough to make your heart beat faster?: 7  How many days per week do you miss taking your medication?: 0

## 2023-10-06 NOTE — PROGRESS NOTES
Assessment & Plan     Right leg weakness  -will get imaging and move forward with plan of care based on results      ADDENDUM:   Benign appearing MRI, will refer to physical therapy, patient is having small improvements with leg weakness  - MR Lumbar Spine w/o Contrast    Acute right-sided low back pain with right-sided sciatica    - MR Lumbar Spine w/o Contrast    Hypercholesterolemia  -stable on atorvastatin   - VENOUS COLLECTION  - Lipid Profile    Primary hypertension  -stable on losartan  - VENOUS COLLECTION  - Comprehensive metabolic panel  - Comprehensive metabolic panel    Screening for endocrine, metabolic and immunity disorder    - CBC with Diff/Plt ([facility])      Ordering of each unique test  Prescription drug management         See Patient Instructions    No follow-ups on file.    JOSE CRUZ Moore CNP  [facility]    Alexandra Cruz is a 78 year old, presenting for the following health issues:  RECHECK (Right leg weakness , finished steroid on 9/25/23, may need some imaging done per kamron )    History of Present Illness       Reason for visit:  Right leg weakness    He eats 4 or more servings of fruits and vegetables daily.He consumes 2 sweetened beverage(s) daily.He exercises with enough effort to increase his heart rate 30 to 60 minutes per day.  He exercises with enough effort to increase his heart rate 7 days per week.   He is taking medications regularly.       Patient presents with follow up no longer having radicular pain, but right leg is weaker than left leg - he is able to walk further but has issues with running and ability to lift his right leg, also reports sometimes having pain to right anterior hip/grown with flexion of hip          Review of Systems   Constitutional, HEENT, cardiovascular, pulmonary, gi and gu systems are negative, except as otherwise noted.      Objective    /84   Pulse 59   Wt 77.2 kg (170 lb 3.2 oz)   SpO2 97%   BMI 24.42 kg/m    Body mass  index is 24.42 kg/m .  Physical Exam   GENERAL: healthy, alert and no distress  NECK: no adenopathy, no asymmetry, masses, or scars and thyroid normal to palpation  RESP: lungs clear to auscultation - no rales, rhonchi or wheezes  CV: regular rate and rhythm, normal S1 S2, no S3 or S4, no murmur, click or rub, no peripheral edema and peripheral pulses strong  MS: no gross musculoskeletal defects noted, no edema  NEURO: cranial nerves intact Normal strength and tone, sensory exam grossly normal, mentation intact, DTR's abnormal: +1 to RLE, and +2 to LLE, 4/5 strength to RLE with 5/5 strength to LLE    No results found for this visit on 10/06/23.

## 2023-10-15 ENCOUNTER — HOSPITAL ENCOUNTER (OUTPATIENT)
Dept: MRI IMAGING | Facility: CLINIC | Age: 78
Discharge: HOME | End: 2023-10-15
Payer: COMMERCIAL

## 2023-10-15 DIAGNOSIS — M54.41 ACUTE RIGHT-SIDED LOW BACK PAIN WITH RIGHT-SIDED SCIATICA: ICD-10-CM

## 2023-10-15 DIAGNOSIS — R29.898 RIGHT LEG WEAKNESS: ICD-10-CM

## 2023-10-15 PROCEDURE — 72148 MRI LUMBAR SPINE W/O DYE: CPT

## 2023-10-23 ENCOUNTER — TRANSFERRED RECORDS (OUTPATIENT)
Dept: FAMILY MEDICINE | Facility: CLINIC | Age: 78
End: 2023-10-23

## 2024-01-04 DIAGNOSIS — I48.4 ATYPICAL ATRIAL FLUTTER (H): ICD-10-CM

## 2024-01-04 RX ORDER — DILTIAZEM HYDROCHLORIDE 180 MG/1
180 CAPSULE, COATED, EXTENDED RELEASE ORAL DAILY
Qty: 90 CAPSULE | Refills: 0 | Status: SHIPPED | OUTPATIENT
Start: 2024-01-04 | End: 2024-01-22

## 2024-01-11 ENCOUNTER — MYC REFILL (OUTPATIENT)
Dept: CARDIOLOGY | Facility: CLINIC | Age: 79
End: 2024-01-11
Payer: COMMERCIAL

## 2024-01-11 DIAGNOSIS — I48.0 PAROXYSMAL ATRIAL FIBRILLATION (H): ICD-10-CM

## 2024-01-22 ENCOUNTER — OFFICE VISIT (OUTPATIENT)
Dept: FAMILY MEDICINE | Facility: CLINIC | Age: 79
End: 2024-01-22

## 2024-01-22 VITALS
SYSTOLIC BLOOD PRESSURE: 136 MMHG | WEIGHT: 177.8 LBS | OXYGEN SATURATION: 98 % | HEART RATE: 56 BPM | BODY MASS INDEX: 25.51 KG/M2 | DIASTOLIC BLOOD PRESSURE: 67 MMHG

## 2024-01-22 DIAGNOSIS — D68.69 SECONDARY HYPERCOAGULABLE STATE (H): Primary | ICD-10-CM

## 2024-01-22 DIAGNOSIS — H93.13 TINNITUS, BILATERAL: ICD-10-CM

## 2024-01-22 DIAGNOSIS — I48.0 PAROXYSMAL ATRIAL FIBRILLATION (H): ICD-10-CM

## 2024-01-22 DIAGNOSIS — N18.31 STAGE 3A CHRONIC KIDNEY DISEASE (H): ICD-10-CM

## 2024-01-22 DIAGNOSIS — I48.3 TYPICAL ATRIAL FLUTTER (H): ICD-10-CM

## 2024-01-22 DIAGNOSIS — I48.4 ATYPICAL ATRIAL FLUTTER (H): ICD-10-CM

## 2024-01-22 DIAGNOSIS — H90.6 MIXED CONDUCTIVE AND SENSORINEURAL HEARING LOSS OF BOTH EARS: ICD-10-CM

## 2024-01-22 PROCEDURE — 99214 OFFICE O/P EST MOD 30 MIN: CPT | Performed by: FAMILY MEDICINE

## 2024-01-22 RX ORDER — DILTIAZEM HYDROCHLORIDE 180 MG/1
180 CAPSULE, COATED, EXTENDED RELEASE ORAL DAILY
Qty: 90 CAPSULE | Refills: 3 | Status: SHIPPED | OUTPATIENT
Start: 2024-01-22 | End: 2024-04-16

## 2024-01-22 NOTE — PROGRESS NOTES
Answers submitted by the patient for this visit:  General Questionnaire (Submitted on 1/19/2024)  Chief Complaint: Chronic problems general questions HPI Form  How many servings of fruits and vegetables do you eat daily?: 2-3  On average, how many sweetened beverages do you drink each day (Examples: soda, juice, sweet tea, etc.  Do NOT count diet or artificially sweetened beverages)?: 2  How many minutes a day do you exercise enough to make your heart beat faster?: 20 to 29  How many days a week do you exercise enough to make your heart beat faster?: 3 or less  How many days per week do you miss taking your medication?: 0  General Concern (Submitted on 1/19/2024)  Chief Complaint: Chronic problems general questions HPI Form  What is the reason for your visit today?: Increased ringing in ears  When did your symptoms begin?: More than a month  What are your symptoms?: Constant ringingin ears  How would you describe these symptoms?: Mild  Are your symptoms:: Worsening  Have you had these symptoms before?: Yes  Have you tried or received treatment for these symptoms before?: No  Is there anything that makes you feel worse?: No  Is there anything that makes you feel better?: No  HEARING FREQUENCY:   Right Ear:     500 Hz : fail   1000 Hz:  fail   2000 Hz:  fail   4000 Hz:  fail  Left Ear:     500 Hz :  pass   1000 Hz:  pass   2000 Hz:  fail   4000 Hz   fail  Sinai Schaffer CMA. 1/22/2024     Problem(s) Oriented visit      ROS:  General and Resp. completed and negative except as noted above     HISTORY:   reports current alcohol use of about 2.0 - 3.0 standard drinks of alcohol per week.   reports that he has never smoked. He has never used smokeless tobacco.    See chart for additional current history and problem list    EXAM:  BP: 136/67   Pulse: 56    Temp: Data Unavailable    Wt Readings from Last 2 Encounters:   01/22/24 80.6 kg (177 lb 12.8 oz)   10/06/23 77.2 kg (170 lb 3.2 oz)       BMI= Body mass index is  "25.51 kg/m .    EXAM:  APPEARANCE: = Relaxed and in no distress  Conj/Eyelids = noninjected and lids and lashes are without inflammation  PERRLA/Irises = Pupils Round Reactive to Light and Irisis without inflammation  Ears/Nose = External structures and Nares have usual shape and form  Ear canals and TM's = Canals are not inflammed and have none or little wax and the drums are not injected and have a light reflex   Breath Sounds = Good air movement with no rales or rhonchi in any lung fields  Heart Rate, Rythym, & sounds (no Murm)  = irregularly irregular rhythm      Assessment/Plan:  Anthony was seen today for ear problem.    Diagnoses and all orders for this visit:    Secondary hypercoagulable state (H24)  [id number] Score: 4 points, which represents a 4.8% annual risk of major embolic event, without anti-coagulation or an LAAO device.   Due to Typical atrial flutter (H), feeling well currently    Stage 3a chronic kidney disease (H)  Chronic kidney disease due to HTN and DM.  Check urine for protein.   Patient is on ACE/ARB.  Patient is on a statin.  Told the patient to avoid NSAIDs if at all possible.   Will monitor closely and send to Nephrologist if renal function continues to decline.      Tinnitus, bilateral. Will get audiology as has definite   Mixed conductive and sensorineural hearing loss of both ears  -     Adult Audiology  Referral; Future        COUNSELING:   reports that he has never smoked. He has never used smokeless tobacco.    Estimated body mass index is 25.51 kg/m  as calculated from the following:    Height as of 5/30/23: 1.778 m (5' 10\").    Weight as of this encounter: 80.6 kg (177 lb 12.8 oz).       Appropriate preventive services were discussed with this patient, including applicable screening as appropriate for cardiovascular disease, diabetes, osteopenia/osteoporosis, and glaucoma.  As appropriate for age/gender, discussed screening for colorectal cancer, prostate cancer, breast " cancer, and cervical cancer. Checklist reviewing preventive services available has been given to the patient.    Reviewed patients plan of care and provided an AVS. The Basic Care Plan (routine screening as documented in Health Maintenance) for Rey meets the Care Plan requirement. This Care Plan has been established and reviewed with the  Patient.      The following health maintenance items are reviewed in Epic and correct as of today:  Health Maintenance   Topic Date Due    HF ACTION PLAN  Never done    BMP  04/06/2024    MEDICARE ANNUAL WELLNESS VISIT  04/11/2024    MICROALBUMIN  04/11/2024    FALL RISK ASSESSMENT  04/11/2024    ALT  10/06/2024    LIPID  10/06/2024    CBC  10/06/2024    HEMOGLOBIN  10/06/2024    ADVANCE CARE PLANNING  04/11/2028    DTAP/TDAP/TD IMMUNIZATION (3 - Td or Tdap) 04/19/2033    TSH W/FREE T4 REFLEX  Completed    HEPATITIS C SCREENING  Completed    PHQ-2 (once per calendar year)  Completed    INFLUENZA VACCINE  Completed    Pneumococcal Vaccine: 65+ Years  Completed    URINALYSIS  Completed    ZOSTER IMMUNIZATION  Completed    RSV VACCINE (Pregnancy & 60+)  Completed    COVID-19 Vaccine  Completed    IPV IMMUNIZATION  Aged Out    HPV IMMUNIZATION  Aged Out    MENINGITIS IMMUNIZATION  Aged Out    RSV MONOCLONAL ANTIBODY  Aged Out    COLORECTAL CANCER SCREENING  Discontinued       Sabas Staley  [facility]  For any issues my office # is [phone]

## 2024-04-08 ENCOUNTER — HOSPITAL ENCOUNTER (OUTPATIENT)
Dept: CARDIOLOGY | Facility: CLINIC | Age: 79
Discharge: HOME | End: 2024-04-08
Attending: INTERNAL MEDICINE | Admitting: INTERNAL MEDICINE
Payer: COMMERCIAL

## 2024-04-08 VITALS — DIASTOLIC BLOOD PRESSURE: 66 MMHG | HEART RATE: 47 BPM | SYSTOLIC BLOOD PRESSURE: 141 MMHG

## 2024-04-08 DIAGNOSIS — R91.8 PULMONARY NODULES: ICD-10-CM

## 2024-04-08 PROCEDURE — 71250 CT THORAX DX C-: CPT

## 2024-04-08 RX ORDER — DIPHENHYDRAMINE HCL 25 MG
25 CAPSULE ORAL
Status: DISCONTINUED | OUTPATIENT
Start: 2024-04-08 | End: 2024-04-09 | Stop reason: HOSPADM

## 2024-04-08 RX ORDER — DIPHENHYDRAMINE HYDROCHLORIDE 50 MG/ML
25-50 INJECTION INTRAMUSCULAR; INTRAVENOUS
Status: DISCONTINUED | OUTPATIENT
Start: 2024-04-08 | End: 2024-04-09 | Stop reason: HOSPADM

## 2024-04-08 RX ORDER — ONDANSETRON 2 MG/ML
4 INJECTION INTRAMUSCULAR; INTRAVENOUS
Status: DISCONTINUED | OUTPATIENT
Start: 2024-04-08 | End: 2024-04-09 | Stop reason: HOSPADM

## 2024-04-08 RX ORDER — ACYCLOVIR 200 MG/1
0-1 CAPSULE ORAL
Status: DISCONTINUED | OUTPATIENT
Start: 2024-04-08 | End: 2024-04-09 | Stop reason: HOSPADM

## 2024-04-08 RX ORDER — METHYLPREDNISOLONE SODIUM SUCCINATE 125 MG/2ML
125 INJECTION, POWDER, LYOPHILIZED, FOR SOLUTION INTRAMUSCULAR; INTRAVENOUS
Status: DISCONTINUED | OUTPATIENT
Start: 2024-04-08 | End: 2024-04-09 | Stop reason: HOSPADM

## 2024-04-09 SDOH — HEALTH STABILITY: PHYSICAL HEALTH: ON AVERAGE, HOW MANY DAYS PER WEEK DO YOU ENGAGE IN MODERATE TO STRENUOUS EXERCISE (LIKE A BRISK WALK)?: 5 DAYS

## 2024-04-09 SDOH — HEALTH STABILITY: PHYSICAL HEALTH: ON AVERAGE, HOW MANY MINUTES DO YOU ENGAGE IN EXERCISE AT THIS LEVEL?: 20 MIN

## 2024-04-09 ASSESSMENT — SOCIAL DETERMINANTS OF HEALTH (SDOH): HOW OFTEN DO YOU GET TOGETHER WITH FRIENDS OR RELATIVES?: ONCE A WEEK

## 2024-04-12 NOTE — PATIENT INSTRUCTIONS
Preventive Care Advice   This is general advice given by our system to help you stay healthy. However, your care team may have specific advice just for you. Please talk to your care team about your preventive care needs.  Nutrition  Eat 5 or more servings of fruits and vegetables each day.  Try wheat bread, brown rice and whole grain pasta (instead of white bread, rice, and pasta).  Get enough calcium and vitamin D. Check the label on foods and aim for 100% of the RDA (recommended daily allowance).  Lifestyle  Exercise at least 150 minutes each week   (30 minutes a day, 5 days a week).  Do muscle strengthening activities 2 days a week. These help control your weight and prevent disease.  No smoking.  Wear sunscreen to prevent skin cancer.  Have a dental exam and cleaning every 6 months.  Yearly exams  See your health care team every year to talk about:  Any changes in your health.  Any medicines your care team has prescribed.  Preventive care, family planning, and ways to prevent chronic diseases.  Shots (vaccines)   HPV shots (up to age 26), if you've never had them before.  Hepatitis B shots (up to age 59), if you've never had them before.  COVID-19 shot: Get this shot when it's due.  Flu shot: Get a flu shot every year.  Tetanus shot: Get a tetanus shot every 10 years.  Pneumococcal, hepatitis A, and RSV shots: Ask your care team if you need these based on your risk.  Shingles shot (for age 50 and up).  General health tests  Diabetes screening:  Starting at age 35, Get screened for diabetes at least every 3 years.  If you are younger than age 35, ask your care team if you should be screened for diabetes.  Cholesterol test: At age 39, start having a cholesterol test every 5 years, or more often if advised.  Bone density scan (DEXA): At age 50, ask your care team if you should have this scan for osteoporosis (brittle bones).  Hepatitis C: Get tested at least once in your life.  STIs (sexually transmitted  infections)  Before age 24: Ask your care team if you should be screened for STIs.  After age 24: Get screened for STIs if you're at risk. You are at risk for STIs (including HIV) if:  You are sexually active with more than one person.  You don't use condoms every time.  You or a partner was diagnosed with a sexually transmitted infection.  If you are at risk for HIV, ask about PrEP medicine to prevent HIV.  Get tested for HIV at least once in your life, whether you are at risk for HIV or not.  Cancer screening tests  Cervical cancer screening: If you have a cervix, begin getting regular cervical cancer screening tests at age 21. Most people who have regular screenings with normal results can stop after age 65. Talk about this with your provider.  Breast cancer scan (mammogram): If you've ever had breasts, begin having regular mammograms starting at age 40. This is a scan to check for breast cancer.  Colon cancer screening: It is important to start screening for colon cancer at age 45.  Have a colonoscopy test every 10 years (or more often if you're at risk) Or, ask your provider about stool tests like a FIT test every year or Cologuard test every 3 years.  To learn more about your testing options, visit: https://[email or web address]/149003.pdf.  For help making a decision, visit: https://bit.ly/fq40676.  Prostate cancer screening test: If you have a prostate and are age 55 to 69, ask your provider if you would benefit from a yearly prostate cancer screening test.  Lung cancer screening: If you are a current or former smoker age 50 to 80, ask your care team if ongoing lung cancer screenings are right for you.  For informational purposes only. Not to replace the advice of your health care provider. Copyright   2023 [employer]. All rights reserved. Clinically reviewed by the [facility]. [employer] 989961 - REV 01/24.    Learning About Activities of Daily Living  What are activities  of daily living?     Activities of daily living (ADLs) are the basic self-care tasks you do every day. These include eating, bathing, dressing, and moving around.  As you age, and if you have health problems, you may find that it's harder to do some of these tasks. If so, your doctor can suggest ideas that may help.  To measure what kind of help you may need, your doctor will ask how well you are able to do ADLs. Let your doctor know if there are any tasks that you are having trouble doing. This is an important first step to getting help. And when you have the help you need, you can stay as independent as possible.  How will a doctor assess your ADLs?  Asking about ADLs is part of a routine health checkup your doctor will likely do as you age. Your health check might be done in a doctor's office, in your home, or at a hospital. The goal is to find out if you are having any problems that could make it hard to care for yourself or that make it unsafe for you to be on your own.  To measure your ADLs, your doctor will ask how hard it is for you to do routine tasks. Your doctor may also want to know if you have changed the way you do a task because of a health problem. Your doctor may watch how you:  Walk back and forth.  Keep your balance while you stand or walk.  Move from sitting to standing or from a bed to a chair.  Button or unbutton a shirt or sweater.  Remove and put on your shoes.  It's common to feel a little worried or anxious if you find you can't do all the things you used to be able to do. Talking with your doctor about ADLs is a way to make sure you're as safe as possible and able to care for yourself as well as you can. You may want to bring a caregiver, friend, or family member to your checkup. They can help you talk to your doctor.  Follow-up care is a key part of your treatment and safety. Be sure to make and go to all appointments, and call your doctor if you are having problems. It's also a good idea  to know your test results and keep a list of the medicines you take.  Current as of: October 24, 2023               Content Version: 14.0    4411-5528 [employer].   Care instructions adapted under license by your healthcare professional. If you have questions about a medical condition or this instruction, always ask your healthcare professional. [employer] disclaims any warranty or liability for your use of this information.      Hearing Loss: Care Instructions  Overview     Hearing loss is a sudden or slow decrease in how well you hear. It can range from slight to profound. Permanent hearing loss can occur with aging. It also can happen when you are exposed long-term to loud noise. Examples include listening to loud music, riding motorcycles, or being around other loud machines.  Hearing loss can affect your work and home life. It can make you feel lonely or depressed. You may feel that you have lost your independence. But hearing aids and other devices can help you hear better and feel connected to others.  Follow-up care is a key part of your treatment and safety. Be sure to make and go to all appointments, and call your doctor if you are having problems. It's also a good idea to know your test results and keep a list of the medicines you take.  How can you care for yourself at home?  Avoid loud noises whenever possible. This helps keep your hearing from getting worse.  Always wear hearing protection around loud noises.  Wear a hearing aid as directed.  A professional can help you pick a hearing aid that will work best for you.  You can also get hearing aids over the counter for mild to moderate hearing loss.  Have hearing tests as your doctor suggests. They can show whether your hearing has changed. Your hearing aid may need to be adjusted.  Use other devices as needed. These may include:  Telephone amplifiers and hearing aids that can connect to a television, stereo, radio, or  "microphone.  Devices that use lights or vibrations. These alert you to the doorbell, a ringing telephone, or a baby monitor.  Television closed-captioning. This shows the words at the bottom of the screen. Most new TVs can do this.  TTY (text telephone). This lets you type messages back and forth on the telephone instead of talking or listening. These devices are also called TDD. When messages are typed on the keyboard, they are sent over the phone line to a receiving TTY. The message is shown on a monitor.  Use text messaging, social media, and email if it is hard for you to communicate by telephone.  Try to learn a listening technique called speechreading. It is not lipreading. You pay attention to people's gestures, expressions, posture, and tone of voice. These clues can help you understand what a person is saying. Face the person you are talking to, and have them face you. Make sure the lighting is good. You need to see the other person's face clearly.  Think about counseling if you need help to adjust to your hearing loss.  When should you call for help?  Watch closely for changes in your health, and be sure to contact your doctor if:    You think your hearing is getting worse.     You have new symptoms, such as dizziness or nausea.   Where can you learn more?  Go to https://www.SPO.net/patiented  Enter R798 in the search box to learn more about \"Hearing Loss: Care Instructions.\"  Current as of: September 27, 2023               Content Version: 14.0    6230-2108 [employer].   Care instructions adapted under license by your healthcare professional. If you have questions about a medical condition or this instruction, always ask your healthcare professional. [employer] disclaims any warranty or liability for your use of this information.      Learning About Sleeping Well  What does sleeping well mean?     Sleeping well means getting enough sleep to feel good and stay healthy. " How much sleep is enough varies among people.  The number of hours you sleep and how you feel when you wake up are both important. If you do not feel refreshed, you probably need more sleep. Another sign of not getting enough sleep is feeling tired during the day.  Experts recommend that adults get at least 7 or more hours of sleep per day. Children and older adults need more sleep.  Why is getting enough sleep important?  Getting enough quality sleep is a basic part of good health. When your sleep suffers, your physical health, mood, and your thoughts can suffer too. You may find yourself feeling more grumpy or stressed. Not getting enough sleep also can lead to serious problems, including injury, accidents, anxiety, and depression.  What might cause poor sleeping?  Many things can cause sleep problems, including:  Changes to your sleep schedule.  Stress. Stress can be caused by fear about a single event, such as giving a speech. Or you may have ongoing stress, such as worry about work or school.  Depression, anxiety, and other mental or emotional conditions.  Changes in your sleep habits or surroundings. This includes changes that happen where you sleep, such as noise, light, or sleeping in a different bed. It also includes changes in your sleep pattern, such as having jet lag or working a late shift.  Health problems, such as pain, breathing problems, and restless legs syndrome.  Lack of regular exercise.  Using alcohol, nicotine, or caffeine before bed.  How can you help yourself?  Here are some tips that may help you sleep more soundly and wake up feeling more refreshed.  Your sleeping area   Use your bedroom only for sleeping and sex. A bit of light reading may help you fall asleep. But if it doesn't, do your reading elsewhere in the house. Try not to use your TV, computer, smartphone, or tablet while you are in bed.  Be sure your bed is big enough to stretch out comfortably, especially if you have a sleep  "partner.  Keep your bedroom quiet, dark, and cool. Use curtains, blinds, or a sleep mask to block out light. To block out noise, use earplugs, soothing music, or a \"white noise\" machine.  Your evening and bedtime routine   Create a relaxing bedtime routine. You might want to take a warm shower or bath, or listen to soothing music.  Go to bed at the same time every night. And get up at the same time every morning, even if you feel tired.  What to avoid   Limit caffeine (coffee, tea, caffeinated sodas) during the day, and don't have any for at least 6 hours before bedtime.  Avoid drinking alcohol before bedtime. Alcohol can cause you to wake up more often during the night.  Try not to smoke or use tobacco, especially in the evening. Nicotine can keep you awake.  Limit naps during the day, especially close to bedtime.  Avoid lying in bed awake for too long. If you can't fall asleep or if you wake up in the middle of the night and can't get back to sleep within about 20 minutes, get out of bed and go to another room until you feel sleepy.  Avoid taking medicine right before bed that may keep you awake or make you feel hyper or energized. Your doctor can tell you if your medicine may do this and if you can take it earlier in the day.  If you can't sleep   Imagine yourself in a peaceful, pleasant scene. Focus on the details and feelings of being in a place that is relaxing.  Get up and do a quiet or boring activity until you feel sleepy.  Avoid drinking any liquids before going to bed to help prevent waking up often to use the bathroom.  Where can you learn more?  Go to https://www.MaxCDN.net/patiented  Enter J942 in the search box to learn more about \"Learning About Sleeping Well.\"  Current as of: July 10, 2023               Content Version: 14.0    0419-5837 [employer].   Care instructions adapted under license by your healthcare professional. If you have questions about a medical condition or this " instruction, always ask your healthcare professional. [employer] disclaims any warranty or liability for your use of this information.

## 2024-04-15 ENCOUNTER — OFFICE VISIT (OUTPATIENT)
Dept: FAMILY MEDICINE | Facility: CLINIC | Age: 79
End: 2024-04-15

## 2024-04-15 VITALS
DIASTOLIC BLOOD PRESSURE: 75 MMHG | HEIGHT: 71 IN | HEART RATE: 53 BPM | BODY MASS INDEX: 24.19 KG/M2 | OXYGEN SATURATION: 99 % | SYSTOLIC BLOOD PRESSURE: 155 MMHG | WEIGHT: 172.8 LBS

## 2024-04-15 DIAGNOSIS — I27.20 PULMONARY HTN (H): ICD-10-CM

## 2024-04-15 DIAGNOSIS — I10 PRIMARY HYPERTENSION: ICD-10-CM

## 2024-04-15 DIAGNOSIS — B07.0 PLANTAR WART: ICD-10-CM

## 2024-04-15 DIAGNOSIS — I25.10 CORONARY ARTERY DISEASE INVOLVING NATIVE CORONARY ARTERY OF NATIVE HEART WITHOUT ANGINA PECTORIS: ICD-10-CM

## 2024-04-15 DIAGNOSIS — D68.69 SECONDARY HYPERCOAGULABLE STATE (H): ICD-10-CM

## 2024-04-15 DIAGNOSIS — R97.20 ELEVATED PROSTATE SPECIFIC ANTIGEN (PSA): ICD-10-CM

## 2024-04-15 DIAGNOSIS — M51.9 LUMBAR DISC DISEASE: ICD-10-CM

## 2024-04-15 DIAGNOSIS — I48.3 TYPICAL ATRIAL FLUTTER (H): ICD-10-CM

## 2024-04-15 DIAGNOSIS — Z00.00 ENCOUNTER FOR MEDICARE ANNUAL WELLNESS EXAM: Primary | ICD-10-CM

## 2024-04-15 DIAGNOSIS — I70.0 ATHEROSCLEROSIS OF AORTA (H): ICD-10-CM

## 2024-04-15 DIAGNOSIS — N18.31 STAGE 3A CHRONIC KIDNEY DISEASE (H): ICD-10-CM

## 2024-04-15 DIAGNOSIS — E78.00 HYPERCHOLESTEROLEMIA: ICD-10-CM

## 2024-04-15 LAB
% GRANULOCYTES: 70.2 % (ref 42.2–75.2)
ALBUMIN SERPL BCG-MCNC: 4.1 G/DL (ref 3.5–5.2)
ALP SERPL-CCNC: 140 U/L (ref 40–150)
ALT SERPL W P-5'-P-CCNC: 11 U/L (ref 0–70)
ANION GAP SERPL CALCULATED.3IONS-SCNC: 14 MMOL/L (ref 7–15)
AST SERPL W P-5'-P-CCNC: 20 U/L (ref 0–45)
BILIRUB SERPL-MCNC: 0.8 MG/DL
BUN SERPL-MCNC: 22.6 MG/DL (ref 8–23)
CALCIUM SERPL-MCNC: 9.4 MG/DL (ref 8.8–10.2)
CHLORIDE SERPL-SCNC: 106 MMOL/L (ref 98–107)
CREAT SERPL-MCNC: 1.11 MG/DL (ref 0.67–1.17)
DEPRECATED HCO3 PLAS-SCNC: 22 MMOL/L (ref 22–29)
EGFRCR SERPLBLD CKD-EPI 2021: 68 ML/MIN/1.73M2
GLUCOSE SERPL-MCNC: 90 MG/DL (ref 70–99)
HCT VFR BLD AUTO: 43.5 % (ref 39–51)
HEMOGLOBIN: 15.1 G/DL (ref 13.4–17.5)
LYMPHOCYTES NFR BLD AUTO: 21 % (ref 20.5–51.1)
MCH RBC QN AUTO: 30.1 PG (ref 27–31)
MCHC RBC AUTO-ENTMCNC: 34.7 G/DL (ref 33–37)
MCV RBC AUTO: 86.8 FL (ref 80–100)
MONOCYTES NFR BLD AUTO: 8.8 % (ref 1.7–9.3)
PLATELET # BLD AUTO: 255 K/UL (ref 140–450)
POTASSIUM SERPL-SCNC: 4.1 MMOL/L (ref 3.4–5.3)
PROT SERPL-MCNC: 6.9 G/DL (ref 6.4–8.3)
RBC # BLD AUTO: 5.01 X10/CMM (ref 4.2–5.9)
SODIUM SERPL-SCNC: 142 MMOL/L (ref 135–145)
WBC # BLD AUTO: 6.7 X10/CMM (ref 3.8–11)

## 2024-04-15 PROCEDURE — 80061 LIPID PANEL: CPT | Mod: ORL | Performed by: FAMILY MEDICINE

## 2024-04-15 PROCEDURE — 99214 OFFICE O/P EST MOD 30 MIN: CPT | Mod: 25 | Performed by: FAMILY MEDICINE

## 2024-04-15 PROCEDURE — 84450 TRANSFERASE (AST) (SGOT): CPT | Mod: ORL | Performed by: FAMILY MEDICINE

## 2024-04-15 PROCEDURE — 82465 ASSAY BLD/SERUM CHOLESTEROL: CPT | Mod: ORL | Performed by: FAMILY MEDICINE

## 2024-04-15 PROCEDURE — 85025 COMPLETE CBC W/AUTO DIFF WBC: CPT | Performed by: FAMILY MEDICINE

## 2024-04-15 PROCEDURE — 36415 COLL VENOUS BLD VENIPUNCTURE: CPT | Performed by: FAMILY MEDICINE

## 2024-04-15 PROCEDURE — 82247 BILIRUBIN TOTAL: CPT | Mod: ORL | Performed by: FAMILY MEDICINE

## 2024-04-15 PROCEDURE — G0103 PSA SCREENING: HCPCS | Mod: ORL | Performed by: PATHOLOGY

## 2024-04-15 PROCEDURE — 82044 UR ALBUMIN SEMIQUANTITATIVE: CPT | Performed by: FAMILY MEDICINE

## 2024-04-15 PROCEDURE — G0438 PPPS, INITIAL VISIT: HCPCS | Performed by: FAMILY MEDICINE

## 2024-04-15 RX ORDER — CIMETIDINE 800 MG
800 TABLET ORAL 2 TIMES DAILY
Qty: 180 TABLET | Refills: 1 | Status: SHIPPED | OUTPATIENT
Start: 2024-04-15 | End: 2024-04-19

## 2024-04-15 NOTE — PROGRESS NOTES
Preventive Care Visit  [facility]  Sabas Staley MD, Family Medicine  Apr 15, 2024    Assessment & Plan     Encounter for Medicare annual wellness exam    Stage 3a chronic kidney disease (H)  Chronic kidney disease due to HTN and DM.  Check urine for protein.   Patient is on ACE/ARB.  Patient is on a statin.  Told the patient to avoid NSAIDs if at all possible.   Will monitor closely and send to Nephrologist if renal function continues to decline.      - Microalbumin ([facility])    Elevated prostate specific antigen (PSA)  Due for a check  - Prostate Specific Antigen Screen    Hypercholesterolemia  Hyperlipidemia  Discussed current lipid results, previous results (if available) current guidelines (NCEP) for treatment and goals for lipids.    Discussed ongoing lifestyle modification, dietary changes (low fat, low simple carb) and regular aerobic exercise.    Discussed the link between dysmetabolic syndrome and impaired glucose tolerance seen in certain patterns of lipids.     Reviewed medication use for lipid lowering, including the statins are their possible side effects of myalgias, rhabdomyolysis, and liver toxicity.  We today managed his prescriptions with refills ensured to ensure availabilty of current medications.  Discussed the importance for aggressive management of dyslipidemia to prevent vascular complications later.     Instructed to contact me if he develop any intolerance to the treatment.    - Lipid Profile ([facility])    Typical atrial flutter (H)  And Coronary artery disease involving native coronary artery of native heart without angina pectoris,  Right ventricular systolic pressure is elevated, consistent with mild to  moderate pulmonary hypertension.  Dilation of the inferior vena cava is present with abnormal respiratory  variation in diameter consistent with elevated RA pressures.  Aortic Atherosclerosis on Statin  And Secondary hypercoagulable state (H24)  Actively managed by Dr. Pizano,  who he is seeing next week.    Primary hypertension  Reviewed his current HTN management. Discussed our goal for him is a systolic pressure at or below 128 and diastolic pressure at or below 83.  We today managed his prescriptions with refills ensured to ensure availabilty of current medications.  Discussed the importance for aggressive management of HTN to prevent vascular complications later.  Recommended lower fat, lower carbohydrate, and lower sodium (<2000 mg)diet. Required intervals for follow up on HTN, lab studies reviewed.    Strongly recommened he follow his blood pressures outside the clinic to ensure that BPs are remaining within guidelines,.  Instructed to contact me if the readings are not within guidelines on a regular basis so we can adjust treatment as needed.    - CBC with Diff/Plt ([facility])  - Comprehensive metabolic panel        Plantar wart  Many years with no help from many cryo therapy.  Discussed alternative treatment with tagamet and he iswilling to try  - cimetidine (TAGAMET) 800 MG tablet  Dispense: 180 tablet; Refill: 1    Lumbar disc disease  Much improved.      Patient has been advised of split billing requirements and indicates understanding: Yes      Counseling  Appropriate preventive services were discussed with this patient, including applicable screening as appropriate for fall prevention, nutrition, physical activity, Tobacco-use cessation, weight loss and cognition.  Checklist reviewing preventive services available has been given to the patient.  Reviewed patient's diet, addressing concerns and/or questions.   Discussed possible causes of fatigue. Patient reported safety concerns were addressed today.The patient was provided with written information regarding signs of hearing loss.       Regular exercise    No follow-ups on file.    Alexandra Cruz is a 79 year old, presenting for the following:  Physical (Fasting /), Hearing Screening (Has a hearing test scheduled this month, no  need to test hearing today ), Derm Problem (Black spot on back /Spots frozen 8 times on thumb and index finger ), Musculoskeletal Problem (Weakness in right leg ), and Headache (Get headaches when coughing and sneezing hard )          Health Care Directive  Patient has a Health Care Directive on file  Advance care planning document is on file and is current.    HPI  Here for check up and to follow up on:  Recalcitrant warts          4/9/2024   General Health   How would you rate your overall physical health? Good   Feel stress (tense, anxious, or unable to sleep) Not at all         4/9/2024   Nutrition   Diet: Regular (no restrictions)         4/9/2024   Exercise   Days per week of moderate/strenous exercise 5 days   Average minutes spent exercising at this level 20 min         4/9/2024   Social Factors   Frequency of gathering with friends or relatives Once a week   Worry food won't last until get money to buy more No   Food not last or not have enough money for food? No   Do you have housing?  Yes   Are you worried about losing your housing? No   Lack of transportation? No   Unable to get utilities (heat,electricity)? No         4/9/2024   Fall Risk   Fallen 2 or more times in the past year? No   Trouble with walking or balance? No          4/9/2024   Activities of Daily Living- Home Safety   Needs help with the following daily activites None of the above   Safety concerns in the home Throw rugs in the hallway    No grab bars in the bathroom         4/9/2024   Dental   Dentist two times every year? Yes         4/9/2024   Hearing Screening   Hearing concerns? (!) I NEED TO ASK PEOPLE TO SPEAK UP OR REPEAT THEMSELVES.    (!) IT'S HARD TO FOLLOW A CONVERSATION IN A NOISY RESTAURANT OR CROWDED ROOM.    (!) TROUBLE UNDERSTANDING SOFT OR WHISPERED SPEECH.         4/9/2024   Driving Risk Screening   Patient/family members have concerns about driving No         4/9/2024   General Alertness/Fatigue Screening   Have you  been more tired than usual lately? (!) YES         4/9/2024   Urinary Incontinence Screening   Bothered by leaking urine in past 6 months No         4/9/2024   TB Screening   Were you born outside of the [place]? No         Today's PHQ-2 Score:       4/15/2024     8:55 AM   PHQ-2 ( 1999 [employer])   Q1: Little interest or pleasure in doing things 0   Q2: Feeling down, depressed or hopeless 0   PHQ-2 Score 0           4/9/2024   Substance Use   Alcohol more than 3/day or more than 7/wk No   Do you have a current opioid prescription? No   How severe/bad is pain from 1 to 10? 0/10 (No Pain)   Do you use any other substances recreationally? No     Social History     Tobacco Use    Smoking status: Never    Smokeless tobacco: Never   Substance Use Topics    Alcohol use: Yes     Alcohol/week: 2.0 - 3.0 standard drinks of alcohol     Types: 2 - 3 Cans of beer per week     Comment: 3-4 beers/week    Drug use: No       ASCVD Risk   The ASCVD Risk score (Marta CUEVA, et al., 2019) failed to calculate for the following reasons:    The patient has a prior MI or stroke diagnosis            Reviewed and updated as needed this visit by Provider   Tobacco  Allergies  Meds  Problems  Med Hx  Surg Hx  Fam Hx            Lab work is in process  Current providers sharing in care for this patient include:  Patient Care Team:  Sabas Staley MD as PCP - General (Family Medicine)  Sabas Staley MD as Assigned PCP  Mona Quintana PA-C as Assigned Heart and Vascular Provider  Geraldo Pizano MD as MD (Cardiovascular Disease)    The following health maintenance items are reviewed in Epic and correct as of today:  Health Maintenance   Topic Date Due    HF ACTION PLAN  Never done    BMP  04/06/2024    MICROALBUMIN  04/11/2024    ALT  10/06/2024    LIPID  10/06/2024    CBC  10/06/2024    HEMOGLOBIN  10/06/2024    MEDICARE ANNUAL WELLNESS VISIT  04/15/2025    FALL RISK ASSESSMENT  04/15/2025    GLUCOSE   "10/06/2026    ADVANCE CARE PLANNING  04/15/2029    DTAP/TDAP/TD IMMUNIZATION (3 - Td or Tdap) 04/19/2033    TSH W/FREE T4 REFLEX  Completed    HEPATITIS C SCREENING  Completed    PHQ-2 (once per calendar year)  Completed    INFLUENZA VACCINE  Completed    Pneumococcal Vaccine: 65+ Years  Completed    URINALYSIS  Completed    ZOSTER IMMUNIZATION  Completed    RSV VACCINE (Pregnancy & 60+)  Completed    COVID-19 Vaccine  Completed    IPV IMMUNIZATION  Aged Out    HPV IMMUNIZATION  Aged Out    MENINGITIS IMMUNIZATION  Aged Out    RSV MONOCLONAL ANTIBODY  Aged Out    COLORECTAL CANCER SCREENING  Discontinued         Review of Systems  Constitutional, HEENT, cardiovascular, pulmonary, GI, , musculoskeletal, neuro, skin, endocrine and psych systems are negative, except as otherwise noted.     Objective    Exam  BP (!) 155/75   Pulse 53   Ht 1.791 m (5' 10.5\")   Wt 78.4 kg (172 lb 12.8 oz)   SpO2 99%   BMI 24.44 kg/m     Estimated body mass index is 24.44 kg/m  as calculated from the following:    Height as of this encounter: 1.791 m (5' 10.5\").    Weight as of this encounter: 78.4 kg (172 lb 12.8 oz).    Physical Exam  GENERAL: alert and no distress  EYES: Eyes grossly normal to inspection, PERRL and conjunctivae and sclerae normal  HENT: ear canals and TM's normal, nose and mouth without ulcers or lesions  NECK: no adenopathy, no asymmetry, masses, or scars  RESP: lungs clear to auscultation - no rales, rhonchi or wheezes  CV: regular rate and rhythm, normal S1 S2, no S3 or S4, no murmur, click or rub, no peripheral edema  ABDOMEN: soft, nontender, no hepatosplenomegaly, no masses and bowel sounds normal  MS: no gross musculoskeletal defects noted, no edema  SKIN: warts  NEURO: Normal strength and tone, mentation intact and speech normal  PSYCH: mentation appears normal, affect normal/bright        4/15/2024   Mini Cog   Clock Draw Score 2 Normal   3 Item Recall 3 objects recalled   Mini Cog Total Score 5          "   Signed Electronically by: Sabas Staley MD

## 2024-04-16 ENCOUNTER — OFFICE VISIT (OUTPATIENT)
Dept: CARDIOLOGY | Facility: CLINIC | Age: 79
End: 2024-04-16
Payer: COMMERCIAL

## 2024-04-16 VITALS
HEIGHT: 71 IN | DIASTOLIC BLOOD PRESSURE: 66 MMHG | SYSTOLIC BLOOD PRESSURE: 134 MMHG | HEART RATE: 54 BPM | OXYGEN SATURATION: 98 % | BODY MASS INDEX: 24.35 KG/M2 | WEIGHT: 173.9 LBS

## 2024-04-16 DIAGNOSIS — I48.4 ATYPICAL ATRIAL FLUTTER (H): ICD-10-CM

## 2024-04-16 DIAGNOSIS — I25.10 CORONARY ARTERY DISEASE INVOLVING NATIVE CORONARY ARTERY OF NATIVE HEART WITHOUT ANGINA PECTORIS: Primary | ICD-10-CM

## 2024-04-16 DIAGNOSIS — E78.5 HYPERLIPIDEMIA LDL GOAL <70: ICD-10-CM

## 2024-04-16 DIAGNOSIS — I48.3 TYPICAL ATRIAL FLUTTER (H): ICD-10-CM

## 2024-04-16 DIAGNOSIS — I10 PRIMARY HYPERTENSION: ICD-10-CM

## 2024-04-16 DIAGNOSIS — I48.0 PAROXYSMAL ATRIAL FIBRILLATION (H): ICD-10-CM

## 2024-04-16 DIAGNOSIS — I25.5 ISCHEMIC CARDIOMYOPATHY: ICD-10-CM

## 2024-04-16 LAB
CHOLEST SERPL-MCNC: 153 MG/DL
FASTING STATUS PATIENT QL REPORTED: YES
HDLC SERPL-MCNC: 51 MG/DL
HOLD SPECIMEN: NORMAL
LDLC SERPL CALC-MCNC: 93 MG/DL
NONHDLC SERPL-MCNC: 102 MG/DL
PSA SERPL DL<=0.01 NG/ML-MCNC: 9.3 NG/ML (ref 0–6.5)
TRIGL SERPL-MCNC: 45 MG/DL

## 2024-04-16 PROCEDURE — 99214 OFFICE O/P EST MOD 30 MIN: CPT | Performed by: NURSE PRACTITIONER

## 2024-04-16 RX ORDER — LOSARTAN POTASSIUM 25 MG/1
25 TABLET ORAL DAILY
Qty: 90 TABLET | Refills: 3 | Status: SHIPPED | OUTPATIENT
Start: 2024-04-16 | End: 2024-05-16

## 2024-04-16 RX ORDER — DILTIAZEM HYDROCHLORIDE 120 MG/1
120 CAPSULE, COATED, EXTENDED RELEASE ORAL DAILY
Qty: 90 CAPSULE | Refills: 3 | Status: SHIPPED | OUTPATIENT
Start: 2024-04-16

## 2024-04-16 RX ORDER — ATORVASTATIN CALCIUM 80 MG/1
80 TABLET, FILM COATED ORAL DAILY
Qty: 90 TABLET | Refills: 3 | Status: SHIPPED | OUTPATIENT
Start: 2024-04-16 | End: 2024-07-02

## 2024-04-16 NOTE — PATIENT INSTRUCTIONS
Thank you for your visit with the [facility] today.    Today's Summary:    Reduce Diltiazem to 120 mg once a day in the evening.  See if this helps with your fatigue.  Monitor blood pressure at home - bring a log to your next visit for review.  Work on a healthy mediterranean style diet to lower your cholesterol.  Daily exercise.    Follow-up:  Cardiology follow up at [facility]: [facility]OL[facility] in 2-months.     Cardiology Scheduling ~[phone]  Cardiology Clinic RN ~ [phone]    It was a pleasure seeing you today.     JOSE CRUZ Aranda, CNP  Certified Nurse Practitioner  [facility]  April 16, 2024  ________________________________________________________

## 2024-04-16 NOTE — LETTER
4/16/2024    Sabas Staley MD  [address]  [facility] [address]    RE: Rey Brown       Dear Colleague,     I had the pleasure of seeing Rey Brown in the [facility].              ~Cardiology Clinic Visit~    Primary Cardiologist: Dr. Pizano  Reason for visit: Annual follow up    History of Present Illness    Rey Brown is a very pleasant 79 year old male with a past medical history notable for hypertension, paroxysmal atrial fibrillation and atrial flutter, history of several cardioversions and 3 ablations, history of NSTEMI.    In summary, patient was initially seen in cardiology clinic in follow-up after a hospitalization for NSTEMI, May 2022.  He had coronary angiogram performed with stent placement to the LAD, and noted proximal LAD 40% stenosis, and circumflex 20% stenosis.  He had an echocardiogram in November 2022 showing normal ejection fraction that had actually improved compared to previous echocardiograms at the interval when he had the NSTEMI.    In follow-up last year, patient was doing well from a cardiac standpoint.  He had no chest pain or shortness of breath.  He remained active.  He had no palpitations, dizziness or syncope.  He is just nearing completion of 1 year Plavix/Eliquis dual therapy.  He was then transition to baby aspirin along with Eliquis for his known PAF.    Interval  Today in follow-up, patient continues to do well from a cardiovascular standpoint.  His only concern as of today is ongoing fatigue and drowsiness.  Vitally in clinic today his blood pressure is 134/66, heart rate 54.  Historically, he has had ongoing bradycardia.  He has no bleeding issues on aspirin and Eliquis.  He is on atorvastatin 80 mg daily with most recent FLP from 4/15/2024 showing , HDL 51, LDL 93, TG 45.  __________________________________________________________________         Assessment and Impression:     Coronary artery disease with  history of NSTEMI  Status post mid distal LAD stent, May 2022.  Remains chest pain-free  On aspirin and Eliquis for known PAF  Ischemic cardiomyopathy, recovered EF to normal range  History of recurrent atrial arrhythmias  Paroxysmal atrial fibrillation/flutter  History of multiple cardioversions and ablations, last in November 2022  Remains in sinus rhythm; on Eliquis for stroke prophylaxis hypertension  Hyperlipidemia  Chronic renal insufficiency, stable          Recommendations and Plan:     Reduce diltiazem 120 mg once daily in the evening.  Ongoing BP and heart rate monitoring at home.  Bring a log to review at the next office visit.  In shared conversation, patient would like to work on a healthy Mediterranean style diet to lower cholesterol.  If LDL remains above 90 at follow-up, can discuss adding additional agents to further optimize levels.  Aim to incorporate daily exercise and regimen.  [facility] follow-up in approximately 2 months.  __________________________________________________________________    Thank you for the opportunity to participate in this pleasant patient's care.    We would be happy to see this patient sooner for any concerns in the meantime.    JOSE CRUZ Aranda, CNP   Nurse Practitioner  [facility]    Today's clinic visit entailed:  Ordering of each unique test  Prescription drug management  The level of medical decision making during this visit was of moderate complexity.  Review of the result(s) of each unique test - cardiac testing, cardiac imaging, labs  Care everywhere reviewed for additional records to facilitate comprehensive patient care.    Orders this Visit:  Orders Placed This Encounter   Procedures    Lipid panel reflex to direct LDL Fasting    Follow-Up with Cardiology- NOLAN     Orders Placed This Encounter   Medications    atorvastatin (LIPITOR) 80 MG tablet     Sig: Take 1 tablet (80 mg) by mouth daily     Dispense:  90 tablet     Refill:  3     losartan (COZAAR) 25 MG tablet     Sig: Take 1 tablet (25 mg) by mouth daily     Dispense:  90 tablet     Refill:  3    diltiazem ER COATED BEADS (CARDIZEM CD/CARTIA XT) 120 MG 24 hr capsule     Sig: Take 1 capsule (120 mg) by mouth daily     Dispense:  90 capsule     Refill:  3     Medications Discontinued During This Encounter   Medication Reason    atorvastatin (LIPITOR) 80 MG tablet Reorder (No AVS)    losartan (COZAAR) 25 MG tablet Reorder (No AVS)    diltiazem ER COATED BEADS (CARDIZEM CD/CARTIA XT) 180 MG 24 hr capsule      Encounter Diagnoses   Name Primary?    Ischemic cardiomyopathy     Coronary artery disease involving native coronary artery of native heart without angina pectoris Yes    Paroxysmal atrial fibrillation (H)     Typical atrial flutter (H)     Primary hypertension     Atypical atrial flutter (H)     Hyperlipidemia LDL goal <70      CURRENT MEDICATIONS:  Current Outpatient Medications   Medication Sig Dispense Refill    acetaminophen (TYLENOL) 325 MG tablet Take 325-650 mg by mouth every 6 hours as needed for mild pain      apixaban ANTICOAGULANT (ELIQUIS) 5 MG tablet Take 1 tablet (5 mg) by mouth 2 times daily 180 tablet 3    aspirin (ASA) 81 MG EC tablet Take 1 tablet (81 mg) by mouth daily      atorvastatin (LIPITOR) 80 MG tablet Take 1 tablet (80 mg) by mouth daily 90 tablet 3    cimetidine (TAGAMET) 800 MG tablet Take 1 tablet (800 mg) by mouth 2 times daily 180 tablet 1    diltiazem ER COATED BEADS (CARDIZEM CD/CARTIA XT) 120 MG 24 hr capsule Take 1 capsule (120 mg) by mouth daily 90 capsule 3    losartan (COZAAR) 25 MG tablet Take 1 tablet (25 mg) by mouth daily 90 tablet 3     ALLERGIES     Allergies   Allergen Reactions    Ace Inhibitors Cough     PAST MEDICAL, SURGICAL, FAMILY, SOCIAL HISTORY:  History was reviewed and updated as needed, see medical record.    Review of Systems:  A 10-point Review Of Systems is otherwise normal except for that which is noted in the HPI and interval  "summary.    Physical Exam:    Vitals: /66   Pulse 54   Ht 1.791 m (5' 10.5\")   Wt 78.9 kg (173 lb 14.4 oz)   SpO2 98%   BMI 24.60 kg/m    Constitutional: Appears stated age, well nourished, NAD.  Neck: Supple. Carotid pulses full and equal.   Respiratory: Non-labored. Lungs CTAB.  Cardiovascular: RRR, normal S1 and S2. No M/G/R.  No edema.  GI: Soft, non-distended, non-tender.  Skin: Warm and dry.   Musculoskeletal/Extremities: Symmetrical movement.  Neurologic: No gross focal deficits. Alert, awake.  Psychiatric: Affect appropriate. Mentation normal.    Recent Lab Results:  LIPID RESULTS:  Lab Results   Component Value Date    CHOL 153 04/15/2024    CHOL 158 03/23/2021    HDL 51 04/15/2024    HDL 54 03/23/2021    LDL 93 04/15/2024    LDL 92 03/23/2021    TRIG 45 04/15/2024    TRIG 58 03/23/2021     LIVER ENZYME RESULTS:  Lab Results   Component Value Date    AST 20 04/15/2024    AST 18 03/23/2021    ALT 11 04/15/2024    ALT 10 03/23/2021     CBC RESULTS:  Lab Results   Component Value Date    WBC 6.7 04/15/2024    WBC 18.8 (H) 03/13/2020    RBC 5.01 04/15/2024    RBC 4.26 (L) 03/13/2020    HGB 15.1 04/15/2024    HCT 43.5 04/15/2024    MCV 86.8 04/15/2024    MCH 30.1 04/15/2024    MCHC 34.7 04/15/2024    RDW 12.8 11/04/2022    RDW 14.1 03/13/2020     04/15/2024     BMP RESULTS:  Lab Results   Component Value Date     04/15/2024     03/23/2021    POTASSIUM 4.1 04/15/2024    POTASSIUM 3.9 11/04/2022    POTASSIUM 4.5 03/23/2021    CHLORIDE 106 04/15/2024    CHLORIDE 106 11/04/2022    CHLORIDE 103 03/23/2021    CO2 22 04/15/2024    CO2 26 11/04/2022    CO2 30 05/14/2020    ANIONGAP 14 04/15/2024    ANIONGAP 5 11/04/2022    ANIONGAP 4 05/14/2020    GLC 90 04/15/2024    GLC 95 11/04/2022    GLC 79 03/23/2021    BUN 22.6 04/15/2024    BUN 25 11/04/2022    BUN 24 03/23/2021    BUN 18 03/23/2021    CR 1.11 04/15/2024    CR 1.31 (H) 03/23/2021    GFRESTIMATED 68 04/15/2024    GFRESTIMATED >60 " "03/25/2022    GFRESTIMATED 46 (L) 05/14/2020    GFRESTBLACK 54 (L) 05/14/2020    ELIEZER 9.4 04/15/2024    ELIEZER 9.3 03/23/2021      A1C RESULTS:  No results found for: \"A1C\"  INR RESULTS:  Lab Results   Component Value Date    INR 1.25 (H) 05/12/2022    INR 1.46 (H) 04/20/2022    INR 0.99 07/27/2007         Thank you for allowing me to participate in the care of your patient.      Sincerely,     JOSE CRUZ Aranda [facility]  cc:   Geraldo Pizano MD  [id number]  [id number]  [place] [id number]      "

## 2024-04-17 LAB
A/C RATIO (RMG): >300
ALBUMIN- RMG: 30 (ref 0–10)
INTERPRETATION: ABNORMAL
URINE CREATININE - RMG: 10 (ref 0–300)

## 2024-04-18 ENCOUNTER — MYC MEDICAL ADVICE (OUTPATIENT)
Dept: FAMILY MEDICINE | Facility: CLINIC | Age: 79
End: 2024-04-18

## 2024-04-18 DIAGNOSIS — B07.0 PLANTAR WART: ICD-10-CM

## 2024-04-18 NOTE — RESULT ENCOUNTER NOTE
Dear Rey,     I am writing to report that your included test results are as expected.    As we discussed the PSA is elevated from 3 years ago that is a bit above expected, so we should recheck in 3 months.    Many labs contain some results that are slightly outside of the normal range, I have reviewed any of these results and they require no changes at this time.    Sabas Staley MD

## 2024-04-19 RX ORDER — CIMETIDINE 800 MG
800 TABLET ORAL 2 TIMES DAILY
Qty: 180 TABLET | Refills: 1 | Status: SHIPPED | OUTPATIENT
Start: 2024-04-19 | End: 2024-06-27

## 2024-04-19 NOTE — PROGRESS NOTES
~Cardiology Clinic Visit~    Primary Cardiologist: Dr. Pizano  Reason for visit: Annual follow up    History of Present Illness    Rey Brown is a very pleasant 79 year old male with a past medical history notable for hypertension, paroxysmal atrial fibrillation and atrial flutter, history of several cardioversions and 3 ablations, history of NSTEMI.    In summary, patient was initially seen in cardiology clinic in follow-up after a hospitalization for NSTEMI, May 2022.  He had coronary angiogram performed with stent placement to the LAD, and noted proximal LAD 40% stenosis, and circumflex 20% stenosis.  He had an echocardiogram in November 2022 showing normal ejection fraction that had actually improved compared to previous echocardiograms at the interval when he had the NSTEMI.    In follow-up last year, patient was doing well from a cardiac standpoint.  He had no chest pain or shortness of breath.  He remained active.  He had no palpitations, dizziness or syncope.  He is just nearing completion of 1 year Plavix/Eliquis dual therapy.  He was then transition to baby aspirin along with Eliquis for his known PAF.    Interval  Today in follow-up, patient continues to do well from a cardiovascular standpoint.  His only concern as of today is ongoing fatigue and drowsiness.  Vitally in clinic today his blood pressure is 134/66, heart rate 54.  Historically, he has had ongoing bradycardia.  He has no bleeding issues on aspirin and Eliquis.  He is on atorvastatin 80 mg daily with most recent FLP from 4/15/2024 showing , HDL 51, LDL 93, TG 45.  __________________________________________________________________         Assessment and Impression:     Coronary artery disease with history of NSTEMI  Status post mid distal LAD stent, May 2022.  Remains chest pain-free  On aspirin and Eliquis for known PAF  Ischemic cardiomyopathy, recovered EF to normal range  History of recurrent atrial  arrhythmias  Paroxysmal atrial fibrillation/flutter  History of multiple cardioversions and ablations, last in November 2022  Remains in sinus rhythm; on Eliquis for stroke prophylaxis hypertension  Hyperlipidemia  Chronic renal insufficiency, stable          Recommendations and Plan:     Reduce diltiazem 120 mg once daily in the evening.  Ongoing BP and heart rate monitoring at home.  Bring a log to review at the next office visit.  In shared conversation, patient would like to work on a healthy Mediterranean style diet to lower cholesterol.  If LDL remains above 90 at follow-up, can discuss adding additional agents to further optimize levels.  Aim to incorporate daily exercise and regimen.  NOLAN follow-up in approximately 2 months.  __________________________________________________________________    Thank you for the opportunity to participate in this pleasant patient's care.    We would be happy to see this patient sooner for any concerns in the meantime.    JOSE CRUZ Aranda, CNP   Nurse Practitioner  [facility]    Today's clinic visit entailed:  Ordering of each unique test  Prescription drug management  The level of medical decision making during this visit was of moderate complexity.  Review of the result(s) of each unique test - cardiac testing, cardiac imaging, labs  Care everywhere reviewed for additional records to facilitate comprehensive patient care.    Orders this Visit:  Orders Placed This Encounter   Procedures    Lipid panel reflex to direct LDL Fasting    Follow-Up with Cardiology- NOLAN     Orders Placed This Encounter   Medications    atorvastatin (LIPITOR) 80 MG tablet     Sig: Take 1 tablet (80 mg) by mouth daily     Dispense:  90 tablet     Refill:  3    losartan (COZAAR) 25 MG tablet     Sig: Take 1 tablet (25 mg) by mouth daily     Dispense:  90 tablet     Refill:  3    diltiazem ER COATED BEADS (CARDIZEM CD/CARTIA XT) 120 MG 24 hr capsule     Sig: Take 1 capsule  "(120 mg) by mouth daily     Dispense:  90 capsule     Refill:  3     Medications Discontinued During This Encounter   Medication Reason    atorvastatin (LIPITOR) 80 MG tablet Reorder (No AVS)    losartan (COZAAR) 25 MG tablet Reorder (No AVS)    diltiazem ER COATED BEADS (CARDIZEM CD/CARTIA XT) 180 MG 24 hr capsule      Encounter Diagnoses   Name Primary?    Ischemic cardiomyopathy     Coronary artery disease involving native coronary artery of native heart without angina pectoris Yes    Paroxysmal atrial fibrillation (H)     Typical atrial flutter (H)     Primary hypertension     Atypical atrial flutter (H)     Hyperlipidemia LDL goal <70      CURRENT MEDICATIONS:  Current Outpatient Medications   Medication Sig Dispense Refill    acetaminophen (TYLENOL) 325 MG tablet Take 325-650 mg by mouth every 6 hours as needed for mild pain      apixaban ANTICOAGULANT (ELIQUIS) 5 MG tablet Take 1 tablet (5 mg) by mouth 2 times daily 180 tablet 3    aspirin (ASA) 81 MG EC tablet Take 1 tablet (81 mg) by mouth daily      atorvastatin (LIPITOR) 80 MG tablet Take 1 tablet (80 mg) by mouth daily 90 tablet 3    cimetidine (TAGAMET) 800 MG tablet Take 1 tablet (800 mg) by mouth 2 times daily 180 tablet 1    diltiazem ER COATED BEADS (CARDIZEM CD/CARTIA XT) 120 MG 24 hr capsule Take 1 capsule (120 mg) by mouth daily 90 capsule 3    losartan (COZAAR) 25 MG tablet Take 1 tablet (25 mg) by mouth daily 90 tablet 3     ALLERGIES     Allergies   Allergen Reactions    Ace Inhibitors Cough     PAST MEDICAL, SURGICAL, FAMILY, SOCIAL HISTORY:  History was reviewed and updated as needed, see medical record.    Review of Systems:  A 10-point Review Of Systems is otherwise normal except for that which is noted in the HPI and interval summary.    Physical Exam:    Vitals: /66   Pulse 54   Ht 1.791 m (5' 10.5\")   Wt 78.9 kg (173 lb 14.4 oz)   SpO2 98%   BMI 24.60 kg/m    Constitutional: Appears stated age, well nourished, NAD.  Neck: " "Supple. Carotid pulses full and equal.   Respiratory: Non-labored. Lungs CTAB.  Cardiovascular: RRR, normal S1 and S2. No M/G/R.  No edema.  GI: Soft, non-distended, non-tender.  Skin: Warm and dry.   Musculoskeletal/Extremities: Symmetrical movement.  Neurologic: No gross focal deficits. Alert, awake.  Psychiatric: Affect appropriate. Mentation normal.    Recent Lab Results:  LIPID RESULTS:  Lab Results   Component Value Date    CHOL 153 04/15/2024    CHOL 158 03/23/2021    HDL 51 04/15/2024    HDL 54 03/23/2021    LDL 93 04/15/2024    LDL 92 03/23/2021    TRIG 45 04/15/2024    TRIG 58 03/23/2021     LIVER ENZYME RESULTS:  Lab Results   Component Value Date    AST 20 04/15/2024    AST 18 03/23/2021    ALT 11 04/15/2024    ALT 10 03/23/2021     CBC RESULTS:  Lab Results   Component Value Date    WBC 6.7 04/15/2024    WBC 18.8 (H) 03/13/2020    RBC 5.01 04/15/2024    RBC 4.26 (L) 03/13/2020    HGB 15.1 04/15/2024    HCT 43.5 04/15/2024    MCV 86.8 04/15/2024    MCH 30.1 04/15/2024    MCHC 34.7 04/15/2024    RDW 12.8 11/04/2022    RDW 14.1 03/13/2020     04/15/2024     BMP RESULTS:  Lab Results   Component Value Date     04/15/2024     03/23/2021    POTASSIUM 4.1 04/15/2024    POTASSIUM 3.9 11/04/2022    POTASSIUM 4.5 03/23/2021    CHLORIDE 106 04/15/2024    CHLORIDE 106 11/04/2022    CHLORIDE 103 03/23/2021    CO2 22 04/15/2024    CO2 26 11/04/2022    CO2 30 05/14/2020    ANIONGAP 14 04/15/2024    ANIONGAP 5 11/04/2022    ANIONGAP 4 05/14/2020    GLC 90 04/15/2024    GLC 95 11/04/2022    GLC 79 03/23/2021    BUN 22.6 04/15/2024    BUN 25 11/04/2022    BUN 24 03/23/2021    BUN 18 03/23/2021    CR 1.11 04/15/2024    CR 1.31 (H) 03/23/2021    GFRESTIMATED 68 04/15/2024    GFRESTIMATED >60 03/25/2022    GFRESTIMATED 46 (L) 05/14/2020    GFRESTBLACK 54 (L) 05/14/2020    ELIEZER 9.4 04/15/2024    ELIEZER 9.3 03/23/2021      A1C RESULTS:  No results found for: \"A1C\"  INR RESULTS:  Lab Results   Component Value " Date    INR 1.25 (H) 05/12/2022    INR 1.46 (H) 04/20/2022    INR 0.99 07/27/2007

## 2024-05-11 ENCOUNTER — MYC MEDICAL ADVICE (OUTPATIENT)
Dept: CARDIOLOGY | Facility: CLINIC | Age: 79
End: 2024-05-11
Payer: COMMERCIAL

## 2024-05-11 DIAGNOSIS — I25.5 ISCHEMIC CARDIOMYOPATHY: ICD-10-CM

## 2024-05-11 DIAGNOSIS — I10 PRIMARY HYPERTENSION: ICD-10-CM

## 2024-05-16 ENCOUNTER — TRANSFERRED RECORDS (OUTPATIENT)
Dept: FAMILY MEDICINE | Facility: CLINIC | Age: 79
End: 2024-05-16

## 2024-05-16 RX ORDER — LOSARTAN POTASSIUM 50 MG/1
50 TABLET ORAL DAILY
Qty: 90 TABLET | Refills: 3 | Status: SHIPPED | OUTPATIENT
Start: 2024-05-16

## 2024-05-16 NOTE — TELEPHONE ENCOUNTER
Received call from patient's wife wanting to know why RN or Rajani have not gotten back to her in over 5 days to discuss his symptoms.    Dorina BABB received message from patient's wife and forwarded message to Rajani on or around 5/11/24.

## 2024-05-16 NOTE — TELEPHONE ENCOUNTER
Discussed with NP will increase Losartan to 50 mg po daily. Pt will have BMP in 2 weeks, and has follow up in July 2024. Pt will continue to monitor Blood pressures. FLACA Rao RN

## 2024-06-27 DIAGNOSIS — B07.0 PLANTAR WART: ICD-10-CM

## 2024-06-27 RX ORDER — CIMETIDINE 800 MG
800 TABLET ORAL 2 TIMES DAILY
Qty: 200 TABLET | Refills: 2 | Status: SHIPPED | OUTPATIENT
Start: 2024-06-27

## 2024-06-28 ENCOUNTER — LAB (OUTPATIENT)
Dept: LAB | Facility: CLINIC | Age: 79
End: 2024-06-28
Payer: COMMERCIAL

## 2024-06-28 DIAGNOSIS — I25.5 ISCHEMIC CARDIOMYOPATHY: ICD-10-CM

## 2024-06-28 DIAGNOSIS — I10 PRIMARY HYPERTENSION: ICD-10-CM

## 2024-06-28 DIAGNOSIS — E78.5 HYPERLIPIDEMIA LDL GOAL <70: ICD-10-CM

## 2024-06-28 LAB
ANION GAP SERPL CALCULATED.3IONS-SCNC: 8 MMOL/L (ref 7–15)
BUN SERPL-MCNC: 17.5 MG/DL (ref 8–23)
CALCIUM SERPL-MCNC: 9.5 MG/DL (ref 8.8–10.2)
CHLORIDE SERPL-SCNC: 104 MMOL/L (ref 98–107)
CHOLEST SERPL-MCNC: 149 MG/DL
CREAT SERPL-MCNC: 1.19 MG/DL (ref 0.67–1.17)
DEPRECATED HCO3 PLAS-SCNC: 29 MMOL/L (ref 22–29)
EGFRCR SERPLBLD CKD-EPI 2021: 63 ML/MIN/1.73M2
FASTING STATUS PATIENT QL REPORTED: YES
GLUCOSE SERPL-MCNC: 95 MG/DL (ref 70–99)
HDLC SERPL-MCNC: 52 MG/DL
LDLC SERPL CALC-MCNC: 85 MG/DL
NONHDLC SERPL-MCNC: 97 MG/DL
POTASSIUM SERPL-SCNC: 4.3 MMOL/L (ref 3.4–5.3)
SODIUM SERPL-SCNC: 141 MMOL/L (ref 135–145)
TRIGL SERPL-MCNC: 58 MG/DL

## 2024-06-28 PROCEDURE — 36415 COLL VENOUS BLD VENIPUNCTURE: CPT | Performed by: NURSE PRACTITIONER

## 2024-06-28 PROCEDURE — 80048 BASIC METABOLIC PNL TOTAL CA: CPT | Performed by: NURSE PRACTITIONER

## 2024-06-28 PROCEDURE — 80061 LIPID PANEL: CPT | Performed by: NURSE PRACTITIONER

## 2024-06-28 NOTE — PROGRESS NOTES
~Cardiology Clinic Visit~    Primary Cardiologist: Dr. Pizano  Reason for visit: Routine follow up     History of Present Illness     Rey Brown is a very pleasant 79 year old male with a past medical history notable for hypertension, paroxysmal atrial fibrillation and atrial flutter, history of several cardioversions and 3 ablations, history of NSTEMI.     In summary, patient was initially seen in cardiology clinic in follow-up after a hospitalization for NSTEMI, May 2022.  He had coronary angiogram performed with stent placement to the LAD, and noted proximal LAD 40% stenosis, and circumflex 20% stenosis.  He had an echocardiogram in November 2022 showing normal ejection fraction that had actually improved compared to previous echocardiograms at the interval when he had the NSTEMI.     In follow-up last year, patient was doing well from a cardiac standpoint.  He had no chest pain or shortness of breath.  He remained active.  He had no palpitations, dizziness or syncope.  He completed DAPT post-intervention.    At his last visit, he was having ongoing fatigue and drowsiness he felt was due to low HR.  BP was controlled.  We reduced his Diltiazem.  He was stable on ASA/Eliquis.  As of today, he notes that he is feeling much better and his prior symptoms have resolved.  His BP again is well controlled in clinic, however, his home records show systolic 170-180.  He has no symptoms.  I question the accuracy of his home machine.  __________________________________________________________________         Assessment and Impression:     Coronary artery disease with history of NSTEMI  Status post mid distal LAD stent, May 2022.  Remains chest pain-free  On ASA, statin.  LDL >70 on Atorvastatin 80 mg daily.  Ischemic cardiomyopathy, recovered EF to normal range  History of recurrent atrial arrhythmias  Paroxysmal atrial fibrillation/flutter  History of multiple cardioversions and ablations, last in  November 2022  Remains in sinus rhythm; on Eliquis for stroke prophylaxis  Hyperlipidemia  Chronic renal insufficiency, stable   Hypertension         Recommendations and Plan:     Continue medications without changes at this point today.  Patient has 2-BP cuffs at home - he will check the accuracy of both in comparison to our office reading today.   If markedly different, we will have him come in for a BP check and machine calibration with our nurse staff.  Based on those results, medication adjustments can be made if necessary.  For cholesterol, stop Atorvastatin and start Rosuvastatin 40 mg daily.  Recheck FLP/ALT in 2-months.  Routine MD follow up in 1-year.  __________________________________________________________________    Thank you for the opportunity to participate in this pleasant patient's care.    We would be happy to see this patient sooner for any concerns in the meantime.    JOSE CRUZ Aranda, CNP   Nurse Practitioner  [facility]    Today's clinic visit entailed:  The following tests were independently interpreted by me as noted in my documentation: labs  Ordering of each unique test  Prescription drug management  The level of medical decision making during this visit was of moderate complexity.  Review of the result(s) of each unique test - cardiac testing, cardiac imaging, labs  Care everywhere reviewed for additional records to facilitate comprehensive patient care.    Orders this Visit:  Orders Placed This Encounter   Procedures    ALT    Lipid panel reflex to direct LDL Fasting    Follow-Up with Cardiology    Follow-Up with Cardiology- NOLAN     Orders Placed This Encounter   Medications    rosuvastatin (CRESTOR) 40 MG tablet     Sig: Take 1 tablet (40 mg) by mouth daily     Dispense:  90 tablet     Refill:  3     Medications Discontinued During This Encounter   Medication Reason    atorvastatin (LIPITOR) 80 MG tablet      Encounter Diagnoses   Name Primary?    Primary  "hypertension Yes    Hyperlipidemia LDL goal <70     Coronary artery disease involving native coronary artery of native heart without angina pectoris     Paroxysmal atrial fibrillation (H)      CURRENT MEDICATIONS:  Current Outpatient Medications   Medication Sig Dispense Refill    acetaminophen (TYLENOL) 325 MG tablet Take 325-650 mg by mouth every 6 hours as needed for mild pain      apixaban ANTICOAGULANT (ELIQUIS) 5 MG tablet Take 1 tablet (5 mg) by mouth 2 times daily 180 tablet 3    aspirin (ASA) 81 MG EC tablet Take 1 tablet (81 mg) by mouth daily      cimetidine (TAGAMET) 800 MG tablet TAKE 1 TABLET BY MOUTH TWICE  DAILY 200 tablet 2    diltiazem ER COATED BEADS (CARDIZEM CD/CARTIA XT) 120 MG 24 hr capsule Take 1 capsule (120 mg) by mouth daily 90 capsule 3    losartan (COZAAR) 50 MG tablet Take 1 tablet (50 mg) by mouth daily 90 tablet 3    rosuvastatin (CRESTOR) 40 MG tablet Take 1 tablet (40 mg) by mouth daily 90 tablet 3     ALLERGIES     Allergies   Allergen Reactions    Ace Inhibitors Cough     PAST MEDICAL, SURGICAL, FAMILY, SOCIAL HISTORY:  History was reviewed and updated as needed, see medical record.    Review of Systems:  A 10-point Review Of Systems is otherwise normal except for that which is noted in the HPI and interval summary.    Physical Exam:    Vitals: /75   Pulse 55   Ht 1.791 m (5' 10.5\")   Wt 77.1 kg (170 lb)   SpO2 98%   BMI 24.05 kg/m    Constitutional: Appears stated age, well nourished, NAD.  Neck: Supple. Carotid pulses full and equal.   Respiratory: Non-labored. Lungs CTAB.  Cardiovascular: RRR, normal S1 and S2. No M/G/R.  No edema.  GI: Soft, non-distended, non-tender.  Skin: Warm and dry.   Musculoskeletal/Extremities: Symmetrical movement.  Neurologic: No gross focal deficits. Alert, awake.  Psychiatric: Affect appropriate. Mentation normal.    Recent Lab Results:  LIPID RESULTS:  Lab Results   Component Value Date    CHOL 149 06/28/2024    CHOL 158 03/23/2021    " "HDL 52 06/28/2024    HDL 54 03/23/2021    LDL 85 06/28/2024    LDL 92 03/23/2021    TRIG 58 06/28/2024    TRIG 58 03/23/2021     LIVER ENZYME RESULTS:  Lab Results   Component Value Date    AST 20 04/15/2024    AST 18 03/23/2021    ALT 11 04/15/2024    ALT 10 03/23/2021     CBC RESULTS:  Lab Results   Component Value Date    WBC 6.7 04/15/2024    WBC 18.8 (H) 03/13/2020    RBC 5.01 04/15/2024    RBC 4.26 (L) 03/13/2020    HGB 15.1 04/15/2024    HCT 43.5 04/15/2024    MCV 86.8 04/15/2024    MCH 30.1 04/15/2024    MCHC 34.7 04/15/2024    RDW 12.8 11/04/2022    RDW 14.1 03/13/2020     04/15/2024     BMP RESULTS:  Lab Results   Component Value Date     06/28/2024     03/23/2021    POTASSIUM 4.3 06/28/2024    POTASSIUM 3.9 11/04/2022    POTASSIUM 4.5 03/23/2021    CHLORIDE 104 06/28/2024    CHLORIDE 106 11/04/2022    CHLORIDE 103 03/23/2021    CO2 29 06/28/2024    CO2 26 11/04/2022    CO2 30 05/14/2020    ANIONGAP 8 06/28/2024    ANIONGAP 5 11/04/2022    ANIONGAP 4 05/14/2020    GLC 95 06/28/2024    GLC 95 11/04/2022    GLC 79 03/23/2021    BUN 17.5 06/28/2024    BUN 25 11/04/2022    BUN 24 03/23/2021    BUN 18 03/23/2021    CR 1.19 (H) 06/28/2024    CR 1.31 (H) 03/23/2021    GFRESTIMATED 63 06/28/2024    GFRESTIMATED >60 03/25/2022    GFRESTIMATED 46 (L) 05/14/2020    GFRESTBLACK 54 (L) 05/14/2020    ELIEZER 9.5 06/28/2024    ELIEZER 9.3 03/23/2021      A1C RESULTS:  No results found for: \"A1C\"  INR RESULTS:  Lab Results   Component Value Date    INR 1.25 (H) 05/12/2022    INR 1.46 (H) 04/20/2022    INR 0.99 07/27/2007                     "

## 2024-07-02 ENCOUNTER — MYC MEDICAL ADVICE (OUTPATIENT)
Dept: CARDIOLOGY | Facility: CLINIC | Age: 79
End: 2024-07-02

## 2024-07-02 ENCOUNTER — OFFICE VISIT (OUTPATIENT)
Dept: CARDIOLOGY | Facility: CLINIC | Age: 79
End: 2024-07-02
Attending: NURSE PRACTITIONER
Payer: COMMERCIAL

## 2024-07-02 VITALS
SYSTOLIC BLOOD PRESSURE: 135 MMHG | HEIGHT: 71 IN | BODY MASS INDEX: 23.8 KG/M2 | OXYGEN SATURATION: 98 % | WEIGHT: 170 LBS | DIASTOLIC BLOOD PRESSURE: 75 MMHG | HEART RATE: 55 BPM

## 2024-07-02 DIAGNOSIS — I48.0 PAROXYSMAL ATRIAL FIBRILLATION (H): ICD-10-CM

## 2024-07-02 DIAGNOSIS — I25.10 CORONARY ARTERY DISEASE INVOLVING NATIVE CORONARY ARTERY OF NATIVE HEART WITHOUT ANGINA PECTORIS: ICD-10-CM

## 2024-07-02 DIAGNOSIS — E78.5 HYPERLIPIDEMIA LDL GOAL <70: ICD-10-CM

## 2024-07-02 DIAGNOSIS — I10 PRIMARY HYPERTENSION: Primary | ICD-10-CM

## 2024-07-02 PROCEDURE — 99214 OFFICE O/P EST MOD 30 MIN: CPT | Performed by: NURSE PRACTITIONER

## 2024-07-02 RX ORDER — ROSUVASTATIN CALCIUM 40 MG/1
40 TABLET, COATED ORAL DAILY
Qty: 90 TABLET | Refills: 3 | Status: SHIPPED | OUTPATIENT
Start: 2024-07-02

## 2024-07-02 NOTE — LETTER
7/2/2024    Sabas Staley MD  [address]  [facility] [id number]    RE: Rey Brown       Dear Colleague,     [facility] the [facility].              ~Cardiology Clinic Visit~    Primary Cardiologist: Dr. Pizano  Reason for visit: Routine follow up     History of Present Illness     Rey Brown is a very pleasant 79 year old male with a past medical history notable for hypertension, paroxysmal atrial fibrillation and atrial flutter, history of several cardioversions and 3 ablations, history of NSTEMI.     In summary, patient was initially seen in cardiology clinic in follow-up after a hospitalization for NSTEMI, May 2022.  He had coronary angiogram performed with stent placement to the LAD, and noted proximal LAD 40% stenosis, and circumflex 20% stenosis.  He had an echocardiogram in November 2022 showing normal ejection fraction that had actually improved compared to previous echocardiograms at the interval when he had the NSTEMI.     In follow-up last year, patient was doing well from a cardiac standpoint.  He had no chest pain or shortness of breath.  He remained active.  He had no palpitations, dizziness or syncope.  He completed DAPT post-intervention.    At his last visit, he was having ongoing fatigue and drowsiness he felt was due to low HR.  BP was controlled.  We reduced his Diltiazem.  He was stable on ASA/Eliquis.  As of today, he notes that he is feeling much better and his prior symptoms have resolved.  His BP again is well controlled in clinic, however, his home records show systolic 170-180.  He has no symptoms.  I question the accuracy of his home machine.  __________________________________________________________________         Assessment and Impression:     Coronary artery disease with history of NSTEMI  Status post mid distal LAD stent, May 2022.  Remains chest pain-free  On ASA, statin.  LDL >70 on  Atorvastatin 80 mg daily.  Ischemic cardiomyopathy, recovered EF to normal range  History of recurrent atrial arrhythmias  Paroxysmal atrial fibrillation/flutter  History of multiple cardioversions and ablations, last in November 2022  Remains in sinus rhythm; on Eliquis for stroke prophylaxis  Hyperlipidemia  Chronic renal insufficiency, stable   Hypertension         Recommendations and Plan:     Continue medications without changes at this point today.  Patient has 2-BP cuffs at home - he will check the accuracy of both in comparison to our office reading today.   If markedly different, we will have him come in for a BP check and machine calibration with our nurse staff.  Based on those results, medication adjustments can be made if necessary.  For cholesterol, stop Atorvastatin and start Rosuvastatin 40 mg daily.  Recheck FLP/ALT in 2-months.  Routine MD follow up in 1-year.  __________________________________________________________________    Thank you for the opportunity to participate in this pleasant patient's care.    We would be happy to see this patient sooner for any concerns in the meantime.    JOSE CRUZ Aranda, CNP   Nurse Practitioner  [facility]    Today's clinic visit entailed:  The following tests were independently interpreted by me as noted in my documentation: labs  Ordering of each unique test  Prescription drug management  The level of medical decision making during this visit was of moderate complexity.  Review of the result(s) of each unique test - cardiac testing, cardiac imaging, labs  Care everywhere reviewed for additional records to facilitate comprehensive patient care.    Orders this Visit:  Orders Placed This Encounter   Procedures    ALT    Lipid panel reflex to direct LDL Fasting    Follow-Up with Cardiology    Follow-Up with Cardiology- NOLAN     Orders Placed This Encounter   Medications    rosuvastatin (CRESTOR) 40 MG tablet     Sig: Take 1 tablet (40  "mg) by mouth daily     Dispense:  90 tablet     Refill:  3     Medications Discontinued During This Encounter   Medication Reason    atorvastatin (LIPITOR) 80 MG tablet      Encounter Diagnoses   Name Primary?    Primary hypertension Yes    Hyperlipidemia LDL goal <70     Coronary artery disease involving native coronary artery of native heart without angina pectoris     Paroxysmal atrial fibrillation (H)      CURRENT MEDICATIONS:  Current Outpatient Medications   Medication Sig Dispense Refill    acetaminophen (TYLENOL) 325 MG tablet Take 325-650 mg by mouth every 6 hours as needed for mild pain      apixaban ANTICOAGULANT (ELIQUIS) 5 MG tablet Take 1 tablet (5 mg) by mouth 2 times daily 180 tablet 3    aspirin (ASA) 81 MG EC tablet Take 1 tablet (81 mg) by mouth daily      cimetidine (TAGAMET) 800 MG tablet TAKE 1 TABLET BY MOUTH TWICE  DAILY 200 tablet 2    diltiazem ER COATED BEADS (CARDIZEM CD/CARTIA XT) 120 MG 24 hr capsule Take 1 capsule (120 mg) by mouth daily 90 capsule 3    losartan (COZAAR) 50 MG tablet Take 1 tablet (50 mg) by mouth daily 90 tablet 3    rosuvastatin (CRESTOR) 40 MG tablet Take 1 tablet (40 mg) by mouth daily 90 tablet 3     ALLERGIES     Allergies   Allergen Reactions    Ace Inhibitors Cough     PAST MEDICAL, SURGICAL, FAMILY, SOCIAL HISTORY:  History was reviewed and updated as needed, see medical record.    Review of Systems:  A 10-point Review Of Systems is otherwise normal except for that which is noted in the HPI and interval summary.    Physical Exam:    Vitals: /75   Pulse 55   Ht 1.791 m (5' 10.5\")   Wt 77.1 kg (170 lb)   SpO2 98%   BMI 24.05 kg/m    Constitutional: Appears stated age, well nourished, NAD.  Neck: Supple. Carotid pulses full and equal.   Respiratory: Non-labored. Lungs CTAB.  Cardiovascular: RRR, normal S1 and S2. No M/G/R.  No edema.  GI: Soft, non-distended, non-tender.  Skin: Warm and dry.   Musculoskeletal/Extremities: Symmetrical " "movement.  Neurologic: No gross focal deficits. Alert, awake.  Psychiatric: Affect appropriate. Mentation normal.    Recent Lab Results:  LIPID RESULTS:  Lab Results   Component Value Date    CHOL 149 06/28/2024    CHOL 158 03/23/2021    HDL 52 06/28/2024    HDL 54 03/23/2021    LDL 85 06/28/2024    LDL 92 03/23/2021    TRIG 58 06/28/2024    TRIG 58 03/23/2021     LIVER ENZYME RESULTS:  Lab Results   Component Value Date    AST 20 04/15/2024    AST 18 03/23/2021    ALT 11 04/15/2024    ALT 10 03/23/2021     CBC RESULTS:  Lab Results   Component Value Date    WBC 6.7 04/15/2024    WBC 18.8 (H) 03/13/2020    RBC 5.01 04/15/2024    RBC 4.26 (L) 03/13/2020    HGB 15.1 04/15/2024    HCT 43.5 04/15/2024    MCV 86.8 04/15/2024    MCH 30.1 04/15/2024    MCHC 34.7 04/15/2024    RDW 12.8 11/04/2022    RDW 14.1 03/13/2020     04/15/2024     BMP RESULTS:  Lab Results   Component Value Date     06/28/2024     03/23/2021    POTASSIUM 4.3 06/28/2024    POTASSIUM 3.9 11/04/2022    POTASSIUM 4.5 03/23/2021    CHLORIDE 104 06/28/2024    CHLORIDE 106 11/04/2022    CHLORIDE 103 03/23/2021    CO2 29 06/28/2024    CO2 26 11/04/2022    CO2 30 05/14/2020    ANIONGAP 8 06/28/2024    ANIONGAP 5 11/04/2022    ANIONGAP 4 05/14/2020    GLC 95 06/28/2024    GLC 95 11/04/2022    GLC 79 03/23/2021    BUN 17.5 06/28/2024    BUN 25 11/04/2022    BUN 24 03/23/2021    BUN 18 03/23/2021    CR 1.19 (H) 06/28/2024    CR 1.31 (H) 03/23/2021    GFRESTIMATED 63 06/28/2024    GFRESTIMATED >60 03/25/2022    GFRESTIMATED 46 (L) 05/14/2020    GFRESTBLACK 54 (L) 05/14/2020    ELIEZER 9.5 06/28/2024    ELIEZER 9.3 03/23/2021      A1C RESULTS:  No results found for: \"A1C\"  INR RESULTS:  Lab Results   Component Value Date    INR 1.25 (H) 05/12/2022    INR 1.46 (H) 04/20/2022    INR 0.99 07/27/2007           Thank you for allowing me to participate in the care of your patient.      Sincerely,     JOSE CRUZ Aranda [facility] " [facility]  cc:   JOSE CRUZ Aranda CNP  [address]  [place] [address]

## 2024-07-02 NOTE — PATIENT INSTRUCTIONS
Thank you for your visit with the [facility] today.    Today's Summary:    Stop Atorvastatin.  Start Rosuvastatin 40 mg daily.  Recheck liver and cholesterol in about 2-3 months.  Check blood pressure machines at home - if your readings are still elevated, I want you to schedule a nurse follow up visit so we can calibrate your machine with ours.  Send me a Vedantu message with the information you find out at home about your machine.  Will make more recommendations depending on what the blood pressure cuff shows.    Follow-up:  Cardiology follow up at [facility]: 1-year follow up.     Cardiology Scheduling ~[phone]  Cardiology Clinic RN ~ [phone]    It was a pleasure seeing you today.     JOSE CRUZ Aranda, CNP  Certified Nurse Practitioner  [facility]  July 2, 2024  ________________________________________________________

## 2024-07-08 DIAGNOSIS — I10 BENIGN ESSENTIAL HYPERTENSION: ICD-10-CM

## 2024-07-08 DIAGNOSIS — I10 PRIMARY HYPERTENSION: Primary | ICD-10-CM

## 2024-07-08 NOTE — PROGRESS NOTES
Rajani Foote APRN CNP Schwartz, Tammy J, RN  Phone Number: [phone]     The readings are still inconsistent with what we have seen in clinic.  Please have him set up nurse visit for BP and machine calibration - we discussed this at our visit.  Order already in place.  Promise Rao RN

## 2024-07-17 NOTE — PROGRESS NOTES
Answers submitted by the patient for this visit:  General Questionnaire (Submitted on 7/15/2024)  Chief Complaint: Chronic problems general questions HPI Form  What is the reason for your visit today? : Remove or freeze a growing mole on back and check out a bump on left side of neck  How many servings of fruits and vegetables do you eat daily?: 2-3  On average, how many sweetened beverages do you drink each day (Examples: soda, juice, sweet tea, etc.  Do NOT count diet or artificially sweetened beverages)?: 1  How many minutes a day do you exercise enough to make your heart beat faster?: 20 to 29  How many days a week do you exercise enough to make your heart beat faster?: 7  How many days per week do you miss taking your medication?: 0    Seb k on the back.  Red itchy, irritating, catch\es on clothes    ROS: No bleeding problems.    cryotherapy applied  Liquid nitrogen was applied for 5-10 seconds x3  to the skin lesions      A:P  Irritated, red and itchy  Dipesh K's    The expected blistering or scabbing reaction explained. Do not pick at the areas. Patient reminded to expect hypopigmented scars from the procedure. Return if lesions fail to fully resolve.

## 2024-07-22 ENCOUNTER — OFFICE VISIT (OUTPATIENT)
Dept: FAMILY MEDICINE | Facility: CLINIC | Age: 79
End: 2024-07-22

## 2024-07-22 VITALS
BODY MASS INDEX: 23.88 KG/M2 | SYSTOLIC BLOOD PRESSURE: 142 MMHG | OXYGEN SATURATION: 99 % | DIASTOLIC BLOOD PRESSURE: 64 MMHG | HEART RATE: 47 BPM | WEIGHT: 168.8 LBS

## 2024-07-22 DIAGNOSIS — I27.20 PULMONARY HTN (H): ICD-10-CM

## 2024-07-22 DIAGNOSIS — L82.0 INFLAMED SEBORRHEIC KERATOSIS: ICD-10-CM

## 2024-07-22 DIAGNOSIS — Z23 NEED FOR VACCINATION: Primary | ICD-10-CM

## 2024-07-22 DIAGNOSIS — I70.0 ATHEROSCLEROSIS OF AORTA (H): ICD-10-CM

## 2024-07-22 DIAGNOSIS — E78.00 HYPERCHOLESTEROLEMIA: ICD-10-CM

## 2024-07-22 PROCEDURE — 90480 ADMN SARSCOV2 VAC 1/ONLY CMP: CPT | Performed by: FAMILY MEDICINE

## 2024-07-22 PROCEDURE — 91322 SARSCOV2 VAC 50 MCG/0.5ML IM: CPT | Performed by: FAMILY MEDICINE

## 2024-07-22 PROCEDURE — 17110 DESTRUCTION B9 LES UP TO 14: CPT | Mod: 59 | Performed by: FAMILY MEDICINE

## 2024-07-22 PROCEDURE — 99212 OFFICE O/P EST SF 10 MIN: CPT | Mod: 25 | Performed by: FAMILY MEDICINE

## 2024-09-30 ENCOUNTER — ALLIED HEALTH/NURSE VISIT (OUTPATIENT)
Dept: CARDIOLOGY | Facility: CLINIC | Age: 79
End: 2024-09-30
Payer: COMMERCIAL

## 2024-09-30 ENCOUNTER — TELEPHONE (OUTPATIENT)
Dept: CARDIOLOGY | Facility: CLINIC | Age: 79
End: 2024-09-30

## 2024-09-30 ENCOUNTER — LAB (OUTPATIENT)
Dept: LAB | Facility: CLINIC | Age: 79
End: 2024-09-30
Payer: COMMERCIAL

## 2024-09-30 VITALS — OXYGEN SATURATION: 99 % | HEART RATE: 51 BPM | SYSTOLIC BLOOD PRESSURE: 142 MMHG | DIASTOLIC BLOOD PRESSURE: 80 MMHG

## 2024-09-30 DIAGNOSIS — I10 PRIMARY HYPERTENSION: Primary | ICD-10-CM

## 2024-09-30 DIAGNOSIS — I10 PRIMARY HYPERTENSION: ICD-10-CM

## 2024-09-30 DIAGNOSIS — I10 BENIGN ESSENTIAL HYPERTENSION: ICD-10-CM

## 2024-09-30 DIAGNOSIS — E78.5 HYPERLIPIDEMIA LDL GOAL <70: ICD-10-CM

## 2024-09-30 LAB
ALT SERPL W P-5'-P-CCNC: 12 U/L (ref 0–70)
CHOLEST SERPL-MCNC: 113 MG/DL
FASTING STATUS PATIENT QL REPORTED: YES
HDLC SERPL-MCNC: 53 MG/DL
LDLC SERPL CALC-MCNC: 51 MG/DL
NONHDLC SERPL-MCNC: 60 MG/DL
TRIGL SERPL-MCNC: 43 MG/DL

## 2024-09-30 PROCEDURE — 36415 COLL VENOUS BLD VENIPUNCTURE: CPT | Performed by: NURSE PRACTITIONER

## 2024-09-30 PROCEDURE — 84460 ALANINE AMINO (ALT) (SGPT): CPT | Performed by: NURSE PRACTITIONER

## 2024-09-30 PROCEDURE — 80061 LIPID PANEL: CPT | Performed by: NURSE PRACTITIONER

## 2024-09-30 PROCEDURE — 99207 PR NO CHARGE NURSE ONLY: CPT | Performed by: NURSE PRACTITIONER

## 2024-09-30 NOTE — PROGRESS NOTES
Last office visit: 7/22/24 PCP    Previous blood pressure:   142/64   Mm Hg     Previous heart rate:   47   bpm    Today's blood pressure:    142/60   mm Hg    Today's heart rate:    51   bpm     Ordering Provider:  JOSE CRUZ Aranda CNP    Results sent to team # :  6    Additional comments:  Patient brought his home BP monitor to check against ours.  After waiting three minutes between BPs, his monitor read 148/70.    VINH Ponce

## 2024-09-30 NOTE — TELEPHONE ENCOUNTER
Component      Latest Ref Rng 9/30/2024  8:45 AM   Cholesterol      <200 mg/dL 113    Triglycerides      <150 mg/dL 43    HDL Cholesterol      >=40 mg/dL 53    LDL Cholesterol Calculated      <100 mg/dL 51    Non HDL Cholesterol      <130 mg/dL 60    Patient Fasting? Yes        Component      Latest Ref Rng 9/30/2024  8:45 AM   ALT      0 - 70 U/L 12    Will forward to cardiologist for review, NP out of clinic.   Last office visit: 7/22/24 PCP     Previous blood pressure:   142/64   Mm Hg      Previous heart rate:   47   bpm     Today's blood pressure:    142/60   mm Hg     Today's heart rate:    51   bpm      Ordering Provider:  JOSE CRUZ Aranda CNP     Results sent to team # :  6     Additional comments:  Patient brought his home BP monitor to check against ours.  After waiting three minutes between BPs, his monitor read 148/70.    VINH Ponce RN

## 2024-10-01 RX ORDER — AMLODIPINE BESYLATE 2.5 MG/1
2.5 TABLET ORAL DAILY
Qty: 30 TABLET | Refills: 1 | Status: SHIPPED | OUTPATIENT
Start: 2024-10-01

## 2024-10-01 NOTE — TELEPHONE ENCOUNTER
Start amlodipine 2.5mg per day for high BP. Can call us in 1m if BP greater than 140 systolic at home

## 2024-10-01 NOTE — TELEPHONE ENCOUNTER
Called Pt reviewed BP's and recommendations of amlodipine 2.5 mg daily. Reviewed side effects, when to take, sent RX to pharmacy, and Pt will monitor BP's , and inform nurse of results in 3-4 weeks. If BP's in 120-130 range will send RX to [facility] mail order. FLACA Rao RN

## 2024-10-03 ENCOUNTER — TELEPHONE (OUTPATIENT)
Dept: CARDIOLOGY | Facility: CLINIC | Age: 79
End: 2024-10-03
Payer: COMMERCIAL

## 2024-10-03 NOTE — TELEPHONE ENCOUNTER
Labs for review   Component      Latest Ref Rng 9/30/2024  8:45 AM   Cholesterol      <200 mg/dL 113    Triglycerides      <150 mg/dL 43    HDL Cholesterol      >=40 mg/dL 53    LDL Cholesterol Calculated      <100 mg/dL 51    Non HDL Cholesterol      <130 mg/dL 60    Patient Fasting? Yes        Component      Latest Ref Rng 9/30/2024  8:45 AM   ALT      0 - 70 U/L 12      Called Pt left voice mail informing him of normal lab results and all at goal. Left phone number if questions or concerns.  FLACA Rao RN

## 2024-10-10 NOTE — PROGRESS NOTES
Answers submitted by the patient for this visit:  General Questionnaire (Submitted on 10/9/2024)  Chief Complaint: Chronic problems general questions HPI Form  What is the reason for your visit today? : Mole/wart/growth on back  How many servings of fruits and vegetables do you eat daily?: 2-3  On average, how many sweetened beverages do you drink each day (Examples: soda, juice, sweet tea, etc.  Do NOT count diet or artificially sweetened beverages)?: 1  How many minutes a day do you exercise enough to make your heart beat faster?: 30 to 60  How many days a week do you exercise enough to make your heart beat faster?: 7  How many days per week do you miss taking your medication?: 0      Seb k on the back.  Red itchy, irritating, catch\es on clothes    ROS: No bleeding problems.    cryotherapy applied  Liquid nitrogen was applied for 5-10 seconds x3  to the skin lesions      A:P  Irritated, red and itchy  Dipesh K's    The expected blistering or scabbing reaction explained. Do not pick at the areas. Patient reminded to expect hypopigmented scars from the procedure. Return if lesions fail to fully resolve.     No problems updated.  has HTN (hypertension); Hypercholesterolemia; Lumbar disc disease; ACP (advance care planning); Elevated prostate specific antigen (PSA); Family history of migraine; Plantar wart; First degree AV block; Chronic kidney disease, stage III (moderate) (H); Pulmonary nodules due for CT in 4/24 and 4/26; History of non-ST elevation myocardial infarction (NSTEMI) 5/2022; Coronary artery disease involving native coronary artery of native heart without angina pectoris; Typical atrial flutter (H); Kidney stone; Secondary hypercoagulable state (H); and Pulmonary HTN (H) seen on Echo  11/2022 actively managed by [facility] Cardiologist Dr. Gerard on their problem list.  The longitudinal plan of care for the diagnosis(es)/condition(s) as documented were addressed during this visit. Due to the added complexity  in care, I will continue to support Anthony in the subsequent management and with ongoing continuity of care.

## 2024-10-14 ENCOUNTER — OFFICE VISIT (OUTPATIENT)
Dept: FAMILY MEDICINE | Facility: CLINIC | Age: 79
End: 2024-10-14

## 2024-10-14 VITALS
DIASTOLIC BLOOD PRESSURE: 48 MMHG | BODY MASS INDEX: 24.16 KG/M2 | OXYGEN SATURATION: 97 % | WEIGHT: 170.8 LBS | HEART RATE: 58 BPM | SYSTOLIC BLOOD PRESSURE: 107 MMHG

## 2024-10-14 DIAGNOSIS — L82.0 INFLAMED SEBORRHEIC KERATOSIS: Primary | ICD-10-CM

## 2024-10-14 PROCEDURE — G0008 ADMIN INFLUENZA VIRUS VAC: HCPCS | Performed by: FAMILY MEDICINE

## 2024-10-14 PROCEDURE — 99213 OFFICE O/P EST LOW 20 MIN: CPT | Mod: 25 | Performed by: FAMILY MEDICINE

## 2024-10-14 PROCEDURE — 90653 IIV ADJUVANT VACCINE IM: CPT | Performed by: FAMILY MEDICINE

## 2024-10-25 ENCOUNTER — TELEPHONE (OUTPATIENT)
Dept: CARDIOLOGY | Facility: CLINIC | Age: 79
End: 2024-10-25
Payer: COMMERCIAL

## 2024-10-25 NOTE — TELEPHONE ENCOUNTER
Received call from Pt. BP's on amlodipine 2.5 mg are not controlling BP. Pt states he is still frequently having BP's 140-150 systolic. Pt asking for recommendations for better BP control. Pt is having no side effects from medication. FLACA Rao RN

## 2024-10-28 NOTE — TELEPHONE ENCOUNTER
Called Pt left message can increase Amlodipine to 5 mg daily, continue to monitor BP.  Asked Pt to contact nurse when needs refills, or BP not controlled, FLACA schilling RN

## 2024-10-30 DIAGNOSIS — I10 PRIMARY HYPERTENSION: ICD-10-CM

## 2024-10-30 RX ORDER — AMLODIPINE BESYLATE 2.5 MG/1
5 TABLET ORAL DAILY
Qty: 60 TABLET | Refills: 0 | Status: SHIPPED | OUTPATIENT
Start: 2024-10-30

## 2024-10-30 NOTE — PROGRESS NOTES
Amlodipine increased to 5 mg per DR Pizano. Pt will monitor BP, and if no side effects will refill with 5 mg table at next refill. FLACA Rao RN

## 2024-12-04 DIAGNOSIS — I48.4 ATYPICAL ATRIAL FLUTTER (H): ICD-10-CM

## 2024-12-04 RX ORDER — DILTIAZEM HYDROCHLORIDE 120 MG/1
120 CAPSULE, COATED, EXTENDED RELEASE ORAL DAILY
Qty: 90 CAPSULE | Refills: 2 | Status: SHIPPED | OUTPATIENT
Start: 2024-12-04

## 2024-12-12 ENCOUNTER — MYC MEDICAL ADVICE (OUTPATIENT)
Dept: CARDIOLOGY | Facility: CLINIC | Age: 79
End: 2024-12-12
Payer: COMMERCIAL

## 2024-12-12 DIAGNOSIS — I10 HTN (HYPERTENSION): Primary | ICD-10-CM

## 2024-12-17 ENCOUNTER — OFFICE VISIT (OUTPATIENT)
Dept: CARDIOLOGY | Facility: CLINIC | Age: 79
End: 2024-12-17
Payer: COMMERCIAL

## 2024-12-17 VITALS
WEIGHT: 177.7 LBS | HEIGHT: 71 IN | SYSTOLIC BLOOD PRESSURE: 159 MMHG | BODY MASS INDEX: 24.88 KG/M2 | HEART RATE: 50 BPM | DIASTOLIC BLOOD PRESSURE: 70 MMHG

## 2024-12-17 DIAGNOSIS — I10 ESSENTIAL HYPERTENSION: Primary | ICD-10-CM

## 2024-12-17 DIAGNOSIS — I25.5 ISCHEMIC CARDIOMYOPATHY: ICD-10-CM

## 2024-12-17 DIAGNOSIS — I10 PRIMARY HYPERTENSION: ICD-10-CM

## 2024-12-17 RX ORDER — LOSARTAN POTASSIUM 50 MG/1
75 TABLET ORAL DAILY
COMMUNITY
Start: 2024-12-17

## 2024-12-17 NOTE — PATIENT INSTRUCTIONS
Thank you for your visit with the [facility] today.    Today's plan:   - Increase the dose of losartan from 1 tablet (50 mg) to 1.5 tablets (75 mg) once daily.   - Check non-fasting labs in 1-2 weeks after increasing the losartan to recheck kidney function and electrolytes. We can do a nurse visit the same day you come in for labs to recheck your blood pressure.     If you have questions or concerns please call the nurse team at [phone] or send a Client24 message.     Scheduling phone number: [phone]    It was a pleasure seeing you today!     JOSE CRUZ Ramon, CNP  Nurse Practitioner  [facility]

## 2024-12-17 NOTE — PROGRESS NOTES
"  Cardiology Clinic Progress Note    Service Date: December 17, 2024  Primary Cardiology Team: Dr. Pizano    HISTORY OF PRESENT ILLNESS:  I had the pleasure of meeting Rey \"Anthony\" THOMAS Brown in the clinic today. He is a very pleasant 79 year old male with a past medical history notable for hypertension, paroxysmal atrial fibrillation and atrial flutter, history of several cardioversions and 3 ablations, and coronary artery disease with NSTEMI in May 2022 with culprit mid to distal LAD lesion which was treated with successful PCI with placement of a single drug-eluting stent.     Most recent echocardiogram in November 2022 showed normal ejection fraction that had actually improved compared to previous echocardiograms at the interval when he had the NSTEMI. No significant valve disease was noted. Right ventricular systolic pressure was elevated, consistent with mild to moderate pulmonary hypertension.    The patient was last seen by my colleague, JOSE CRUZ Aranda, CNP, in the clinic in July 2024.  He was doing quite well from a cardiac standpoint at that time.    In the interim, the patient was having some issues with blood pressure control, so he was initially instructed started on amlodipine. He developed issues with side effect of lower extremity edema on amlodipine so this was subsequently discontinued.    Today, Anthony presents to the clinic in follow-up for reassessment of his blood pressure.  He brings in a log of his most recent blood pressure readings which have been consistently elevated around the 130s-150s systolic range over the past 2 weeks or so.  He reports occasional mild positional lightheadedness when he gets up in the middle of the night to go to the bathroom.  He has not had any falls or syncopal episodes.  He otherwise has been doing well from a cardiac standpoint.  He continues to go to the gym several times per week and feels he is tolerating his usual activity level well without any " limitations or exertional symptoms.  He has not had chest pain, palpitations, or shortness of breath.  His lower extremity edema has resolved after stopping the amlodipine.    ASSESSMENT:  Coronary artery disease with history of NSTEMI in May 2022 s/p PCI of the mid-distal LAD with MARYCARMEN x1. Remains chest pain-free. On ASA, statin. LDL >70 on Atorvastatin 80 mg daily.  Ischemic cardiomyopathy, recovered EF to normal range  History of recurrent atrial arrhythmias  Paroxysmal atrial fibrillation/flutter with multiple cardioversions and ablations, last in November 2022. Remains in sinus rhythm; on Eliquis for stroke prophylaxi  Hyperlipidemia  Chronic renal insufficiency, stable   Hypertension, blood pressure suboptimally controlled primarily in the 130-150 systolic range at home. Also, with occasional postural lightheadedness suspicious for possible component of some orthostatic hypotension.    PLAN:  - Recommend increasing the dose of losartan from 50 mg to 75 mg once daily with a repeat BMP in about 1-2 weeks after increasing the dose.  - Will plan for a nurse visit for blood pressure recheck in 1-2 weeks after increasing losartan.  Reviewed to continue to monitor his blood pressure regularly at home and keep a log of his readings to bring in with him to the visit.  If he is tolerating the 75 mg daily dose of losartan well and blood pressure readings remain suboptimally controlled, then we could consider increasing this further to 100 mg once daily. He states that he was previously on losartan 100 mg daily and tolerated this without issues    Thank you for the opportunity to participate in this pleasant patient's care. We would be happy to see him sooner if needed for any concerns in the meantime.    30 total minutes was spent today including chart review, precharting, history and exam, post visit documentation, and reviewing studies as outlined above.     Please kindly note that this document was completed in part  using voice recognition software. Although reviewed after completion, some word substitutions and typographical errors may occur. Please contact me if clarification is needed.     JOSE CRUZ Ramon, CNP   Nurse Practitioner  [facility]    Orders this Visit:  Orders Placed This Encounter   Procedures    Basic metabolic panel    Follow-Up with Cardiology Nurse     Orders Placed This Encounter   Medications    losartan (COZAAR) 50 MG tablet     Sig: Take 1.5 tablets (75 mg) by mouth daily.     Medications Discontinued During This Encounter   Medication Reason    amLODIPine (NORVASC) 2.5 MG tablet Med Rec(No AVS / No eCancel)    losartan (COZAAR) 50 MG tablet     cimetidine (TAGAMET) 800 MG tablet Stopped by Patient (No AVS)     Encounter Diagnoses   Name Primary?    Essential hypertension Yes    Ischemic cardiomyopathy     Primary hypertension        CURRENT MEDICATIONS:  Current Outpatient Medications   Medication Sig Dispense Refill    acetaminophen (TYLENOL) 325 MG tablet Take 325-650 mg by mouth every 6 hours as needed for mild pain      apixaban ANTICOAGULANT (ELIQUIS) 5 MG tablet Take 1 tablet (5 mg) by mouth 2 times daily 180 tablet 3    aspirin (ASA) 81 MG EC tablet Take 1 tablet (81 mg) by mouth daily      diltiazem ER COATED BEADS (CARDIZEM CD/CARTIA XT) 120 MG 24 hr capsule Take 1 capsule (120 mg) by mouth daily. 90 capsule 2    losartan (COZAAR) 50 MG tablet Take 1.5 tablets (75 mg) by mouth daily.      rosuvastatin (CRESTOR) 40 MG tablet Take 1 tablet (40 mg) by mouth daily 90 tablet 3       ALLERGIES  Allergies   Allergen Reactions    Ace Inhibitors Cough       PAST MEDICAL, SURGICAL, FAMILY HISTORY:  History was reviewed and updated as needed, see medical record.    SOCIAL HISTORY:  Social History     Socioeconomic History    Marital status:      Spouse name: Erin    Number of children: Not on file    Years of education: Not on file    Highest education level: Not on file    Occupational History    Not on file   Tobacco Use    Smoking status: Never    Smokeless tobacco: Never   Substance and Sexual Activity    Alcohol use: Yes     Alcohol/week: 2.0 - 3.0 standard drinks of alcohol     Types: 2 - 3 Cans of beer per week     Comment: 3-4 beers/week    Drug use: No    Sexual activity: Yes     Partners: Female   Other Topics Concern     Service Not Asked    Blood Transfusions Not Asked    Caffeine Concern Not Asked    Occupational Exposure Not Asked    Hobby Hazards Not Asked    Sleep Concern Not Asked    Stress Concern Not Asked    Weight Concern Not Asked    Special Diet No    Back Care Not Asked    Exercise Yes     Comment: 2-3x/week    Bike Helmet Not Asked    Seat Belt Not Asked    Self-Exams Not Asked    Parent/sibling w/ CABG, MI or angioplasty before 65F 55M? Not Asked   Social History Narrative    Not on file     Social Drivers of Health     Financial Resource Strain: Low Risk  (7/15/2024)    Financial Resource Strain     Within the past 12 months, have you or your family members you live with been unable to get utilities (heat, electricity) when it was really needed?: No   Food Insecurity: Low Risk  (7/15/2024)    Food Insecurity     Within the past 12 months, did you worry that your food would run out before you got money to buy more?: No     Within the past 12 months, did the food you bought just not last and you didn t have money to get more?: No   Transportation Needs: Low Risk  (7/15/2024)    Transportation Needs     Within the past 12 months, has lack of transportation kept you from medical appointments, getting your medicines, non-medical meetings or appointments, work, or from getting things that you need?: No   Physical Activity: Insufficiently Active (4/9/2024)    Exercise Vital Sign     Days of Exercise per Week: 5 days     Minutes of Exercise per Session: 20 min   Stress: No Stress Concern Present (4/9/2024)    [place] [employer] -  "Occupational Stress Questionnaire     Feeling of Stress : Not at all   Social Connections: Unknown (4/9/2024)    Social Connection and Isolation Panel [NHANES]     Frequency of Communication with Friends and Family: Not on file     Frequency of Social Gatherings with Friends and Family: Once a week     Attends Quaker Services: Not on file     Active Member of Clubs or Organizations: Not on file     Attends Club or Organization Meetings: Not on file     Marital Status: Not on file   Interpersonal Safety: Not on file   Housing Stability: Low Risk  (7/15/2024)    Housing Stability     Do you have housing? : Yes     Are you worried about losing your housing?: No       Review of Systems:  Focused cardiovascular and respiratory review of systems is negative other than the symptoms noted above in the HPI.     Physical Exam:  Vitals: BP (!) 159/70   Pulse 50   Ht 1.791 m (5' 10.5\")   Wt 80.6 kg (177 lb 11.2 oz)   BMI 25.14 kg/m     Wt Readings from Last 4 Encounters:   12/17/24 80.6 kg (177 lb 11.2 oz)   10/14/24 77.5 kg (170 lb 12.8 oz)   07/22/24 76.6 kg (168 lb 12.8 oz)   07/02/24 77.1 kg (170 lb)     Constitutional: Alert and in no acute distress.  Neck: No JVD.  Cardiovascular:  Regular rate and rhythm. No murmur, rub or gallop.   Respiratory: Breathing non-labored. Lungs are clear to auscultation with no wheezes or crackles bilaterally.  Gastrointestinal: Abdomen non-distended.  Extremities: No pitting lower extremity edema bilaterally.   Neuropsychiatric: No gross focal deficits. Affect appropriate. Mentation normal.     Recent Lab Results:  LIPID RESULTS:  Lab Results   Component Value Date    CHOL 113 09/30/2024    CHOL 158 03/23/2021    HDL 53 09/30/2024    HDL 54 03/23/2021    LDL 51 09/30/2024    LDL 92 03/23/2021    TRIG 43 09/30/2024    TRIG 58 03/23/2021     LIVER ENZYME RESULTS:  Lab Results   Component Value Date    AST 20 04/15/2024    AST 18 03/23/2021    ALT 12 09/30/2024    ALT 10 03/23/2021 "     CBC RESULTS:  Lab Results   Component Value Date    WBC 6.7 04/15/2024    WBC 18.8 (H) 03/13/2020    RBC 5.01 04/15/2024    RBC 4.26 (L) 03/13/2020    HGB 15.1 04/15/2024    HCT 43.5 04/15/2024    MCV 86.8 04/15/2024    MCH 30.1 04/15/2024    MCHC 34.7 04/15/2024    RDW 12.8 11/04/2022    RDW 14.1 03/13/2020     04/15/2024     BMP RESULTS:  Lab Results   Component Value Date     06/28/2024     03/23/2021    POTASSIUM 4.3 06/28/2024    POTASSIUM 3.9 11/04/2022    POTASSIUM 4.5 03/23/2021    CHLORIDE 104 06/28/2024    CHLORIDE 106 11/04/2022    CHLORIDE 103 03/23/2021    CO2 29 06/28/2024    CO2 26 11/04/2022    CO2 30 05/14/2020    ANIONGAP 8 06/28/2024    ANIONGAP 5 11/04/2022    ANIONGAP 4 05/14/2020    GLC 95 06/28/2024    GLC 95 11/04/2022    GLC 79 03/23/2021    BUN 17.5 06/28/2024    BUN 25 11/04/2022    BUN 24 03/23/2021    BUN 18 03/23/2021    CR 1.19 (H) 06/28/2024    CR 1.31 (H) 03/23/2021    GFRESTIMATED 63 06/28/2024    GFRESTIMATED >60 03/25/2022    GFRESTIMATED 46 (L) 05/14/2020    GFRESTBLACK 54 (L) 05/14/2020    ELIEZER 9.5 06/28/2024    ELIEZER 9.3 03/23/2021     CC  Geraldo Pizano MD  [id number]  [id number]  [place] [id number]

## 2024-12-17 NOTE — LETTER
"12/17/2024    Sabas Staley MD  [address]  [facility] [address]    RE: Rey GUZMAN Stephanie       Dear Colleague,     I had the pleasure of seeing Rey Brown in the [facility].    Cardiology Clinic Progress Note    Service Date: December 17, 2024  Primary Cardiology Team: Dr. Pizano    HISTORY OF PRESENT ILLNESS:  I had the pleasure of meeting Rey \"Anthony\" THOMAS Brown in the clinic today. He is a very pleasant 79 year old male with a past medical history notable for hypertension, paroxysmal atrial fibrillation and atrial flutter, history of several cardioversions and 3 ablations, and coronary artery disease with NSTEMI in May 2022 with culprit mid to distal LAD lesion which was treated with successful PCI with placement of a single drug-eluting stent.     Most recent echocardiogram in November 2022 showed normal ejection fraction that had actually improved compared to previous echocardiograms at the interval when he had the NSTEMI. No significant valve disease was noted. Right ventricular systolic pressure was elevated, consistent with mild to moderate pulmonary hypertension.    The patient was last seen by my colleague, JOSE CRUZ Aranda, CNP, in the clinic in July 2024.  He was doing quite well from a cardiac standpoint at that time.    In the interim, the patient was having some issues with blood pressure control, so he was initially instructed started on amlodipine. He developed issues with side effect of lower extremity edema on amlodipine so this was subsequently discontinued.    Today, Anthony presents to the clinic in follow-up for reassessment of his blood pressure.  He brings in a log of his most recent blood pressure readings which have been consistently elevated around the 130s-150s systolic range over the past 2 weeks or so.  He reports occasional mild positional lightheadedness when he gets up in the middle of the night to go to the bathroom.  He has " not had any falls or syncopal episodes.  He otherwise has been doing well from a cardiac standpoint.  He continues to go to the gym several times per week and feels he is tolerating his usual activity level well without any limitations or exertional symptoms.  He has not had chest pain, palpitations, or shortness of breath.  His lower extremity edema has resolved after stopping the amlodipine.    ASSESSMENT:  Coronary artery disease with history of NSTEMI in May 2022 s/p PCI of the mid-distal LAD with MARYCARMEN x1. Remains chest pain-free. On ASA, statin. LDL >70 on Atorvastatin 80 mg daily.  Ischemic cardiomyopathy, recovered EF to normal range  History of recurrent atrial arrhythmias  Paroxysmal atrial fibrillation/flutter with multiple cardioversions and ablations, last in November 2022. Remains in sinus rhythm; on Eliquis for stroke prophylaxi  Hyperlipidemia  Chronic renal insufficiency, stable   Hypertension, blood pressure suboptimally controlled primarily in the 130-150 systolic range at home. Also, with occasional postural lightheadedness suspicious for possible component of some orthostatic hypotension.    PLAN:  - Recommend increasing the dose of losartan from 50 mg to 75 mg once daily with a repeat BMP in about 1-2 weeks after increasing the dose.  - Will plan for a nurse visit for blood pressure recheck in 1-2 weeks after increasing losartan.  Reviewed to continue to monitor his blood pressure regularly at home and keep a log of his readings to bring in with him to the visit.  If he is tolerating the 75 mg daily dose of losartan well and blood pressure readings remain suboptimally controlled, then we could consider increasing this further to 100 mg once daily. He states that he was previously on losartan 100 mg daily and tolerated this without issues    Thank you for the opportunity to participate in this pleasant patient's care. We would be happy to see him sooner if needed for any concerns in the  meantime.    30 total minutes was spent today including chart review, precharting, history and exam, post visit documentation, and reviewing studies as outlined above.     Please kindly note that this document was completed in part using voice recognition software. Although reviewed after completion, some word substitutions and typographical errors may occur. Please contact me if clarification is needed.     JOSE CRUZ Ramon, CNP   Nurse Practitioner  [facility]    Orders this Visit:  Orders Placed This Encounter   Procedures     Basic metabolic panel     Follow-Up with Cardiology Nurse     Orders Placed This Encounter   Medications     losartan (COZAAR) 50 MG tablet     Sig: Take 1.5 tablets (75 mg) by mouth daily.     Medications Discontinued During This Encounter   Medication Reason     amLODIPine (NORVASC) 2.5 MG tablet Med Rec(No AVS / No eCancel)     losartan (COZAAR) 50 MG tablet      cimetidine (TAGAMET) 800 MG tablet Stopped by Patient (No AVS)     Encounter Diagnoses   Name Primary?     Essential hypertension Yes     Ischemic cardiomyopathy      Primary hypertension        CURRENT MEDICATIONS:  Current Outpatient Medications   Medication Sig Dispense Refill     acetaminophen (TYLENOL) 325 MG tablet Take 325-650 mg by mouth every 6 hours as needed for mild pain       apixaban ANTICOAGULANT (ELIQUIS) 5 MG tablet Take 1 tablet (5 mg) by mouth 2 times daily 180 tablet 3     aspirin (ASA) 81 MG EC tablet Take 1 tablet (81 mg) by mouth daily       diltiazem ER COATED BEADS (CARDIZEM CD/CARTIA XT) 120 MG 24 hr capsule Take 1 capsule (120 mg) by mouth daily. 90 capsule 2     losartan (COZAAR) 50 MG tablet Take 1.5 tablets (75 mg) by mouth daily.       rosuvastatin (CRESTOR) 40 MG tablet Take 1 tablet (40 mg) by mouth daily 90 tablet 3       ALLERGIES  Allergies   Allergen Reactions     Ace Inhibitors Cough       PAST MEDICAL, SURGICAL, FAMILY HISTORY:  History was reviewed and updated as needed,  see medical record.    SOCIAL HISTORY:  Social History     Socioeconomic History     Marital status:      Spouse name: Erin     Number of children: Not on file     Years of education: Not on file     Highest education level: Not on file   Occupational History     Not on file   Tobacco Use     Smoking status: Never     Smokeless tobacco: Never   Substance and Sexual Activity     Alcohol use: Yes     Alcohol/week: 2.0 - 3.0 standard drinks of alcohol     Types: 2 - 3 Cans of beer per week     Comment: 3-4 beers/week     Drug use: No     Sexual activity: Yes     Partners: Female   Other Topics Concern      Service Not Asked     Blood Transfusions Not Asked     Caffeine Concern Not Asked     Occupational Exposure Not Asked     Hobby Hazards Not Asked     Sleep Concern Not Asked     Stress Concern Not Asked     Weight Concern Not Asked     Special Diet No     Back Care Not Asked     Exercise Yes     Comment: 2-3x/week     Bike Helmet Not Asked     Seat Belt Not Asked     Self-Exams Not Asked     Parent/sibling w/ CABG, MI or angioplasty before 65F 55M? Not Asked   Social History Narrative     Not on file     Social Drivers of Health     Financial Resource Strain: Low Risk  (7/15/2024)    Financial Resource Strain      Within the past 12 months, have you or your family members you live with been unable to get utilities (heat, electricity) when it was really needed?: No   Food Insecurity: Low Risk  (7/15/2024)    Food Insecurity      Within the past 12 months, did you worry that your food would run out before you got money to buy more?: No      Within the past 12 months, did the food you bought just not last and you didn t have money to get more?: No   Transportation Needs: Low Risk  (7/15/2024)    Transportation Needs      Within the past 12 months, has lack of transportation kept you from medical appointments, getting your medicines, non-medical meetings or appointments, work, or from getting things that  "you need?: No   Physical Activity: Insufficiently Active (4/9/2024)    Exercise Vital Sign      Days of Exercise per Week: 5 days      Minutes of Exercise per Session: 20 min   Stress: No Stress Concern Present (4/9/2024)    [employer] [place] - Occupational Stress Questionnaire      Feeling of Stress : Not at all   Social Connections: Unknown (4/9/2024)    Social Connection and Isolation Panel [NHANES]      Frequency of Communication with Friends and Family: Not on file      Frequency of Social Gatherings with Friends and Family: Once a week      Attends Taoist Services: Not on file      Active Member of Clubs or Organizations: Not on file      Attends Club or Organization Meetings: Not on file      Marital Status: Not on file   Interpersonal Safety: Not on file   Housing Stability: Low Risk  (7/15/2024)    Housing Stability      Do you have housing? : Yes      Are you worried about losing your housing?: No       Review of Systems:  Focused cardiovascular and respiratory review of systems is negative other than the symptoms noted above in the HPI.     Physical Exam:  Vitals: BP (!) 159/70   Pulse 50   Ht 1.791 m (5' 10.5\")   Wt 80.6 kg (177 lb 11.2 oz)   BMI 25.14 kg/m     Wt Readings from Last 4 Encounters:   12/17/24 80.6 kg (177 lb 11.2 oz)   10/14/24 77.5 kg (170 lb 12.8 oz)   07/22/24 76.6 kg (168 lb 12.8 oz)   07/02/24 77.1 kg (170 lb)     Constitutional: Alert and in no acute distress.  Neck: No JVD.  Cardiovascular:  Regular rate and rhythm. No murmur, rub or gallop.   Respiratory: Breathing non-labored. Lungs are clear to auscultation with no wheezes or crackles bilaterally.  Gastrointestinal: Abdomen non-distended.  Extremities: No pitting lower extremity edema bilaterally.   Neuropsychiatric: No gross focal deficits. Affect appropriate. Mentation normal.     Recent Lab Results:  LIPID RESULTS:  Lab Results   Component Value Date    CHOL 113 09/30/2024    CHOL 158 03/23/2021 "    HDL 53 09/30/2024    HDL 54 03/23/2021    LDL 51 09/30/2024    LDL 92 03/23/2021    TRIG 43 09/30/2024    TRIG 58 03/23/2021     LIVER ENZYME RESULTS:  Lab Results   Component Value Date    AST 20 04/15/2024    AST 18 03/23/2021    ALT 12 09/30/2024    ALT 10 03/23/2021     CBC RESULTS:  Lab Results   Component Value Date    WBC 6.7 04/15/2024    WBC 18.8 (H) 03/13/2020    RBC 5.01 04/15/2024    RBC 4.26 (L) 03/13/2020    HGB 15.1 04/15/2024    HCT 43.5 04/15/2024    MCV 86.8 04/15/2024    MCH 30.1 04/15/2024    MCHC 34.7 04/15/2024    RDW 12.8 11/04/2022    RDW 14.1 03/13/2020     04/15/2024     BMP RESULTS:  Lab Results   Component Value Date     06/28/2024     03/23/2021    POTASSIUM 4.3 06/28/2024    POTASSIUM 3.9 11/04/2022    POTASSIUM 4.5 03/23/2021    CHLORIDE 104 06/28/2024    CHLORIDE 106 11/04/2022    CHLORIDE 103 03/23/2021    CO2 29 06/28/2024    CO2 26 11/04/2022    CO2 30 05/14/2020    ANIONGAP 8 06/28/2024    ANIONGAP 5 11/04/2022    ANIONGAP 4 05/14/2020    GLC 95 06/28/2024    GLC 95 11/04/2022    GLC 79 03/23/2021    BUN 17.5 06/28/2024    BUN 25 11/04/2022    BUN 24 03/23/2021    BUN 18 03/23/2021    CR 1.19 (H) 06/28/2024    CR 1.31 (H) 03/23/2021    GFRESTIMATED 63 06/28/2024    GFRESTIMATED >60 03/25/2022    GFRESTIMATED 46 (L) 05/14/2020    GFRESTBLACK 54 (L) 05/14/2020    ELIEZER 9.5 06/28/2024    ELIEZER 9.3 03/23/2021     CC  Geraldo Pizano MD  [id number]  [id number]  [place] [id number]      Thank you for allowing me to participate in the care of your patient.      Sincerely,     Kurt Elaine NP     [facility]  cc:   Geraldo Pizano MD  [id number]  [id number]  [place] [id number]

## 2024-12-23 ENCOUNTER — MYC MEDICAL ADVICE (OUTPATIENT)
Dept: CARDIOLOGY | Facility: CLINIC | Age: 79
End: 2024-12-23
Payer: COMMERCIAL

## 2024-12-24 NOTE — TELEPHONE ENCOUNTER
Patient notified of recommendations to increase losartan from 75mg to 100mg and will bring in home BP monitor.  Note he has had a tooth ache for a few weeks and may or may not be a factor in elevated BP. Has an appt on 1-7-24 with the Dentist.  Updated medication in Epic.  Patient verbalized understanding.  Sherrill Barker RN on 12/24/2024 at 9:02 AM

## 2024-12-24 NOTE — TELEPHONE ENCOUNTER
Thank you for the update! Let's go ahead and have him increase the losartan further to 100 mg once daily and we can keep the appointments as planned for 12/30 to recheck labs and for the nurse visit for the BP recheck. Thanks! JOSE CRUZ Ramon, CNP

## 2024-12-30 ENCOUNTER — LAB (OUTPATIENT)
Dept: LAB | Facility: CLINIC | Age: 79
End: 2024-12-30
Payer: COMMERCIAL

## 2024-12-30 ENCOUNTER — ALLIED HEALTH/NURSE VISIT (OUTPATIENT)
Dept: CARDIOLOGY | Facility: CLINIC | Age: 79
End: 2024-12-30
Attending: NURSE PRACTITIONER
Payer: COMMERCIAL

## 2024-12-30 ENCOUNTER — TELEPHONE (OUTPATIENT)
Dept: CARDIOLOGY | Facility: CLINIC | Age: 79
End: 2024-12-30

## 2024-12-30 DIAGNOSIS — I10 PRIMARY HYPERTENSION: ICD-10-CM

## 2024-12-30 DIAGNOSIS — I25.5 ISCHEMIC CARDIOMYOPATHY: ICD-10-CM

## 2024-12-30 LAB
ANION GAP SERPL CALCULATED.3IONS-SCNC: 8 MMOL/L (ref 7–15)
BUN SERPL-MCNC: 20.5 MG/DL (ref 8–23)
CALCIUM SERPL-MCNC: 9.2 MG/DL (ref 8.8–10.4)
CHLORIDE SERPL-SCNC: 106 MMOL/L (ref 98–107)
CREAT SERPL-MCNC: 1.18 MG/DL (ref 0.67–1.17)
EGFRCR SERPLBLD CKD-EPI 2021: 64 ML/MIN/1.73M2
GLUCOSE SERPL-MCNC: 118 MG/DL (ref 70–99)
HCO3 SERPL-SCNC: 29 MMOL/L (ref 22–29)
POTASSIUM SERPL-SCNC: 4.2 MMOL/L (ref 3.4–5.3)
SODIUM SERPL-SCNC: 143 MMOL/L (ref 135–145)

## 2024-12-30 NOTE — TELEPHONE ENCOUNTER
Contacted patient to inquire further about dizziness symptoms. Patient states that he has been having some dizziness with position changes since the increase in his losartan. Patient states that he has been making position changes slower, and that has helped. Inquired about hydration and water intake, and patient admits that he could be drinking more water than he does. Patient reports that his home BP readings have still been in the 150s-160s systolic 1-2 hours after taking his medications. Patient denies any other symptoms. Will route to Fabien Elaine for review.

## 2024-12-30 NOTE — PROGRESS NOTES
Last office visit: 12/17/24    Previous blood pressure:      Mm Hg 159/70    Previous heart rate:      bpm 50    Time of reading:    Morning medications were taken at: 7:00 am    Today's blood pressure:       mm Hg 149/62    Today's heart rate:       bpm 48    Ordering Provider: Fabien Elaine NP    Results sent to team # :  3    Additional comments:    Patient stated that he gets dizzy often. He believes this might be related to when he started the higher dose of losartan, one week ago.      Bing Cooepr LPN

## 2024-12-30 NOTE — PROGRESS NOTES
Last office visit: 12/17/24    Previous blood pressure:      Mm Hg 159/70    Previous heart rate:      bpm 50    Time of reading:    Morning medications were taken at: 7:00 am    Today's blood pressure:       mm Hg 149/62    Today's heart rate:       bpm 48    Ordering Provider: Fabien Elaine NP    Results sent to team # :  3    Additional comments:    Patient stated that he gets dizzy often. He believes this might be related to when he started the higher dose of losartan, one week ago.      Bing Cooper LPN

## 2024-12-31 NOTE — TELEPHONE ENCOUNTER
Contacted patient to review Fabien's recommendations. Patient did not answer. Left message for patient to call back.

## 2024-12-31 NOTE — TELEPHONE ENCOUNTER
His heart rates look slow recently and could be contributing some. Recommend checking a 3-day Zio patch monitor to assess this further as we may want to try cutting back on or replacing diltiazem to help avoid this. Also suspect he has a component of some orthostatic hypotension so agree with trying to change positions slowly and can try compression stockings to help with this. Thank you! JOSE CRUZ Ramon, CNP

## 2025-01-07 ENCOUNTER — ORDERS ONLY (AUTO-RELEASED) (OUTPATIENT)
Dept: CARDIOLOGY | Facility: CLINIC | Age: 80
End: 2025-01-07
Payer: COMMERCIAL

## 2025-01-07 DIAGNOSIS — R42 DIZZINESS: ICD-10-CM

## 2025-01-07 NOTE — TELEPHONE ENCOUNTER
[employer] message was sent to pt. Pt confirmed. Zio patched order placed and will be mailed out.     Azalea BABB  [facility]

## 2025-01-18 LAB — CV ZIO PRELIM RESULTS: NORMAL

## 2025-01-20 ENCOUNTER — MYC MEDICAL ADVICE (OUTPATIENT)
Dept: CARDIOLOGY | Facility: CLINIC | Age: 80
End: 2025-01-20
Payer: COMMERCIAL

## 2025-01-20 ENCOUNTER — TELEPHONE (OUTPATIENT)
Dept: CARDIOLOGY | Facility: CLINIC | Age: 80
End: 2025-01-20
Payer: COMMERCIAL

## 2025-01-20 DIAGNOSIS — I10 PRIMARY HYPERTENSION: ICD-10-CM

## 2025-01-20 DIAGNOSIS — I10 PRIMARY HYPERTENSION: Primary | ICD-10-CM

## 2025-01-20 RX ORDER — AMLODIPINE BESYLATE 5 MG/1
5 TABLET ORAL DAILY
Qty: 90 TABLET | Refills: 0 | Status: SHIPPED | OUTPATIENT
Start: 2025-01-20

## 2025-01-20 RX ORDER — AMLODIPINE BESYLATE 5 MG/1
5 TABLET ORAL DAILY
Qty: 90 TABLET | Refills: 0 | Status: SHIPPED | OUTPATIENT
Start: 2025-01-20 | End: 2025-01-20

## 2025-01-20 NOTE — TELEPHONE ENCOUNTER
Patient notified of results and plan.  Will stop diltiazem. He has amlodipine at home and had a hx of ankle edema but eill resume and call if he is having any side effect sof the amlodipine ie ankle swelling.  Update medications in Epic.    Patient verbalized understanding.  Sherrill Barker RN, [phone]

## 2025-01-20 NOTE — TELEPHONE ENCOUNTER
----- Message from Kurt Elaine sent at 1/20/2025  8:57 AM CST -----  Please update [place] that his monitor results overall look good with no concerning arrhythmias. His heart rates are on the slow side with an average HR of 51 bpm during the monitoring period so would suggest stopping diltiazem and instead starting amlodipine 5 mg once daily. He can keep his bottle of diltiazem for use as needed in case he has recurrence of A.Fib with faster heart rates down the road. Thank you! Fabien Elaine, APRN, CNP

## 2025-02-26 DIAGNOSIS — E78.5 HYPERLIPIDEMIA LDL GOAL <70: ICD-10-CM

## 2025-02-26 RX ORDER — ROSUVASTATIN CALCIUM 40 MG/1
40 TABLET, COATED ORAL DAILY
Qty: 90 TABLET | Refills: 3 | Status: SHIPPED | OUTPATIENT
Start: 2025-02-26

## 2025-04-10 DIAGNOSIS — I10 PRIMARY HYPERTENSION: ICD-10-CM

## 2025-04-10 RX ORDER — AMLODIPINE BESYLATE 5 MG/1
5 TABLET ORAL DAILY
Qty: 90 TABLET | Refills: 3 | Status: SHIPPED | OUTPATIENT
Start: 2025-04-10

## 2025-04-10 SDOH — HEALTH STABILITY: PHYSICAL HEALTH: ON AVERAGE, HOW MANY MINUTES DO YOU ENGAGE IN EXERCISE AT THIS LEVEL?: 50 MIN

## 2025-04-10 SDOH — HEALTH STABILITY: PHYSICAL HEALTH: ON AVERAGE, HOW MANY DAYS PER WEEK DO YOU ENGAGE IN MODERATE TO STRENUOUS EXERCISE (LIKE A BRISK WALK)?: 6 DAYS

## 2025-04-10 ASSESSMENT — SOCIAL DETERMINANTS OF HEALTH (SDOH): HOW OFTEN DO YOU GET TOGETHER WITH FRIENDS OR RELATIVES?: ONCE A WEEK

## 2025-04-17 ENCOUNTER — OFFICE VISIT (OUTPATIENT)
Dept: FAMILY MEDICINE | Facility: CLINIC | Age: 80
End: 2025-04-17

## 2025-04-17 VITALS
DIASTOLIC BLOOD PRESSURE: 66 MMHG | OXYGEN SATURATION: 99 % | HEIGHT: 70 IN | BODY MASS INDEX: 24.77 KG/M2 | WEIGHT: 173 LBS | HEART RATE: 51 BPM | SYSTOLIC BLOOD PRESSURE: 124 MMHG

## 2025-04-17 DIAGNOSIS — I27.20 PULMONARY HTN (H): ICD-10-CM

## 2025-04-17 DIAGNOSIS — N18.31 STAGE 3A CHRONIC KIDNEY DISEASE (H): ICD-10-CM

## 2025-04-17 DIAGNOSIS — Z00.00 ENCOUNTER FOR MEDICARE ANNUAL WELLNESS EXAM: Primary | ICD-10-CM

## 2025-04-17 DIAGNOSIS — I48.4 ATYPICAL ATRIAL FLUTTER (H): ICD-10-CM

## 2025-04-17 DIAGNOSIS — E78.00 HYPERCHOLESTEROLEMIA: ICD-10-CM

## 2025-04-17 DIAGNOSIS — Z23 NEED FOR VACCINATION: ICD-10-CM

## 2025-04-17 DIAGNOSIS — G89.29 CHRONIC BILATERAL LOW BACK PAIN WITHOUT SCIATICA: ICD-10-CM

## 2025-04-17 DIAGNOSIS — R73.02 IGT (IMPAIRED GLUCOSE TOLERANCE): ICD-10-CM

## 2025-04-17 DIAGNOSIS — M54.50 CHRONIC BILATERAL LOW BACK PAIN WITHOUT SCIATICA: ICD-10-CM

## 2025-04-17 LAB
% GRANULOCYTES: 69.8 % (ref 42.2–75.2)
ALBUMIN SERPL BCG-MCNC: 4.2 G/DL (ref 3.5–5.2)
ALP SERPL-CCNC: 91 U/L (ref 40–150)
ALT SERPL W P-5'-P-CCNC: 18 U/L (ref 0–70)
ANION GAP SERPL CALCULATED.3IONS-SCNC: 9 MMOL/L (ref 7–15)
AST SERPL W P-5'-P-CCNC: 26 U/L (ref 0–45)
BILIRUB SERPL-MCNC: 0.7 MG/DL
BUN SERPL-MCNC: 24 MG/DL (ref 8–23)
CALCIUM SERPL-MCNC: 9.5 MG/DL (ref 8.8–10.4)
CHLORIDE SERPL-SCNC: 104 MMOL/L (ref 98–107)
CHOLESTEROL: 110 MG/DL (ref 100–199)
CREAT SERPL-MCNC: 1.22 MG/DL (ref 0.67–1.17)
CREAT UR-MCNC: 118 MG/DL
EGFRCR SERPLBLD CKD-EPI 2021: 61 ML/MIN/1.73M2
FASTING STATUS PATIENT QL REPORTED: YES
FASTING?: YES
GLUCOSE SERPL-MCNC: 87 MG/DL (ref 70–99)
HBA1C MFR BLD: 5.3 % (ref 4–6)
HCO3 SERPL-SCNC: 29 MMOL/L (ref 22–29)
HCT VFR BLD AUTO: 42.4 % (ref 39–51)
HDL (RMG): 42 MG/DL (ref 40–?)
HEMOGLOBIN: 14.9 G/DL (ref 13.4–17.5)
LDL CALCULATED (RMG): 58 MG/DL (ref 0–130)
LYMPHOCYTES NFR BLD AUTO: 21.3 % (ref 20.5–51.1)
MCH RBC QN AUTO: 30.3 PG (ref 27–31)
MCHC RBC AUTO-ENTMCNC: 35.1 G/DL (ref 33–37)
MCV RBC AUTO: 86.4 FL (ref 80–100)
MICROALBUMIN UR-MCNC: 20.5 MG/L
MICROALBUMIN/CREAT UR: 17.37 MG/G CR (ref 0–17)
MONOCYTES NFR BLD AUTO: 8.9 % (ref 1.7–9.3)
PLATELET # BLD AUTO: 222 K/UL (ref 140–450)
POTASSIUM SERPL-SCNC: 4.1 MMOL/L (ref 3.4–5.3)
PROT SERPL-MCNC: 7.1 G/DL (ref 6.4–8.3)
RBC # BLD AUTO: 4.91 X10/CMM (ref 4.2–5.9)
SODIUM SERPL-SCNC: 142 MMOL/L (ref 135–145)
TRIGLYCERIDES (RMG): 50 MG/DL (ref 0–149)
WBC # BLD AUTO: 5.5 X10/CMM (ref 3.8–11)

## 2025-04-17 PROCEDURE — 36415 COLL VENOUS BLD VENIPUNCTURE: CPT | Performed by: FAMILY MEDICINE

## 2025-04-17 PROCEDURE — 84155 ASSAY OF PROTEIN SERUM: CPT | Mod: ORL | Performed by: FAMILY MEDICINE

## 2025-04-17 PROCEDURE — 82947 ASSAY GLUCOSE BLOOD QUANT: CPT | Mod: ORL | Performed by: FAMILY MEDICINE

## 2025-04-17 PROCEDURE — 82043 UR ALBUMIN QUANTITATIVE: CPT | Mod: ORL | Performed by: FAMILY MEDICINE

## 2025-04-17 PROCEDURE — 82570 ASSAY OF URINE CREATININE: CPT | Mod: ORL | Performed by: FAMILY MEDICINE

## 2025-04-17 NOTE — PROGRESS NOTES
"Preventive Care Visit  [facility]  Sabas Staley MD, Family Medicine  Apr 17, 2025      Assessment & Plan     Encounter for Medicare annual wellness exam    - VENOUS COLLECTION    Pulmonary HTN (H)  Continue to see cardiology as needed  - HEART FAILURE ACTION PLAN  - Lipid Profile ([facility])  - Comprehensive metabolic panel    Atypical atrial flutter (H)  On elquis, not many more episodes.  - Lipid Profile ([facility])  - Comprehensive metabolic panel    Stage 3a chronic kidney disease (H)  Chronic kidney disease due to HTN and DM.  Check urine for protein.   Patient is on ACE/ARB.  Patient is on a statin.  Told the patient to avoid NSAIDs if at all possible.   Will monitor closely and send to Nephrologist if renal function continues to decline.      - Albumin Random Urine Quantitative with Creat Ratio  - CBC with Diff/Plt ([facility])    IGT (impaired glucose tolerance)  Check for   - Hemoglobin A1C ([facility])    Hypercholesterolemia  Has Hyperlipidemia   On listed medication  Last lab result are:  Cholesterol   Date Value Ref Range Status   09/30/2024 113 <200 mg/dL Final   06/28/2024 149 <200 mg/dL Final   03/23/2021 158 100 - 199 mg/dL Final   09/27/2019 158 100 - 199 mg/dL Final     HDL Cholesterol   Date Value Ref Range Status   03/23/2021 54 >39 mg/dL Final   09/27/2019 53 >39 mg/dL Final     Direct Measure HDL   Date Value Ref Range Status   09/30/2024 53 >=40 mg/dL Final   06/28/2024 52 >=40 mg/dL Final     LDL Cholesterol Calculated   Date Value Ref Range Status   09/30/2024 51 <100 mg/dL Final   06/28/2024 85 <=100 mg/dL Final   03/23/2021 92 0 - 99 mg/dL Final   09/27/2019 94 0 - 99 mg/dL Final     Triglycerides   Date Value Ref Range Status   09/30/2024 43 <150 mg/dL Final   06/28/2024 58 <150 mg/dL Final   03/23/2021 58 0 - 149 mg/dL Final   09/27/2019 56 0 - 149 mg/dL Final     No results found for: \"CHOLHDLRATIO\"  Will continue current medication or adjust based on lab results      Need for " vaccination    - COVID-19 12+ (PFIZER)    Chronic bilateral low back pain without sciatica  Try some PT  - Physical Therapy  Referral      Patient has been advised of split billing requirements and indicates understanding: Yes        Counseling  Appropriate preventive services were addressed with this patient via screening, questionnaire, or discussion as appropriate for fall prevention, nutrition, physical activity, Tobacco-use cessation, social engagement, weight loss and cognition.  Checklist reviewing preventive services available has been given to the patient.  Reviewed patient's diet, addressing concerns and/or questions.   Patient reported safety concerns were addressed today.The patient was provided with written information regarding signs of hearing loss.           Subjective   Anthony is a 80 year old, presenting for the following:  Physical (Fasting - physical.) and Pain (Pain in lower back, has previously had cortisone injections there. )            HPI  Here for awv and to follow up on the above   The longitudinal plan of care for the diagnosis(es)/condition(s) as documented were addressed during this visit. Due to the added complexity in care, I will continue to support Anthony in the subsequent management and with ongoing continuity of care.      Advance Care Planning      Hearing screen    Left ear:    500 HZ: Pass  1000 HZ: Pass  2000 HZ: Fail  4000 HZ: Fail    Right ear:    500 HZ: Pass  1000 HZ: Pass  2000 HZ: Pass  4000 HZ: Fail        4/10/2025   General Health   How would you rate your overall physical health? Excellent   Feel stress (tense, anxious, or unable to sleep) Not at all         4/10/2025   Nutrition   Diet: Regular (no restrictions)         4/10/2025   Exercise   Days per week of moderate/strenous exercise 6 days   Average minutes spent exercising at this level 50 min         4/10/2025   Social Factors   Frequency of gathering with friends or relatives Once a week   Worry food  won't last until get money to buy more No   Food not last or not have enough money for food? No   Do you have housing? (Housing is defined as stable permanent housing and does not include staying ouside in a car, in a tent, in an abandoned building, in an overnight shelter, or couch-surfing.) Yes   Are you worried about losing your housing? No   Lack of transportation? No   Unable to get utilities (heat,electricity)? No         4/17/2025   Fall Risk   Fallen 2 or more times in the past year? No   Trouble with walking or balance? No          4/10/2025   Activities of Daily Living- Home Safety   Needs help with the following daily activites None of the above   Safety concerns in the home Throw rugs in the hallway         4/10/2025   Dental   Dentist two times every year? Yes         4/10/2025   Hearing Screening   Hearing concerns? (!) IT'S HARD TO FOLLOW A CONVERSATION IN A NOISY RESTAURANT OR CROWDED ROOM.    (!) TROUBLE UNDERSTANDING SOFT OR WHISPERED SPEECH.       Multiple values from one day are sorted in reverse-chronological order         4/10/2025   Driving Risk Screening   Patient/family members have concerns about driving No         4/10/2025   General Alertness/Fatigue Screening   Have you been more tired than usual lately? No         4/10/2025   Urinary Incontinence Screening   Bothered by leaking urine in past 6 months No         Today's PHQ-2 Score:       4/17/2025     8:31 AM   PHQ-2 ( 1999 [employer])   Q1: Little interest or pleasure in doing things 0   Q2: Feeling down, depressed or hopeless 0   PHQ-2 Score 0    Q1: Little interest or pleasure in doing things Not at all   Q2: Feeling down, depressed or hopeless Not at all   PHQ-2 Score 0       Patient-reported           4/10/2025   Substance Use   Alcohol more than 3/day or more than 7/wk No   Do you have a current opioid prescription? No   How severe/bad is pain from 1 to 10? 2/10   Do you use any other substances recreationally? No     Social History      Tobacco Use    Smoking status: Never    Smokeless tobacco: Never   Substance Use Topics    Alcohol use: Yes     Alcohol/week: 2.0 - 3.0 standard drinks of alcohol     Types: 2 - 3 Cans of beer per week     Comment: 3-4 beers/week    Drug use: No       ASCVD Risk   The ASCVD Risk score (Marta CUEVA, et al., 2019) failed to calculate for the following reasons:    Risk score cannot be calculated because patient has a medical history suggesting prior/existing ASCVD            Reviewed and updated as needed this visit by Provider                    Lab work is in process  Current providers sharing in care for this patient include:  Patient Care Team:  Sabas Staley MD as PCP - General (Family Medicine)  Sabas Staley MD as Assigned PCP  Geraldo Pizano MD as MD (Cardiovascular Disease)  Rajani Foote APRN CNP as Assigned Heart and Vascular Provider    The following health maintenance items are reviewed in Epic and correct as of today:  Health Maintenance   Topic Date Due    COVID-19 Vaccine (8 - 2024-25 season) 09/16/2024    MICROALBUMIN  04/15/2025    CBC  04/15/2025    HEMOGLOBIN  04/15/2025    BMP  06/30/2025    ALT  09/30/2025    LIPID  09/30/2025    MEDICARE ANNUAL WELLNESS VISIT  04/17/2026    FALL RISK ASSESSMENT  04/17/2026    DIABETES SCREENING  12/30/2027    HF ACTION PLAN  04/17/2028    ADVANCE CARE PLANNING  04/15/2029    DTAP/TDAP/TD IMMUNIZATION (3 - Td or Tdap) 04/19/2033    TSH W/FREE T4 REFLEX  Completed    HEPATITIS C SCREENING  Completed    PHQ-2 (once per calendar year)  Completed    INFLUENZA VACCINE  Completed    Pneumococcal Vaccine: 50+ Years  Completed    URINALYSIS  Completed    ZOSTER IMMUNIZATION  Completed    RSV VACCINE  Completed    HPV IMMUNIZATION  Aged Out    MENINGITIS IMMUNIZATION  Aged Out    COLORECTAL CANCER SCREENING  Discontinued         Review of Systems  Constitutional, HEENT, cardiovascular, pulmonary, GI, , musculoskeletal, neuro, skin,  "endocrine and psych systems are negative, except as otherwise noted.     Objective    Exam  /66   Pulse 51   Ht 1.778 m (5' 10\")   Wt 78.5 kg (173 lb)   SpO2 99%   BMI 24.82 kg/m     Estimated body mass index is 24.82 kg/m  as calculated from the following:    Height as of this encounter: 1.778 m (5' 10\").    Weight as of this encounter: 78.5 kg (173 lb).    Physical Exam  GENERAL: alert and no distress  EYES: Eyes grossly normal to inspection, PERRL and conjunctivae and sclerae normal  HENT: ear canals and TM's normal, nose and mouth without ulcers or lesions  NECK: no adenopathy, no asymmetry, masses, or scars  RESP: lungs clear to auscultation - no rales, rhonchi or wheezes  CV: regular rate and rhythm, normal S1 S2, no S3 or S4, no murmur, click or rub, no peripheral edema  ABDOMEN: soft, nontender, no hepatosplenomegaly, no masses and bowel sounds normal   (male): normal male genitalia without lesions or urethral discharge, no hernia  MS: decreased rom in the ls spine  SKIN: no suspicious lesions or rashes  NEURO: Normal strength and tone, mentation intact and speech normal  PSYCH: mentation appears normal, affect normal/bright         4/15/2024     8:55 AM 4/17/2025     8:44 AM   MINI COG   Clock Draw Score 2 Normal 2 Normal   3 Item Recall 3 objects recalled 3 objects recalled   Mini Cog Total Score 5 5                Signed Electronically by: Sabas Staley MD    "

## 2025-04-17 NOTE — PATIENT INSTRUCTIONS
Patient Education   Preventive Care Advice   This is general advice given by our system to help you stay healthy. However, your care team may have specific advice just for you. Please talk to your care team about your preventive care needs.  Nutrition  Eat 5 or more servings of fruits and vegetables each day.  Try wheat bread, brown rice and whole grain pasta (instead of white bread, rice, and pasta).  Get enough calcium and vitamin D. Check the label on foods and aim for 100% of the RDA (recommended daily allowance).  Lifestyle  Exercise at least 150 minutes each week  (30 minutes a day, 5 days a week).  Do muscle strengthening activities 2 days a week. These help control your weight and prevent disease.  No smoking.  Wear sunscreen to prevent skin cancer.  Have a dental exam and cleaning every 6 months.  Yearly exams  See your health care team every year to talk about:  Any changes in your health.  Any medicines your care team has prescribed.  Preventive care, family planning, and ways to prevent chronic diseases.  Shots (vaccines)   HPV shots (up to age 26), if you've never had them before.  Hepatitis B shots (up to age 59), if you've never had them before.  COVID-19 shot: Get this shot when it's due.  Flu shot: Get a flu shot every year.  Tetanus shot: Get a tetanus shot every 10 years.  Pneumococcal, hepatitis A, and RSV shots: Ask your care team if you need these based on your risk.  Shingles shot (for age 50 and up)  General health tests  Diabetes screening:  Starting at age 35, Get screened for diabetes at least every 3 years.  If you are younger than age 35, ask your care team if you should be screened for diabetes.  Cholesterol test: At age 39, start having a cholesterol test every 5 years, or more often if advised.  Bone density scan (DEXA): At age 50, ask your care team if you should have this scan for osteoporosis (brittle bones).  Hepatitis C: Get tested at least once in your life.  STIs (sexually  transmitted infections)  Before age 24: Ask your care team if you should be screened for STIs.  After age 24: Get screened for STIs if you're at risk. You are at risk for STIs (including HIV) if:  You are sexually active with more than one person.  You don't use condoms every time.  You or a partner was diagnosed with a sexually transmitted infection.  If you are at risk for HIV, ask about PrEP medicine to prevent HIV.  Get tested for HIV at least once in your life, whether you are at risk for HIV or not.  Cancer screening tests  Cervical cancer screening: If you have a cervix, begin getting regular cervical cancer screening tests starting at age 21.  Breast cancer scan (mammogram): If you've ever had breasts, begin having regular mammograms starting at age 40. This is a scan to check for breast cancer.  Colon cancer screening: It is important to start screening for colon cancer at age 45.  Have a colonoscopy test every 10 years (or more often if you're at risk) Or, ask your provider about stool tests like a FIT test every year or Cologuard test every 3 years.  To learn more about your testing options, visit:   .  For help making a decision, visit:   https://bit.ly/ps85024.  Prostate cancer screening test: If you have a prostate, ask your care team if a prostate cancer screening test (PSA) at age 55 is right for you.  Lung cancer screening: If you are a current or former smoker ages 50 to 80, ask your care team if ongoing lung cancer screenings are right for you.  For informational purposes only. Not to replace the advice of your health care provider. Copyright   2023 [facility]. All rights reserved. Clinically reviewed by the [facility]. EDUS 686011 - REV 01/24.  Learning About Activities of Daily Living  What are activities of daily living?     Activities of daily living (ADLs) are the basic self-care tasks you do every day. These include eating, bathing, dressing,  and moving around.  As you age, and if you have health problems, you may find that it's harder to do some of these tasks. If so, your doctor can suggest ideas that may help.  To measure what kind of help you may need, your doctor will ask how well you are able to do ADLs. Let your doctor know if there are any tasks that you are having trouble doing. This is an important first step to getting help. And when you have the help you need, you can stay as independent as possible.  How will a doctor assess your ADLs?  Asking about ADLs is part of a routine health checkup your doctor will likely do as you age. Your health check might be done in a doctor's office, in your home, or at a hospital. The goal is to find out if you are having any problems that could make it hard to care for yourself or that make it unsafe for you to be on your own.  To measure your ADLs, your doctor will ask how hard it is for you to do routine tasks. Your doctor may also want to know if you have changed the way you do a task because of a health problem. Your doctor may watch how you:  Walk back and forth.  Keep your balance while you stand or walk.  Move from sitting to standing or from a bed to a chair.  Button or unbutton a shirt or sweater.  Remove and put on your shoes.  It's common to feel a little worried or anxious if you find you can't do all the things you used to be able to do. Talking with your doctor about ADLs is a way to make sure you're as safe as possible and able to care for yourself as well as you can. You may want to bring a caregiver, friend, or family member to your checkup. They can help you talk to your doctor.  Follow-up care is a key part of your treatment and safety. Be sure to make and go to all appointments, and call your doctor if you are having problems. It's also a good idea to know your test results and keep a list of the medicines you take.  Current as of: October 24, 2024  Content Version: 14.4    6127-8399  [employer].   Care instructions adapted under license by your healthcare professional. If you have questions about a medical condition or this instruction, always ask your healthcare professional. [employer] disclaims any warranty or liability for your use of this information.    Hearing Loss: Care Instructions  Overview     Hearing loss is a sudden or slow decrease in how well you hear. It can range from slight to profound. Permanent hearing loss can occur with aging. It also can happen when you are exposed long-term to loud noise. Examples include listening to loud music, riding motorcycles, or being around other loud machines.  Hearing loss can affect your work and home life. It can make you feel lonely or depressed. You may feel that you have lost your independence. But hearing aids and other devices can help you hear better and feel connected to others.  Follow-up care is a key part of your treatment and safety. Be sure to make and go to all appointments, and call your doctor if you are having problems. It's also a good idea to know your test results and keep a list of the medicines you take.  How can you care for yourself at home?  Avoid loud noises whenever possible. This helps keep your hearing from getting worse.  Always wear hearing protection around loud noises.  Wear a hearing aid as directed.  A professional can help you pick a hearing aid that will work best for you.  You can also get hearing aids over the counter for mild to moderate hearing loss.  Have hearing tests as your doctor suggests. They can show whether your hearing has changed. Your hearing aid may need to be adjusted.  Use other devices as needed. These may include:  Telephone amplifiers and hearing aids that can connect to a television, stereo, radio, or microphone.  Devices that use lights or vibrations. These alert you to the doorbell, a ringing telephone, or a baby monitor.  Television closed-captioning. This  "shows the words at the bottom of the screen. Most new TVs can do this.  TTY (text telephone). This lets you type messages back and forth on the telephone instead of talking or listening. These devices are also called TDD. When messages are typed on the keyboard, they are sent over the phone line to a receiving TTY. The message is shown on a monitor.  Use text messaging, social media, and email if it is hard for you to communicate by telephone.  Try to learn a listening technique called speechreading. It is not lipreading. You pay attention to people's gestures, expressions, posture, and tone of voice. These clues can help you understand what a person is saying. Face the person you are talking to, and have them face you. Make sure the lighting is good. You need to see the other person's face clearly.  Think about counseling if you need help to adjust to your hearing loss.  When should you call for help?  Watch closely for changes in your health, and be sure to contact your doctor if:    You think your hearing is getting worse.     You have new symptoms, such as dizziness or nausea.   Where can you learn more?  Go to https://www.[employer].net/patiented  Enter R798 in the search box to learn more about \"Hearing Loss: Care Instructions.\"  Current as of: October 27, 2024  Content Version: 14.4    9446-1493 [employer].   Care instructions adapted under license by your healthcare professional. If you have questions about a medical condition or this instruction, always ask your healthcare professional. [employer] disclaims any warranty or liability for your use of this information.       "

## 2025-04-21 ENCOUNTER — TRANSFERRED RECORDS (OUTPATIENT)
Dept: FAMILY MEDICINE | Facility: CLINIC | Age: 80
End: 2025-04-21

## 2025-06-06 DIAGNOSIS — I48.0 PAROXYSMAL ATRIAL FIBRILLATION (H): ICD-10-CM

## 2025-06-09 RX ORDER — APIXABAN 5 MG/1
5 TABLET, FILM COATED ORAL 2 TIMES DAILY
Qty: 200 TABLET | Refills: 2 | Status: SHIPPED | OUTPATIENT
Start: 2025-06-09

## 2025-06-09 NOTE — TELEPHONE ENCOUNTER
Med: eliquis     LOV (related): 4/17/25 AWV with Dr. Staley       Due for F/U around: 4/2026 for AWV    Next Appt: None

## (undated) DEVICE — ESU ELEC LOOP 24FR 20750G

## (undated) DEVICE — GUIDEWIRE TRNSEPT 0.014 X35CM SAFE SE

## (undated) DEVICE — CATH RF CARD ABL BID JJ THERMO

## (undated) DEVICE — NDL TRANSSEPTAL BRK 18GA 71CM BRK CVD 407200

## (undated) DEVICE — BAG CYSTO TABLE DRAIN

## (undated) DEVICE — DEFIB PRO-PADZ LVP LQD GEL ADULT 8900-2105-01

## (undated) DEVICE — SHEATH HEMOSTASIS INTRODUCER 10FRX12CM 406120

## (undated) DEVICE — RAD RX ISOVUE 300 (50ML) 61% IOPAMIDOL CHARGE PER ML

## (undated) DEVICE — EVACUATOR BLADDER UROVAC LATEX M0067301250

## (undated) DEVICE — CATH EP 110CM 7FR HALO XP 2-1

## (undated) DEVICE — CATH BALLOON EMERGE 2.0X12MM H7493918912200

## (undated) DEVICE — CATH GUIDING BLUE YELLOW PTFE XB3.5 6FRX100CM 67005400

## (undated) DEVICE — SYR RED NITRO PRNT 10CC

## (undated) DEVICE — CABLE HIGH FREQEUCY STERILE 8MM PLUG 300CM 277KB-D/10

## (undated) DEVICE — PACK EP SRG PROC LF DISP SAN32EPFSR

## (undated) DEVICE — INTRO SHEATH 7FRX10CM PINNACLE RSS702

## (undated) DEVICE — CATH URETERAL OPEN END 6FR AXXCESS

## (undated) DEVICE — INFL DVC KIT W/10CC NITRO IN4530

## (undated) DEVICE — SHEATH BRD GD EP PRFACE  W/MUL 301803M

## (undated) DEVICE — ESU GROUND PAD UNIVERSAL W/O CORD

## (undated) DEVICE — KIT HAND CONTROL ANGIOTOUCH ACIST 65CM AT-P65

## (undated) DEVICE — CATH FOLEY 3WAY 22FR 30ML LATEX 0167SI22

## (undated) DEVICE — CATH ANGIO INFINITI JR4 4FRX100CM 538421

## (undated) DEVICE — ESU ELEC COAGULATE 24FR POINTED  27050L-S

## (undated) DEVICE — SLEEVE TR BAND RADIAL COMPRESSION DEVICE 24CM TRB24-REG

## (undated) DEVICE — PATCH CARTO 3 EXTERNAL REFERENCE 3D MAPPING CREFP6

## (undated) DEVICE — MANIFOLD KIT ANGIO AUTOMATED 014613

## (undated) DEVICE — CATH EP 7FR X 115CM DECANAV CA

## (undated) DEVICE — INTRODUCER SHEATH GREEN 6.5FRX11CM .038IN PSI-6F-11-038ACT

## (undated) DEVICE — TUBE SET SMARKABLATE IRRIGATION

## (undated) DEVICE — NDL TRANSSEPTAL BRK 18GA 71CM BRK-1 CVD 407201

## (undated) DEVICE — GUIDEWIRE VASC 0.014INX180CM RUNTHROUGH 25-1011

## (undated) DEVICE — SOL WATER IRRIG 1000ML BOTTLE 2F7114

## (undated) DEVICE — SHEATH PRELUDE SNAP 13CM 9FR

## (undated) DEVICE — TOTE ANGIO CORP PC15AT SAN32CC83O

## (undated) DEVICE — WIRE GUIDE AMPLATZ SUPER STIFF 0.035"X145CM 46-524

## (undated) DEVICE — 8F SOUNDSTAR ECO ULTRASOUND CATHETER

## (undated) DEVICE — NEEDLE TRANSSEPTAL 18GA X 98CM 86 DEG

## (undated) DEVICE — STENT CORONARY DES SYNERGY XD MR US 2.50X16MM H7493941816250: Type: IMPLANTABLE DEVICE | Status: NON-FUNCTIONAL

## (undated) DEVICE — DECANTER BAG 2002S

## (undated) DEVICE — Device

## (undated) DEVICE — PACK TUR CUSTOM SBA15RUFSE

## (undated) DEVICE — CATH EP 60CM 5FR 2-8-2MM SPC-

## (undated) DEVICE — BAG URINARY DRAIN 4000ML LF 153509

## (undated) DEVICE — GLOVE PROTEXIS W/NEU-THERA 7.5  2D73TE75

## (undated) DEVICE — VALVE HEMOSTASIS .096" COPILOT MECH 1003331

## (undated) DEVICE — ESU ELEC ROLLERBALL 24FR 5MM 27050NK

## (undated) DEVICE — CATH ANGIO INFINITI JL4 4FRX100CM 538420

## (undated) DEVICE — PAD CHUX UNDERPAD 23X24" 7136

## (undated) DEVICE — GUIDEWIRE ZIPWIRE STR STIFF .035"X150CM M006630222B0

## (undated) DEVICE — SOL WATER IRRIG 3000ML BAG 2B7117

## (undated) DEVICE — INTRO CATH 12CM 8.5FR FST-CATH

## (undated) DEVICE — INTRO GLIDESHEATH SLENDER 6FR 10X45CM 60-1060

## (undated) DEVICE — WIRE GUIDE 0.035"X260CM SAFE-T-J EXCHANGE G00517

## (undated) DEVICE — CATH URETERAL CONE 08FR 70CM 138008LT / 138008RT

## (undated) DEVICE — CATH BALLOON NC EMERGE 2.50X8MM H7493926708250

## (undated) RX ORDER — HEPARIN SODIUM 1000 [USP'U]/ML
INJECTION, SOLUTION INTRAVENOUS; SUBCUTANEOUS
Status: DISPENSED
Start: 2022-11-04

## (undated) RX ORDER — LIDOCAINE HYDROCHLORIDE 10 MG/ML
INJECTION, SOLUTION EPIDURAL; INFILTRATION; INTRACAUDAL; PERINEURAL
Status: DISPENSED
Start: 2022-11-04

## (undated) RX ORDER — FENTANYL CITRATE 50 UG/ML
INJECTION, SOLUTION INTRAMUSCULAR; INTRAVENOUS
Status: DISPENSED
Start: 2022-04-01

## (undated) RX ORDER — HYDRALAZINE HYDROCHLORIDE 20 MG/ML
INJECTION INTRAMUSCULAR; INTRAVENOUS
Status: DISPENSED
Start: 2022-04-20

## (undated) RX ORDER — PROTAMINE SULFATE 10 MG/ML
INJECTION, SOLUTION INTRAVENOUS
Status: DISPENSED
Start: 2022-07-08

## (undated) RX ORDER — LIDOCAINE HYDROCHLORIDE 10 MG/ML
INJECTION, SOLUTION EPIDURAL; INFILTRATION; INTRACAUDAL; PERINEURAL
Status: DISPENSED
Start: 2022-07-08

## (undated) RX ORDER — HEPARIN SODIUM 1000 [USP'U]/ML
INJECTION, SOLUTION INTRAVENOUS; SUBCUTANEOUS
Status: DISPENSED
Start: 2022-07-08

## (undated) RX ORDER — FENTANYL CITRATE 50 UG/ML
INJECTION, SOLUTION INTRAMUSCULAR; INTRAVENOUS
Status: DISPENSED
Start: 2022-11-04

## (undated) RX ORDER — POTASSIUM CHLORIDE 1500 MG/1
TABLET, EXTENDED RELEASE ORAL
Status: DISPENSED
Start: 2022-05-12

## (undated) RX ORDER — NALOXONE HYDROCHLORIDE 0.4 MG/ML
INJECTION, SOLUTION INTRAMUSCULAR; INTRAVENOUS; SUBCUTANEOUS
Status: DISPENSED
Start: 2022-04-20

## (undated) RX ORDER — HEPARIN SODIUM 1000 [USP'U]/ML
INJECTION, SOLUTION INTRAVENOUS; SUBCUTANEOUS
Status: DISPENSED
Start: 2022-05-23

## (undated) RX ORDER — GLYCOPYRROLATE 0.2 MG/ML
INJECTION, SOLUTION INTRAMUSCULAR; INTRAVENOUS
Status: DISPENSED
Start: 2022-04-20

## (undated) RX ORDER — FUROSEMIDE 10 MG/ML
INJECTION INTRAMUSCULAR; INTRAVENOUS
Status: DISPENSED
Start: 2020-05-13

## (undated) RX ORDER — POTASSIUM CHLORIDE 1500 MG/1
TABLET, EXTENDED RELEASE ORAL
Status: DISPENSED
Start: 2022-04-22

## (undated) RX ORDER — FENTANYL CITRATE 50 UG/ML
INJECTION, SOLUTION INTRAMUSCULAR; INTRAVENOUS
Status: DISPENSED
Start: 2022-04-22

## (undated) RX ORDER — HYDROMORPHONE HCL IN WATER/PF 6 MG/30 ML
PATIENT CONTROLLED ANALGESIA SYRINGE INTRAVENOUS
Status: DISPENSED
Start: 2022-04-01

## (undated) RX ORDER — FENTANYL CITRATE 50 UG/ML
INJECTION, SOLUTION INTRAMUSCULAR; INTRAVENOUS
Status: DISPENSED
Start: 2020-05-13

## (undated) RX ORDER — VERAPAMIL HYDROCHLORIDE 2.5 MG/ML
INJECTION, SOLUTION INTRAVENOUS
Status: DISPENSED
Start: 2022-05-23

## (undated) RX ORDER — LIDOCAINE HYDROCHLORIDE 10 MG/ML
INJECTION, SOLUTION EPIDURAL; INFILTRATION; INTRACAUDAL; PERINEURAL
Status: DISPENSED
Start: 2022-05-23

## (undated) RX ORDER — LIDOCAINE HYDROCHLORIDE 40 MG/ML
SOLUTION TOPICAL
Status: DISPENSED
Start: 2022-04-20

## (undated) RX ORDER — LIDOCAINE HYDROCHLORIDE 20 MG/ML
INJECTION, SOLUTION EPIDURAL; INFILTRATION; INTRACAUDAL; PERINEURAL
Status: DISPENSED
Start: 2020-05-13

## (undated) RX ORDER — HEPARIN SODIUM 1000 [USP'U]/ML
INJECTION, SOLUTION INTRAVENOUS; SUBCUTANEOUS
Status: DISPENSED
Start: 2022-04-01

## (undated) RX ORDER — PROTAMINE SULFATE 10 MG/ML
INJECTION, SOLUTION INTRAVENOUS
Status: DISPENSED
Start: 2022-11-04

## (undated) RX ORDER — HEPARIN SODIUM 200 [USP'U]/100ML
INJECTION, SOLUTION INTRAVENOUS
Status: DISPENSED
Start: 2022-05-23

## (undated) RX ORDER — ATROPA BELLADONNA AND OPIUM 16.2; 3 MG/1; MG/1
SUPPOSITORY RECTAL
Status: DISPENSED
Start: 2020-05-13

## (undated) RX ORDER — FENTANYL CITRATE 50 UG/ML
INJECTION, SOLUTION INTRAMUSCULAR; INTRAVENOUS
Status: DISPENSED
Start: 2022-05-23

## (undated) RX ORDER — PROPOFOL 10 MG/ML
INJECTION, EMULSION INTRAVENOUS
Status: DISPENSED
Start: 2020-05-13

## (undated) RX ORDER — LIDOCAINE HYDROCHLORIDE 10 MG/ML
INJECTION, SOLUTION EPIDURAL; INFILTRATION; INTRACAUDAL; PERINEURAL
Status: DISPENSED
Start: 2022-04-01

## (undated) RX ORDER — LIDOCAINE HYDROCHLORIDE 20 MG/ML
JELLY TOPICAL
Status: DISPENSED
Start: 2022-07-08

## (undated) RX ORDER — FENTANYL CITRATE 50 UG/ML
INJECTION, SOLUTION INTRAMUSCULAR; INTRAVENOUS
Status: DISPENSED
Start: 2022-07-08

## (undated) RX ORDER — HEPARIN SODIUM 200 [USP'U]/100ML
INJECTION, SOLUTION INTRAVENOUS
Status: DISPENSED
Start: 2022-07-08

## (undated) RX ORDER — POTASSIUM CHLORIDE 1500 MG/1
TABLET, EXTENDED RELEASE ORAL
Status: DISPENSED
Start: 2022-04-20

## (undated) RX ORDER — FLUMAZENIL 0.1 MG/ML
INJECTION, SOLUTION INTRAVENOUS
Status: DISPENSED
Start: 2022-04-20

## (undated) RX ORDER — FENTANYL CITRATE 50 UG/ML
INJECTION, SOLUTION INTRAMUSCULAR; INTRAVENOUS
Status: DISPENSED
Start: 2022-04-20